# Patient Record
Sex: FEMALE | Race: OTHER | Employment: OTHER | URBAN - METROPOLITAN AREA
[De-identification: names, ages, dates, MRNs, and addresses within clinical notes are randomized per-mention and may not be internally consistent; named-entity substitution may affect disease eponyms.]

---

## 2017-05-26 ENCOUNTER — APPOINTMENT (OUTPATIENT)
Dept: GENERAL RADIOLOGY | Age: 82
DRG: 310 | End: 2017-05-26
Payer: MEDICARE

## 2017-05-26 ENCOUNTER — HOSPITAL ENCOUNTER (INPATIENT)
Age: 82
LOS: 1 days | Discharge: HOME | DRG: 310 | End: 2017-05-28
Attending: EMERGENCY MEDICINE | Admitting: INTERNAL MEDICINE
Payer: MEDICARE

## 2017-05-26 DIAGNOSIS — I47.1 PAROXYSMAL SUPRAVENTRICULAR TACHYCARDIA (HCC): Primary | ICD-10-CM

## 2017-05-26 LAB
ALBUMIN SERPL-MCNC: 3.5 G/DL (ref 3.9–4.9)
ALP BLD-CCNC: 90 U/L (ref 40–130)
ALT SERPL-CCNC: 18 U/L (ref 0–33)
ANION GAP SERPL CALCULATED.3IONS-SCNC: 15 MEQ/L (ref 7–13)
AST SERPL-CCNC: 25 U/L (ref 0–35)
BASOPHILS ABSOLUTE: 0 K/UL (ref 0–0.2)
BASOPHILS ABSOLUTE: 0.1 K/UL (ref 0–0.2)
BASOPHILS RELATIVE PERCENT: 0.4 %
BASOPHILS RELATIVE PERCENT: 0.5 %
BILIRUB SERPL-MCNC: 0.2 MG/DL (ref 0–1.2)
BILIRUBIN URINE: NEGATIVE
BLOOD, URINE: NEGATIVE
BUN BLDV-MCNC: 9 MG/DL (ref 8–23)
CALCIUM SERPL-MCNC: 8.7 MG/DL (ref 8.6–10.2)
CHLORIDE BLD-SCNC: 101 MEQ/L (ref 98–107)
CLARITY: CLEAR
CO2: 23 MEQ/L (ref 22–29)
COLOR: YELLOW
CREAT SERPL-MCNC: 0.66 MG/DL (ref 0.5–0.9)
EOSINOPHILS ABSOLUTE: 0 K/UL (ref 0–0.7)
EOSINOPHILS ABSOLUTE: 0.1 K/UL (ref 0–0.7)
EOSINOPHILS RELATIVE PERCENT: 0.3 %
EOSINOPHILS RELATIVE PERCENT: 0.8 %
GFR AFRICAN AMERICAN: >60
GFR NON-AFRICAN AMERICAN: >60
GLOBULIN: 2.4 G/DL (ref 2.3–3.5)
GLUCOSE BLD-MCNC: 192 MG/DL (ref 74–109)
GLUCOSE URINE: NEGATIVE MG/DL
HCT VFR BLD CALC: 30.5 % (ref 37–47)
HCT VFR BLD CALC: 41.7 % (ref 37–47)
HEMOGLOBIN: 13 G/DL (ref 12–16)
HEMOGLOBIN: 9.5 G/DL (ref 12–16)
INR BLD: 1
KETONES, URINE: NEGATIVE MG/DL
LEUKOCYTE ESTERASE, URINE: NEGATIVE
LYMPHOCYTES ABSOLUTE: 1.5 K/UL (ref 1–4.8)
LYMPHOCYTES ABSOLUTE: 1.8 K/UL (ref 1–4.8)
LYMPHOCYTES RELATIVE PERCENT: 13.1 %
LYMPHOCYTES RELATIVE PERCENT: 22.3 %
MCH RBC QN AUTO: 28 PG (ref 27–31.3)
MCH RBC QN AUTO: 28.3 PG (ref 27–31.3)
MCHC RBC AUTO-ENTMCNC: 31.2 % (ref 33–37)
MCHC RBC AUTO-ENTMCNC: 31.2 % (ref 33–37)
MCV RBC AUTO: 89.5 FL (ref 82–100)
MCV RBC AUTO: 90.6 FL (ref 82–100)
MONOCYTES ABSOLUTE: 0.3 K/UL (ref 0.2–0.8)
MONOCYTES ABSOLUTE: 0.5 K/UL (ref 0.2–0.8)
MONOCYTES RELATIVE PERCENT: 4 %
MONOCYTES RELATIVE PERCENT: 4.1 %
NEUTROPHILS ABSOLUTE: 6 K/UL (ref 1.4–6.5)
NEUTROPHILS ABSOLUTE: 9.6 K/UL (ref 1.4–6.5)
NEUTROPHILS RELATIVE PERCENT: 72.4 %
NEUTROPHILS RELATIVE PERCENT: 82.1 %
NITRITE, URINE: NEGATIVE
PDW BLD-RTO: 14.3 % (ref 11.5–14.5)
PDW BLD-RTO: 14.3 % (ref 11.5–14.5)
PH UA: 6.5 (ref 5–9)
PLATELET # BLD: 189 K/UL (ref 130–400)
PLATELET # BLD: 226 K/UL (ref 130–400)
POTASSIUM SERPL-SCNC: 4.5 MEQ/L (ref 3.5–5.1)
PROTEIN UA: ABNORMAL MG/DL
PROTHROMBIN TIME: 10.7 SEC (ref 8.1–13.7)
RBC # BLD: 3.36 M/UL (ref 4.2–5.4)
RBC # BLD: 4.66 M/UL (ref 4.2–5.4)
SODIUM BLD-SCNC: 139 MEQ/L (ref 132–144)
SPECIFIC GRAVITY UA: 1.01 (ref 1–1.03)
TOTAL PROTEIN: 5.9 G/DL (ref 6.4–8.1)
URINE REFLEX TO CULTURE: ABNORMAL
UROBILINOGEN, URINE: 0.2 E.U./DL
WBC # BLD: 11.7 K/UL (ref 4.8–10.8)
WBC # BLD: 8.3 K/UL (ref 4.8–10.8)

## 2017-05-26 PROCEDURE — G0378 HOSPITAL OBSERVATION PER HR: HCPCS

## 2017-05-26 PROCEDURE — 96374 THER/PROPH/DIAG INJ IV PUSH: CPT

## 2017-05-26 PROCEDURE — 2500000003 HC RX 250 WO HCPCS: Performed by: EMERGENCY MEDICINE

## 2017-05-26 PROCEDURE — 2580000003 HC RX 258: Performed by: EMERGENCY MEDICINE

## 2017-05-26 PROCEDURE — 85025 COMPLETE CBC W/AUTO DIFF WBC: CPT

## 2017-05-26 PROCEDURE — 71010 XR CHEST PORTABLE: CPT

## 2017-05-26 PROCEDURE — 85610 PROTHROMBIN TIME: CPT

## 2017-05-26 PROCEDURE — 81003 URINALYSIS AUTO W/O SCOPE: CPT

## 2017-05-26 PROCEDURE — 99285 EMERGENCY DEPT VISIT HI MDM: CPT

## 2017-05-26 PROCEDURE — 6360000002 HC RX W HCPCS: Performed by: EMERGENCY MEDICINE

## 2017-05-26 PROCEDURE — 93005 ELECTROCARDIOGRAM TRACING: CPT

## 2017-05-26 PROCEDURE — 80053 COMPREHEN METABOLIC PANEL: CPT

## 2017-05-26 PROCEDURE — 96372 THER/PROPH/DIAG INJ SC/IM: CPT

## 2017-05-26 PROCEDURE — 36415 COLL VENOUS BLD VENIPUNCTURE: CPT

## 2017-05-26 RX ORDER — ACETAMINOPHEN 325 MG/1
650 TABLET ORAL EVERY 4 HOURS PRN
Status: DISCONTINUED | OUTPATIENT
Start: 2017-05-26 | End: 2017-05-28 | Stop reason: HOSPADM

## 2017-05-26 RX ORDER — ACETAMINOPHEN 160 MG
TABLET,DISINTEGRATING ORAL DAILY
COMMUNITY
End: 2017-10-30

## 2017-05-26 RX ORDER — DILTIAZEM HYDROCHLORIDE 5 MG/ML
10 INJECTION INTRAVENOUS ONCE
Status: COMPLETED | OUTPATIENT
Start: 2017-05-26 | End: 2017-05-26

## 2017-05-26 RX ORDER — SOLIFENACIN SUCCINATE 5 MG/1
5 TABLET, FILM COATED ORAL DAILY
Status: ON HOLD | COMMUNITY
End: 2017-05-28 | Stop reason: SDUPTHER

## 2017-05-26 RX ORDER — SODIUM CHLORIDE 0.9 % (FLUSH) 0.9 %
10 SYRINGE (ML) INJECTION PRN
Status: DISCONTINUED | OUTPATIENT
Start: 2017-05-26 | End: 2017-05-28 | Stop reason: HOSPADM

## 2017-05-26 RX ORDER — ALENDRONATE SODIUM 70 MG/1
70 TABLET ORAL
Status: ON HOLD | COMMUNITY
End: 2017-05-28 | Stop reason: SDUPTHER

## 2017-05-26 RX ORDER — CYANOCOBALAMIN (VITAMIN B-12) 1000 MCG
1000 TABLET, EXTENDED RELEASE ORAL DAILY
COMMUNITY
End: 2017-10-30

## 2017-05-26 RX ORDER — MORPHINE SULFATE 4 MG/ML
4 INJECTION, SOLUTION INTRAMUSCULAR; INTRAVENOUS
Status: DISCONTINUED | OUTPATIENT
Start: 2017-05-26 | End: 2017-05-28 | Stop reason: HOSPADM

## 2017-05-26 RX ORDER — MORPHINE SULFATE 2 MG/ML
2 INJECTION, SOLUTION INTRAMUSCULAR; INTRAVENOUS
Status: DISCONTINUED | OUTPATIENT
Start: 2017-05-26 | End: 2017-05-28 | Stop reason: HOSPADM

## 2017-05-26 RX ORDER — OMEPRAZOLE 20 MG/1
20 CAPSULE, DELAYED RELEASE ORAL DAILY
Status: ON HOLD | COMMUNITY
End: 2017-05-28 | Stop reason: SDUPTHER

## 2017-05-26 RX ORDER — SODIUM CHLORIDE 0.9 % (FLUSH) 0.9 %
10 SYRINGE (ML) INJECTION EVERY 12 HOURS SCHEDULED
Status: DISCONTINUED | OUTPATIENT
Start: 2017-05-26 | End: 2017-05-28 | Stop reason: HOSPADM

## 2017-05-26 RX ORDER — RANITIDINE 150 MG/1
150 CAPSULE ORAL 2 TIMES DAILY
Status: ON HOLD | COMMUNITY
End: 2017-05-28 | Stop reason: SDUPTHER

## 2017-05-26 RX ADMIN — DILTIAZEM HYDROCHLORIDE 10 MG: 5 INJECTION INTRAVENOUS at 17:39

## 2017-05-26 RX ADMIN — DILTIAZEM HYDROCHLORIDE 10 MG: 5 INJECTION INTRAVENOUS at 17:23

## 2017-05-26 RX ADMIN — ENOXAPARIN SODIUM 40 MG: 100 INJECTION SUBCUTANEOUS at 19:24

## 2017-05-26 RX ADMIN — DILTIAZEM HYDROCHLORIDE 10 MG/HR: 5 INJECTION INTRAVENOUS at 17:44

## 2017-05-26 ASSESSMENT — ENCOUNTER SYMPTOMS
ANAL BLEEDING: 0
SHORTNESS OF BREATH: 0
NAUSEA: 0
FACIAL SWELLING: 0
VOICE CHANGE: 0
BACK PAIN: 0
TROUBLE SWALLOWING: 0
COLOR CHANGE: 0
ABDOMINAL DISTENTION: 0
PHOTOPHOBIA: 0
COUGH: 0
BLOOD IN STOOL: 0
EYE ITCHING: 0
DIARRHEA: 0
SINUS PRESSURE: 0
ABDOMINAL PAIN: 0
RHINORRHEA: 0
VOMITING: 0
EYE PAIN: 0
CHEST TIGHTNESS: 0
EYE REDNESS: 0
WHEEZING: 0
CHOKING: 0
SORE THROAT: 0
CONSTIPATION: 0
EYE DISCHARGE: 0
STRIDOR: 0

## 2017-05-26 ASSESSMENT — PAIN DESCRIPTION - FREQUENCY: FREQUENCY: CONTINUOUS

## 2017-05-26 ASSESSMENT — PAIN SCALES - GENERAL
PAINLEVEL_OUTOF10: 0
PAINLEVEL_OUTOF10: 8

## 2017-05-26 ASSESSMENT — PAIN DESCRIPTION - ONSET: ONSET: SUDDEN

## 2017-05-26 ASSESSMENT — PAIN DESCRIPTION - LOCATION: LOCATION: CHEST

## 2017-05-26 ASSESSMENT — PAIN DESCRIPTION - DESCRIPTORS: DESCRIPTORS: SHARP;TIGHTNESS;THROBBING

## 2017-05-26 ASSESSMENT — PAIN DESCRIPTION - PAIN TYPE: TYPE: ACUTE PAIN

## 2017-05-27 PROBLEM — I47.1 PAROXYSMAL SVT (SUPRAVENTRICULAR TACHYCARDIA) (HCC): Status: ACTIVE | Noted: 2017-05-27

## 2017-05-27 PROBLEM — E66.9 NON MORBID OBESITY: Status: ACTIVE | Noted: 2017-05-27

## 2017-05-27 PROBLEM — I47.10 PAROXYSMAL SVT (SUPRAVENTRICULAR TACHYCARDIA) (HCC): Status: ACTIVE | Noted: 2017-05-27

## 2017-05-27 PROBLEM — E11.9 TYPE 2 DIABETES MELLITUS (HCC): Status: ACTIVE | Noted: 2017-05-27

## 2017-05-27 PROBLEM — I10 ESSENTIAL HYPERTENSION: Status: ACTIVE | Noted: 2017-05-27

## 2017-05-27 LAB
GLUCOSE BLD-MCNC: 129 MG/DL (ref 60–115)
GLUCOSE BLD-MCNC: 140 MG/DL (ref 60–115)
GLUCOSE BLD-MCNC: 185 MG/DL (ref 60–115)
GLUCOSE BLD-MCNC: 189 MG/DL (ref 60–115)
LV EF: 60 %
LVEF MODALITY: NORMAL
MAGNESIUM: 2 MG/DL (ref 1.7–2.3)
PERFORMED ON: ABNORMAL
TROPONIN: <0.01 NG/ML (ref 0–0.01)

## 2017-05-27 PROCEDURE — 84484 ASSAY OF TROPONIN QUANT: CPT

## 2017-05-27 PROCEDURE — 2580000003 HC RX 258: Performed by: EMERGENCY MEDICINE

## 2017-05-27 PROCEDURE — 2700000000 HC OXYGEN THERAPY PER DAY

## 2017-05-27 PROCEDURE — 6360000002 HC RX W HCPCS: Performed by: EMERGENCY MEDICINE

## 2017-05-27 PROCEDURE — 36415 COLL VENOUS BLD VENIPUNCTURE: CPT

## 2017-05-27 PROCEDURE — 2500000003 HC RX 250 WO HCPCS: Performed by: EMERGENCY MEDICINE

## 2017-05-27 PROCEDURE — 93306 TTE W/DOPPLER COMPLETE: CPT

## 2017-05-27 PROCEDURE — 83735 ASSAY OF MAGNESIUM: CPT

## 2017-05-27 PROCEDURE — 1210000000 HC MED SURG R&B

## 2017-05-27 PROCEDURE — 99222 1ST HOSP IP/OBS MODERATE 55: CPT | Performed by: INTERNAL MEDICINE

## 2017-05-27 PROCEDURE — 6370000000 HC RX 637 (ALT 250 FOR IP): Performed by: PHYSICIAN ASSISTANT

## 2017-05-27 RX ORDER — PANTOPRAZOLE SODIUM 40 MG/1
40 TABLET, DELAYED RELEASE ORAL
Status: DISCONTINUED | OUTPATIENT
Start: 2017-05-28 | End: 2017-05-28 | Stop reason: HOSPADM

## 2017-05-27 RX ORDER — FAMOTIDINE 20 MG/1
20 TABLET, FILM COATED ORAL 2 TIMES DAILY
Status: DISCONTINUED | OUTPATIENT
Start: 2017-05-27 | End: 2017-05-28 | Stop reason: HOSPADM

## 2017-05-27 RX ORDER — CHOLECALCIFEROL (VITAMIN D3) 125 MCG
1000 CAPSULE ORAL DAILY
Status: DISCONTINUED | OUTPATIENT
Start: 2017-05-27 | End: 2017-05-28 | Stop reason: HOSPADM

## 2017-05-27 RX ORDER — OXYBUTYNIN CHLORIDE 5 MG/1
5 TABLET, EXTENDED RELEASE ORAL NIGHTLY
Status: DISCONTINUED | OUTPATIENT
Start: 2017-05-27 | End: 2017-05-28 | Stop reason: HOSPADM

## 2017-05-27 RX ORDER — NICOTINE POLACRILEX 4 MG
15 LOZENGE BUCCAL PRN
Status: DISCONTINUED | OUTPATIENT
Start: 2017-05-27 | End: 2017-05-28 | Stop reason: HOSPADM

## 2017-05-27 RX ORDER — ALENDRONATE SODIUM 70 MG/1
70 TABLET ORAL
Status: DISCONTINUED | OUTPATIENT
Start: 2017-05-27 | End: 2017-05-27 | Stop reason: CLARIF

## 2017-05-27 RX ORDER — DEXTROSE MONOHYDRATE 50 MG/ML
100 INJECTION, SOLUTION INTRAVENOUS PRN
Status: DISCONTINUED | OUTPATIENT
Start: 2017-05-27 | End: 2017-05-28 | Stop reason: HOSPADM

## 2017-05-27 RX ORDER — DEXTROSE MONOHYDRATE 25 G/50ML
12.5 INJECTION, SOLUTION INTRAVENOUS PRN
Status: DISCONTINUED | OUTPATIENT
Start: 2017-05-27 | End: 2017-05-28 | Stop reason: HOSPADM

## 2017-05-27 RX ADMIN — ENOXAPARIN SODIUM 40 MG: 100 INJECTION SUBCUTANEOUS at 08:26

## 2017-05-27 RX ADMIN — OXYBUTYNIN CHLORIDE 5 MG: 5 TABLET, FILM COATED, EXTENDED RELEASE ORAL at 21:36

## 2017-05-27 RX ADMIN — DILTIAZEM HYDROCHLORIDE 10 MG/HR: 5 INJECTION INTRAVENOUS at 04:50

## 2017-05-27 RX ADMIN — METFORMIN HYDROCHLORIDE 500 MG: 500 TABLET ORAL at 18:15

## 2017-05-27 RX ADMIN — Medication 10 ML: at 21:36

## 2017-05-27 RX ADMIN — DILTIAZEM HYDROCHLORIDE 30 MG: 30 TABLET, FILM COATED ORAL at 10:46

## 2017-05-27 RX ADMIN — DILTIAZEM HYDROCHLORIDE 30 MG: 30 TABLET, FILM COATED ORAL at 18:15

## 2017-05-27 RX ADMIN — METFORMIN HYDROCHLORIDE 500 MG: 500 TABLET ORAL at 09:48

## 2017-05-27 RX ADMIN — LINAGLIPTIN 5 MG: 5 TABLET, FILM COATED ORAL at 09:48

## 2017-05-27 RX ADMIN — Medication 10 ML: at 08:26

## 2017-05-27 RX ADMIN — DILTIAZEM HYDROCHLORIDE 30 MG: 30 TABLET, FILM COATED ORAL at 23:11

## 2017-05-27 RX ADMIN — INSULIN LISPRO 2 UNITS: 100 INJECTION, SOLUTION INTRAVENOUS; SUBCUTANEOUS at 13:40

## 2017-05-27 RX ADMIN — FAMOTIDINE 20 MG: 20 TABLET ORAL at 21:36

## 2017-05-27 RX ADMIN — FAMOTIDINE 20 MG: 20 TABLET ORAL at 09:48

## 2017-05-27 RX ADMIN — CYANOCOBALAMIN TAB 500 MCG 1000 MCG: 500 TAB at 09:48

## 2017-05-27 ASSESSMENT — ENCOUNTER SYMPTOMS
CHEST TIGHTNESS: 0
SHORTNESS OF BREATH: 1
ABDOMINAL PAIN: 0
VOMITING: 0
CONSTIPATION: 0
APNEA: 0
COUGH: 0
NAUSEA: 0
COLOR CHANGE: 0
WHEEZING: 0
DIARRHEA: 0

## 2017-05-28 VITALS
BODY MASS INDEX: 34.54 KG/M2 | SYSTOLIC BLOOD PRESSURE: 149 MMHG | DIASTOLIC BLOOD PRESSURE: 63 MMHG | RESPIRATION RATE: 18 BRPM | OXYGEN SATURATION: 94 % | HEIGHT: 60 IN | TEMPERATURE: 98.1 F | HEART RATE: 85 BPM | WEIGHT: 175.93 LBS

## 2017-05-28 LAB
GLUCOSE BLD-MCNC: 128 MG/DL (ref 60–115)
GLUCOSE BLD-MCNC: 129 MG/DL (ref 60–115)
PERFORMED ON: ABNORMAL
PERFORMED ON: ABNORMAL

## 2017-05-28 PROCEDURE — 99217 PR OBSERVATION CARE DISCHARGE MANAGEMENT: CPT | Performed by: PHYSICIAN ASSISTANT

## 2017-05-28 PROCEDURE — 2580000003 HC RX 258: Performed by: EMERGENCY MEDICINE

## 2017-05-28 PROCEDURE — 6360000002 HC RX W HCPCS: Performed by: EMERGENCY MEDICINE

## 2017-05-28 PROCEDURE — 6370000000 HC RX 637 (ALT 250 FOR IP): Performed by: PHYSICIAN ASSISTANT

## 2017-05-28 RX ORDER — DILTIAZEM HYDROCHLORIDE 180 MG/1
180 CAPSULE, COATED, EXTENDED RELEASE ORAL DAILY
Qty: 30 CAPSULE | Refills: 3 | Status: SHIPPED | OUTPATIENT
Start: 2017-05-28 | End: 2017-08-04 | Stop reason: SDUPTHER

## 2017-05-28 RX ORDER — DILTIAZEM HYDROCHLORIDE 180 MG/1
180 CAPSULE, COATED, EXTENDED RELEASE ORAL DAILY
Qty: 30 CAPSULE | Refills: 3 | Status: SHIPPED | OUTPATIENT
Start: 2017-05-28 | End: 2017-05-28 | Stop reason: SDUPTHER

## 2017-05-28 RX ORDER — OMEPRAZOLE 20 MG/1
20 CAPSULE, DELAYED RELEASE ORAL DAILY
Qty: 30 CAPSULE | Refills: 3 | Status: SHIPPED | OUTPATIENT
Start: 2017-05-28 | End: 2017-09-14 | Stop reason: SDUPTHER

## 2017-05-28 RX ORDER — ALENDRONATE SODIUM 70 MG/1
70 TABLET ORAL
Qty: 4 TABLET | Refills: 3 | Status: SHIPPED | OUTPATIENT
Start: 2017-05-28 | End: 2017-08-04 | Stop reason: SDUPTHER

## 2017-05-28 RX ORDER — SOLIFENACIN SUCCINATE 5 MG/1
5 TABLET, FILM COATED ORAL DAILY
Qty: 30 TABLET | Refills: 3 | Status: SHIPPED | OUTPATIENT
Start: 2017-05-28 | End: 2017-09-14

## 2017-05-28 RX ORDER — RANITIDINE 150 MG/1
150 CAPSULE ORAL 2 TIMES DAILY
Qty: 60 CAPSULE | Refills: 3 | Status: SHIPPED | OUTPATIENT
Start: 2017-05-28 | End: 2017-08-04 | Stop reason: SDUPTHER

## 2017-05-28 RX ADMIN — FAMOTIDINE 20 MG: 20 TABLET ORAL at 09:09

## 2017-05-28 RX ADMIN — DILTIAZEM HYDROCHLORIDE 30 MG: 30 TABLET, FILM COATED ORAL at 06:12

## 2017-05-28 RX ADMIN — DILTIAZEM HYDROCHLORIDE 30 MG: 30 TABLET, FILM COATED ORAL at 12:07

## 2017-05-28 RX ADMIN — PANTOPRAZOLE SODIUM 40 MG: 40 TABLET, DELAYED RELEASE ORAL at 09:09

## 2017-05-28 RX ADMIN — CYANOCOBALAMIN TAB 500 MCG 1000 MCG: 500 TAB at 09:09

## 2017-05-28 RX ADMIN — LINAGLIPTIN 5 MG: 5 TABLET, FILM COATED ORAL at 09:09

## 2017-05-28 RX ADMIN — Medication 10 ML: at 09:12

## 2017-05-28 RX ADMIN — METFORMIN HYDROCHLORIDE 500 MG: 500 TABLET ORAL at 09:09

## 2017-05-28 RX ADMIN — ENOXAPARIN SODIUM 40 MG: 100 INJECTION SUBCUTANEOUS at 09:10

## 2017-05-31 LAB
EKG ATRIAL RATE: 187 BPM
EKG ATRIAL RATE: 91 BPM
EKG P AXIS: 66 DEGREES
EKG P-R INTERVAL: 172 MS
EKG Q-T INTERVAL: 260 MS
EKG Q-T INTERVAL: 366 MS
EKG QRS DURATION: 68 MS
EKG QRS DURATION: 76 MS
EKG QTC CALCULATION (BAZETT): 447 MS
EKG QTC CALCULATION (BAZETT): 450 MS
EKG R AXIS: 10 DEGREES
EKG R AXIS: 47 DEGREES
EKG T AXIS: 27 DEGREES
EKG T AXIS: 92 DEGREES
EKG VENTRICULAR RATE: 178 BPM
EKG VENTRICULAR RATE: 91 BPM

## 2017-08-04 ENCOUNTER — OFFICE VISIT (OUTPATIENT)
Dept: FAMILY MEDICINE CLINIC | Age: 82
End: 2017-08-04

## 2017-08-04 VITALS
RESPIRATION RATE: 12 BRPM | OXYGEN SATURATION: 97 % | BODY MASS INDEX: 34.63 KG/M2 | SYSTOLIC BLOOD PRESSURE: 122 MMHG | TEMPERATURE: 96.9 F | WEIGHT: 165 LBS | HEIGHT: 58 IN | HEART RATE: 76 BPM | DIASTOLIC BLOOD PRESSURE: 80 MMHG

## 2017-08-04 DIAGNOSIS — I10 ESSENTIAL HYPERTENSION: ICD-10-CM

## 2017-08-04 DIAGNOSIS — M54.50 CHRONIC MIDLINE LOW BACK PAIN WITHOUT SCIATICA: ICD-10-CM

## 2017-08-04 DIAGNOSIS — M15.9 PRIMARY OSTEOARTHRITIS INVOLVING MULTIPLE JOINTS: ICD-10-CM

## 2017-08-04 DIAGNOSIS — E11.9 TYPE 2 DIABETES MELLITUS WITHOUT COMPLICATION, WITHOUT LONG-TERM CURRENT USE OF INSULIN (HCC): Primary | ICD-10-CM

## 2017-08-04 DIAGNOSIS — R26.9 ABNORMAL GAIT: ICD-10-CM

## 2017-08-04 DIAGNOSIS — G89.29 CHRONIC MIDLINE LOW BACK PAIN WITHOUT SCIATICA: ICD-10-CM

## 2017-08-04 DIAGNOSIS — K21.9 GASTROESOPHAGEAL REFLUX DISEASE WITHOUT ESOPHAGITIS: ICD-10-CM

## 2017-08-04 DIAGNOSIS — I47.1 PAROXYSMAL SUPRAVENTRICULAR TACHYCARDIA (HCC): ICD-10-CM

## 2017-08-04 DIAGNOSIS — Z91.81 RISK FOR FALLS: ICD-10-CM

## 2017-08-04 DIAGNOSIS — M85.80 OSTEOPENIA, UNSPECIFIED LOCATION: ICD-10-CM

## 2017-08-04 PROBLEM — M15.0 PRIMARY OSTEOARTHRITIS INVOLVING MULTIPLE JOINTS: Status: ACTIVE | Noted: 2017-08-04

## 2017-08-04 PROCEDURE — 99203 OFFICE O/P NEW LOW 30 MIN: CPT | Performed by: FAMILY MEDICINE

## 2017-08-04 RX ORDER — OMEPRAZOLE 20 MG/1
20 CAPSULE, DELAYED RELEASE ORAL DAILY
Qty: 30 CAPSULE | Refills: 3 | Status: CANCELLED | OUTPATIENT
Start: 2017-08-04

## 2017-08-04 RX ORDER — DILTIAZEM HYDROCHLORIDE 180 MG/1
180 CAPSULE, COATED, EXTENDED RELEASE ORAL DAILY
Qty: 30 CAPSULE | Refills: 3 | Status: ON HOLD | OUTPATIENT
Start: 2017-08-04 | End: 2017-08-15 | Stop reason: HOSPADM

## 2017-08-04 RX ORDER — SOLIFENACIN SUCCINATE 5 MG/1
5 TABLET, FILM COATED ORAL DAILY
Qty: 30 TABLET | Refills: 3 | Status: CANCELLED | OUTPATIENT
Start: 2017-08-04

## 2017-08-04 RX ORDER — IBUPROFEN 400 MG/1
400 TABLET ORAL EVERY 8 HOURS PRN
Qty: 120 TABLET | Refills: 3 | Status: SHIPPED | OUTPATIENT
Start: 2017-08-04 | End: 2018-02-05 | Stop reason: ALTCHOICE

## 2017-08-04 RX ORDER — RANITIDINE 150 MG/1
150 CAPSULE ORAL 2 TIMES DAILY
Qty: 60 CAPSULE | Refills: 3 | Status: SHIPPED | OUTPATIENT
Start: 2017-08-04 | End: 2018-02-23 | Stop reason: SDUPTHER

## 2017-08-04 RX ORDER — ALENDRONATE SODIUM 70 MG/1
70 TABLET ORAL
Qty: 4 TABLET | Refills: 3 | Status: SHIPPED | OUTPATIENT
Start: 2017-08-04 | End: 2018-02-05

## 2017-08-04 RX ORDER — IBUPROFEN 400 MG/1
400 TABLET ORAL EVERY 8 HOURS PRN
COMMUNITY
End: 2017-08-04 | Stop reason: SDUPTHER

## 2017-08-04 ASSESSMENT — PATIENT HEALTH QUESTIONNAIRE - PHQ9
SUM OF ALL RESPONSES TO PHQ9 QUESTIONS 1 & 2: 2
2. FEELING DOWN, DEPRESSED OR HOPELESS: 1
SUM OF ALL RESPONSES TO PHQ QUESTIONS 1-9: 2
1. LITTLE INTEREST OR PLEASURE IN DOING THINGS: 1

## 2017-08-04 ASSESSMENT — ENCOUNTER SYMPTOMS
BACK PAIN: 1
GASTROINTESTINAL NEGATIVE: 1
RESPIRATORY NEGATIVE: 1
ALLERGIC/IMMUNOLOGIC NEGATIVE: 1

## 2017-08-11 ENCOUNTER — HOSPITAL ENCOUNTER (INPATIENT)
Age: 82
LOS: 4 days | Discharge: HOME | DRG: 274 | End: 2017-08-15
Attending: EMERGENCY MEDICINE | Admitting: INTERNAL MEDICINE
Payer: MEDICARE

## 2017-08-11 DIAGNOSIS — I47.1 SVT (SUPRAVENTRICULAR TACHYCARDIA) (HCC): Primary | ICD-10-CM

## 2017-08-11 PROBLEM — E78.5 DYSLIPIDEMIA: Status: ACTIVE | Noted: 2017-08-11

## 2017-08-11 LAB
ALBUMIN SERPL-MCNC: 3.9 G/DL (ref 3.9–4.9)
ALP BLD-CCNC: 94 U/L (ref 40–130)
ALT SERPL-CCNC: 30 U/L (ref 0–33)
ANION GAP SERPL CALCULATED.3IONS-SCNC: 17 MEQ/L (ref 7–13)
AST SERPL-CCNC: 30 U/L (ref 0–35)
BASOPHILS ABSOLUTE: 0.1 K/UL (ref 0–0.2)
BASOPHILS RELATIVE PERCENT: 0.4 %
BILIRUB SERPL-MCNC: 0.2 MG/DL (ref 0–1.2)
BUN BLDV-MCNC: 9 MG/DL (ref 8–23)
CALCIUM SERPL-MCNC: 8.5 MG/DL (ref 8.6–10.2)
CHLORIDE BLD-SCNC: 101 MEQ/L (ref 98–107)
CO2: 24 MEQ/L (ref 22–29)
CREAT SERPL-MCNC: 0.66 MG/DL (ref 0.5–0.9)
EOSINOPHILS ABSOLUTE: 0 K/UL (ref 0–0.7)
EOSINOPHILS RELATIVE PERCENT: 0.2 %
GFR AFRICAN AMERICAN: >60
GFR NON-AFRICAN AMERICAN: >60
GLOBULIN: 2.5 G/DL (ref 2.3–3.5)
GLUCOSE BLD-MCNC: 191 MG/DL (ref 74–109)
HCT VFR BLD CALC: 42.7 % (ref 37–47)
HEMOGLOBIN: 13.6 G/DL (ref 12–16)
LYMPHOCYTES ABSOLUTE: 1.6 K/UL (ref 1–4.8)
LYMPHOCYTES RELATIVE PERCENT: 13.8 %
MCH RBC QN AUTO: 27.9 PG (ref 27–31.3)
MCHC RBC AUTO-ENTMCNC: 31.9 % (ref 33–37)
MCV RBC AUTO: 87.5 FL (ref 82–100)
MONOCYTES ABSOLUTE: 0.4 K/UL (ref 0.2–0.8)
MONOCYTES RELATIVE PERCENT: 3.4 %
NEUTROPHILS ABSOLUTE: 9.8 K/UL (ref 1.4–6.5)
NEUTROPHILS RELATIVE PERCENT: 82.2 %
PDW BLD-RTO: 14.9 % (ref 11.5–14.5)
PLATELET # BLD: 274 K/UL (ref 130–400)
POTASSIUM SERPL-SCNC: 4.8 MEQ/L (ref 3.5–5.1)
PRO-BNP: 610 PG/ML
RBC # BLD: 4.88 M/UL (ref 4.2–5.4)
SODIUM BLD-SCNC: 142 MEQ/L (ref 132–144)
TOTAL PROTEIN: 6.4 G/DL (ref 6.4–8.1)
TROPONIN: <0.01 NG/ML (ref 0–0.01)
TROPONIN: <0.01 NG/ML (ref 0–0.01)
WBC # BLD: 11.9 K/UL (ref 4.8–10.8)

## 2017-08-11 PROCEDURE — 85025 COMPLETE CBC W/AUTO DIFF WBC: CPT

## 2017-08-11 PROCEDURE — 99223 1ST HOSP IP/OBS HIGH 75: CPT | Performed by: INTERNAL MEDICINE

## 2017-08-11 PROCEDURE — 93005 ELECTROCARDIOGRAM TRACING: CPT

## 2017-08-11 PROCEDURE — 80053 COMPREHEN METABOLIC PANEL: CPT

## 2017-08-11 PROCEDURE — 2060000000 HC ICU INTERMEDIATE R&B

## 2017-08-11 PROCEDURE — 6360000002 HC RX W HCPCS: Performed by: PHYSICIAN ASSISTANT

## 2017-08-11 PROCEDURE — 2500000003 HC RX 250 WO HCPCS: Performed by: EMERGENCY MEDICINE

## 2017-08-11 PROCEDURE — 83880 ASSAY OF NATRIURETIC PEPTIDE: CPT

## 2017-08-11 PROCEDURE — 2580000003 HC RX 258

## 2017-08-11 PROCEDURE — 84484 ASSAY OF TROPONIN QUANT: CPT

## 2017-08-11 PROCEDURE — 93010 ELECTROCARDIOGRAM REPORT: CPT | Performed by: INTERNAL MEDICINE

## 2017-08-11 PROCEDURE — 96374 THER/PROPH/DIAG INJ IV PUSH: CPT

## 2017-08-11 PROCEDURE — 2580000003 HC RX 258: Performed by: EMERGENCY MEDICINE

## 2017-08-11 PROCEDURE — 36415 COLL VENOUS BLD VENIPUNCTURE: CPT

## 2017-08-11 PROCEDURE — 6370000000 HC RX 637 (ALT 250 FOR IP): Performed by: PHYSICIAN ASSISTANT

## 2017-08-11 PROCEDURE — 99285 EMERGENCY DEPT VISIT HI MDM: CPT

## 2017-08-11 RX ORDER — DILTIAZEM HYDROCHLORIDE 5 MG/ML
10 INJECTION INTRAVENOUS ONCE
Status: COMPLETED | OUTPATIENT
Start: 2017-08-11 | End: 2017-08-11

## 2017-08-11 RX ORDER — ATORVASTATIN CALCIUM 20 MG/1
20 TABLET, FILM COATED ORAL NIGHTLY
Status: DISCONTINUED | OUTPATIENT
Start: 2017-08-11 | End: 2017-08-13

## 2017-08-11 RX ORDER — NITROGLYCERIN 0.4 MG/1
0.4 TABLET SUBLINGUAL EVERY 5 MIN PRN
Status: DISCONTINUED | OUTPATIENT
Start: 2017-08-11 | End: 2017-08-15 | Stop reason: HOSPADM

## 2017-08-11 RX ORDER — ACETAMINOPHEN 325 MG/1
650 TABLET ORAL EVERY 4 HOURS PRN
Status: DISCONTINUED | OUTPATIENT
Start: 2017-08-11 | End: 2017-08-15 | Stop reason: HOSPADM

## 2017-08-11 RX ORDER — SODIUM CHLORIDE 9 MG/ML
INJECTION, SOLUTION INTRAVENOUS
Status: COMPLETED
Start: 2017-08-11 | End: 2017-08-11

## 2017-08-11 RX ORDER — PANTOPRAZOLE SODIUM 40 MG/1
40 TABLET, DELAYED RELEASE ORAL
Status: DISCONTINUED | OUTPATIENT
Start: 2017-08-12 | End: 2017-08-15 | Stop reason: HOSPADM

## 2017-08-11 RX ORDER — MORPHINE SULFATE 4 MG/ML
2 INJECTION, SOLUTION INTRAMUSCULAR; INTRAVENOUS
Status: DISCONTINUED | OUTPATIENT
Start: 2017-08-11 | End: 2017-08-15 | Stop reason: HOSPADM

## 2017-08-11 RX ORDER — ONDANSETRON 2 MG/ML
4 INJECTION INTRAMUSCULAR; INTRAVENOUS EVERY 6 HOURS PRN
Status: DISCONTINUED | OUTPATIENT
Start: 2017-08-11 | End: 2017-08-15 | Stop reason: HOSPADM

## 2017-08-11 RX ORDER — ALENDRONATE SODIUM 70 MG/1
70 TABLET ORAL
Status: DISCONTINUED | OUTPATIENT
Start: 2017-08-11 | End: 2017-08-11 | Stop reason: CLARIF

## 2017-08-11 RX ORDER — SODIUM CHLORIDE 0.9 % (FLUSH) 0.9 %
10 SYRINGE (ML) INJECTION PRN
Status: DISCONTINUED | OUTPATIENT
Start: 2017-08-11 | End: 2017-08-15 | Stop reason: HOSPADM

## 2017-08-11 RX ORDER — 0.9 % SODIUM CHLORIDE 0.9 %
500 INTRAVENOUS SOLUTION INTRAVENOUS ONCE
Status: COMPLETED | OUTPATIENT
Start: 2017-08-11 | End: 2017-08-11

## 2017-08-11 RX ORDER — OXYBUTYNIN CHLORIDE 5 MG/1
5 TABLET, EXTENDED RELEASE ORAL NIGHTLY
Status: DISCONTINUED | OUTPATIENT
Start: 2017-08-11 | End: 2017-08-15 | Stop reason: HOSPADM

## 2017-08-11 RX ORDER — SODIUM CHLORIDE 0.9 % (FLUSH) 0.9 %
10 SYRINGE (ML) INJECTION EVERY 12 HOURS SCHEDULED
Status: DISCONTINUED | OUTPATIENT
Start: 2017-08-11 | End: 2017-08-15 | Stop reason: HOSPADM

## 2017-08-11 RX ORDER — MORPHINE SULFATE 4 MG/ML
4 INJECTION, SOLUTION INTRAMUSCULAR; INTRAVENOUS
Status: DISCONTINUED | OUTPATIENT
Start: 2017-08-11 | End: 2017-08-15 | Stop reason: HOSPADM

## 2017-08-11 RX ORDER — CHOLECALCIFEROL (VITAMIN D3) 125 MCG
1000 CAPSULE ORAL DAILY
Status: DISCONTINUED | OUTPATIENT
Start: 2017-08-11 | End: 2017-08-15 | Stop reason: HOSPADM

## 2017-08-11 RX ORDER — FAMOTIDINE 20 MG/1
20 TABLET, FILM COATED ORAL 2 TIMES DAILY
Status: DISCONTINUED | OUTPATIENT
Start: 2017-08-11 | End: 2017-08-15 | Stop reason: HOSPADM

## 2017-08-11 RX ORDER — SODIUM CHLORIDE 9 MG/ML
INJECTION, SOLUTION INTRAVENOUS CONTINUOUS
Status: DISCONTINUED | OUTPATIENT
Start: 2017-08-11 | End: 2017-08-12

## 2017-08-11 RX ADMIN — FAMOTIDINE 20 MG: 20 TABLET ORAL at 20:54

## 2017-08-11 RX ADMIN — SODIUM CHLORIDE: 9 INJECTION, SOLUTION INTRAVENOUS at 20:05

## 2017-08-11 RX ADMIN — SODIUM CHLORIDE 500 ML: 9 INJECTION, SOLUTION INTRAVENOUS at 13:08

## 2017-08-11 RX ADMIN — ENOXAPARIN SODIUM 40 MG: 40 INJECTION SUBCUTANEOUS at 18:40

## 2017-08-11 RX ADMIN — DILTIAZEM HYDROCHLORIDE 10 MG: 5 INJECTION INTRAVENOUS at 13:37

## 2017-08-11 RX ADMIN — DILTIAZEM HYDROCHLORIDE 10 MG: 5 INJECTION INTRAVENOUS at 13:08

## 2017-08-11 RX ADMIN — DILTIAZEM HYDROCHLORIDE 5 MG/HR: 5 INJECTION INTRAVENOUS at 14:06

## 2017-08-11 RX ADMIN — ATORVASTATIN CALCIUM 20 MG: 20 TABLET, FILM COATED ORAL at 20:54

## 2017-08-11 RX ADMIN — OXYBUTYNIN CHLORIDE 5 MG: 5 TABLET, FILM COATED, EXTENDED RELEASE ORAL at 20:54

## 2017-08-11 RX ADMIN — SODIUM CHLORIDE: 9 INJECTION, SOLUTION INTRAVENOUS at 13:11

## 2017-08-11 ASSESSMENT — ENCOUNTER SYMPTOMS
DIARRHEA: 0
CHEST TIGHTNESS: 0
ABDOMINAL PAIN: 0
SHORTNESS OF BREATH: 0
BLOOD IN STOOL: 0
COLOR CHANGE: 0
SORE THROAT: 0
VOMITING: 0
APNEA: 0
WHEEZING: 0
NAUSEA: 0
EYE PAIN: 0
COUGH: 0
CONSTIPATION: 0

## 2017-08-11 ASSESSMENT — PAIN SCALES - GENERAL
PAINLEVEL_OUTOF10: 0
PAINLEVEL_OUTOF10: 0

## 2017-08-12 LAB
ANION GAP SERPL CALCULATED.3IONS-SCNC: 14 MEQ/L (ref 7–13)
BUN BLDV-MCNC: 9 MG/DL (ref 8–23)
CALCIUM SERPL-MCNC: 7.9 MG/DL (ref 8.6–10.2)
CHLORIDE BLD-SCNC: 102 MEQ/L (ref 98–107)
CHOLESTEROL, TOTAL: 229 MG/DL (ref 0–199)
CO2: 25 MEQ/L (ref 22–29)
CREAT SERPL-MCNC: 0.6 MG/DL (ref 0.5–0.9)
GFR AFRICAN AMERICAN: >60
GFR NON-AFRICAN AMERICAN: >60
GLUCOSE BLD-MCNC: 172 MG/DL (ref 74–109)
HCT VFR BLD CALC: 36.4 % (ref 37–47)
HDLC SERPL-MCNC: 48 MG/DL (ref 40–59)
HEMOGLOBIN: 11.7 G/DL (ref 12–16)
INR BLD: 1
LDL CHOLESTEROL CALCULATED: 147 MG/DL (ref 0–129)
MAGNESIUM: 1.7 MG/DL (ref 1.7–2.3)
MCH RBC QN AUTO: 28.2 PG (ref 27–31.3)
MCHC RBC AUTO-ENTMCNC: 32.2 % (ref 33–37)
MCV RBC AUTO: 87.6 FL (ref 82–100)
PDW BLD-RTO: 14.9 % (ref 11.5–14.5)
PLATELET # BLD: 220 K/UL (ref 130–400)
POTASSIUM SERPL-SCNC: 4.5 MEQ/L (ref 3.5–5.1)
PROTHROMBIN TIME: 10.7 SEC (ref 8.1–13.7)
RBC # BLD: 4.15 M/UL (ref 4.2–5.4)
SODIUM BLD-SCNC: 141 MEQ/L (ref 132–144)
TRIGL SERPL-MCNC: 168 MG/DL (ref 0–200)
TSH SERPL DL<=0.05 MIU/L-ACNC: 2.01 UIU/ML (ref 0.27–4.2)
WBC # BLD: 9.8 K/UL (ref 4.8–10.8)

## 2017-08-12 PROCEDURE — 6370000000 HC RX 637 (ALT 250 FOR IP): Performed by: PHYSICIAN ASSISTANT

## 2017-08-12 PROCEDURE — 80048 BASIC METABOLIC PNL TOTAL CA: CPT

## 2017-08-12 PROCEDURE — 2700000000 HC OXYGEN THERAPY PER DAY

## 2017-08-12 PROCEDURE — 2580000003 HC RX 258

## 2017-08-12 PROCEDURE — 6360000002 HC RX W HCPCS: Performed by: NURSE PRACTITIONER

## 2017-08-12 PROCEDURE — 2060000000 HC ICU INTERMEDIATE R&B

## 2017-08-12 PROCEDURE — 36415 COLL VENOUS BLD VENIPUNCTURE: CPT

## 2017-08-12 PROCEDURE — 6370000000 HC RX 637 (ALT 250 FOR IP): Performed by: NURSE PRACTITIONER

## 2017-08-12 PROCEDURE — 85610 PROTHROMBIN TIME: CPT

## 2017-08-12 PROCEDURE — 2580000003 HC RX 258: Performed by: EMERGENCY MEDICINE

## 2017-08-12 PROCEDURE — 84443 ASSAY THYROID STIM HORMONE: CPT

## 2017-08-12 PROCEDURE — 2580000003 HC RX 258: Performed by: PHYSICIAN ASSISTANT

## 2017-08-12 PROCEDURE — 80061 LIPID PANEL: CPT

## 2017-08-12 PROCEDURE — 83735 ASSAY OF MAGNESIUM: CPT

## 2017-08-12 PROCEDURE — 2500000003 HC RX 250 WO HCPCS: Performed by: EMERGENCY MEDICINE

## 2017-08-12 PROCEDURE — 93005 ELECTROCARDIOGRAM TRACING: CPT

## 2017-08-12 PROCEDURE — 85027 COMPLETE CBC AUTOMATED: CPT

## 2017-08-12 PROCEDURE — 6360000002 HC RX W HCPCS: Performed by: PHYSICIAN ASSISTANT

## 2017-08-12 RX ORDER — DILTIAZEM HYDROCHLORIDE 180 MG/1
180 CAPSULE, COATED, EXTENDED RELEASE ORAL DAILY
Status: DISCONTINUED | OUTPATIENT
Start: 2017-08-12 | End: 2017-08-12

## 2017-08-12 RX ORDER — MAGNESIUM SULFATE IN WATER 40 MG/ML
2 INJECTION, SOLUTION INTRAVENOUS ONCE
Status: COMPLETED | OUTPATIENT
Start: 2017-08-12 | End: 2017-08-12

## 2017-08-12 RX ORDER — DILTIAZEM HYDROCHLORIDE 180 MG/1
180 CAPSULE, COATED, EXTENDED RELEASE ORAL DAILY
Status: DISCONTINUED | OUTPATIENT
Start: 2017-08-13 | End: 2017-08-14

## 2017-08-12 RX ORDER — SODIUM CHLORIDE 9 MG/ML
INJECTION, SOLUTION INTRAVENOUS
Status: COMPLETED
Start: 2017-08-12 | End: 2017-08-12

## 2017-08-12 RX ADMIN — ENOXAPARIN SODIUM 40 MG: 40 INJECTION SUBCUTANEOUS at 08:31

## 2017-08-12 RX ADMIN — DILTIAZEM HYDROCHLORIDE 2.5 MG/HR: 5 INJECTION INTRAVENOUS at 12:04

## 2017-08-12 RX ADMIN — METFORMIN HYDROCHLORIDE 500 MG: 500 TABLET ORAL at 16:50

## 2017-08-12 RX ADMIN — LINAGLIPTIN 5 MG: 5 TABLET, FILM COATED ORAL at 08:31

## 2017-08-12 RX ADMIN — METFORMIN HYDROCHLORIDE 500 MG: 500 TABLET ORAL at 08:30

## 2017-08-12 RX ADMIN — SODIUM CHLORIDE: 9 INJECTION, SOLUTION INTRAVENOUS at 05:12

## 2017-08-12 RX ADMIN — VITAMIN D, TAB 1000IU (100/BT) 2000 UNITS: 25 TAB at 08:30

## 2017-08-12 RX ADMIN — ATORVASTATIN CALCIUM 20 MG: 20 TABLET, FILM COATED ORAL at 20:42

## 2017-08-12 RX ADMIN — OXYBUTYNIN CHLORIDE 5 MG: 5 TABLET, FILM COATED, EXTENDED RELEASE ORAL at 20:42

## 2017-08-12 RX ADMIN — FAMOTIDINE 20 MG: 20 TABLET ORAL at 20:42

## 2017-08-12 RX ADMIN — CYANOCOBALAMIN TAB 500 MCG 1000 MCG: 500 TAB at 08:30

## 2017-08-12 RX ADMIN — SODIUM CHLORIDE, PRESERVATIVE FREE 10 ML: 5 INJECTION INTRAVENOUS at 08:31

## 2017-08-12 RX ADMIN — FAMOTIDINE 20 MG: 20 TABLET ORAL at 08:31

## 2017-08-12 RX ADMIN — MAGNESIUM SULFATE IN WATER 2 G: 40 INJECTION, SOLUTION INTRAVENOUS at 13:20

## 2017-08-12 RX ADMIN — PANTOPRAZOLE SODIUM 40 MG: 40 TABLET, DELAYED RELEASE ORAL at 06:33

## 2017-08-12 RX ADMIN — DILTIAZEM HYDROCHLORIDE 180 MG: 180 CAPSULE, COATED, EXTENDED RELEASE ORAL at 13:19

## 2017-08-12 RX ADMIN — SODIUM CHLORIDE 1000 ML: 9 INJECTION, SOLUTION INTRAVENOUS at 06:32

## 2017-08-12 ASSESSMENT — PAIN SCALES - GENERAL
PAINLEVEL_OUTOF10: 0

## 2017-08-12 ASSESSMENT — ENCOUNTER SYMPTOMS
ABDOMINAL DISTENTION: 0
COUGH: 0
ABDOMINAL PAIN: 0
CHEST TIGHTNESS: 0
SHORTNESS OF BREATH: 0
SINUS PRESSURE: 0
NAUSEA: 0
BACK PAIN: 0
EYE DISCHARGE: 0
FACIAL SWELLING: 0
DIARRHEA: 0
SORE THROAT: 0
WHEEZING: 0
CONSTIPATION: 0
EYE REDNESS: 0

## 2017-08-13 PROCEDURE — 93005 ELECTROCARDIOGRAM TRACING: CPT

## 2017-08-13 PROCEDURE — 6370000000 HC RX 637 (ALT 250 FOR IP): Performed by: PHYSICIAN ASSISTANT

## 2017-08-13 PROCEDURE — 2580000003 HC RX 258: Performed by: PHYSICIAN ASSISTANT

## 2017-08-13 PROCEDURE — 99232 SBSQ HOSP IP/OBS MODERATE 35: CPT | Performed by: INTERNAL MEDICINE

## 2017-08-13 PROCEDURE — 6360000002 HC RX W HCPCS: Performed by: PHYSICIAN ASSISTANT

## 2017-08-13 PROCEDURE — 6360000002 HC RX W HCPCS: Performed by: NURSE PRACTITIONER

## 2017-08-13 PROCEDURE — 6370000000 HC RX 637 (ALT 250 FOR IP): Performed by: SPECIALIST

## 2017-08-13 PROCEDURE — 6370000000 HC RX 637 (ALT 250 FOR IP): Performed by: NURSE PRACTITIONER

## 2017-08-13 PROCEDURE — 2060000000 HC ICU INTERMEDIATE R&B

## 2017-08-13 RX ORDER — POTASSIUM CHLORIDE 7.45 MG/ML
10 INJECTION INTRAVENOUS PRN
Status: DISCONTINUED | OUTPATIENT
Start: 2017-08-13 | End: 2017-08-15 | Stop reason: HOSPADM

## 2017-08-13 RX ORDER — POTASSIUM CHLORIDE 20MEQ/15ML
40 LIQUID (ML) ORAL PRN
Status: DISCONTINUED | OUTPATIENT
Start: 2017-08-13 | End: 2017-08-15 | Stop reason: HOSPADM

## 2017-08-13 RX ORDER — FUROSEMIDE 10 MG/ML
40 INJECTION INTRAMUSCULAR; INTRAVENOUS ONCE
Status: COMPLETED | OUTPATIENT
Start: 2017-08-13 | End: 2017-08-13

## 2017-08-13 RX ORDER — ATORVASTATIN CALCIUM 40 MG/1
40 TABLET, FILM COATED ORAL NIGHTLY
Status: DISCONTINUED | OUTPATIENT
Start: 2017-08-13 | End: 2017-08-15 | Stop reason: HOSPADM

## 2017-08-13 RX ORDER — POTASSIUM CHLORIDE 20 MEQ/1
40 TABLET, EXTENDED RELEASE ORAL PRN
Status: DISCONTINUED | OUTPATIENT
Start: 2017-08-13 | End: 2017-08-15 | Stop reason: HOSPADM

## 2017-08-13 RX ADMIN — ENOXAPARIN SODIUM 40 MG: 40 INJECTION SUBCUTANEOUS at 09:44

## 2017-08-13 RX ADMIN — METFORMIN HYDROCHLORIDE 500 MG: 500 TABLET ORAL at 17:14

## 2017-08-13 RX ADMIN — SODIUM CHLORIDE, PRESERVATIVE FREE 10 ML: 5 INJECTION INTRAVENOUS at 09:44

## 2017-08-13 RX ADMIN — LINAGLIPTIN 5 MG: 5 TABLET, FILM COATED ORAL at 09:43

## 2017-08-13 RX ADMIN — VITAMIN D, TAB 1000IU (100/BT) 2000 UNITS: 25 TAB at 09:44

## 2017-08-13 RX ADMIN — FAMOTIDINE 20 MG: 20 TABLET ORAL at 21:21

## 2017-08-13 RX ADMIN — FAMOTIDINE 20 MG: 20 TABLET ORAL at 09:44

## 2017-08-13 RX ADMIN — ATORVASTATIN CALCIUM 40 MG: 40 TABLET, FILM COATED ORAL at 21:21

## 2017-08-13 RX ADMIN — METFORMIN HYDROCHLORIDE 500 MG: 500 TABLET ORAL at 09:43

## 2017-08-13 RX ADMIN — PANTOPRAZOLE SODIUM 40 MG: 40 TABLET, DELAYED RELEASE ORAL at 06:43

## 2017-08-13 RX ADMIN — CYANOCOBALAMIN TAB 500 MCG 1000 MCG: 500 TAB at 09:44

## 2017-08-13 RX ADMIN — SODIUM CHLORIDE, PRESERVATIVE FREE 10 ML: 5 INJECTION INTRAVENOUS at 21:22

## 2017-08-13 RX ADMIN — FUROSEMIDE 40 MG: 10 INJECTION, SOLUTION INTRAVENOUS at 09:44

## 2017-08-13 RX ADMIN — OXYBUTYNIN CHLORIDE 5 MG: 5 TABLET, FILM COATED, EXTENDED RELEASE ORAL at 21:21

## 2017-08-13 RX ADMIN — DILTIAZEM HYDROCHLORIDE 180 MG: 180 CAPSULE, COATED, EXTENDED RELEASE ORAL at 09:43

## 2017-08-13 ASSESSMENT — ENCOUNTER SYMPTOMS
EYE REDNESS: 0
SHORTNESS OF BREATH: 0
CONSTIPATION: 0
SORE THROAT: 0
BACK PAIN: 0
EYE DISCHARGE: 0
DIARRHEA: 0
ABDOMINAL DISTENTION: 0
SINUS PRESSURE: 0
ABDOMINAL PAIN: 0
CHEST TIGHTNESS: 0
NAUSEA: 0
WHEEZING: 0
COUGH: 0
FACIAL SWELLING: 0

## 2017-08-13 ASSESSMENT — PAIN SCALES - GENERAL: PAINLEVEL_OUTOF10: 0

## 2017-08-14 ENCOUNTER — APPOINTMENT (OUTPATIENT)
Dept: GENERAL RADIOLOGY | Age: 82
DRG: 274 | End: 2017-08-14
Payer: MEDICARE

## 2017-08-14 ENCOUNTER — APPOINTMENT (OUTPATIENT)
Dept: CARDIAC CATH/INVASIVE PROCEDURES | Age: 82
DRG: 274 | End: 2017-08-14
Payer: MEDICARE

## 2017-08-14 LAB
ANION GAP SERPL CALCULATED.3IONS-SCNC: 15 MEQ/L (ref 7–13)
BUN BLDV-MCNC: 8 MG/DL (ref 8–23)
CALCIUM SERPL-MCNC: 8.7 MG/DL (ref 8.6–10.2)
CHLORIDE BLD-SCNC: 99 MEQ/L (ref 98–107)
CO2: 27 MEQ/L (ref 22–29)
CREAT SERPL-MCNC: 0.57 MG/DL (ref 0.5–0.9)
GFR AFRICAN AMERICAN: >60
GFR NON-AFRICAN AMERICAN: >60
GLUCOSE BLD-MCNC: 155 MG/DL (ref 74–109)
HCT VFR BLD CALC: 41.8 % (ref 37–47)
HEMOGLOBIN: 13.5 G/DL (ref 12–16)
MAGNESIUM: 2.1 MG/DL (ref 1.7–2.3)
MCH RBC QN AUTO: 28.1 PG (ref 27–31.3)
MCHC RBC AUTO-ENTMCNC: 32.3 % (ref 33–37)
MCV RBC AUTO: 87 FL (ref 82–100)
PDW BLD-RTO: 14.6 % (ref 11.5–14.5)
PLATELET # BLD: 255 K/UL (ref 130–400)
POTASSIUM SERPL-SCNC: 4.1 MEQ/L (ref 3.5–5.1)
RBC # BLD: 4.81 M/UL (ref 4.2–5.4)
SODIUM BLD-SCNC: 141 MEQ/L (ref 132–144)
WBC # BLD: 10.6 K/UL (ref 4.8–10.8)

## 2017-08-14 PROCEDURE — G8987 SELF CARE CURRENT STATUS: HCPCS

## 2017-08-14 PROCEDURE — 2500000003 HC RX 250 WO HCPCS

## 2017-08-14 PROCEDURE — 93613 INTRACARDIAC EPHYS 3D MAPG: CPT | Performed by: SPECIALIST

## 2017-08-14 PROCEDURE — 85027 COMPLETE CBC AUTOMATED: CPT

## 2017-08-14 PROCEDURE — 6370000000 HC RX 637 (ALT 250 FOR IP): Performed by: PHYSICIAN ASSISTANT

## 2017-08-14 PROCEDURE — 2580000003 HC RX 258

## 2017-08-14 PROCEDURE — 99232 SBSQ HOSP IP/OBS MODERATE 35: CPT | Performed by: INTERNAL MEDICINE

## 2017-08-14 PROCEDURE — 71010 XR CHEST PORTABLE: CPT

## 2017-08-14 PROCEDURE — 02K83ZZ MAP CONDUCTION MECHANISM, PERCUTANEOUS APPROACH: ICD-10-PCS | Performed by: SPECIALIST

## 2017-08-14 PROCEDURE — 6360000002 HC RX W HCPCS

## 2017-08-14 PROCEDURE — G8978 MOBILITY CURRENT STATUS: HCPCS

## 2017-08-14 PROCEDURE — G8989 SELF CARE D/C STATUS: HCPCS

## 2017-08-14 PROCEDURE — 93620 COMP EP EVL R AT VEN PAC&REC: CPT | Performed by: SPECIALIST

## 2017-08-14 PROCEDURE — 97165 OT EVAL LOW COMPLEX 30 MIN: CPT

## 2017-08-14 PROCEDURE — C1894 INTRO/SHEATH, NON-LASER: HCPCS

## 2017-08-14 PROCEDURE — 97161 PT EVAL LOW COMPLEX 20 MIN: CPT

## 2017-08-14 PROCEDURE — 80048 BASIC METABOLIC PNL TOTAL CA: CPT

## 2017-08-14 PROCEDURE — 2580000003 HC RX 258: Performed by: PHYSICIAN ASSISTANT

## 2017-08-14 PROCEDURE — 93005 ELECTROCARDIOGRAM TRACING: CPT

## 2017-08-14 PROCEDURE — G8980 MOBILITY D/C STATUS: HCPCS

## 2017-08-14 PROCEDURE — 93010 ELECTROCARDIOGRAM REPORT: CPT | Performed by: INTERNAL MEDICINE

## 2017-08-14 PROCEDURE — 6370000000 HC RX 637 (ALT 250 FOR IP): Performed by: SPECIALIST

## 2017-08-14 PROCEDURE — G8979 MOBILITY GOAL STATUS: HCPCS

## 2017-08-14 PROCEDURE — G8988 SELF CARE GOAL STATUS: HCPCS

## 2017-08-14 PROCEDURE — 02583ZZ DESTRUCTION OF CONDUCTION MECHANISM, PERCUTANEOUS APPROACH: ICD-10-PCS | Performed by: SPECIALIST

## 2017-08-14 PROCEDURE — 6360000002 HC RX W HCPCS: Performed by: PHYSICIAN ASSISTANT

## 2017-08-14 PROCEDURE — C1730 CATH, EP, 19 OR FEW ELECT: HCPCS

## 2017-08-14 PROCEDURE — 2060000000 HC ICU INTERMEDIATE R&B

## 2017-08-14 PROCEDURE — 6370000000 HC RX 637 (ALT 250 FOR IP): Performed by: NURSE PRACTITIONER

## 2017-08-14 PROCEDURE — 2720000010 HC SURG SUPPLY STERILE

## 2017-08-14 PROCEDURE — 36415 COLL VENOUS BLD VENIPUNCTURE: CPT

## 2017-08-14 PROCEDURE — 83735 ASSAY OF MAGNESIUM: CPT

## 2017-08-14 RX ORDER — SODIUM CHLORIDE 0.9 % (FLUSH) 0.9 %
10 SYRINGE (ML) INJECTION EVERY 12 HOURS SCHEDULED
Status: DISCONTINUED | OUTPATIENT
Start: 2017-08-14 | End: 2017-08-15 | Stop reason: HOSPADM

## 2017-08-14 RX ORDER — SODIUM CHLORIDE 0.9 % (FLUSH) 0.9 %
10 SYRINGE (ML) INJECTION PRN
Status: DISCONTINUED | OUTPATIENT
Start: 2017-08-14 | End: 2017-08-15 | Stop reason: HOSPADM

## 2017-08-14 RX ADMIN — SODIUM CHLORIDE, PRESERVATIVE FREE 10 ML: 5 INJECTION INTRAVENOUS at 20:47

## 2017-08-14 RX ADMIN — METFORMIN HYDROCHLORIDE 500 MG: 500 TABLET ORAL at 07:51

## 2017-08-14 RX ADMIN — SODIUM CHLORIDE, PRESERVATIVE FREE 10 ML: 5 INJECTION INTRAVENOUS at 07:52

## 2017-08-14 RX ADMIN — VITAMIN D, TAB 1000IU (100/BT) 2000 UNITS: 25 TAB at 07:50

## 2017-08-14 RX ADMIN — ENOXAPARIN SODIUM 40 MG: 40 INJECTION SUBCUTANEOUS at 07:53

## 2017-08-14 RX ADMIN — OXYBUTYNIN CHLORIDE 5 MG: 5 TABLET, FILM COATED, EXTENDED RELEASE ORAL at 20:46

## 2017-08-14 RX ADMIN — CYANOCOBALAMIN TAB 500 MCG 1000 MCG: 500 TAB at 07:51

## 2017-08-14 RX ADMIN — FAMOTIDINE 20 MG: 20 TABLET ORAL at 20:46

## 2017-08-14 RX ADMIN — FAMOTIDINE 20 MG: 20 TABLET ORAL at 07:51

## 2017-08-14 RX ADMIN — ATORVASTATIN CALCIUM 40 MG: 40 TABLET, FILM COATED ORAL at 20:46

## 2017-08-14 RX ADMIN — PANTOPRAZOLE SODIUM 40 MG: 40 TABLET, DELAYED RELEASE ORAL at 06:19

## 2017-08-14 RX ADMIN — LINAGLIPTIN 5 MG: 5 TABLET, FILM COATED ORAL at 07:51

## 2017-08-14 ASSESSMENT — ENCOUNTER SYMPTOMS
COUGH: 0
NAUSEA: 0
EYE DISCHARGE: 0
CHEST TIGHTNESS: 0
SINUS PRESSURE: 0
FACIAL SWELLING: 0
ABDOMINAL DISTENTION: 0
EYE REDNESS: 0
DIARRHEA: 0
BACK PAIN: 0
ABDOMINAL PAIN: 0
CONSTIPATION: 0
SHORTNESS OF BREATH: 0
SORE THROAT: 0
WHEEZING: 0

## 2017-08-14 ASSESSMENT — PAIN SCALES - GENERAL
PAINLEVEL_OUTOF10: 0

## 2017-08-15 VITALS
BODY MASS INDEX: 41.71 KG/M2 | WEIGHT: 185.4 LBS | HEIGHT: 56 IN | HEART RATE: 101 BPM | OXYGEN SATURATION: 99 % | SYSTOLIC BLOOD PRESSURE: 138 MMHG | RESPIRATION RATE: 18 BRPM | DIASTOLIC BLOOD PRESSURE: 68 MMHG | TEMPERATURE: 98.1 F

## 2017-08-15 LAB
ANION GAP SERPL CALCULATED.3IONS-SCNC: 15 MEQ/L (ref 7–13)
BUN BLDV-MCNC: 8 MG/DL (ref 8–23)
CALCIUM SERPL-MCNC: 8.4 MG/DL (ref 8.6–10.2)
CHLORIDE BLD-SCNC: 101 MEQ/L (ref 98–107)
CO2: 27 MEQ/L (ref 22–29)
CREAT SERPL-MCNC: 0.52 MG/DL (ref 0.5–0.9)
GFR AFRICAN AMERICAN: >60
GFR NON-AFRICAN AMERICAN: >60
GLUCOSE BLD-MCNC: 162 MG/DL (ref 74–109)
HCT VFR BLD CALC: 39.4 % (ref 37–47)
HEMOGLOBIN: 12.5 G/DL (ref 12–16)
MAGNESIUM: 2.1 MG/DL (ref 1.7–2.3)
MCH RBC QN AUTO: 27.6 PG (ref 27–31.3)
MCHC RBC AUTO-ENTMCNC: 31.8 % (ref 33–37)
MCV RBC AUTO: 86.8 FL (ref 82–100)
PDW BLD-RTO: 14.5 % (ref 11.5–14.5)
PLATELET # BLD: 210 K/UL (ref 130–400)
POTASSIUM SERPL-SCNC: 4.3 MEQ/L (ref 3.5–5.1)
RBC # BLD: 4.54 M/UL (ref 4.2–5.4)
SODIUM BLD-SCNC: 143 MEQ/L (ref 132–144)
WBC # BLD: 11.2 K/UL (ref 4.8–10.8)

## 2017-08-15 PROCEDURE — 36415 COLL VENOUS BLD VENIPUNCTURE: CPT

## 2017-08-15 PROCEDURE — 99238 HOSP IP/OBS DSCHRG MGMT 30/<: CPT | Performed by: INTERNAL MEDICINE

## 2017-08-15 PROCEDURE — 2580000003 HC RX 258: Performed by: PHYSICIAN ASSISTANT

## 2017-08-15 PROCEDURE — 93005 ELECTROCARDIOGRAM TRACING: CPT

## 2017-08-15 PROCEDURE — 80048 BASIC METABOLIC PNL TOTAL CA: CPT

## 2017-08-15 PROCEDURE — 2700000000 HC OXYGEN THERAPY PER DAY

## 2017-08-15 PROCEDURE — 6370000000 HC RX 637 (ALT 250 FOR IP): Performed by: NURSE PRACTITIONER

## 2017-08-15 PROCEDURE — 6370000000 HC RX 637 (ALT 250 FOR IP): Performed by: PHYSICIAN ASSISTANT

## 2017-08-15 PROCEDURE — 93010 ELECTROCARDIOGRAM REPORT: CPT | Performed by: INTERNAL MEDICINE

## 2017-08-15 PROCEDURE — 85027 COMPLETE CBC AUTOMATED: CPT

## 2017-08-15 PROCEDURE — 2580000003 HC RX 258: Performed by: NURSE PRACTITIONER

## 2017-08-15 PROCEDURE — 83735 ASSAY OF MAGNESIUM: CPT

## 2017-08-15 RX ORDER — METOPROLOL SUCCINATE 50 MG/1
50 TABLET, EXTENDED RELEASE ORAL DAILY
Status: DISCONTINUED | OUTPATIENT
Start: 2017-08-15 | End: 2017-08-15 | Stop reason: HOSPADM

## 2017-08-15 RX ORDER — METOPROLOL SUCCINATE 50 MG/1
50 TABLET, EXTENDED RELEASE ORAL DAILY
Qty: 30 TABLET | Refills: 3 | Status: SHIPPED | OUTPATIENT
Start: 2017-08-15 | End: 2018-01-15 | Stop reason: SDUPTHER

## 2017-08-15 RX ORDER — ATORVASTATIN CALCIUM 40 MG/1
20 TABLET, FILM COATED ORAL NIGHTLY
Qty: 30 TABLET | Refills: 3 | Status: SHIPPED | OUTPATIENT
Start: 2017-08-15 | End: 2018-03-14 | Stop reason: SDUPTHER

## 2017-08-15 RX ADMIN — METOPROLOL SUCCINATE 50 MG: 50 TABLET, EXTENDED RELEASE ORAL at 08:28

## 2017-08-15 RX ADMIN — CYANOCOBALAMIN TAB 500 MCG 1000 MCG: 500 TAB at 08:27

## 2017-08-15 RX ADMIN — SODIUM CHLORIDE, PRESERVATIVE FREE 10 ML: 5 INJECTION INTRAVENOUS at 08:30

## 2017-08-15 RX ADMIN — FAMOTIDINE 20 MG: 20 TABLET ORAL at 08:30

## 2017-08-15 RX ADMIN — Medication 10 ML: at 10:04

## 2017-08-15 RX ADMIN — LINAGLIPTIN 5 MG: 5 TABLET, FILM COATED ORAL at 08:30

## 2017-08-15 RX ADMIN — PANTOPRAZOLE SODIUM 40 MG: 40 TABLET, DELAYED RELEASE ORAL at 07:01

## 2017-08-15 RX ADMIN — VITAMIN D, TAB 1000IU (100/BT) 2000 UNITS: 25 TAB at 08:30

## 2017-08-15 RX ADMIN — METFORMIN HYDROCHLORIDE 500 MG: 500 TABLET ORAL at 08:27

## 2017-08-15 ASSESSMENT — PAIN SCALES - GENERAL
PAINLEVEL_OUTOF10: 0

## 2017-08-16 ENCOUNTER — HOSPITAL ENCOUNTER (OUTPATIENT)
Dept: PHYSICAL THERAPY | Age: 82
Setting detail: THERAPIES SERIES
Discharge: HOME | End: 2017-08-16
Payer: MEDICARE

## 2017-08-16 LAB
EKG ATRIAL RATE: 178 BPM
EKG ATRIAL RATE: 88 BPM
EKG ATRIAL RATE: 92 BPM
EKG ATRIAL RATE: 93 BPM
EKG ATRIAL RATE: 95 BPM
EKG ATRIAL RATE: 99 BPM
EKG P AXIS: 64 DEGREES
EKG P AXIS: 65 DEGREES
EKG P AXIS: 70 DEGREES
EKG P AXIS: 72 DEGREES
EKG P AXIS: 78 DEGREES
EKG P-R INTERVAL: 150 MS
EKG P-R INTERVAL: 198 MS
EKG P-R INTERVAL: 204 MS
EKG P-R INTERVAL: 228 MS
EKG P-R INTERVAL: 252 MS
EKG Q-T INTERVAL: 250 MS
EKG Q-T INTERVAL: 348 MS
EKG Q-T INTERVAL: 366 MS
EKG Q-T INTERVAL: 376 MS
EKG Q-T INTERVAL: 382 MS
EKG Q-T INTERVAL: 394 MS
EKG QRS DURATION: 74 MS
EKG QRS DURATION: 76 MS
EKG QRS DURATION: 88 MS
EKG QTC CALCULATION (BAZETT): 435 MS
EKG QTC CALCULATION (BAZETT): 446 MS
EKG QTC CALCULATION (BAZETT): 459 MS
EKG QTC CALCULATION (BAZETT): 462 MS
EKG QTC CALCULATION (BAZETT): 464 MS
EKG QTC CALCULATION (BAZETT): 489 MS
EKG R AXIS: -2 DEGREES
EKG R AXIS: 1 DEGREES
EKG R AXIS: 1 DEGREES
EKG R AXIS: 3 DEGREES
EKG R AXIS: 4 DEGREES
EKG R AXIS: 7 DEGREES
EKG T AXIS: 148 DEGREES
EKG T AXIS: 21 DEGREES
EKG T AXIS: 27 DEGREES
EKG T AXIS: 39 DEGREES
EKG T AXIS: 47 DEGREES
EKG T AXIS: 56 DEGREES
EKG VENTRICULAR RATE: 182 BPM
EKG VENTRICULAR RATE: 88 BPM
EKG VENTRICULAR RATE: 92 BPM
EKG VENTRICULAR RATE: 93 BPM
EKG VENTRICULAR RATE: 95 BPM
EKG VENTRICULAR RATE: 99 BPM

## 2017-08-17 ENCOUNTER — APPOINTMENT (OUTPATIENT)
Dept: GENERAL RADIOLOGY | Age: 82
End: 2017-08-17
Payer: MEDICARE

## 2017-08-17 ENCOUNTER — TELEPHONE (OUTPATIENT)
Dept: FAMILY MEDICINE CLINIC | Age: 82
End: 2017-08-17

## 2017-08-17 ENCOUNTER — HOSPITAL ENCOUNTER (EMERGENCY)
Age: 82
Discharge: HOME | End: 2017-08-17
Payer: MEDICARE

## 2017-08-17 VITALS
DIASTOLIC BLOOD PRESSURE: 61 MMHG | WEIGHT: 180 LBS | OXYGEN SATURATION: 98 % | BODY MASS INDEX: 35.34 KG/M2 | RESPIRATION RATE: 18 BRPM | HEART RATE: 82 BPM | TEMPERATURE: 98.1 F | HEIGHT: 60 IN | SYSTOLIC BLOOD PRESSURE: 132 MMHG

## 2017-08-17 DIAGNOSIS — R53.1 GENERAL WEAKNESS: Primary | ICD-10-CM

## 2017-08-17 LAB
ALBUMIN SERPL-MCNC: 3.5 G/DL (ref 3.9–4.9)
ALP BLD-CCNC: 87 U/L (ref 40–130)
ALT SERPL-CCNC: 21 U/L (ref 0–33)
AMPHETAMINE SCREEN, URINE: NORMAL
ANION GAP SERPL CALCULATED.3IONS-SCNC: 11 MEQ/L (ref 7–13)
AST SERPL-CCNC: 23 U/L (ref 0–35)
BARBITURATE SCREEN URINE: NORMAL
BASOPHILS ABSOLUTE: 0 K/UL (ref 0–0.2)
BASOPHILS RELATIVE PERCENT: 0.4 %
BENZODIAZEPINE SCREEN, URINE: NORMAL
BILIRUB SERPL-MCNC: 0.3 MG/DL (ref 0–1.2)
BILIRUBIN URINE: NEGATIVE
BLOOD, URINE: NEGATIVE
BUN BLDV-MCNC: 11 MG/DL (ref 8–23)
CALCIUM SERPL-MCNC: 8.5 MG/DL (ref 8.6–10.2)
CANNABINOID SCREEN URINE: NORMAL
CHLORIDE BLD-SCNC: 101 MEQ/L (ref 98–107)
CLARITY: CLEAR
CO2: 27 MEQ/L (ref 22–29)
COCAINE METABOLITE SCREEN URINE: NORMAL
COLOR: YELLOW
CREAT SERPL-MCNC: 0.53 MG/DL (ref 0.5–0.9)
EOSINOPHILS ABSOLUTE: 0.1 K/UL (ref 0–0.7)
EOSINOPHILS RELATIVE PERCENT: 1.1 %
GFR AFRICAN AMERICAN: >60
GFR NON-AFRICAN AMERICAN: >60
GLOBULIN: 2.5 G/DL (ref 2.3–3.5)
GLUCOSE BLD-MCNC: 179 MG/DL (ref 74–109)
GLUCOSE URINE: NEGATIVE MG/DL
HCT VFR BLD CALC: 43.3 % (ref 37–47)
HEMOGLOBIN: 13.8 G/DL (ref 12–16)
INR BLD: 1
KETONES, URINE: NEGATIVE MG/DL
LACTIC ACID: 1.8 MMOL/L (ref 0.5–2.2)
LACTIC ACID: 3 MMOL/L (ref 0.5–2.2)
LEUKOCYTE ESTERASE, URINE: NEGATIVE
LYMPHOCYTES ABSOLUTE: 2.7 K/UL (ref 1–4.8)
LYMPHOCYTES RELATIVE PERCENT: 23.4 %
Lab: NORMAL
MAGNESIUM: 1.9 MG/DL (ref 1.7–2.3)
MCH RBC QN AUTO: 27.7 PG (ref 27–31.3)
MCHC RBC AUTO-ENTMCNC: 31.9 % (ref 33–37)
MCV RBC AUTO: 87 FL (ref 82–100)
MONOCYTES ABSOLUTE: 0.7 K/UL (ref 0.2–0.8)
MONOCYTES RELATIVE PERCENT: 6 %
NEUTROPHILS ABSOLUTE: 7.9 K/UL (ref 1.4–6.5)
NEUTROPHILS RELATIVE PERCENT: 69.1 %
NITRITE, URINE: NEGATIVE
OPIATE SCREEN URINE: NORMAL
PDW BLD-RTO: 14.9 % (ref 11.5–14.5)
PH UA: 6 (ref 5–9)
PHENCYCLIDINE SCREEN URINE: NORMAL
PLATELET # BLD: 277 K/UL (ref 130–400)
POTASSIUM SERPL-SCNC: 4.6 MEQ/L (ref 3.5–5.1)
PRO-BNP: 168 PG/ML
PROTEIN UA: NEGATIVE MG/DL
PROTHROMBIN TIME: 10.5 SEC (ref 8.1–13.7)
RBC # BLD: 4.98 M/UL (ref 4.2–5.4)
SODIUM BLD-SCNC: 139 MEQ/L (ref 132–144)
SPECIFIC GRAVITY UA: 1.01 (ref 1–1.03)
TOTAL CK: 38 U/L (ref 0–170)
TOTAL PROTEIN: 6 G/DL (ref 6.4–8.1)
TROPONIN: <0.01 NG/ML (ref 0–0.01)
TSH SERPL DL<=0.05 MIU/L-ACNC: 2.07 UIU/ML (ref 0.27–4.2)
UROBILINOGEN, URINE: 1 E.U./DL
WBC # BLD: 11.5 K/UL (ref 4.8–10.8)

## 2017-08-17 PROCEDURE — 83880 ASSAY OF NATRIURETIC PEPTIDE: CPT

## 2017-08-17 PROCEDURE — 85610 PROTHROMBIN TIME: CPT

## 2017-08-17 PROCEDURE — 80307 DRUG TEST PRSMV CHEM ANLYZR: CPT

## 2017-08-17 PROCEDURE — 36415 COLL VENOUS BLD VENIPUNCTURE: CPT

## 2017-08-17 PROCEDURE — 93005 ELECTROCARDIOGRAM TRACING: CPT

## 2017-08-17 PROCEDURE — 71010 XR CHEST PORTABLE: CPT

## 2017-08-17 PROCEDURE — 2580000003 HC RX 258: Performed by: NURSE PRACTITIONER

## 2017-08-17 PROCEDURE — 83735 ASSAY OF MAGNESIUM: CPT

## 2017-08-17 PROCEDURE — 82550 ASSAY OF CK (CPK): CPT

## 2017-08-17 PROCEDURE — 81003 URINALYSIS AUTO W/O SCOPE: CPT

## 2017-08-17 PROCEDURE — 84443 ASSAY THYROID STIM HORMONE: CPT

## 2017-08-17 PROCEDURE — 80053 COMPREHEN METABOLIC PANEL: CPT

## 2017-08-17 PROCEDURE — 99285 EMERGENCY DEPT VISIT HI MDM: CPT

## 2017-08-17 PROCEDURE — 83605 ASSAY OF LACTIC ACID: CPT

## 2017-08-17 PROCEDURE — 85025 COMPLETE CBC W/AUTO DIFF WBC: CPT

## 2017-08-17 PROCEDURE — 84484 ASSAY OF TROPONIN QUANT: CPT

## 2017-08-17 RX ORDER — 0.9 % SODIUM CHLORIDE 0.9 %
1000 INTRAVENOUS SOLUTION INTRAVENOUS ONCE
Status: COMPLETED | OUTPATIENT
Start: 2017-08-17 | End: 2017-08-17

## 2017-08-17 RX ADMIN — SODIUM CHLORIDE 1000 ML: 9 INJECTION, SOLUTION INTRAVENOUS at 15:38

## 2017-08-17 ASSESSMENT — ENCOUNTER SYMPTOMS
SHORTNESS OF BREATH: 0
ABDOMINAL PAIN: 0
DIARRHEA: 0
COUGH: 0
VOMITING: 0
NAUSEA: 0

## 2017-08-19 LAB
EKG ATRIAL RATE: 89 BPM
EKG P AXIS: 62 DEGREES
EKG P-R INTERVAL: 192 MS
EKG Q-T INTERVAL: 358 MS
EKG QRS DURATION: 76 MS
EKG QTC CALCULATION (BAZETT): 435 MS
EKG R AXIS: 4 DEGREES
EKG T AXIS: 42 DEGREES
EKG VENTRICULAR RATE: 89 BPM

## 2017-08-25 ENCOUNTER — OFFICE VISIT (OUTPATIENT)
Dept: FAMILY MEDICINE CLINIC | Age: 82
End: 2017-08-25

## 2017-08-25 VITALS
OXYGEN SATURATION: 96 % | TEMPERATURE: 96.8 F | RESPIRATION RATE: 12 BRPM | BODY MASS INDEX: 37.36 KG/M2 | HEIGHT: 58 IN | HEART RATE: 81 BPM | WEIGHT: 178 LBS | DIASTOLIC BLOOD PRESSURE: 64 MMHG | SYSTOLIC BLOOD PRESSURE: 122 MMHG

## 2017-08-25 DIAGNOSIS — Z91.81 RISK FOR FALLS: ICD-10-CM

## 2017-08-25 DIAGNOSIS — R26.9 ABNORMAL GAIT: ICD-10-CM

## 2017-08-25 DIAGNOSIS — M15.9 PRIMARY OSTEOARTHRITIS INVOLVING MULTIPLE JOINTS: Primary | ICD-10-CM

## 2017-08-25 PROCEDURE — 99214 OFFICE O/P EST MOD 30 MIN: CPT | Performed by: FAMILY MEDICINE

## 2017-08-25 ASSESSMENT — ENCOUNTER SYMPTOMS
RESPIRATORY NEGATIVE: 1
GASTROINTESTINAL NEGATIVE: 1

## 2017-08-30 ENCOUNTER — OFFICE VISIT (OUTPATIENT)
Dept: CARDIOLOGY | Age: 82
End: 2017-08-30

## 2017-08-30 VITALS
OXYGEN SATURATION: 96 % | WEIGHT: 177 LBS | BODY MASS INDEX: 37.16 KG/M2 | DIASTOLIC BLOOD PRESSURE: 72 MMHG | TEMPERATURE: 97.7 F | HEART RATE: 78 BPM | HEIGHT: 58 IN | SYSTOLIC BLOOD PRESSURE: 120 MMHG | RESPIRATION RATE: 16 BRPM

## 2017-08-30 DIAGNOSIS — I47.1 SVT (SUPRAVENTRICULAR TACHYCARDIA) (HCC): Primary | ICD-10-CM

## 2017-08-30 DIAGNOSIS — I10 ESSENTIAL HYPERTENSION: ICD-10-CM

## 2017-08-30 DIAGNOSIS — E78.5 DYSLIPIDEMIA: ICD-10-CM

## 2017-08-30 PROCEDURE — 99214 OFFICE O/P EST MOD 30 MIN: CPT | Performed by: INTERNAL MEDICINE

## 2017-08-30 ASSESSMENT — ENCOUNTER SYMPTOMS
NAUSEA: 0
GASTROINTESTINAL NEGATIVE: 1
WHEEZING: 0
STRIDOR: 0
SHORTNESS OF BREATH: 1
CHEST TIGHTNESS: 0
EYES NEGATIVE: 1
COUGH: 0
BLOOD IN STOOL: 0

## 2017-09-06 ENCOUNTER — TELEPHONE (OUTPATIENT)
Dept: FAMILY MEDICINE CLINIC | Age: 82
End: 2017-09-06

## 2017-09-06 DIAGNOSIS — I47.1 PAROXYSMAL SUPRAVENTRICULAR TACHYCARDIA (HCC): ICD-10-CM

## 2017-09-06 DIAGNOSIS — M15.9 PRIMARY OSTEOARTHRITIS INVOLVING MULTIPLE JOINTS: ICD-10-CM

## 2017-09-06 DIAGNOSIS — M85.80 OSTEOPENIA, UNSPECIFIED LOCATION: ICD-10-CM

## 2017-09-06 DIAGNOSIS — M54.50 CHRONIC MIDLINE LOW BACK PAIN WITHOUT SCIATICA: ICD-10-CM

## 2017-09-06 DIAGNOSIS — E11.9 TYPE 2 DIABETES MELLITUS WITHOUT COMPLICATION, WITHOUT LONG-TERM CURRENT USE OF INSULIN (HCC): ICD-10-CM

## 2017-09-06 DIAGNOSIS — G89.29 CHRONIC MIDLINE LOW BACK PAIN WITHOUT SCIATICA: ICD-10-CM

## 2017-09-06 DIAGNOSIS — I10 ESSENTIAL HYPERTENSION: ICD-10-CM

## 2017-09-06 DIAGNOSIS — K21.9 GASTROESOPHAGEAL REFLUX DISEASE WITHOUT ESOPHAGITIS: ICD-10-CM

## 2017-09-06 LAB
ALBUMIN SERPL-MCNC: 4 G/DL (ref 3.9–4.9)
ALP BLD-CCNC: 94 U/L (ref 40–130)
ALT SERPL-CCNC: 14 U/L (ref 0–33)
ANION GAP SERPL CALCULATED.3IONS-SCNC: 14 MEQ/L (ref 7–13)
AST SERPL-CCNC: 19 U/L (ref 0–35)
BASOPHILS ABSOLUTE: 0 K/UL (ref 0–0.2)
BASOPHILS RELATIVE PERCENT: 0.4 %
BILIRUB SERPL-MCNC: 0.3 MG/DL (ref 0–1.2)
BUN BLDV-MCNC: 11 MG/DL (ref 8–23)
CALCIUM SERPL-MCNC: 9.2 MG/DL (ref 8.6–10.2)
CHLORIDE BLD-SCNC: 98 MEQ/L (ref 98–107)
CHOLESTEROL, TOTAL: 178 MG/DL (ref 0–199)
CO2: 29 MEQ/L (ref 22–29)
CREAT SERPL-MCNC: 0.65 MG/DL (ref 0.5–0.9)
CREATININE URINE: 92.3 MG/DL
EOSINOPHILS ABSOLUTE: 0.1 K/UL (ref 0–0.7)
EOSINOPHILS RELATIVE PERCENT: 1.4 %
GFR AFRICAN AMERICAN: >60
GFR NON-AFRICAN AMERICAN: >60
GLOBULIN: 2.7 G/DL (ref 2.3–3.5)
GLUCOSE BLD-MCNC: 155 MG/DL (ref 74–109)
HBA1C MFR BLD: 8.2 % (ref 4.8–5.9)
HCT VFR BLD CALC: 43.7 % (ref 37–47)
HDLC SERPL-MCNC: 52 MG/DL (ref 40–59)
HEMOGLOBIN: 13.8 G/DL (ref 12–16)
LDL CHOLESTEROL CALCULATED: 94 MG/DL (ref 0–129)
LYMPHOCYTES ABSOLUTE: 2.4 K/UL (ref 1–4.8)
LYMPHOCYTES RELATIVE PERCENT: 25.9 %
MAGNESIUM: 2.2 MG/DL (ref 1.7–2.3)
MCH RBC QN AUTO: 27.8 PG (ref 27–31.3)
MCHC RBC AUTO-ENTMCNC: 31.7 % (ref 33–37)
MCV RBC AUTO: 87.7 FL (ref 82–100)
MICROALBUMIN UR-MCNC: <1.2 MG/DL
MICROALBUMIN/CREAT UR-RTO: NORMAL MG/G (ref 0–30)
MONOCYTES ABSOLUTE: 0.5 K/UL (ref 0.2–0.8)
MONOCYTES RELATIVE PERCENT: 5.1 %
NEUTROPHILS ABSOLUTE: 6.2 K/UL (ref 1.4–6.5)
NEUTROPHILS RELATIVE PERCENT: 67.2 %
PDW BLD-RTO: 14.2 % (ref 11.5–14.5)
PLATELET # BLD: 265 K/UL (ref 130–400)
POTASSIUM SERPL-SCNC: 4.6 MEQ/L (ref 3.5–5.1)
RBC # BLD: 4.97 M/UL (ref 4.2–5.4)
SODIUM BLD-SCNC: 141 MEQ/L (ref 132–144)
TOTAL PROTEIN: 6.7 G/DL (ref 6.4–8.1)
TRIGL SERPL-MCNC: 162 MG/DL (ref 0–200)
TSH REFLEX: 1.87 UIU/ML (ref 0.27–4.2)
WBC # BLD: 9.2 K/UL (ref 4.8–10.8)

## 2017-09-10 LAB
VITAMIN D2 AND D3, TOTAL: 28.4 NG/ML (ref 30–80)
VITAMIN D2, 25 HYDROXY: <1 NG/ML
VITAMIN D3,25 HYDROXY: 28.4 NG/ML

## 2017-09-14 ENCOUNTER — OFFICE VISIT (OUTPATIENT)
Dept: FAMILY MEDICINE CLINIC | Age: 82
End: 2017-09-14

## 2017-09-14 ENCOUNTER — TELEPHONE (OUTPATIENT)
Dept: FAMILY MEDICINE CLINIC | Age: 82
End: 2017-09-14

## 2017-09-14 VITALS
DIASTOLIC BLOOD PRESSURE: 72 MMHG | SYSTOLIC BLOOD PRESSURE: 110 MMHG | RESPIRATION RATE: 16 BRPM | HEART RATE: 84 BPM | TEMPERATURE: 96.8 F | HEIGHT: 58 IN | BODY MASS INDEX: 37.79 KG/M2 | WEIGHT: 180 LBS | OXYGEN SATURATION: 98 %

## 2017-09-14 DIAGNOSIS — E78.5 DYSLIPIDEMIA: ICD-10-CM

## 2017-09-14 DIAGNOSIS — Z23 NEED FOR INFLUENZA VACCINATION: ICD-10-CM

## 2017-09-14 DIAGNOSIS — Z23 NEED FOR PNEUMOCOCCAL VACCINATION: ICD-10-CM

## 2017-09-14 DIAGNOSIS — I10 ESSENTIAL HYPERTENSION: ICD-10-CM

## 2017-09-14 DIAGNOSIS — E11.9 TYPE 2 DIABETES MELLITUS WITHOUT COMPLICATION, WITHOUT LONG-TERM CURRENT USE OF INSULIN (HCC): Primary | ICD-10-CM

## 2017-09-14 DIAGNOSIS — K21.9 GASTROESOPHAGEAL REFLUX DISEASE WITHOUT ESOPHAGITIS: ICD-10-CM

## 2017-09-14 DIAGNOSIS — E55.9 VITAMIN D DEFICIENCY: ICD-10-CM

## 2017-09-14 PROCEDURE — 90662 IIV NO PRSV INCREASED AG IM: CPT | Performed by: FAMILY MEDICINE

## 2017-09-14 PROCEDURE — G0008 ADMIN INFLUENZA VIRUS VAC: HCPCS | Performed by: FAMILY MEDICINE

## 2017-09-14 PROCEDURE — 99214 OFFICE O/P EST MOD 30 MIN: CPT | Performed by: FAMILY MEDICINE

## 2017-09-14 PROCEDURE — 90670 PCV13 VACCINE IM: CPT | Performed by: FAMILY MEDICINE

## 2017-09-14 PROCEDURE — G0009 ADMIN PNEUMOCOCCAL VACCINE: HCPCS | Performed by: FAMILY MEDICINE

## 2017-09-14 RX ORDER — ERGOCALCIFEROL (VITAMIN D2) 1250 MCG
50000 CAPSULE ORAL WEEKLY
Qty: 12 CAPSULE | Refills: 0 | Status: SHIPPED | OUTPATIENT
Start: 2017-09-14 | End: 2018-02-05 | Stop reason: ALTCHOICE

## 2017-09-14 RX ORDER — OMEPRAZOLE 20 MG/1
20 CAPSULE, DELAYED RELEASE ORAL DAILY
Qty: 30 CAPSULE | Refills: 3 | Status: CANCELLED | OUTPATIENT
Start: 2017-09-14

## 2017-09-14 RX ORDER — ERGOCALCIFEROL (VITAMIN D2) 1250 MCG
50000 CAPSULE ORAL WEEKLY
Qty: 12 CAPSULE | Refills: 0 | Status: CANCELLED | OUTPATIENT
Start: 2017-09-14 | End: 2017-12-01

## 2017-09-14 RX ORDER — OMEPRAZOLE 20 MG/1
20 CAPSULE, DELAYED RELEASE ORAL DAILY
Qty: 30 CAPSULE | Refills: 5 | Status: SHIPPED | OUTPATIENT
Start: 2017-09-14 | End: 2018-02-27 | Stop reason: SDUPTHER

## 2017-09-14 ASSESSMENT — ENCOUNTER SYMPTOMS
BLURRED VISION: 0
GASTROINTESTINAL NEGATIVE: 1
RESPIRATORY NEGATIVE: 1
VISUAL CHANGE: 0
EYES NEGATIVE: 1
BACK PAIN: 1
ALLERGIC/IMMUNOLOGIC NEGATIVE: 1

## 2017-09-18 ENCOUNTER — TELEPHONE (OUTPATIENT)
Dept: FAMILY MEDICINE CLINIC | Age: 82
End: 2017-09-18

## 2017-09-18 ENCOUNTER — HOSPITAL ENCOUNTER (OUTPATIENT)
Dept: NUCLEAR MEDICINE | Age: 82
Discharge: HOME | End: 2017-09-18
Payer: MEDICARE

## 2017-09-18 DIAGNOSIS — I47.1 SVT (SUPRAVENTRICULAR TACHYCARDIA) (HCC): ICD-10-CM

## 2017-09-18 PROCEDURE — 2580000003 HC RX 258: Performed by: INTERNAL MEDICINE

## 2017-09-18 PROCEDURE — 6360000002 HC RX W HCPCS: Performed by: INTERNAL MEDICINE

## 2017-09-18 PROCEDURE — 93017 CV STRESS TEST TRACING ONLY: CPT

## 2017-09-18 PROCEDURE — 78452 HT MUSCLE IMAGE SPECT MULT: CPT

## 2017-09-18 PROCEDURE — A9502 TC99M TETROFOSMIN: HCPCS | Performed by: INTERNAL MEDICINE

## 2017-09-18 PROCEDURE — 3430000000 HC RX DIAGNOSTIC RADIOPHARMACEUTICAL: Performed by: INTERNAL MEDICINE

## 2017-09-18 RX ORDER — SODIUM CHLORIDE 0.9 % (FLUSH) 0.9 %
10 SYRINGE (ML) INJECTION PRN
Status: DISCONTINUED | OUTPATIENT
Start: 2017-09-18 | End: 2017-09-21 | Stop reason: HOSPADM

## 2017-09-18 RX ADMIN — Medication 20 ML: at 12:42

## 2017-09-18 RX ADMIN — TETROFOSMIN 10.9 MILLICURIE: 0.23 INJECTION, POWDER, LYOPHILIZED, FOR SOLUTION INTRAVENOUS at 10:40

## 2017-09-18 RX ADMIN — REGADENOSON 0.4 MG: 0.08 INJECTION, SOLUTION INTRAVENOUS at 12:42

## 2017-09-18 RX ADMIN — TETROFOSMIN 31.8 MILLICURIE: 0.23 INJECTION, POWDER, LYOPHILIZED, FOR SOLUTION INTRAVENOUS at 12:42

## 2017-09-18 RX ADMIN — Medication 10 ML: at 10:40

## 2017-10-30 ENCOUNTER — OFFICE VISIT (OUTPATIENT)
Dept: CARDIOLOGY | Age: 82
End: 2017-10-30

## 2017-10-30 VITALS
SYSTOLIC BLOOD PRESSURE: 112 MMHG | DIASTOLIC BLOOD PRESSURE: 62 MMHG | BODY MASS INDEX: 36.73 KG/M2 | OXYGEN SATURATION: 97 % | HEIGHT: 58 IN | WEIGHT: 175 LBS | RESPIRATION RATE: 16 BRPM | HEART RATE: 96 BPM | TEMPERATURE: 97.2 F

## 2017-10-30 DIAGNOSIS — I10 ESSENTIAL HYPERTENSION: ICD-10-CM

## 2017-10-30 DIAGNOSIS — E78.5 DYSLIPIDEMIA: ICD-10-CM

## 2017-10-30 DIAGNOSIS — I47.1 SVT (SUPRAVENTRICULAR TACHYCARDIA) (HCC): ICD-10-CM

## 2017-10-30 DIAGNOSIS — R06.09 DOE (DYSPNEA ON EXERTION): Primary | ICD-10-CM

## 2017-10-30 DIAGNOSIS — R06.09 DYSPNEA ON EXERTION: Primary | ICD-10-CM

## 2017-10-30 PROCEDURE — 99213 OFFICE O/P EST LOW 20 MIN: CPT | Performed by: INTERNAL MEDICINE

## 2017-10-30 ASSESSMENT — ENCOUNTER SYMPTOMS
WHEEZING: 0
STRIDOR: 0
GASTROINTESTINAL NEGATIVE: 1
BLOOD IN STOOL: 0
CHEST TIGHTNESS: 0
NAUSEA: 0
COUGH: 0
EYES NEGATIVE: 1
SHORTNESS OF BREATH: 1

## 2017-10-30 NOTE — PROGRESS NOTES
Subsequent Progress Note  Patient: Aric Flowers  YOB: 1934  MRN: [id number]    Chief Complaint:CADENA palpitations  Chief Complaint   Patient presents with    Follow-up     Review of Stress test   10/20- Spect negative  2017 echo 60%  Subjective: doing well. No cp at this time. Ambulates with walker. No falls. EKG:sr  Past Medical History:   Diagnosis Date    Arthritis     Chronic kidney disease     Diabetes mellitus ([address]     borderline    Gastroesophageal reflux disease without esophagitis 2017    History of blood transfusion     hemorrhage after an     Hyperlipidemia     Hypertension     Osteoporosis     Urinary frequency        Past Surgical History:   Procedure Laterality Date    CATARACT REMOVAL      COLONOSCOPY      EYE SURGERY      TUBAL LIGATION         No family history on file. Social History     Social History    Marital status:      Spouse name: N/A    Number of children: N/A    Years of education: N/A     Social History Main Topics    Smoking status: Former Smoker     Quit date: 1987    Smokeless tobacco: Never Used    Alcohol use No    Drug use: No    Sexual activity: Not on file     Other Topics Concern    Not on file     Social History Narrative    No narrative on file       Allergies   Allergen Reactions    Asa [Aspirin] Nausea And Vomiting       Current Outpatient Prescriptions   Medication Sig Dispense Refill    Handicap Placard MISC by Does not apply route Duration-5 years  Dx: Chronic back pain 1 each 0    omeprazole (PRILOSEC) 20 MG delayed release capsule Take 1 capsule by mouth daily 30 capsule 5    metFORMIN (GLUCOPHAGE) 500 MG tablet Take 2 tablets by mouth 2 times daily (with meals) 120 tablet 5    empagliflozin (JARDIANCE) 25 MG tablet Take 25 mg by mouth daily 30 tablet 5    Misc.  Devices (ROLLATOR) MISC 1 Device by Does not apply route daily 1 each 0    atorvastatin (LIPITOR) 40 MG tablet Take 0.5 tablets by mouth nightly 30 tablet 3    metoprolol succinate (TOPROL XL) 50 MG extended release tablet Take 1 tablet by mouth daily 30 tablet 3    ibuprofen (ADVIL;MOTRIN) 400 MG tablet Take 1 tablet by mouth every 8 hours as needed for Pain 120 tablet 3    alendronate (FOSAMAX) 70 MG tablet Take 1 tablet by mouth every 7 days 4 tablet 3    ranitidine (ZANTAC) 150 MG capsule Take 1 capsule by mouth 2 times daily 60 capsule 3    SITagliptin (JANUVIA) 100 MG tablet Take 1 tablet by mouth daily 30 tablet 3    ergocalciferol (DRISDOL) 11393 units capsule Take 1 capsule by mouth once a week for 12 doses 12 capsule 0     No current facility-administered medications for this visit. Review of Systems:   Review of Systems   Constitutional: Negative. Negative for diaphoresis and fatigue. HENT: Negative. Eyes: Negative. Respiratory: Positive for shortness of breath. Negative for cough, chest tightness, wheezing and stridor. Cardiovascular: Negative. Negative for chest pain, palpitations and leg swelling. Gastrointestinal: Negative. Negative for blood in stool and nausea. Genitourinary: Negative. Musculoskeletal: Negative. Skin: Negative. Neurological: Negative. Negative for dizziness, syncope, weakness and light-headedness. Hematological: Negative. Psychiatric/Behavioral: Negative. Physical Examination:    /62 (Site: Left Arm, Position: Sitting, Cuff Size: Large Adult)   Pulse 96   Temp 97.2 °F (36.2 °C) (Temporal)   Resp 16   Ht 4' 10\" (1.473 m)   Wt 175 lb (79.4 kg)   SpO2 97%   BMI 36.58 kg/m²    Physical Exam   Constitutional: She appears healthy. No distress. HENT:   Normal cephalic and Atraumatic   Eyes: Pupils are equal, round, and reactive to light. Neck: Normal range of motion and thyroid normal. Neck supple. No JVD present. No neck adenopathy. No thyromegaly present.    Cardiovascular: Normal rate, regular rhythm, normal heart sounds, intact distal pulses

## 2018-01-15 RX ORDER — METOPROLOL SUCCINATE 50 MG/1
50 TABLET, EXTENDED RELEASE ORAL DAILY
Qty: 30 TABLET | Refills: 5 | Status: SHIPPED | OUTPATIENT
Start: 2018-01-15 | End: 2018-02-27 | Stop reason: SDUPTHER

## 2018-01-31 ENCOUNTER — TELEPHONE (OUTPATIENT)
Dept: FAMILY MEDICINE CLINIC | Age: 83
End: 2018-01-31

## 2018-02-01 RX ORDER — METFORMIN HYDROCHLORIDE 1000 MG/1
1000 TABLET, FILM COATED, EXTENDED RELEASE ORAL
Qty: 90 TABLET | Refills: 3 | Status: SHIPPED | OUTPATIENT
Start: 2018-02-01 | End: 2018-03-14 | Stop reason: SDUPTHER

## 2018-02-05 ENCOUNTER — OFFICE VISIT (OUTPATIENT)
Dept: CARDIOLOGY CLINIC | Age: 83
End: 2018-02-05
Payer: COMMERCIAL

## 2018-02-05 VITALS
OXYGEN SATURATION: 94 % | WEIGHT: 178 LBS | HEART RATE: 88 BPM | DIASTOLIC BLOOD PRESSURE: 76 MMHG | RESPIRATION RATE: 16 BRPM | SYSTOLIC BLOOD PRESSURE: 129 MMHG | HEIGHT: 58 IN | BODY MASS INDEX: 37.36 KG/M2 | TEMPERATURE: 97.9 F

## 2018-02-05 DIAGNOSIS — E78.5 DYSLIPIDEMIA: ICD-10-CM

## 2018-02-05 DIAGNOSIS — R42 DIZZY: Primary | ICD-10-CM

## 2018-02-05 DIAGNOSIS — Z98.890 HISTORY OF CARDIAC RADIOFREQUENCY ABLATION (RFA): ICD-10-CM

## 2018-02-05 DIAGNOSIS — I10 ESSENTIAL HYPERTENSION: ICD-10-CM

## 2018-02-05 DIAGNOSIS — I47.1 SVT (SUPRAVENTRICULAR TACHYCARDIA) (HCC): ICD-10-CM

## 2018-02-05 PROCEDURE — 1036F TOBACCO NON-USER: CPT | Performed by: INTERNAL MEDICINE

## 2018-02-05 PROCEDURE — 4040F PNEUMOC VAC/ADMIN/RCVD: CPT | Performed by: INTERNAL MEDICINE

## 2018-02-05 PROCEDURE — 1123F ACP DISCUSS/DSCN MKR DOCD: CPT | Performed by: INTERNAL MEDICINE

## 2018-02-05 PROCEDURE — G8484 FLU IMMUNIZE NO ADMIN: HCPCS | Performed by: INTERNAL MEDICINE

## 2018-02-05 PROCEDURE — G8400 PT W/DXA NO RESULTS DOC: HCPCS | Performed by: INTERNAL MEDICINE

## 2018-02-05 PROCEDURE — G8428 CUR MEDS NOT DOCUMENT: HCPCS | Performed by: INTERNAL MEDICINE

## 2018-02-05 PROCEDURE — 99214 OFFICE O/P EST MOD 30 MIN: CPT | Performed by: INTERNAL MEDICINE

## 2018-02-05 PROCEDURE — G8417 CALC BMI ABV UP PARAM F/U: HCPCS | Performed by: INTERNAL MEDICINE

## 2018-02-05 PROCEDURE — 1090F PRES/ABSN URINE INCON ASSESS: CPT | Performed by: INTERNAL MEDICINE

## 2018-02-05 RX ORDER — CLOPIDOGREL BISULFATE 75 MG/1
75 TABLET ORAL DAILY
Qty: 30 TABLET | Refills: 5 | Status: SHIPPED | OUTPATIENT
Start: 2018-02-05

## 2018-02-05 ASSESSMENT — ENCOUNTER SYMPTOMS
NAUSEA: 0
CHEST TIGHTNESS: 0
GASTROINTESTINAL NEGATIVE: 1
BLOOD IN STOOL: 0
WHEEZING: 0
SHORTNESS OF BREATH: 0
EYES NEGATIVE: 1
STRIDOR: 0
RESPIRATORY NEGATIVE: 1
COUGH: 0

## 2018-02-09 ENCOUNTER — HOSPITAL ENCOUNTER (OUTPATIENT)
Dept: ULTRASOUND IMAGING | Age: 83
Discharge: HOME | End: 2018-02-11
Payer: COMMERCIAL

## 2018-02-09 DIAGNOSIS — R42 DIZZY: ICD-10-CM

## 2018-02-09 PROCEDURE — 93880 EXTRACRANIAL BILAT STUDY: CPT

## 2018-02-23 DIAGNOSIS — K21.9 GASTROESOPHAGEAL REFLUX DISEASE WITHOUT ESOPHAGITIS: ICD-10-CM

## 2018-02-26 RX ORDER — RANITIDINE 150 MG/1
TABLET ORAL
Qty: 60 TABLET | Refills: 1 | Status: SHIPPED | OUTPATIENT
Start: 2018-02-26 | End: 2018-02-27 | Stop reason: SDUPTHER

## 2018-02-27 DIAGNOSIS — K21.9 GASTROESOPHAGEAL REFLUX DISEASE WITHOUT ESOPHAGITIS: ICD-10-CM

## 2018-02-27 RX ORDER — METOPROLOL SUCCINATE 50 MG/1
50 TABLET, EXTENDED RELEASE ORAL DAILY
Qty: 30 TABLET | Refills: 5 | Status: SHIPPED | OUTPATIENT
Start: 2018-02-27 | End: 2018-03-14 | Stop reason: SDUPTHER

## 2018-02-27 RX ORDER — OMEPRAZOLE 20 MG/1
20 CAPSULE, DELAYED RELEASE ORAL DAILY
Qty: 30 CAPSULE | Refills: 5 | Status: SHIPPED | OUTPATIENT
Start: 2018-02-27 | End: 2018-03-14 | Stop reason: SDUPTHER

## 2018-02-27 RX ORDER — RANITIDINE 150 MG/1
150 TABLET ORAL 2 TIMES DAILY
Qty: 60 TABLET | Refills: 1 | Status: SHIPPED | OUTPATIENT
Start: 2018-02-27

## 2018-03-07 DIAGNOSIS — I10 ESSENTIAL HYPERTENSION: ICD-10-CM

## 2018-03-07 DIAGNOSIS — E11.9 TYPE 2 DIABETES MELLITUS WITHOUT COMPLICATION, WITHOUT LONG-TERM CURRENT USE OF INSULIN (HCC): ICD-10-CM

## 2018-03-07 DIAGNOSIS — E78.5 DYSLIPIDEMIA: ICD-10-CM

## 2018-03-07 DIAGNOSIS — E55.9 VITAMIN D DEFICIENCY: ICD-10-CM

## 2018-03-07 LAB
ALBUMIN SERPL-MCNC: 4 G/DL (ref 3.9–4.9)
ALP BLD-CCNC: 89 U/L (ref 40–130)
ALT SERPL-CCNC: 17 U/L (ref 0–33)
ANION GAP SERPL CALCULATED.3IONS-SCNC: 14 MEQ/L (ref 7–13)
AST SERPL-CCNC: 17 U/L (ref 0–35)
BASOPHILS ABSOLUTE: 0 K/UL (ref 0–0.2)
BASOPHILS RELATIVE PERCENT: 0.4 %
BILIRUB SERPL-MCNC: 0.2 MG/DL (ref 0–1.2)
BUN BLDV-MCNC: 11 MG/DL (ref 8–23)
CALCIUM SERPL-MCNC: 9.2 MG/DL (ref 8.6–10.2)
CHLORIDE BLD-SCNC: 96 MEQ/L (ref 98–107)
CHOLESTEROL, TOTAL: 288 MG/DL (ref 0–199)
CO2: 31 MEQ/L (ref 22–29)
CREAT SERPL-MCNC: 0.69 MG/DL (ref 0.5–0.9)
CREATININE URINE: 127.2 MG/DL
EOSINOPHILS ABSOLUTE: 0.1 K/UL (ref 0–0.7)
EOSINOPHILS RELATIVE PERCENT: 1.5 %
GFR AFRICAN AMERICAN: >60
GFR NON-AFRICAN AMERICAN: >60
GLOBULIN: 2.5 G/DL (ref 2.3–3.5)
GLUCOSE BLD-MCNC: 161 MG/DL (ref 74–109)
HBA1C MFR BLD: 8 % (ref 4.8–5.9)
HCT VFR BLD CALC: 43.4 % (ref 37–47)
HDLC SERPL-MCNC: 53 MG/DL (ref 40–59)
HEMOGLOBIN: 13.9 G/DL (ref 12–16)
LDL CHOLESTEROL CALCULATED: 171 MG/DL (ref 0–129)
LYMPHOCYTES ABSOLUTE: 2.7 K/UL (ref 1–4.8)
LYMPHOCYTES RELATIVE PERCENT: 30.3 %
MCH RBC QN AUTO: 28.9 PG (ref 27–31.3)
MCHC RBC AUTO-ENTMCNC: 32 % (ref 33–37)
MCV RBC AUTO: 90.3 FL (ref 82–100)
MICROALBUMIN UR-MCNC: <1.2 MG/DL
MICROALBUMIN/CREAT UR-RTO: NORMAL MG/G (ref 0–30)
MONOCYTES ABSOLUTE: 0.5 K/UL (ref 0.2–0.8)
MONOCYTES RELATIVE PERCENT: 5.2 %
NEUTROPHILS ABSOLUTE: 5.6 K/UL (ref 1.4–6.5)
NEUTROPHILS RELATIVE PERCENT: 62.6 %
PDW BLD-RTO: 14.4 % (ref 11.5–14.5)
PLATELET # BLD: 263 K/UL (ref 130–400)
POTASSIUM SERPL-SCNC: 4.6 MEQ/L (ref 3.5–5.1)
RBC # BLD: 4.81 M/UL (ref 4.2–5.4)
SODIUM BLD-SCNC: 141 MEQ/L (ref 132–144)
TOTAL PROTEIN: 6.5 G/DL (ref 6.4–8.1)
TRIGL SERPL-MCNC: 318 MG/DL (ref 0–200)
TSH REFLEX: 3.72 UIU/ML (ref 0.27–4.2)
WBC # BLD: 8.9 K/UL (ref 4.8–10.8)

## 2018-03-10 LAB
VITAMIN D2 AND D3, TOTAL: 32.9 NG/ML (ref 30–80)
VITAMIN D2, 25 HYDROXY: <1 NG/ML
VITAMIN D3,25 HYDROXY: 32.9 NG/ML

## 2018-03-14 ENCOUNTER — OFFICE VISIT (OUTPATIENT)
Dept: FAMILY MEDICINE CLINIC | Age: 83
End: 2018-03-14
Payer: COMMERCIAL

## 2018-03-14 VITALS
WEIGHT: 176 LBS | HEIGHT: 59 IN | DIASTOLIC BLOOD PRESSURE: 60 MMHG | RESPIRATION RATE: 14 BRPM | TEMPERATURE: 98.4 F | BODY MASS INDEX: 35.48 KG/M2 | SYSTOLIC BLOOD PRESSURE: 126 MMHG | HEART RATE: 96 BPM

## 2018-03-14 DIAGNOSIS — E11.9 TYPE 2 DIABETES MELLITUS WITHOUT COMPLICATION, WITHOUT LONG-TERM CURRENT USE OF INSULIN (HCC): Primary | ICD-10-CM

## 2018-03-14 DIAGNOSIS — E78.5 DYSLIPIDEMIA: ICD-10-CM

## 2018-03-14 DIAGNOSIS — Z23 NEED FOR PROPHYLACTIC VACCINATION AND INOCULATION AGAINST VARICELLA: ICD-10-CM

## 2018-03-14 DIAGNOSIS — K21.9 GASTROESOPHAGEAL REFLUX DISEASE WITHOUT ESOPHAGITIS: ICD-10-CM

## 2018-03-14 DIAGNOSIS — I10 ESSENTIAL HYPERTENSION: ICD-10-CM

## 2018-03-14 PROCEDURE — G8417 CALC BMI ABV UP PARAM F/U: HCPCS | Performed by: FAMILY MEDICINE

## 2018-03-14 PROCEDURE — G8482 FLU IMMUNIZE ORDER/ADMIN: HCPCS | Performed by: FAMILY MEDICINE

## 2018-03-14 PROCEDURE — G8400 PT W/DXA NO RESULTS DOC: HCPCS | Performed by: FAMILY MEDICINE

## 2018-03-14 PROCEDURE — 99214 OFFICE O/P EST MOD 30 MIN: CPT | Performed by: FAMILY MEDICINE

## 2018-03-14 PROCEDURE — G8427 DOCREV CUR MEDS BY ELIG CLIN: HCPCS | Performed by: FAMILY MEDICINE

## 2018-03-14 PROCEDURE — 1090F PRES/ABSN URINE INCON ASSESS: CPT | Performed by: FAMILY MEDICINE

## 2018-03-14 PROCEDURE — 1036F TOBACCO NON-USER: CPT | Performed by: FAMILY MEDICINE

## 2018-03-14 PROCEDURE — 4040F PNEUMOC VAC/ADMIN/RCVD: CPT | Performed by: FAMILY MEDICINE

## 2018-03-14 PROCEDURE — 1123F ACP DISCUSS/DSCN MKR DOCD: CPT | Performed by: FAMILY MEDICINE

## 2018-03-14 RX ORDER — ATORVASTATIN CALCIUM 20 MG/1
20 TABLET, FILM COATED ORAL NIGHTLY
Qty: 90 TABLET | Refills: 3 | Status: SHIPPED | OUTPATIENT
Start: 2018-03-14 | End: 2018-09-27 | Stop reason: SDUPTHER

## 2018-03-14 RX ORDER — METFORMIN HYDROCHLORIDE 1000 MG/1
1000 TABLET, FILM COATED, EXTENDED RELEASE ORAL 2 TIMES DAILY WITH MEALS
Qty: 180 TABLET | Refills: 3 | Status: SHIPPED | OUTPATIENT
Start: 2018-03-14 | End: 2018-06-19 | Stop reason: SDUPTHER

## 2018-03-14 RX ORDER — RANITIDINE 150 MG/1
150 TABLET ORAL 2 TIMES DAILY
Qty: 60 TABLET | Refills: 1 | Status: CANCELLED | OUTPATIENT
Start: 2018-03-14

## 2018-03-14 RX ORDER — OMEPRAZOLE 20 MG/1
20 CAPSULE, DELAYED RELEASE ORAL DAILY
Qty: 90 CAPSULE | Refills: 3 | Status: SHIPPED | OUTPATIENT
Start: 2018-03-14

## 2018-03-14 RX ORDER — METOPROLOL SUCCINATE 50 MG/1
50 TABLET, EXTENDED RELEASE ORAL DAILY
Qty: 90 TABLET | Refills: 3 | Status: SHIPPED | OUTPATIENT
Start: 2018-03-14 | End: 2019-03-17 | Stop reason: SDUPTHER

## 2018-03-14 ASSESSMENT — ENCOUNTER SYMPTOMS
RESPIRATORY NEGATIVE: 1
EYES NEGATIVE: 1
ALLERGIC/IMMUNOLOGIC NEGATIVE: 1
GASTROINTESTINAL NEGATIVE: 1

## 2018-03-14 NOTE — PROGRESS NOTES
She exhibits no distension. There is no splenomegaly or hepatomegaly. There is no tenderness. There is no rigidity, no rebound, no guarding and no CVA tenderness. No hernia. Musculoskeletal:        Right knee: Normal.        Left knee: Normal.        Cervical back: Normal.        Thoracic back: Normal.        Lumbar back: Normal.   Diffuse arthritic deformities   Lymphadenopathy:     She has no cervical adenopathy. Neurological: She is alert. She has normal strength. No cranial nerve deficit or sensory deficit. Coordination and gait normal.   Skin: Skin is warm, dry and intact. No rash noted. No erythema. Psychiatric: She has a normal mood and affect. Her speech is normal. Judgment and thought content normal. Cognition and memory are normal.            Assessment & Plan   1. Type 2 diabetes mellitus without complication, without long-term current use of insulin (HCC)  Comprehensive Metabolic Panel    Lipid Panel    Hemoglobin A1C    Microalbumin / Creatinine Urine Ratio    TSH ULTRASENSITIVE, DIRECTED    metFORMIN, MOD, (GLUMETZA) 1000 MG extended release tablet    atorvastatin (LIPITOR) 20 MG tablet    SITagliptin (JANUVIA) 100 MG tablet   2. Essential hypertension  CBC Auto Differential    Comprehensive Metabolic Panel    Lipid Panel    Microalbumin / Creatinine Urine Ratio    TSH ULTRASENSITIVE, DIRECTED    metoprolol succinate (TOPROL XL) 50 MG extended release tablet   3. Dyslipidemia  Comprehensive Metabolic Panel    Lipid Panel    TSH ULTRASENSITIVE, DIRECTED    atorvastatin (LIPITOR) 20 MG tablet   4. Gastroesophageal reflux disease without esophagitis  Comprehensive Metabolic Panel    omeprazole (PRILOSEC) 20 MG delayed release capsule   5.  Need for prophylactic vaccination and inoculation against varicella  zoster recombinant adjuvanted vaccine (SHINGRIX) 50 MCG SUSR injection     Orders Placed This Encounter   Procedures    CBC Auto Differential     Standing Status:   Future     Standing Expiration

## 2018-05-09 ENCOUNTER — OFFICE VISIT (OUTPATIENT)
Dept: CARDIOLOGY CLINIC | Age: 83
End: 2018-05-09
Payer: COMMERCIAL

## 2018-05-09 VITALS
HEART RATE: 102 BPM | OXYGEN SATURATION: 96 % | HEIGHT: 59 IN | WEIGHT: 182 LBS | SYSTOLIC BLOOD PRESSURE: 128 MMHG | RESPIRATION RATE: 16 BRPM | BODY MASS INDEX: 36.69 KG/M2 | DIASTOLIC BLOOD PRESSURE: 68 MMHG

## 2018-05-09 DIAGNOSIS — I10 ESSENTIAL HYPERTENSION: ICD-10-CM

## 2018-05-09 DIAGNOSIS — I47.1 SVT (SUPRAVENTRICULAR TACHYCARDIA) (HCC): ICD-10-CM

## 2018-05-09 DIAGNOSIS — Z98.890 HISTORY OF CARDIAC RADIOFREQUENCY ABLATION (RFA): ICD-10-CM

## 2018-05-09 DIAGNOSIS — E78.5 DYSLIPIDEMIA: ICD-10-CM

## 2018-05-09 DIAGNOSIS — R42 DIZZY: ICD-10-CM

## 2018-05-09 PROCEDURE — G8428 CUR MEDS NOT DOCUMENT: HCPCS | Performed by: INTERNAL MEDICINE

## 2018-05-09 PROCEDURE — 1036F TOBACCO NON-USER: CPT | Performed by: INTERNAL MEDICINE

## 2018-05-09 PROCEDURE — 99214 OFFICE O/P EST MOD 30 MIN: CPT | Performed by: INTERNAL MEDICINE

## 2018-05-09 PROCEDURE — G8417 CALC BMI ABV UP PARAM F/U: HCPCS | Performed by: INTERNAL MEDICINE

## 2018-05-09 PROCEDURE — 1123F ACP DISCUSS/DSCN MKR DOCD: CPT | Performed by: INTERNAL MEDICINE

## 2018-05-09 PROCEDURE — G8400 PT W/DXA NO RESULTS DOC: HCPCS | Performed by: INTERNAL MEDICINE

## 2018-05-09 PROCEDURE — 1090F PRES/ABSN URINE INCON ASSESS: CPT | Performed by: INTERNAL MEDICINE

## 2018-05-09 PROCEDURE — 4040F PNEUMOC VAC/ADMIN/RCVD: CPT | Performed by: INTERNAL MEDICINE

## 2018-05-09 ASSESSMENT — ENCOUNTER SYMPTOMS
WHEEZING: 0
GASTROINTESTINAL NEGATIVE: 1
RESPIRATORY NEGATIVE: 1
STRIDOR: 0
SHORTNESS OF BREATH: 0
NAUSEA: 0
CHEST TIGHTNESS: 0
COUGH: 0
EYES NEGATIVE: 1
BLOOD IN STOOL: 0

## 2018-05-15 ENCOUNTER — TELEPHONE (OUTPATIENT)
Dept: FAMILY MEDICINE CLINIC | Age: 83
End: 2018-05-15

## 2018-06-04 ENCOUNTER — TELEPHONE (OUTPATIENT)
Dept: FAMILY MEDICINE CLINIC | Age: 83
End: 2018-06-04

## 2018-06-19 DIAGNOSIS — E11.9 TYPE 2 DIABETES MELLITUS WITHOUT COMPLICATION, WITHOUT LONG-TERM CURRENT USE OF INSULIN (HCC): ICD-10-CM

## 2018-06-19 RX ORDER — METFORMIN HYDROCHLORIDE 1000 MG/1
1000 TABLET, FILM COATED, EXTENDED RELEASE ORAL 2 TIMES DAILY WITH MEALS
Qty: 180 TABLET | Refills: 3 | Status: SHIPPED | OUTPATIENT
Start: 2018-06-19

## 2018-06-26 ENCOUNTER — TELEPHONE (OUTPATIENT)
Dept: FAMILY MEDICINE CLINIC | Age: 83
End: 2018-06-26

## 2018-07-03 ENCOUNTER — TELEPHONE (OUTPATIENT)
Dept: FAMILY MEDICINE CLINIC | Age: 83
End: 2018-07-03

## 2018-07-03 NOTE — TELEPHONE ENCOUNTER
Patient states she was unable to travel to [place] 07- sites breathing issues. For reimbursement purposes she is requesting a letter stating she medically was unable to travel.

## 2018-09-24 DIAGNOSIS — K21.9 GASTROESOPHAGEAL REFLUX DISEASE WITHOUT ESOPHAGITIS: ICD-10-CM

## 2018-09-24 DIAGNOSIS — E11.9 TYPE 2 DIABETES MELLITUS WITHOUT COMPLICATION, WITHOUT LONG-TERM CURRENT USE OF INSULIN (HCC): ICD-10-CM

## 2018-09-24 DIAGNOSIS — E78.5 DYSLIPIDEMIA: ICD-10-CM

## 2018-09-24 DIAGNOSIS — I10 ESSENTIAL HYPERTENSION: ICD-10-CM

## 2018-09-24 LAB
ALBUMIN SERPL-MCNC: 3.7 G/DL (ref 3.9–4.9)
ALP BLD-CCNC: 94 U/L (ref 40–130)
ALT SERPL-CCNC: 24 U/L (ref 0–33)
ANION GAP SERPL CALCULATED.3IONS-SCNC: 15 MEQ/L (ref 7–13)
AST SERPL-CCNC: 25 U/L (ref 0–35)
BASOPHILS ABSOLUTE: 0 K/UL (ref 0–0.2)
BASOPHILS RELATIVE PERCENT: 0.3 %
BILIRUB SERPL-MCNC: 0.3 MG/DL (ref 0–1.2)
BUN BLDV-MCNC: 8 MG/DL (ref 8–23)
CALCIUM SERPL-MCNC: 8.8 MG/DL (ref 8.6–10.2)
CHLORIDE BLD-SCNC: 98 MEQ/L (ref 98–107)
CHOLESTEROL, TOTAL: 240 MG/DL (ref 0–199)
CO2: 29 MEQ/L (ref 22–29)
CREAT SERPL-MCNC: 0.66 MG/DL (ref 0.5–0.9)
CREATININE URINE: 86.4 MG/DL
EOSINOPHILS ABSOLUTE: 0.1 K/UL (ref 0–0.7)
EOSINOPHILS RELATIVE PERCENT: 1.4 %
GFR AFRICAN AMERICAN: >60
GFR NON-AFRICAN AMERICAN: >60
GLOBULIN: 3 G/DL (ref 2.3–3.5)
GLUCOSE BLD-MCNC: 181 MG/DL (ref 74–109)
HBA1C MFR BLD: 8.8 % (ref 4.8–5.9)
HCT VFR BLD CALC: 42 % (ref 37–47)
HDLC SERPL-MCNC: 49 MG/DL (ref 40–59)
HEMOGLOBIN: 13.6 G/DL (ref 12–16)
LDL CHOLESTEROL CALCULATED: 148 MG/DL (ref 0–129)
LYMPHOCYTES ABSOLUTE: 2.7 K/UL (ref 1–4.8)
LYMPHOCYTES RELATIVE PERCENT: 29 %
MCH RBC QN AUTO: 28.3 PG (ref 27–31.3)
MCHC RBC AUTO-ENTMCNC: 32.4 % (ref 33–37)
MCV RBC AUTO: 87.5 FL (ref 82–100)
MICROALBUMIN UR-MCNC: <1.2 MG/DL
MICROALBUMIN/CREAT UR-RTO: NORMAL MG/G (ref 0–30)
MONOCYTES ABSOLUTE: 0.5 K/UL (ref 0.2–0.8)
MONOCYTES RELATIVE PERCENT: 5.1 %
NEUTROPHILS ABSOLUTE: 5.9 K/UL (ref 1.4–6.5)
NEUTROPHILS RELATIVE PERCENT: 64.2 %
PDW BLD-RTO: 14.8 % (ref 11.5–14.5)
PLATELET # BLD: 275 K/UL (ref 130–400)
POTASSIUM SERPL-SCNC: 4.3 MEQ/L (ref 3.5–5.1)
RBC # BLD: 4.8 M/UL (ref 4.2–5.4)
SODIUM BLD-SCNC: 142 MEQ/L (ref 132–144)
TOTAL PROTEIN: 6.7 G/DL (ref 6.4–8.1)
TRIGL SERPL-MCNC: 216 MG/DL (ref 0–200)
TSH REFLEX: 2.87 UIU/ML (ref 0.27–4.2)
WBC # BLD: 9.3 K/UL (ref 4.8–10.8)

## 2018-09-27 ENCOUNTER — OFFICE VISIT (OUTPATIENT)
Dept: FAMILY MEDICINE CLINIC | Age: 83
End: 2018-09-27
Payer: COMMERCIAL

## 2018-09-27 VITALS
WEIGHT: 182 LBS | SYSTOLIC BLOOD PRESSURE: 130 MMHG | BODY MASS INDEX: 36.69 KG/M2 | HEART RATE: 84 BPM | OXYGEN SATURATION: 98 % | DIASTOLIC BLOOD PRESSURE: 60 MMHG | HEIGHT: 59 IN | TEMPERATURE: 96.7 F | RESPIRATION RATE: 16 BRPM

## 2018-09-27 DIAGNOSIS — E66.01 CLASS 2 SEVERE OBESITY DUE TO EXCESS CALORIES WITH SERIOUS COMORBIDITY AND BODY MASS INDEX (BMI) OF 36.0 TO 36.9 IN ADULT (HCC): ICD-10-CM

## 2018-09-27 DIAGNOSIS — Z78.0 POSTMENOPAUSAL: ICD-10-CM

## 2018-09-27 DIAGNOSIS — E78.5 DYSLIPIDEMIA: ICD-10-CM

## 2018-09-27 DIAGNOSIS — Z23 NEED FOR PNEUMOCOCCAL VACCINATION: ICD-10-CM

## 2018-09-27 DIAGNOSIS — E11.9 TYPE 2 DIABETES MELLITUS WITHOUT COMPLICATION, WITHOUT LONG-TERM CURRENT USE OF INSULIN (HCC): Primary | ICD-10-CM

## 2018-09-27 DIAGNOSIS — I10 ESSENTIAL HYPERTENSION: ICD-10-CM

## 2018-09-27 DIAGNOSIS — Z23 NEED FOR INFLUENZA VACCINATION: ICD-10-CM

## 2018-09-27 PROBLEM — E66.812 CLASS 2 SEVERE OBESITY DUE TO EXCESS CALORIES WITH SERIOUS COMORBIDITY AND BODY MASS INDEX (BMI) OF 36.0 TO 36.9 IN ADULT: Status: ACTIVE | Noted: 2017-05-27

## 2018-09-27 PROCEDURE — G8417 CALC BMI ABV UP PARAM F/U: HCPCS | Performed by: FAMILY MEDICINE

## 2018-09-27 PROCEDURE — 1090F PRES/ABSN URINE INCON ASSESS: CPT | Performed by: FAMILY MEDICINE

## 2018-09-27 PROCEDURE — G8427 DOCREV CUR MEDS BY ELIG CLIN: HCPCS | Performed by: FAMILY MEDICINE

## 2018-09-27 PROCEDURE — 90471 IMMUNIZATION ADMIN: CPT | Performed by: FAMILY MEDICINE

## 2018-09-27 PROCEDURE — 1123F ACP DISCUSS/DSCN MKR DOCD: CPT | Performed by: FAMILY MEDICINE

## 2018-09-27 PROCEDURE — 90732 PPSV23 VACC 2 YRS+ SUBQ/IM: CPT | Performed by: FAMILY MEDICINE

## 2018-09-27 PROCEDURE — 90662 IIV NO PRSV INCREASED AG IM: CPT | Performed by: FAMILY MEDICINE

## 2018-09-27 PROCEDURE — 4040F PNEUMOC VAC/ADMIN/RCVD: CPT | Performed by: FAMILY MEDICINE

## 2018-09-27 PROCEDURE — 1036F TOBACCO NON-USER: CPT | Performed by: FAMILY MEDICINE

## 2018-09-27 PROCEDURE — 1101F PT FALLS ASSESS-DOCD LE1/YR: CPT | Performed by: FAMILY MEDICINE

## 2018-09-27 PROCEDURE — G8400 PT W/DXA NO RESULTS DOC: HCPCS | Performed by: FAMILY MEDICINE

## 2018-09-27 PROCEDURE — 90472 IMMUNIZATION ADMIN EACH ADD: CPT | Performed by: FAMILY MEDICINE

## 2018-09-27 PROCEDURE — 99214 OFFICE O/P EST MOD 30 MIN: CPT | Performed by: FAMILY MEDICINE

## 2018-09-27 RX ORDER — LISINOPRIL 2.5 MG/1
2.5 TABLET ORAL DAILY
Qty: 30 TABLET | Refills: 11 | Status: SHIPPED | OUTPATIENT
Start: 2018-09-27

## 2018-09-27 RX ORDER — ATORVASTATIN CALCIUM 40 MG/1
40 TABLET, FILM COATED ORAL DAILY
Qty: 30 TABLET | Refills: 11 | Status: SHIPPED | OUTPATIENT
Start: 2018-09-27

## 2018-09-27 ASSESSMENT — PATIENT HEALTH QUESTIONNAIRE - PHQ9
1. LITTLE INTEREST OR PLEASURE IN DOING THINGS: 0
DEPRESSION UNABLE TO ASSESS: FUNCTIONAL CAPACITY MOTIVATION LIMITS ACCURACY
SUM OF ALL RESPONSES TO PHQ9 QUESTIONS 1 & 2: 0
2. FEELING DOWN, DEPRESSED OR HOPELESS: 0
SUM OF ALL RESPONSES TO PHQ QUESTIONS 1-9: 0
SUM OF ALL RESPONSES TO PHQ QUESTIONS 1-9: 0

## 2018-09-27 ASSESSMENT — ENCOUNTER SYMPTOMS
ALLERGIC/IMMUNOLOGIC NEGATIVE: 1
GASTROINTESTINAL NEGATIVE: 1
RESPIRATORY NEGATIVE: 1
EYES NEGATIVE: 1

## 2018-09-27 NOTE — PROGRESS NOTES
Subjective  Araseli Rubio, 80 y.o. female presents today with:  Chief Complaint   Patient presents with    6 Month Follow-Up    Diabetes    Hypertension    Gastroesophageal Reflux    Arthritis    Results     labs        Diabetes   She presents for her follow-up diabetic visit. She has type 2 diabetes mellitus. Her disease course has been stable. There are no hypoglycemic associated symptoms. Associated symptoms include foot paresthesias. There are no hypoglycemic complications. Symptoms are stable. Diabetic complications include peripheral neuropathy. Risk factors for coronary artery disease include diabetes mellitus, dyslipidemia, hypertension, obesity and post-menopausal. Current diabetic treatment includes oral agent (dual therapy). She is compliant with treatment all of the time. Her weight is fluctuating minimally. She is following a generally healthy diet. When asked about meal planning, she reported none. She has not had a previous visit with a dietitian. She participates in exercise intermittently. There is no change in her home blood glucose trend. An ACE inhibitor/angiotensin II receptor blocker is not being taken. She does not see a podiatrist.Eye exam is current. Hypertension   This is a chronic problem. The current episode started more than 1 year ago. The problem is unchanged. The problem is controlled. There are no associated agents to hypertension. Past treatments include beta blockers. The current treatment provides significant improvement. There are no compliance problems. Hypertensive end-organ damage includes kidney disease and CAD/MI. Identifiable causes of hypertension include chronic renal disease. There is no history of a hypertension causing med or a thyroid problem. Gastroesophageal Reflux         Review of Systems   Constitutional: Negative. HENT: Negative. Eyes: Negative. Respiratory: Negative. Cardiovascular: Negative. Gastrointestinal: Negative.     Endocrine: Objective    Vitals:    09/27/18 1350   BP: 130/60   Pulse: 84   Resp: 16   Temp: 96.7 °F (35.9 °C)   TempSrc: Tympanic   SpO2: 98%   Weight: 182 lb (82.6 kg)   Height: 4' 11\" (1.499 m)     Physical Exam   Constitutional: Vital signs are normal. She appears well-developed and well-nourished. No distress. HENT:   Head: Normocephalic and atraumatic. Right Ear: Tympanic membrane, external ear and ear canal normal. Tympanic membrane is not erythematous and not retracted. No middle ear effusion. Left Ear: Tympanic membrane, external ear and ear canal normal. Tympanic membrane is not erythematous and not retracted. No middle ear effusion. Nose: Nose normal. No mucosal edema, rhinorrhea or septal deviation. Right sinus exhibits no maxillary sinus tenderness and no frontal sinus tenderness. Left sinus exhibits no maxillary sinus tenderness and no frontal sinus tenderness. Mouth/Throat: Uvula is midline, oropharynx is clear and moist and mucous membranes are normal.   Eyes: Pupils are equal, round, and reactive to light. Conjunctivae, EOM and lids are normal.   Neck: Trachea normal and normal range of motion. Neck supple. No JVD present. Carotid bruit is not present. No tracheal deviation present. No thyroid mass and no thyromegaly present. Cardiovascular: Normal rate, regular rhythm, S1 normal, S2 normal, normal heart sounds, intact distal pulses and normal pulses. Pulmonary/Chest: Effort normal and breath sounds normal. No respiratory distress. She has no decreased breath sounds. She has no wheezes. She has no rhonchi. She has no rales. She exhibits no tenderness and no deformity. Abdominal: Soft. Normal appearance and bowel sounds are normal. She exhibits no distension. There is no splenomegaly or hepatomegaly. There is no tenderness. There is no rigidity, no rebound, no guarding and no CVA tenderness. No hernia.    Musculoskeletal:        Right knee: Normal.        Left knee: Normal. Density Axial Skeleton     Standing Status:   Future     Standing Expiration Date:   9/27/2019    INFLUENZA, HIGH DOSE, 65 YRS +, IM, PF, PREFILL SYR, 0.5ML (FLUZONE HD)    Pneumococcal polysaccharide vaccine 23-valent greater than or equal to 1yo subcutaneous/IM    CBC Auto Differential     Standing Status:   Future     Standing Expiration Date:   9/27/2019    Comprehensive Metabolic Panel     Standing Status:   Future     Standing Expiration Date:   9/27/2019    Hemoglobin A1C     Standing Status:   Future     Standing Expiration Date:   9/27/2019    Lipid Panel     Standing Status:   Future     Standing Expiration Date:   9/27/2019     Order Specific Question:   Is Patient Fasting?/# of Hours     Answer:   8    Magnesium     Standing Status:   Future     Standing Expiration Date:   9/27/2019    Microalbumin / Creatinine Urine Ratio     Standing Status:   Future     Standing Expiration Date:   9/27/2019    TSH without Reflex     Standing Status:   Future     Standing Expiration Date:   9/27/2019     Orders Placed This Encounter   Medications    lisinopril (PRINIVIL;ZESTRIL) 2.5 MG tablet     Sig: Take 1 tablet by mouth daily     Dispense:  30 tablet     Refill:  11    atorvastatin (LIPITOR) 40 MG tablet     Sig: Take 1 tablet by mouth daily     Dispense:  30 tablet     Refill:  11    Empagliflozin-Linagliptin 25-5 MG TABS     Sig: Take 1 tablet by mouth daily     Dispense:  30 tablet     Refill:  11     Medications Discontinued During This Encounter   Medication Reason    SITagliptin (JANUVIA) 100 MG tablet LIST CLEANUP    atorvastatin (LIPITOR) 20 MG tablet REORDER       Counseling given: Yes      Return in about 6 months (around 3/27/2019).     Fidencio Laughter, DO

## 2018-11-08 ENCOUNTER — HOSPITAL ENCOUNTER (OUTPATIENT)
Dept: WOMENS IMAGING | Age: 83
Discharge: HOME | End: 2018-11-10
Payer: MEDICARE

## 2018-11-08 DIAGNOSIS — Z78.0 POSTMENOPAUSAL: ICD-10-CM

## 2018-11-08 PROCEDURE — 77080 DXA BONE DENSITY AXIAL: CPT

## 2019-02-22 ENCOUNTER — TELEPHONE (OUTPATIENT)
Dept: FAMILY MEDICINE CLINIC | Age: 84
End: 2019-02-22

## 2019-02-25 ENCOUNTER — TELEPHONE (OUTPATIENT)
Dept: FAMILY MEDICINE CLINIC | Age: 84
End: 2019-02-25

## 2019-03-17 DIAGNOSIS — E11.9 TYPE 2 DIABETES MELLITUS WITHOUT COMPLICATION, WITHOUT LONG-TERM CURRENT USE OF INSULIN (HCC): ICD-10-CM

## 2019-03-17 DIAGNOSIS — I10 ESSENTIAL HYPERTENSION: ICD-10-CM

## 2019-03-18 RX ORDER — SITAGLIPTIN 100 MG/1
TABLET, FILM COATED ORAL
Qty: 90 TABLET | Refills: 0 | Status: SHIPPED | OUTPATIENT
Start: 2019-03-18

## 2019-03-18 RX ORDER — METOPROLOL SUCCINATE 50 MG/1
50 TABLET, EXTENDED RELEASE ORAL DAILY
Qty: 90 TABLET | Refills: 0 | Status: SHIPPED | OUTPATIENT
Start: 2019-03-18

## 2019-04-29 ENCOUNTER — TELEPHONE (OUTPATIENT)
Dept: FAMILY MEDICINE CLINIC | Age: 84
End: 2019-04-29

## 2023-01-23 PROBLEM — N39.46 URGE AND STRESS INCONTINENCE: Status: ACTIVE | Noted: 2023-01-23

## 2023-01-23 PROBLEM — N32.81 OVERACTIVE BLADDER: Status: ACTIVE | Noted: 2023-01-23

## 2023-01-23 PROBLEM — E78.2 MIXED HYPERLIPIDEMIA: Status: ACTIVE | Noted: 2023-01-23

## 2023-01-23 PROBLEM — M19.90 ARTHRITIS: Status: ACTIVE | Noted: 2023-01-23

## 2023-01-23 PROBLEM — G56.03 BILATERAL CARPAL TUNNEL SYNDROME: Status: ACTIVE | Noted: 2023-01-23

## 2023-01-23 PROBLEM — K21.00 GASTROESOPHAGEAL REFLUX DISEASE WITH ESOPHAGITIS: Status: ACTIVE | Noted: 2023-01-23

## 2023-01-23 PROBLEM — E66.811 CLASS 1 OBESITY DUE TO EXCESS CALORIES WITHOUT SERIOUS COMORBIDITY WITH BODY MASS INDEX (BMI) OF 33.0 TO 33.9 IN ADULT: Status: ACTIVE | Noted: 2023-01-23

## 2023-01-23 PROBLEM — I10 ESSENTIAL HYPERTENSION: Status: ACTIVE | Noted: 2023-01-23

## 2023-01-23 PROBLEM — E11.42 DIABETIC POLYNEUROPATHY ASSOCIATED WITH TYPE 2 DIABETES MELLITUS (MULTI): Status: ACTIVE | Noted: 2023-01-23

## 2023-01-23 PROBLEM — M81.0 OSTEOPOROSIS: Status: ACTIVE | Noted: 2023-01-23

## 2023-01-23 PROBLEM — E66.09 CLASS 1 OBESITY DUE TO EXCESS CALORIES WITHOUT SERIOUS COMORBIDITY WITH BODY MASS INDEX (BMI) OF 33.0 TO 33.9 IN ADULT: Status: ACTIVE | Noted: 2023-01-23

## 2023-01-23 RX ORDER — METFORMIN HYDROCHLORIDE 1000 MG/1
1000 TABLET ORAL 2 TIMES DAILY
COMMUNITY
End: 2023-09-18 | Stop reason: SDUPTHER

## 2023-01-23 RX ORDER — METOPROLOL SUCCINATE 50 MG/1
50 TABLET, EXTENDED RELEASE ORAL DAILY
COMMUNITY
End: 2023-09-18 | Stop reason: SDUPTHER

## 2023-01-23 RX ORDER — ATORVASTATIN CALCIUM 40 MG/1
40 TABLET, FILM COATED ORAL DAILY
COMMUNITY
End: 2023-09-18 | Stop reason: SDUPTHER

## 2023-01-23 RX ORDER — MIRABEGRON 50 MG/1
50 TABLET, EXTENDED RELEASE ORAL DAILY
COMMUNITY
End: 2023-04-21 | Stop reason: ALTCHOICE

## 2023-01-23 RX ORDER — CHOLECALCIFEROL (VITAMIN D3) 50 MCG
50 TABLET ORAL DAILY
COMMUNITY

## 2023-01-23 RX ORDER — LISINOPRIL 2.5 MG/1
2.5 TABLET ORAL DAILY
COMMUNITY
End: 2023-09-18 | Stop reason: SDUPTHER

## 2023-01-23 RX ORDER — OMEPRAZOLE 20 MG/1
20 CAPSULE, DELAYED RELEASE ORAL
COMMUNITY
End: 2023-04-11

## 2023-03-07 ENCOUNTER — OFFICE VISIT (OUTPATIENT)
Dept: PRIMARY CARE | Facility: CLINIC | Age: 88
End: 2023-03-07
Payer: MEDICARE

## 2023-03-07 VITALS
WEIGHT: 131 LBS | TEMPERATURE: 97.5 F | SYSTOLIC BLOOD PRESSURE: 107 MMHG | BODY MASS INDEX: 30.32 KG/M2 | RESPIRATION RATE: 16 BRPM | HEIGHT: 55 IN | OXYGEN SATURATION: 93 % | DIASTOLIC BLOOD PRESSURE: 51 MMHG | HEART RATE: 82 BPM

## 2023-03-07 DIAGNOSIS — E11.42 TYPE 2 DIABETES MELLITUS WITH DIABETIC POLYNEUROPATHY, WITHOUT LONG-TERM CURRENT USE OF INSULIN (MULTI): Primary | ICD-10-CM

## 2023-03-07 DIAGNOSIS — E78.2 MIXED HYPERLIPIDEMIA: ICD-10-CM

## 2023-03-07 DIAGNOSIS — I10 ESSENTIAL HYPERTENSION: ICD-10-CM

## 2023-03-07 DIAGNOSIS — E66.09 CLASS 1 OBESITY DUE TO EXCESS CALORIES WITH SERIOUS COMORBIDITY AND BODY MASS INDEX (BMI) OF 31.0 TO 31.9 IN ADULT: ICD-10-CM

## 2023-03-07 PROCEDURE — 3078F DIAST BP <80 MM HG: CPT | Performed by: FAMILY MEDICINE

## 2023-03-07 PROCEDURE — 99214 OFFICE O/P EST MOD 30 MIN: CPT | Performed by: FAMILY MEDICINE

## 2023-03-07 PROCEDURE — 3074F SYST BP LT 130 MM HG: CPT | Performed by: FAMILY MEDICINE

## 2023-03-07 PROCEDURE — 1036F TOBACCO NON-USER: CPT | Performed by: FAMILY MEDICINE

## 2023-03-07 PROCEDURE — 1159F MED LIST DOCD IN RCRD: CPT | Performed by: FAMILY MEDICINE

## 2023-03-07 RX ORDER — PIOGLITAZONEHYDROCHLORIDE 15 MG/1
15 TABLET ORAL DAILY
Qty: 30 TABLET | Refills: 11 | Status: SHIPPED | OUTPATIENT
Start: 2023-03-07 | End: 2023-05-09 | Stop reason: SDUPTHER

## 2023-03-07 ASSESSMENT — ENCOUNTER SYMPTOMS
POLYDIPSIA: 0
ACTIVITY CHANGE: 0
BLURRED VISION: 0
DIFFICULTY URINATING: 0
FACIAL ASYMMETRY: 0
LIGHT-HEADEDNESS: 0
EYE PAIN: 0
POLYPHAGIA: 0
WHEEZING: 0
VOMITING: 0
FREQUENCY: 1
CHILLS: 0
SHORTNESS OF BREATH: 0
BACK PAIN: 0
NECK STIFFNESS: 0
ADENOPATHY: 0
RHINORRHEA: 0
WEAKNESS: 0
CHOKING: 0
JOINT SWELLING: 0
EYE DISCHARGE: 0
ABDOMINAL PAIN: 0
SPEECH DIFFICULTY: 0
SWEATS: 0
ORTHOPNEA: 0
DYSURIA: 0
SEIZURES: 0
DIZZINESS: 0
BRUISES/BLEEDS EASILY: 0
ARTHRALGIAS: 0
SLEEP DISTURBANCE: 0
FEVER: 0
FOCAL SENSORY LOSS: 0
WEIGHT LOSS: 0
WOUND: 0
FOCAL WEAKNESS: 0
BLOOD IN STOOL: 0
UNEXPECTED WEIGHT CHANGE: 0
TREMORS: 0
HYPERTENSION: 1
NECK PAIN: 0
DIARRHEA: 0
FATIGUE: 0
CONSTIPATION: 0
EYE ITCHING: 0
PHOTOPHOBIA: 0
PALPITATIONS: 0
HEADACHES: 0
HEMATURIA: 0
NAUSEA: 0
APPETITE CHANGE: 0
NUMBNESS: 0
NERVOUS/ANXIOUS: 0
VISUAL CHANGE: 0
LEG PAIN: 0
PND: 0
FLANK PAIN: 0
MYALGIAS: 0
HUNGER: 0
TROUBLE SWALLOWING: 0
VOICE CHANGE: 0
EYE REDNESS: 0
DIAPHORESIS: 0
SINUS PRESSURE: 0
COUGH: 0

## 2023-03-07 NOTE — PROGRESS NOTES
Subjective   Patient ID: Kelly Ramesh is a 88 y.o. female who presents for 6 month check up (And follow up on labs ), Knee Pain (Left), and Leg Cramps (Bilt calves and feet ).    Hypertension  This is a chronic problem. The current episode started more than 1 year ago. The problem is unchanged. The problem is controlled. Pertinent negatives include no anxiety, blurred vision, chest pain, headaches, malaise/fatigue, neck pain, orthopnea, palpitations, peripheral edema, PND, shortness of breath or sweats. There are no associated agents to hypertension. Risk factors for coronary artery disease include diabetes mellitus, dyslipidemia, obesity and post-menopausal state. Past treatments include beta blockers and ACE inhibitors. The current treatment provides significant improvement. There are no compliance problems.  There is no history of angina, kidney disease, CAD/MI, CVA, heart failure, left ventricular hypertrophy, PVD or retinopathy. There is no history of chronic renal disease, coarctation of the aorta, hyperaldosteronism, hypercortisolism, hyperparathyroidism, a hypertension causing med, pheochromocytoma, renovascular disease, sleep apnea or a thyroid problem.   Hyperlipidemia  This is a chronic problem. The current episode started more than 1 year ago. The problem is controlled. Recent lipid tests were reviewed and are normal. Exacerbating diseases include diabetes and obesity. She has no history of chronic renal disease, hypothyroidism, liver disease or nephrotic syndrome. Factors aggravating her hyperlipidemia include beta blockers. Pertinent negatives include no chest pain, focal sensory loss, focal weakness, leg pain, myalgias or shortness of breath. Current antihyperlipidemic treatment includes statins. The current treatment provides significant improvement of lipids. There are no compliance problems.  Risk factors for coronary artery disease include diabetes mellitus, dyslipidemia, hypertension,  post-menopausal and obesity.   Diabetes  She presents for her follow-up diabetic visit. She has type 2 diabetes mellitus. No MedicAlert identification noted. Her disease course has been worsening. Pertinent negatives for hypoglycemia include no dizziness, headaches, hunger, mood changes, nervousness/anxiousness, pallor, seizures, sleepiness, speech difficulty, sweats or tremors. Associated symptoms include foot paresthesias and polyuria. Pertinent negatives for diabetes include no blurred vision, no chest pain, no fatigue, no foot ulcerations, no polydipsia, no polyphagia, no visual change, no weakness and no weight loss. There are no hypoglycemic complications. Symptoms are worsening. Diabetic complications include peripheral neuropathy. Pertinent negatives for diabetic complications include no autonomic neuropathy, CVA, heart disease, nephropathy, PVD or retinopathy. Risk factors for coronary artery disease include diabetes mellitus, dyslipidemia, hypertension, obesity and post-menopausal. Current diabetic treatment includes oral agent (triple therapy). She is compliant with treatment all of the time. She has not had a previous visit with a dietitian. She participates in exercise intermittently. An ACE inhibitor/angiotensin II receptor blocker is being taken. She does not see a podiatrist.Eye exam is current.        Review of Systems   Constitutional:  Negative for activity change, appetite change, chills, diaphoresis, fatigue, fever, malaise/fatigue, unexpected weight change and weight loss.   HENT:  Negative for congestion, hearing loss, nosebleeds, postnasal drip, rhinorrhea, sinus pressure, trouble swallowing and voice change.    Eyes:  Negative for blurred vision, photophobia, pain, discharge, redness, itching and visual disturbance.   Respiratory:  Negative for cough, choking, shortness of breath and wheezing.    Cardiovascular:  Negative for chest pain, palpitations, orthopnea, leg swelling and PND.  "  Gastrointestinal:  Negative for abdominal pain, blood in stool, constipation, diarrhea, nausea and vomiting.   Endocrine: Positive for polyuria. Negative for cold intolerance, heat intolerance, polydipsia and polyphagia.   Genitourinary:  Positive for frequency and urgency. Negative for difficulty urinating, dysuria, flank pain and hematuria.   Musculoskeletal:  Negative for arthralgias, back pain, joint swelling, myalgias, neck pain and neck stiffness.   Skin:  Negative for pallor, rash and wound.   Allergic/Immunologic: Negative for immunocompromised state.   Neurological:  Negative for dizziness, tremors, focal weakness, seizures, syncope, facial asymmetry, speech difficulty, weakness, light-headedness, numbness and headaches.   Hematological:  Negative for adenopathy. Does not bruise/bleed easily.   Psychiatric/Behavioral:  Negative for sleep disturbance and suicidal ideas. The patient is not nervous/anxious.        Objective   /51 (BP Location: Right arm, Patient Position: Sitting, BP Cuff Size: Large adult)   Pulse 82   Temp 36.4 °C (97.5 °F) (Temporal)   Resp 16   Ht 1.372 m (4' 6\")   Wt 59.4 kg (131 lb)   SpO2 93%   BMI 31.59 kg/m²     Physical Exam  Constitutional:       General: She is not in acute distress.     Appearance: She is not ill-appearing or diaphoretic.   HENT:      Head: Normocephalic and atraumatic.      Right Ear: External ear normal.      Left Ear: External ear normal.      Nose: Nose normal. No rhinorrhea.   Eyes:      General: Lids are normal. No scleral icterus.        Right eye: No discharge.         Left eye: No discharge.      Conjunctiva/sclera: Conjunctivae normal.   Cardiovascular:      Rate and Rhythm: Normal rate and regular rhythm.      Pulses: Normal pulses.      Heart sounds: No murmur heard.  Pulmonary:      Effort: Pulmonary effort is normal. No respiratory distress.      Breath sounds: No decreased breath sounds, wheezing, rhonchi or rales.   Abdominal:      " General: Bowel sounds are normal. There is no distension.      Palpations: Abdomen is soft. There is no mass.      Tenderness: There is no abdominal tenderness. There is no guarding or rebound.   Musculoskeletal:         General: No swelling, tenderness or deformity.      Cervical back: No rigidity or tenderness.      Right lower leg: No edema.      Left lower leg: No edema.   Lymphadenopathy:      Cervical: No cervical adenopathy.      Upper Body:      Right upper body: No supraclavicular adenopathy.      Left upper body: No supraclavicular adenopathy.   Skin:     General: Skin is warm and dry.      Coloration: Skin is not jaundiced or pale.      Findings: No erythema, lesion or rash.   Neurological:      General: No focal deficit present.      Mental Status: She is alert and oriented to person, place, and time.      Sensory: No sensory deficit.      Motor: No weakness or tremor.      Coordination: Coordination normal.      Gait: Gait normal.   Psychiatric:         Mood and Affect: Mood normal. Affect is not inappropriate.         Behavior: Behavior normal.         Assessment/Plan   Diagnoses and all orders for this visit:  Type 2 diabetes mellitus with diabetic polyneuropathy, without long-term current use of insulin (CMS/[facility])  -     pioglitazone (Actos) 15 mg tablet; Take 1 tablet (15 mg) by mouth once daily.  -     Follow Up In [facility] - Pharmacy; Future  -     Albumin , Urine Random; Future  -     Comprehensive Metabolic Panel; Future  -     Hemoglobin A1C; Future  -     Lipid Panel; Future  -     Magnesium; Future  -     TSH with reflex to Free T4 if abnormal; Future  -     CBC and Auto Differential; Future  -     Follow Up In [facility] - PCP; Future  Essential hypertension  -     Albumin , Urine Random; Future  -     Comprehensive Metabolic Panel; Future  -     Hemoglobin A1C; Future  -     Lipid Panel; Future  -     Magnesium; Future  -     TSH with reflex to Free T4 if abnormal;  Future  -     CBC and Auto Differential; Future  -     Follow Up In [facility] - PCP; Future  Mixed hyperlipidemia  -     Albumin , Urine Random; Future  -     Comprehensive Metabolic Panel; Future  -     Hemoglobin A1C; Future  -     Lipid Panel; Future  -     Magnesium; Future  -     TSH with reflex to Free T4 if abnormal; Future  -     CBC and Auto Differential; Future  -     Follow Up In [facility] - PCP; Future  Class 1 obesity due to excess calories with serious comorbidity and body mass index (BMI) of 31.0 to 31.9 in adult  -     Albumin , Urine Random; Future  -     Comprehensive Metabolic Panel; Future  -     Hemoglobin A1C; Future  -     Lipid Panel; Future  -     Magnesium; Future  -     TSH with reflex to Free T4 if abnormal; Future  -     CBC and Auto Differential; Future  -     Follow Up In [facility] - PCP; Future

## 2023-03-21 ENCOUNTER — TELEMEDICINE (OUTPATIENT)
Dept: PHARMACY | Facility: HOSPITAL | Age: 88
End: 2023-03-21
Payer: MEDICARE

## 2023-03-21 DIAGNOSIS — E11.42 TYPE 2 DIABETES MELLITUS WITH DIABETIC POLYNEUROPATHY, WITHOUT LONG-TERM CURRENT USE OF INSULIN (MULTI): ICD-10-CM

## 2023-03-21 ASSESSMENT — ENCOUNTER SYMPTOMS: DIABETIC ASSOCIATED SYMPTOMS: 0

## 2023-03-21 NOTE — PROGRESS NOTES
Subjective   Patient ID: Kelly Ramesh is a 88 y.o. female who presents for Diabetes.    Referring Provider: Jj Bonner,      Diabetes  She presents for her initial diabetic visit. She has type 2 diabetes mellitus. Her disease course has been stable. There are no hypoglycemic associated symptoms. There are no diabetic associated symptoms. There are no hypoglycemic complications. Symptoms are stable. Current diabetic treatment includes oral agent (triple therapy). She is compliant with treatment all of the time. Weight trend: lost weight is now at 131 lbs. An ACE inhibitor/angiotensin II receptor blocker is being taken.       Review of Systems   All other systems reviewed and are negative.      Objective     Assessment/Plan   Problem List Items Addressed This Visit          Nervous    Type 2 diabetes mellitus with diabetic polyneuropathy, without long-term current use of insulin (CMS/[facility])     # Secondary prevention  - Statin? [ Yes ]  - ACE-I/ARB? [ Yes ]  - Aspirin? [ No ]  ______________________________________________________________________________  DIABETES ASSESSMENT    Diet:  Stated she does not really have a diet.  Morning: oatmeal and cup of coffee  Lunch: sandwich and toast   Dinner: rice and not too much meat   Drinks: a soda-seltzer, water     Monitoring  Patient only checks blood sugars when she believes she needs to. Patient is aware on what do if she experiences hypoglycemia.     Has the patient experienced any low sugars since last contact? [ No ]   Was the patient symptomatic? [ No ] [ sweaty, shaky, anxious, excessive hunger, confusion, lightheaded, nauseous, blurred vision ]   Has the patient experienced any high sugars since last contact? [ No ]   Was the patient symptomatic? [ No ] [ excessive thirst, frequent urination, fatigue, nausea, shortness of breath, trouble concentrating ]     ______________________________________________________________________________  PATIENT  EDUCATION/GOALS  Current A1c: [ 8.3%] [ 2022]     Goals  Fasting B - 130 mg/dL  Postprandial BG: less than 180 mg/dL  A1c: less than 7%   Exercise when she can even if it it for 15-30 minutes 2-3x/week    ______________________________________________________________________________  Assessment/Plan:  1.  Continue current medication regimen   2.  Lifestyle modification : mainly exercise   3. Check blood glucose and record readings at least 2x/day      Clinical Pharmacist Follow-Up Date: 1 month  Type of encounter: Nilson BeasleyD, BRENDEN  Pharmacy Resident, [facility]    Verbal consent to manage patient's drug therapy was obtained from [the patient or an individual authorized to act on behalf of a patient]. They were informed they may decline to participate or withdraw from participation in pharmacy services at any time.

## 2023-04-07 DIAGNOSIS — K21.00 GASTRO-ESOPHAGEAL REFLUX DISEASE WITH ESOPHAGITIS: ICD-10-CM

## 2023-04-11 RX ORDER — OMEPRAZOLE 20 MG/1
CAPSULE, DELAYED RELEASE ORAL
Qty: 30 CAPSULE | Refills: 11 | Status: SHIPPED | OUTPATIENT
Start: 2023-04-11 | End: 2023-04-21 | Stop reason: ALTCHOICE

## 2023-04-21 ENCOUNTER — TELEMEDICINE (OUTPATIENT)
Dept: PHARMACY | Facility: HOSPITAL | Age: 88
End: 2023-04-21
Payer: MEDICARE

## 2023-04-21 DIAGNOSIS — E11.42 TYPE 2 DIABETES MELLITUS WITH DIABETIC POLYNEUROPATHY, WITHOUT LONG-TERM CURRENT USE OF INSULIN (MULTI): ICD-10-CM

## 2023-04-21 NOTE — PROGRESS NOTES
Pharmacist Clinic: Diabetes Management  Kelly Ramesh is a 88 y.o. female was referred to Clinical Pharmacy Team for diabetes management.     Referring Provider: Jj Bonner DO     HISTORY OF PRESENT ILLNESS  Spoke with patient today regarding diabetes management - patient denies any concerns at this time. Patient is not testing blood sugar regularly, however denies any side effects, or hyper/hypoglycemia symptoms.    LAB REVIEW   Glucose (mg/dL)   Date Value   09/06/2022 145 (H)   03/28/2022 125 (H)   10/28/2020 154 (H)     Hemoglobin A1C (%)   Date Value   09/06/2022 8.3 (A)   03/28/2022 8.1 (A)   10/28/2020 8.0     Bicarbonate (mmol/L)   Date Value   09/06/2022 29   03/28/2022 30   10/28/2020 29     Urea Nitrogen (mg/dL)   Date Value   09/06/2022 8   03/28/2022 17   10/28/2020 13     Creatinine (mg/dL)   Date Value   09/06/2022 0.67   03/28/2022 0.64   10/28/2020 0.74     Lab Results   Component Value Date    HGBA1C 8.3 (A) 09/06/2022    HGBA1C 8.1 (A) 03/28/2022    HGBA1C 8.0 10/28/2020     Lab Results   Component Value Date    CREATININE 0.67 09/06/2022     Lab Results   Component Value Date    CHOL 141 09/06/2022    CHOL 182 03/28/2022    CHOL 145 10/28/2020     Lab Results   Component Value Date    HDL 52.0 09/06/2022    HDL 59.0 03/28/2022    HDL 49.0 10/28/2020     No results found for: LDLCALC  Lab Results   Component Value Date    TRIG 154 (H) 09/06/2022    TRIG 184 (H) 03/28/2022    TRIG 153 (H) 10/28/2020     No components found for: CHOLHDL  No results found for: LDLCALC    DIABETES ASSESSMENT    CURRENT PHARMACOTHERAPY  - Jardiance 25 mg daily  - pioglitazone 15 mg daily  - Rybelsus 14 mg daily  - metformin 1000 mg BID    SECONDARY PREVENTION  - Statin? Yes - atorvastatin 40 mg daily  - ACE-I/ARB? Yes - lisinopril 2.5 mg daily  - Aspirin? No    SMBG MEASUREMENTS  Patient is not testing blood sugar at this time.    Patient does not report symptoms of hypoglycemia.   Patient does not report  symptoms of hyperglycemia.    RECOMMENDATIONS/PLAN  1. Patients diabetes is stable with most recent A1c of 8.3% (goal < 8.5 %).   Continue:   - Jardiance 25 mg daily  - pioglitazone 15 mg daily  - Rybelsus 14 mg daily  - metformin 1000 mg BID    2. Education:   - At this time, patient is at A1c goal, and most diabetic medications are maximized. Given patient is overdue for labs, [facility] will not make any medication changes at this time. Will re-evaluate in 6 weeks, and advised patient to complete labwork.    Clinical Pharmacist follow up: 5/30/2023 @ 11 am    Thank you,  Chanel Cueva, PharmD    Verbal consent to manage patient's drug therapy was obtained from patient. They were informed they may decline to participate or withdraw from participation in pharmacy services at any time.

## 2023-05-09 DIAGNOSIS — E11.42 TYPE 2 DIABETES MELLITUS WITH DIABETIC POLYNEUROPATHY, WITHOUT LONG-TERM CURRENT USE OF INSULIN (MULTI): ICD-10-CM

## 2023-05-09 RX ORDER — PIOGLITAZONEHYDROCHLORIDE 15 MG/1
15 TABLET ORAL DAILY
Qty: 90 TABLET | Refills: 3 | Status: SHIPPED | OUTPATIENT
Start: 2023-05-09 | End: 2023-11-13 | Stop reason: SDUPTHER

## 2023-05-30 ENCOUNTER — TELEPHONE (OUTPATIENT)
Dept: PRIMARY CARE | Facility: CLINIC | Age: 88
End: 2023-05-30

## 2023-05-30 NOTE — TELEPHONE ENCOUNTER
Pharmacy Follow-up  5/30/23     Unable to reach patient and left voicemail. Will try again soon.      Please reach out to pharmacy team for any questions or concerns.   _______________________________________________________________________     Xiomara Pacheco PharmD, BRENDEN  Pharmacy Resident, [facility]

## 2023-06-20 ENCOUNTER — TELEPHONE (OUTPATIENT)
Dept: PRIMARY CARE | Facility: CLINIC | Age: 88
End: 2023-06-20

## 2023-06-20 NOTE — TELEPHONE ENCOUNTER
Pharmacy Follow-up  06/20/23     Unable to reach patient and left voicemail. Will try again soon.      Please reach out to pharmacy team for any questions or concerns.   _______________________________________________________________________     Xiomara Pacheco PharmD, BRENDEN  Pharmacy Resident, [facility]

## 2023-09-18 DIAGNOSIS — E78.2 MIXED HYPERLIPIDEMIA: ICD-10-CM

## 2023-09-18 DIAGNOSIS — I10 ESSENTIAL HYPERTENSION: ICD-10-CM

## 2023-09-18 DIAGNOSIS — E11.42 TYPE 2 DIABETES MELLITUS WITH DIABETIC POLYNEUROPATHY, WITHOUT LONG-TERM CURRENT USE OF INSULIN (MULTI): ICD-10-CM

## 2023-09-18 RX ORDER — METOPROLOL SUCCINATE 50 MG/1
50 TABLET, EXTENDED RELEASE ORAL DAILY
Qty: 90 TABLET | Refills: 0 | Status: SHIPPED | OUTPATIENT
Start: 2023-09-18 | End: 2024-05-11

## 2023-09-18 RX ORDER — ATORVASTATIN CALCIUM 40 MG/1
40 TABLET, FILM COATED ORAL DAILY
Qty: 90 TABLET | Refills: 0 | Status: SHIPPED | OUTPATIENT
Start: 2023-09-18 | End: 2024-05-11

## 2023-09-18 RX ORDER — METFORMIN HYDROCHLORIDE 1000 MG/1
1000 TABLET ORAL 2 TIMES DAILY
Qty: 180 TABLET | Refills: 0 | Status: SHIPPED | OUTPATIENT
Start: 2023-09-18 | End: 2024-05-11

## 2023-09-18 RX ORDER — LISINOPRIL 2.5 MG/1
2.5 TABLET ORAL DAILY
Qty: 90 TABLET | Refills: 0 | Status: SHIPPED | OUTPATIENT
Start: 2023-09-18 | End: 2024-05-11

## 2023-11-13 DIAGNOSIS — E11.42 TYPE 2 DIABETES MELLITUS WITH DIABETIC POLYNEUROPATHY, WITHOUT LONG-TERM CURRENT USE OF INSULIN (MULTI): ICD-10-CM

## 2023-11-13 RX ORDER — PIOGLITAZONEHYDROCHLORIDE 15 MG/1
15 TABLET ORAL DAILY
Qty: 30 TABLET | Refills: 0 | Status: SHIPPED | OUTPATIENT
Start: 2023-11-13 | End: 2023-12-11

## 2023-12-20 ENCOUNTER — OFFICE VISIT (OUTPATIENT)
Dept: PRIMARY CARE | Facility: CLINIC | Age: 88
End: 2023-12-20
Payer: MEDICARE

## 2023-12-20 VITALS
SYSTOLIC BLOOD PRESSURE: 129 MMHG | HEIGHT: 55 IN | BODY MASS INDEX: 38.56 KG/M2 | HEART RATE: 81 BPM | OXYGEN SATURATION: 95 % | DIASTOLIC BLOOD PRESSURE: 73 MMHG | WEIGHT: 166.6 LBS | TEMPERATURE: 97.1 F | RESPIRATION RATE: 16 BRPM

## 2023-12-20 DIAGNOSIS — E11.42 TYPE 2 DIABETES MELLITUS WITH DIABETIC POLYNEUROPATHY, WITHOUT LONG-TERM CURRENT USE OF INSULIN (MULTI): ICD-10-CM

## 2023-12-20 DIAGNOSIS — M79.2 NEURALGIA INVOLVING SCALP: ICD-10-CM

## 2023-12-20 DIAGNOSIS — E66.01 CLASS 3 SEVERE OBESITY DUE TO EXCESS CALORIES WITH SERIOUS COMORBIDITY AND BODY MASS INDEX (BMI) OF 40.0 TO 44.9 IN ADULT (MULTI): ICD-10-CM

## 2023-12-20 DIAGNOSIS — I10 ESSENTIAL HYPERTENSION: ICD-10-CM

## 2023-12-20 DIAGNOSIS — N39.46 URGE AND STRESS INCONTINENCE: Primary | ICD-10-CM

## 2023-12-20 DIAGNOSIS — Z23 ENCOUNTER FOR IMMUNIZATION: ICD-10-CM

## 2023-12-20 DIAGNOSIS — H04.123 DRY EYE SYNDROME OF BOTH EYES: ICD-10-CM

## 2023-12-20 DIAGNOSIS — E78.2 MIXED HYPERLIPIDEMIA: ICD-10-CM

## 2023-12-20 PROBLEM — E66.813 CLASS 3 SEVERE OBESITY DUE TO EXCESS CALORIES WITH SERIOUS COMORBIDITY AND BODY MASS INDEX (BMI) OF 40.0 TO 44.9 IN ADULT: Status: ACTIVE | Noted: 2023-01-23

## 2023-12-20 PROBLEM — G56.00 CARPAL TUNNEL SYNDROME: Status: RESOLVED | Noted: 2023-12-20 | Resolved: 2023-12-20

## 2023-12-20 LAB
POC APPEARANCE, URINE: CLEAR
POC BILIRUBIN, URINE: NEGATIVE
POC BLOOD, URINE: NEGATIVE
POC COLOR, URINE: YELLOW
POC GLUCOSE, URINE: ABNORMAL MG/DL
POC HEMOGLOBIN A1C: 8 % (ref 4.2–6.5)
POC KETONES, URINE: NEGATIVE MG/DL
POC LEUKOCYTES, URINE: NEGATIVE
POC NITRITE,URINE: NEGATIVE
POC PH, URINE: 5.5 PH
POC PROTEIN, URINE: NEGATIVE MG/DL
POC SPECIFIC GRAVITY, URINE: 1.01
POC UROBILINOGEN, URINE: 0.2 EU/DL

## 2023-12-20 PROCEDURE — 1159F MED LIST DOCD IN RCRD: CPT | Performed by: FAMILY MEDICINE

## 2023-12-20 PROCEDURE — 83036 HEMOGLOBIN GLYCOSYLATED A1C: CPT | Performed by: FAMILY MEDICINE

## 2023-12-20 PROCEDURE — 81003 URINALYSIS AUTO W/O SCOPE: CPT | Performed by: FAMILY MEDICINE

## 2023-12-20 PROCEDURE — 1160F RVW MEDS BY RX/DR IN RCRD: CPT | Performed by: FAMILY MEDICINE

## 2023-12-20 PROCEDURE — 3074F SYST BP LT 130 MM HG: CPT | Performed by: FAMILY MEDICINE

## 2023-12-20 PROCEDURE — 99213 OFFICE O/P EST LOW 20 MIN: CPT | Performed by: FAMILY MEDICINE

## 2023-12-20 PROCEDURE — 1126F AMNT PAIN NOTED NONE PRSNT: CPT | Performed by: FAMILY MEDICINE

## 2023-12-20 PROCEDURE — 1036F TOBACCO NON-USER: CPT | Performed by: FAMILY MEDICINE

## 2023-12-20 PROCEDURE — 3078F DIAST BP <80 MM HG: CPT | Performed by: FAMILY MEDICINE

## 2023-12-20 RX ORDER — MIRABEGRON 50 MG/1
50 TABLET, EXTENDED RELEASE ORAL DAILY
Qty: 30 TABLET | Refills: 11 | Status: SHIPPED | OUTPATIENT
Start: 2023-12-20 | End: 2024-12-19

## 2023-12-20 RX ORDER — OMEPRAZOLE 20 MG/1
20 CAPSULE, DELAYED RELEASE ORAL
COMMUNITY
Start: 2023-11-29 | End: 2024-03-08

## 2023-12-20 NOTE — PROGRESS NOTES
"Subjective   Patient ID: Kelly Ramesh is a 89 y.o. female who presents for Pain (And \"weird\" sensation on left side of head. Patient states she touches her head and feels the sensation for a while and its constant. ), Eye Problem (Patient states eyes get watery all the time especially when the light hits them. ), and bladder issues (Patient states she's going to the bathroom all the time and it's been happening for some time. Patient states she wakes up at least 4 times throughout the night to go to the bathroom. ).  Chronic issues with urinary urgency, frequency, stress incontinence.  Desires urologic eval.  Has recent i.e. 1 week history of burning sensation left side of scalp worse with light touch.  Denies any nausea, vomiting, diarrhea, fever, chills, shortness of breath or rashes.  No visual changes.  Does have chronic dry eye but that is unchanged.    Urinary Frequency   This is a chronic problem. The current episode started more than 1 year ago. The problem occurs every urination. The problem has been waxing and waning. The patient is experiencing no pain. There has been no fever. She is Not sexually active. There is No history of pyelonephritis. Associated symptoms include frequency and urgency. Pertinent negatives include no chills, flank pain, hematuria, nausea or vomiting. She has tried nothing for the symptoms. The treatment provided no relief.       Review of Systems   Constitutional:  Negative for activity change, appetite change, chills, diaphoresis, fatigue, fever and unexpected weight change.   HENT:  Negative for congestion, ear pain, hearing loss, nosebleeds, postnasal drip, rhinorrhea, sinus pressure, sneezing, sore throat, tinnitus, trouble swallowing and voice change.    Eyes:  Negative for photophobia, pain, discharge, redness, itching and visual disturbance.   Respiratory:  Negative for cough, choking, chest tightness, shortness of breath and wheezing.    Cardiovascular:  Negative for chest " "pain, palpitations and leg swelling.   Gastrointestinal:  Negative for abdominal distention, abdominal pain, blood in stool, constipation, diarrhea, nausea and vomiting.   Endocrine: Negative for cold intolerance, heat intolerance, polydipsia and polyuria.   Genitourinary:  Positive for frequency and urgency. Negative for dysuria, flank pain and hematuria.   Musculoskeletal:  Negative for arthralgias, back pain, joint swelling, myalgias, neck pain and neck stiffness.   Skin:  Negative for rash and wound.   Allergic/Immunologic: Negative for immunocompromised state.   Neurological:  Positive for headaches (Hypesthesia left temporal area). Negative for dizziness, tremors, seizures, syncope, facial asymmetry, speech difficulty, weakness, light-headedness and numbness.   Hematological:  Negative for adenopathy. Does not bruise/bleed easily.   Psychiatric/Behavioral:  Negative for agitation, behavioral problems, confusion, dysphoric mood, hallucinations, self-injury, sleep disturbance and suicidal ideas. The patient is not nervous/anxious.        Objective   /73 (BP Location: Right arm, Patient Position: Sitting)   Pulse 81   Temp 36.2 °C (97.1 °F)   Resp 16   Ht 1.372 m (4' 6\")   Wt 75.6 kg (166 lb 9.6 oz)   SpO2 95%   BMI 40.17 kg/m²   Physical Exam  Constitutional:       General: She is not in acute distress.     Appearance: She is not ill-appearing or diaphoretic.   HENT:      Head: Normocephalic and atraumatic.        Comments: Area of hypersensitivity-no rashes noted     Right Ear: External ear normal.      Left Ear: External ear normal.      Nose: Nose normal. No rhinorrhea.   Eyes:      General: Lids are normal. No scleral icterus.        Right eye: No discharge.         Left eye: No discharge.      Conjunctiva/sclera: Conjunctivae normal.   Cardiovascular:      Rate and Rhythm: Normal rate and regular rhythm.      Pulses: Normal pulses.      Heart sounds: No murmur heard.  Pulmonary:      Effort: " Pulmonary effort is normal. No respiratory distress.      Breath sounds: No decreased breath sounds, wheezing, rhonchi or rales.   Abdominal:      General: Bowel sounds are normal. There is no distension.      Palpations: Abdomen is soft. There is no mass.      Tenderness: There is no abdominal tenderness. There is no guarding or rebound.   Musculoskeletal:         General: No swelling, tenderness or deformity.      Cervical back: No rigidity or tenderness.      Right lower leg: No edema.      Left lower leg: No edema.   Lymphadenopathy:      Cervical: No cervical adenopathy.      Upper Body:      Right upper body: No supraclavicular adenopathy.      Left upper body: No supraclavicular adenopathy.   Skin:     General: Skin is warm and dry.      Coloration: Skin is not jaundiced or pale.      Findings: No erythema, lesion or rash.   Neurological:      General: No focal deficit present.      Mental Status: She is alert and oriented to person, place, and time.      Sensory: No sensory deficit.      Motor: No weakness or tremor.      Coordination: Coordination normal.      Gait: Gait normal.   Psychiatric:         Mood and Affect: Mood normal. Affect is not inappropriate.         Behavior: Behavior normal.         Assessment/Plan   Problem List Items Addressed This Visit       Class 3 severe obesity due to excess calories with serious comorbidity and body mass index (BMI) of 40.0 to 44.9 in adult (CMS/HCC)    Essential hypertension    Relevant Orders    Albumin , Urine Random    CBC and Auto Differential    Comprehensive Metabolic Panel    Lipid Panel    Magnesium    TSH with reflex to Free T4 if abnormal    Follow Up In [facility] - PCP - Established    Mixed hyperlipidemia    Relevant Orders    Comprehensive Metabolic Panel    Lipid Panel    TSH with reflex to Free T4 if abnormal    Follow Up In [facility] - PCP - Established    Urge and stress incontinence - Primary    Relevant Medications     mirabegron (Myrbetriq) 50 mg tablet extended release 24 hr 24 hr tablet    Other Relevant Orders    Referral to Urology    Follow Up In [facility] - PCP - Established    Type 2 diabetes mellitus with diabetic polyneuropathy, without long-term current use of insulin (CMS/[facility])    Relevant Orders    POCT UA Automated manually resulted (Completed)    POCT glycosylated hemoglobin (Hb A1C) manually resulted (Completed)    Albumin , Urine Random    CBC and Auto Differential    Comprehensive Metabolic Panel    Hemoglobin A1C    Lipid Panel    Magnesium    TSH with reflex to Free T4 if abnormal    Follow Up In [facility] - PCP - Established    Referral to Ophthalmology     Other Visit Diagnoses       Dry eye syndrome of both eyes        Relevant Medications    white petrolatum-mineral oil 94-3 % ophthalmic ointment    Encounter for immunization        Neuralgia involving scalp        Relevant Medications    lidocaine HCL 4 % ointment

## 2023-12-20 NOTE — LETTER
December 21, 2023     Kelly Ramesh  [address] [address]      Dear Ms. Ramesh:    Below are the results from your recent visit:  No lab findings that require immediate attention. We will review at next visit. If you have concerns that you desire to address sooner than next regular visit, please schedule appointment.   Resulted Orders   POCT UA Automated manually resulted   Result Value Ref Range    POC Color, Urine Yellow Straw, Yellow, Light-Yellow    POC Appearance, Urine Clear Clear    POC Glucose, Urine 500 (3+) (A) NEGATIVE mg/dl    POC Bilirubin, Urine NEGATIVE NEGATIVE    POC Ketones, Urine NEGATIVE NEGATIVE mg/dl    POC Specific Gravity, Urine 1.015 1.005 - 1.035    POC Blood, Urine NEGATIVE NEGATIVE    POC PH, Urine 5.5 No Reference Range Established PH    POC Protein, Urine NEGATIVE NEGATIVE, 30 (1+) mg/dl    POC Urobilinogen, Urine 0.2 0.2, 1.0 EU/DL    Poc Nitrite, Urine NEGATIVE NEGATIVE    POC Leukocytes, Urine NEGATIVE NEGATIVE   POCT glycosylated hemoglobin (Hb A1C) manually resulted   Result Value Ref Range    POC HEMOGLOBIN A1c 8.0 (A) 4.2 - 6.5 %      Comment:      8.0% +

## 2023-12-21 ASSESSMENT — ENCOUNTER SYMPTOMS
CONFUSION: 0
POLYDIPSIA: 0
PHOTOPHOBIA: 0
SINUS PRESSURE: 0
FACIAL ASYMMETRY: 0
SHORTNESS OF BREATH: 0
RHINORRHEA: 0
BACK PAIN: 0
EYE ITCHING: 0
ACTIVITY CHANGE: 0
CONSTIPATION: 0
NECK PAIN: 0
EYE REDNESS: 0
NERVOUS/ANXIOUS: 0
TREMORS: 0
JOINT SWELLING: 0
UNEXPECTED WEIGHT CHANGE: 0
HEMATURIA: 0
CHOKING: 0
SPEECH DIFFICULTY: 0
VOMITING: 0
ADENOPATHY: 0
DYSPHORIC MOOD: 0
EYE DISCHARGE: 0
WEAKNESS: 0
EYE PAIN: 0
COUGH: 0
VOICE CHANGE: 0
FEVER: 0
AGITATION: 0
CHEST TIGHTNESS: 0
ABDOMINAL DISTENTION: 0
TROUBLE SWALLOWING: 0
BLOOD IN STOOL: 0
HEADACHES: 1
NUMBNESS: 0
MYALGIAS: 0
SORE THROAT: 0
SEIZURES: 0
DYSURIA: 0
WOUND: 0
NECK STIFFNESS: 0
LIGHT-HEADEDNESS: 0
DIZZINESS: 0
PALPITATIONS: 0
DIAPHORESIS: 0
WHEEZING: 0
APPETITE CHANGE: 0
BRUISES/BLEEDS EASILY: 0
DIARRHEA: 0
FLANK PAIN: 0
FREQUENCY: 1
NAUSEA: 0
FATIGUE: 0
ARTHRALGIAS: 0
ABDOMINAL PAIN: 0
SLEEP DISTURBANCE: 0
HALLUCINATIONS: 0
CHILLS: 0

## 2024-01-01 ENCOUNTER — OFFICE VISIT (OUTPATIENT)
Dept: GERIATRIC MEDICINE | Age: 88
End: 2024-01-01
Payer: MEDICARE

## 2024-01-01 DIAGNOSIS — C34.82 MALIGNANT NEOPLASM OF OVERLAPPING SITES OF LEFT LUNG (HCC): ICD-10-CM

## 2024-01-01 DIAGNOSIS — Z51.5 HOSPICE CARE PATIENT: Primary | ICD-10-CM

## 2024-01-01 DIAGNOSIS — R62.7 FTT (FAILURE TO THRIVE) IN ADULT: ICD-10-CM

## 2024-01-01 PROCEDURE — 99308 SBSQ NF CARE LOW MDM 20: CPT | Performed by: PHYSICIAN ASSISTANT

## 2024-01-01 PROCEDURE — G9692 HOSP RECD BY PT DUR MSMT PER: HCPCS | Performed by: PHYSICIAN ASSISTANT

## 2024-01-07 DIAGNOSIS — E11.42 TYPE 2 DIABETES MELLITUS WITH DIABETIC POLYNEUROPATHY, WITHOUT LONG-TERM CURRENT USE OF INSULIN (MULTI): ICD-10-CM

## 2024-01-08 RX ORDER — PIOGLITAZONEHYDROCHLORIDE 15 MG/1
15 TABLET ORAL DAILY
Qty: 30 TABLET | Refills: 0 | Status: SHIPPED | OUTPATIENT
Start: 2024-01-08 | End: 2024-02-05

## 2024-02-03 DIAGNOSIS — E11.42 TYPE 2 DIABETES MELLITUS WITH DIABETIC POLYNEUROPATHY, WITHOUT LONG-TERM CURRENT USE OF INSULIN (MULTI): ICD-10-CM

## 2024-02-05 RX ORDER — PIOGLITAZONEHYDROCHLORIDE 15 MG/1
15 TABLET ORAL DAILY
Qty: 30 TABLET | Refills: 0 | Status: SHIPPED | OUTPATIENT
Start: 2024-02-05 | End: 2024-03-08

## 2024-03-08 DIAGNOSIS — K21.00 GASTRO-ESOPHAGEAL REFLUX DISEASE WITH ESOPHAGITIS, WITHOUT BLEEDING: ICD-10-CM

## 2024-03-08 DIAGNOSIS — E11.42 TYPE 2 DIABETES MELLITUS WITH DIABETIC POLYNEUROPATHY, WITHOUT LONG-TERM CURRENT USE OF INSULIN (MULTI): ICD-10-CM

## 2024-03-08 RX ORDER — PIOGLITAZONEHYDROCHLORIDE 15 MG/1
15 TABLET ORAL DAILY
Qty: 30 TABLET | Refills: 0 | Status: SHIPPED | OUTPATIENT
Start: 2024-03-08 | End: 2024-04-09

## 2024-03-08 RX ORDER — OMEPRAZOLE 20 MG/1
20 CAPSULE, DELAYED RELEASE ORAL
Qty: 30 CAPSULE | Refills: 0 | Status: SHIPPED | OUTPATIENT
Start: 2024-03-08 | End: 2024-04-09

## 2024-03-22 ENCOUNTER — TELEPHONE (OUTPATIENT)
Dept: PRIMARY CARE | Facility: CLINIC | Age: 89
End: 2024-03-22
Payer: MEDICARE

## 2024-04-06 DIAGNOSIS — E11.42 TYPE 2 DIABETES MELLITUS WITH DIABETIC POLYNEUROPATHY, WITHOUT LONG-TERM CURRENT USE OF INSULIN (MULTI): ICD-10-CM

## 2024-04-06 DIAGNOSIS — K21.00 GASTRO-ESOPHAGEAL REFLUX DISEASE WITH ESOPHAGITIS, WITHOUT BLEEDING: ICD-10-CM

## 2024-04-09 RX ORDER — PIOGLITAZONEHYDROCHLORIDE 15 MG/1
15 TABLET ORAL DAILY
Qty: 30 TABLET | Refills: 0 | Status: SHIPPED | OUTPATIENT
Start: 2024-04-09 | End: 2024-05-11

## 2024-04-09 RX ORDER — OMEPRAZOLE 20 MG/1
20 CAPSULE, DELAYED RELEASE ORAL
Qty: 30 CAPSULE | Refills: 0 | Status: SHIPPED | OUTPATIENT
Start: 2024-04-09 | End: 2024-05-11

## 2024-05-10 ENCOUNTER — APPOINTMENT (OUTPATIENT)
Dept: GENERAL RADIOLOGY | Age: 89
DRG: 180 | End: 2024-05-10
Payer: MEDICARE

## 2024-05-10 ENCOUNTER — HOSPITAL ENCOUNTER (INPATIENT)
Age: 89
LOS: 6 days | Discharge: NURSING, REHAB OR LONG-TERM CARE | DRG: 180 | End: 2024-05-17
Attending: INTERNAL MEDICINE | Admitting: INTERNAL MEDICINE
Payer: MEDICARE

## 2024-05-10 DIAGNOSIS — E78.2 MIXED HYPERLIPIDEMIA: ICD-10-CM

## 2024-05-10 DIAGNOSIS — E11.42 TYPE 2 DIABETES MELLITUS WITH DIABETIC POLYNEUROPATHY, WITHOUT LONG-TERM CURRENT USE OF INSULIN (MULTI): ICD-10-CM

## 2024-05-10 DIAGNOSIS — I48.91 ATRIAL FIBRILLATION, UNSPECIFIED TYPE (HCC): ICD-10-CM

## 2024-05-10 DIAGNOSIS — J96.01 ACUTE RESPIRATORY FAILURE WITH HYPOXIA (HCC): Primary | ICD-10-CM

## 2024-05-10 DIAGNOSIS — R91.8 LUNG MASS: ICD-10-CM

## 2024-05-10 DIAGNOSIS — I10 ESSENTIAL HYPERTENSION: ICD-10-CM

## 2024-05-10 DIAGNOSIS — K21.00 GASTRO-ESOPHAGEAL REFLUX DISEASE WITH ESOPHAGITIS, WITHOUT BLEEDING: ICD-10-CM

## 2024-05-10 LAB
ALBUMIN SERPL-MCNC: 3.5 G/DL (ref 3.5–4.6)
ALP SERPL-CCNC: 104 U/L (ref 40–130)
ALT SERPL-CCNC: 9 U/L (ref 0–33)
ANION GAP SERPL CALCULATED.3IONS-SCNC: 15 MEQ/L (ref 9–15)
AST SERPL-CCNC: 10 U/L (ref 0–35)
BASOPHILS # BLD: 0 K/UL (ref 0–0.2)
BASOPHILS NFR BLD: 0.2 %
BILIRUB SERPL-MCNC: <0.2 MG/DL (ref 0.2–0.7)
BNP BLD-MCNC: 535 PG/ML
BUN SERPL-MCNC: 13 MG/DL (ref 8–23)
CALCIUM SERPL-MCNC: 9 MG/DL (ref 8.5–9.9)
CHLORIDE SERPL-SCNC: 98 MEQ/L (ref 95–107)
CO2 SERPL-SCNC: 24 MEQ/L (ref 20–31)
CREAT SERPL-MCNC: 0.61 MG/DL (ref 0.5–0.9)
D DIMER PPP FEU-MCNC: 4.34 MG/L FEU (ref 0–0.5)
EOSINOPHIL # BLD: 0.3 K/UL (ref 0–0.7)
EOSINOPHIL NFR BLD: 2.4 %
ERYTHROCYTE [DISTWIDTH] IN BLOOD BY AUTOMATED COUNT: 14.4 % (ref 11.5–14.5)
GLOBULIN SER CALC-MCNC: 3.4 G/DL (ref 2.3–3.5)
GLUCOSE SERPL-MCNC: 278 MG/DL (ref 70–99)
HCT VFR BLD AUTO: 43.3 % (ref 37–47)
HGB BLD-MCNC: 13.1 G/DL (ref 12–16)
INR PPP: 1
LACTATE BLDV-SCNC: 3 MMOL/L (ref 0.5–2.2)
LYMPHOCYTES # BLD: 1.6 K/UL (ref 1–4.8)
LYMPHOCYTES NFR BLD: 12.8 %
MAGNESIUM SERPL-MCNC: 2.1 MG/DL (ref 1.7–2.4)
MCH RBC QN AUTO: 27.2 PG (ref 27–31.3)
MCHC RBC AUTO-ENTMCNC: 30.3 % (ref 33–37)
MCV RBC AUTO: 89.8 FL (ref 79.4–94.8)
MONOCYTES # BLD: 0.6 K/UL (ref 0.2–0.8)
MONOCYTES NFR BLD: 5.1 %
NEUTROPHILS # BLD: 9.7 K/UL (ref 1.4–6.5)
NEUTS SEG NFR BLD: 79 %
PLATELET # BLD AUTO: 369 K/UL (ref 130–400)
POTASSIUM SERPL-SCNC: 4.6 MEQ/L (ref 3.4–4.9)
PROT SERPL-MCNC: 6.9 G/DL (ref 6.3–8)
PROTHROMBIN TIME: 13.2 SEC (ref 12.3–14.9)
RBC # BLD AUTO: 4.82 M/UL (ref 4.2–5.4)
SODIUM SERPL-SCNC: 137 MEQ/L (ref 135–144)
TROPONIN, HIGH SENSITIVITY: 10 NG/L (ref 0–19)
WBC # BLD AUTO: 12.3 K/UL (ref 4.8–10.8)

## 2024-05-10 PROCEDURE — 36415 COLL VENOUS BLD VENIPUNCTURE: CPT

## 2024-05-10 PROCEDURE — 85379 FIBRIN DEGRADATION QUANT: CPT

## 2024-05-10 PROCEDURE — 96374 THER/PROPH/DIAG INJ IV PUSH: CPT

## 2024-05-10 PROCEDURE — 83880 ASSAY OF NATRIURETIC PEPTIDE: CPT

## 2024-05-10 PROCEDURE — 84484 ASSAY OF TROPONIN QUANT: CPT

## 2024-05-10 PROCEDURE — 81003 URINALYSIS AUTO W/O SCOPE: CPT

## 2024-05-10 PROCEDURE — 6370000000 HC RX 637 (ALT 250 FOR IP): Performed by: PHYSICIAN ASSISTANT

## 2024-05-10 PROCEDURE — 83735 ASSAY OF MAGNESIUM: CPT

## 2024-05-10 PROCEDURE — 71045 X-RAY EXAM CHEST 1 VIEW: CPT

## 2024-05-10 PROCEDURE — 6360000002 HC RX W HCPCS: Performed by: PHYSICIAN ASSISTANT

## 2024-05-10 PROCEDURE — 83605 ASSAY OF LACTIC ACID: CPT

## 2024-05-10 PROCEDURE — 0202U NFCT DS 22 TRGT SARS-COV-2: CPT

## 2024-05-10 PROCEDURE — 94640 AIRWAY INHALATION TREATMENT: CPT

## 2024-05-10 PROCEDURE — 84145 PROCALCITONIN (PCT): CPT

## 2024-05-10 PROCEDURE — 85610 PROTHROMBIN TIME: CPT

## 2024-05-10 PROCEDURE — 85025 COMPLETE CBC W/AUTO DIFF WBC: CPT

## 2024-05-10 PROCEDURE — 94761 N-INVAS EAR/PLS OXIMETRY MLT: CPT

## 2024-05-10 PROCEDURE — 99285 EMERGENCY DEPT VISIT HI MDM: CPT

## 2024-05-10 PROCEDURE — 80053 COMPREHEN METABOLIC PANEL: CPT

## 2024-05-10 RX ORDER — METHYLPREDNISOLONE SODIUM SUCCINATE 125 MG/2ML
125 INJECTION, POWDER, LYOPHILIZED, FOR SOLUTION INTRAMUSCULAR; INTRAVENOUS ONCE
Status: COMPLETED | OUTPATIENT
Start: 2024-05-10 | End: 2024-05-10

## 2024-05-10 RX ORDER — ALBUTEROL SULFATE 2.5 MG/3ML
2.5 SOLUTION RESPIRATORY (INHALATION)
Status: DISCONTINUED | OUTPATIENT
Start: 2024-05-10 | End: 2024-05-11

## 2024-05-10 RX ORDER — IPRATROPIUM BROMIDE AND ALBUTEROL SULFATE 2.5; .5 MG/3ML; MG/3ML
1 SOLUTION RESPIRATORY (INHALATION) ONCE
Status: COMPLETED | OUTPATIENT
Start: 2024-05-10 | End: 2024-05-10

## 2024-05-10 RX ADMIN — METHYLPREDNISOLONE SODIUM SUCCINATE 125 MG: 125 INJECTION, POWDER, FOR SOLUTION INTRAMUSCULAR; INTRAVENOUS at 23:02

## 2024-05-10 RX ADMIN — ALBUTEROL SULFATE 2.5 MG: 2.5 SOLUTION RESPIRATORY (INHALATION) at 21:44

## 2024-05-10 RX ADMIN — IPRATROPIUM BROMIDE AND ALBUTEROL SULFATE 1 DOSE: 2.5; .5 SOLUTION RESPIRATORY (INHALATION) at 21:44

## 2024-05-11 ENCOUNTER — APPOINTMENT (OUTPATIENT)
Dept: ULTRASOUND IMAGING | Age: 89
DRG: 180 | End: 2024-05-11
Payer: MEDICARE

## 2024-05-11 ENCOUNTER — APPOINTMENT (OUTPATIENT)
Dept: CT IMAGING | Age: 89
DRG: 180 | End: 2024-05-11
Payer: MEDICARE

## 2024-05-11 PROBLEM — J96.01 ACUTE RESPIRATORY FAILURE WITH HYPOXIA (HCC): Status: ACTIVE | Noted: 2024-05-11

## 2024-05-11 PROBLEM — J96.21 ACUTE ON CHRONIC RESPIRATORY FAILURE WITH HYPOXIA (HCC): Status: RESOLVED | Noted: 2024-05-11 | Resolved: 2024-05-11

## 2024-05-11 PROBLEM — J96.21 ACUTE ON CHRONIC RESPIRATORY FAILURE WITH HYPOXIA (HCC): Status: ACTIVE | Noted: 2024-05-11

## 2024-05-11 LAB
ALBUMIN SERPL-MCNC: 3.6 G/DL (ref 3.5–4.6)
ALP SERPL-CCNC: 100 U/L (ref 40–130)
ALT SERPL-CCNC: 8 U/L (ref 0–33)
ANION GAP SERPL CALCULATED.3IONS-SCNC: 12 MEQ/L (ref 9–15)
AST SERPL-CCNC: 9 U/L (ref 0–35)
B PARAP IS1001 DNA NPH QL NAA+NON-PROBE: NOT DETECTED
B PERT.PT PRMT NPH QL NAA+NON-PROBE: NOT DETECTED
BASOPHILS # BLD: 0 K/UL (ref 0–0.2)
BASOPHILS NFR BLD: 0.2 %
BILIRUB SERPL-MCNC: <0.2 MG/DL (ref 0.2–0.7)
BILIRUB UR QL STRIP: NEGATIVE
BUN SERPL-MCNC: 14 MG/DL (ref 8–23)
C PNEUM DNA NPH QL NAA+NON-PROBE: NOT DETECTED
CALCIUM SERPL-MCNC: 9 MG/DL (ref 8.5–9.9)
CHLORIDE SERPL-SCNC: 100 MEQ/L (ref 95–107)
CLARITY UR: CLEAR
CO2 SERPL-SCNC: 26 MEQ/L (ref 20–31)
COLOR UR: YELLOW
CREAT SERPL-MCNC: 0.57 MG/DL (ref 0.5–0.9)
EOSINOPHIL # BLD: 0 K/UL (ref 0–0.7)
EOSINOPHIL NFR BLD: 0.2 %
ERYTHROCYTE [DISTWIDTH] IN BLOOD BY AUTOMATED COUNT: 14.3 % (ref 11.5–14.5)
FLUAV RNA NPH QL NAA+NON-PROBE: NOT DETECTED
FLUBV RNA NPH QL NAA+NON-PROBE: NOT DETECTED
GLOBULIN SER CALC-MCNC: 3.1 G/DL (ref 2.3–3.5)
GLUCOSE BLD-MCNC: 158 MG/DL (ref 70–99)
GLUCOSE BLD-MCNC: 181 MG/DL (ref 70–99)
GLUCOSE BLD-MCNC: 349 MG/DL (ref 70–99)
GLUCOSE SERPL-MCNC: 297 MG/DL (ref 70–99)
GLUCOSE UR STRIP-MCNC: >=1000 MG/DL
HADV DNA NPH QL NAA+NON-PROBE: NOT DETECTED
HCOV 229E RNA NPH QL NAA+NON-PROBE: NOT DETECTED
HCOV HKU1 RNA NPH QL NAA+NON-PROBE: NOT DETECTED
HCOV NL63 RNA NPH QL NAA+NON-PROBE: NOT DETECTED
HCOV OC43 RNA NPH QL NAA+NON-PROBE: NOT DETECTED
HCT VFR BLD AUTO: 40.3 % (ref 37–47)
HGB BLD-MCNC: 12.5 G/DL (ref 12–16)
HGB UR QL STRIP: NEGATIVE
HMPV RNA NPH QL NAA+NON-PROBE: NOT DETECTED
HPIV1 RNA NPH QL NAA+NON-PROBE: NOT DETECTED
HPIV2 RNA NPH QL NAA+NON-PROBE: NOT DETECTED
HPIV3 RNA NPH QL NAA+NON-PROBE: NOT DETECTED
HPIV4 RNA NPH QL NAA+NON-PROBE: NOT DETECTED
KETONES UR STRIP-MCNC: NEGATIVE MG/DL
LEUKOCYTE ESTERASE UR QL STRIP: NEGATIVE
LYMPHOCYTES # BLD: 0.8 K/UL (ref 1–4.8)
LYMPHOCYTES NFR BLD: 7.6 %
M PNEUMO DNA NPH QL NAA+NON-PROBE: NOT DETECTED
MAGNESIUM SERPL-MCNC: 2.3 MG/DL (ref 1.7–2.4)
MCH RBC QN AUTO: 26.5 PG (ref 27–31.3)
MCHC RBC AUTO-ENTMCNC: 31 % (ref 33–37)
MCV RBC AUTO: 85.6 FL (ref 79.4–94.8)
MONOCYTES # BLD: 0.1 K/UL (ref 0.2–0.8)
MONOCYTES NFR BLD: 0.7 %
NEUTROPHILS # BLD: 9.1 K/UL (ref 1.4–6.5)
NEUTS SEG NFR BLD: 90.9 %
NITRITE UR QL STRIP: NEGATIVE
PERFORMED ON: ABNORMAL
PH UR STRIP: 8.5 [PH] (ref 5–9)
PLATELET # BLD AUTO: 345 K/UL (ref 130–400)
POTASSIUM SERPL-SCNC: 4.8 MEQ/L (ref 3.4–4.9)
PROCALCITONIN SERPL IA-MCNC: 0.07 NG/ML (ref 0–0.15)
PROT SERPL-MCNC: 6.7 G/DL (ref 6.3–8)
PROT UR STRIP-MCNC: NEGATIVE MG/DL
RBC # BLD AUTO: 4.71 M/UL (ref 4.2–5.4)
RSV RNA NPH QL NAA+NON-PROBE: NOT DETECTED
RV+EV RNA NPH QL NAA+NON-PROBE: NOT DETECTED
SARS-COV-2 RNA NPH QL NAA+NON-PROBE: NOT DETECTED
SODIUM SERPL-SCNC: 138 MEQ/L (ref 135–144)
SP GR UR STRIP: 1.04 (ref 1–1.03)
TROPONIN, HIGH SENSITIVITY: 9 NG/L (ref 0–19)
URINE REFLEX TO CULTURE: ABNORMAL
UROBILINOGEN UR STRIP-ACNC: 0.2 E.U./DL
WBC # BLD AUTO: 10 K/UL (ref 4.8–10.8)

## 2024-05-11 PROCEDURE — 2580000003 HC RX 258: Performed by: NURSE PRACTITIONER

## 2024-05-11 PROCEDURE — 36415 COLL VENOUS BLD VENIPUNCTURE: CPT

## 2024-05-11 PROCEDURE — 71275 CT ANGIOGRAPHY CHEST: CPT

## 2024-05-11 PROCEDURE — 83735 ASSAY OF MAGNESIUM: CPT

## 2024-05-11 PROCEDURE — 6370000000 HC RX 637 (ALT 250 FOR IP): Performed by: INTERNAL MEDICINE

## 2024-05-11 PROCEDURE — 6360000002 HC RX W HCPCS: Performed by: NURSE PRACTITIONER

## 2024-05-11 PROCEDURE — 2060000000 HC ICU INTERMEDIATE R&B

## 2024-05-11 PROCEDURE — 85025 COMPLETE CBC W/AUTO DIFF WBC: CPT

## 2024-05-11 PROCEDURE — 80053 COMPREHEN METABOLIC PANEL: CPT

## 2024-05-11 PROCEDURE — 6370000000 HC RX 637 (ALT 250 FOR IP): Performed by: NURSE PRACTITIONER

## 2024-05-11 PROCEDURE — 99223 1ST HOSP IP/OBS HIGH 75: CPT | Performed by: INTERNAL MEDICINE

## 2024-05-11 PROCEDURE — 6360000004 HC RX CONTRAST MEDICATION: Performed by: PHYSICIAN ASSISTANT

## 2024-05-11 PROCEDURE — 93970 EXTREMITY STUDY: CPT

## 2024-05-11 RX ORDER — EMPAGLIFLOZIN 25 MG/1
25 TABLET, FILM COATED ORAL DAILY
COMMUNITY

## 2024-05-11 RX ORDER — MAGNESIUM SULFATE IN WATER 40 MG/ML
2000 INJECTION, SOLUTION INTRAVENOUS PRN
Status: DISCONTINUED | OUTPATIENT
Start: 2024-05-11 | End: 2024-05-18 | Stop reason: HOSPADM

## 2024-05-11 RX ORDER — LISINOPRIL 2.5 MG/1
2.5 TABLET ORAL DAILY
Qty: 90 TABLET | Refills: 0 | Status: SHIPPED | OUTPATIENT
Start: 2024-05-11

## 2024-05-11 RX ORDER — GLUCAGON 1 MG/ML
1 KIT INJECTION PRN
Status: DISCONTINUED | OUTPATIENT
Start: 2024-05-11 | End: 2024-05-18 | Stop reason: HOSPADM

## 2024-05-11 RX ORDER — SODIUM CHLORIDE 0.9 % (FLUSH) 0.9 %
5-40 SYRINGE (ML) INJECTION PRN
Status: DISCONTINUED | OUTPATIENT
Start: 2024-05-11 | End: 2024-05-18 | Stop reason: HOSPADM

## 2024-05-11 RX ORDER — POTASSIUM CHLORIDE 20 MEQ/1
40 TABLET, EXTENDED RELEASE ORAL PRN
Status: DISCONTINUED | OUTPATIENT
Start: 2024-05-11 | End: 2024-05-18 | Stop reason: HOSPADM

## 2024-05-11 RX ORDER — METOPROLOL SUCCINATE 50 MG/1
50 TABLET, EXTENDED RELEASE ORAL DAILY
Qty: 90 TABLET | Refills: 0 | Status: SHIPPED | OUTPATIENT
Start: 2024-05-11

## 2024-05-11 RX ORDER — ACETAMINOPHEN 650 MG/1
650 SUPPOSITORY RECTAL EVERY 6 HOURS PRN
Status: DISCONTINUED | OUTPATIENT
Start: 2024-05-11 | End: 2024-05-18 | Stop reason: HOSPADM

## 2024-05-11 RX ORDER — MIRABEGRON 50 MG/1
50 TABLET, EXTENDED RELEASE ORAL DAILY
COMMUNITY

## 2024-05-11 RX ORDER — INSULIN GLARGINE 100 [IU]/ML
10 INJECTION, SOLUTION SUBCUTANEOUS 2 TIMES DAILY
Status: DISCONTINUED | OUTPATIENT
Start: 2024-05-11 | End: 2024-05-18 | Stop reason: HOSPADM

## 2024-05-11 RX ORDER — SODIUM CHLORIDE 9 MG/ML
INJECTION, SOLUTION INTRAVENOUS PRN
Status: DISCONTINUED | OUTPATIENT
Start: 2024-05-11 | End: 2024-05-18 | Stop reason: HOSPADM

## 2024-05-11 RX ORDER — INSULIN LISPRO 100 [IU]/ML
0-4 INJECTION, SOLUTION INTRAVENOUS; SUBCUTANEOUS NIGHTLY
Status: DISCONTINUED | OUTPATIENT
Start: 2024-05-11 | End: 2024-05-18 | Stop reason: HOSPADM

## 2024-05-11 RX ORDER — SODIUM CHLORIDE 0.9 % (FLUSH) 0.9 %
5-40 SYRINGE (ML) INJECTION EVERY 12 HOURS SCHEDULED
Status: DISCONTINUED | OUTPATIENT
Start: 2024-05-11 | End: 2024-05-18 | Stop reason: HOSPADM

## 2024-05-11 RX ORDER — POLYETHYLENE GLYCOL 3350 17 G/17G
17 POWDER, FOR SOLUTION ORAL DAILY PRN
Status: DISCONTINUED | OUTPATIENT
Start: 2024-05-11 | End: 2024-05-18 | Stop reason: HOSPADM

## 2024-05-11 RX ORDER — PIOGLITAZONEHYDROCHLORIDE 15 MG/1
15 TABLET ORAL DAILY
COMMUNITY
Start: 2024-05-01

## 2024-05-11 RX ORDER — METFORMIN HYDROCHLORIDE 1000 MG/1
1000 TABLET ORAL 2 TIMES DAILY
Qty: 180 TABLET | Refills: 0 | Status: SHIPPED | OUTPATIENT
Start: 2024-05-11

## 2024-05-11 RX ORDER — INSULIN LISPRO 100 [IU]/ML
0-8 INJECTION, SOLUTION INTRAVENOUS; SUBCUTANEOUS
Status: DISCONTINUED | OUTPATIENT
Start: 2024-05-11 | End: 2024-05-18 | Stop reason: HOSPADM

## 2024-05-11 RX ORDER — DEXTROSE MONOHYDRATE 100 MG/ML
INJECTION, SOLUTION INTRAVENOUS CONTINUOUS PRN
Status: DISCONTINUED | OUTPATIENT
Start: 2024-05-11 | End: 2024-05-18 | Stop reason: HOSPADM

## 2024-05-11 RX ORDER — EMPAGLIFLOZIN 25 MG/1
25 TABLET, FILM COATED ORAL DAILY
Qty: 90 TABLET | Refills: 0 | Status: SHIPPED | OUTPATIENT
Start: 2024-05-11

## 2024-05-11 RX ORDER — PIOGLITAZONEHYDROCHLORIDE 15 MG/1
15 TABLET ORAL DAILY
Qty: 30 TABLET | Refills: 0 | Status: SHIPPED | OUTPATIENT
Start: 2024-05-11 | End: 2024-05-14 | Stop reason: SDUPTHER

## 2024-05-11 RX ORDER — ENOXAPARIN SODIUM 100 MG/ML
40 INJECTION SUBCUTANEOUS DAILY
Status: DISCONTINUED | OUTPATIENT
Start: 2024-05-11 | End: 2024-05-16

## 2024-05-11 RX ORDER — ATORVASTATIN CALCIUM 40 MG/1
40 TABLET, FILM COATED ORAL DAILY
Qty: 90 TABLET | Refills: 0 | Status: SHIPPED | OUTPATIENT
Start: 2024-05-11

## 2024-05-11 RX ORDER — ACETAMINOPHEN 325 MG/1
650 TABLET ORAL EVERY 6 HOURS PRN
Status: DISCONTINUED | OUTPATIENT
Start: 2024-05-11 | End: 2024-05-18 | Stop reason: HOSPADM

## 2024-05-11 RX ORDER — POTASSIUM CHLORIDE 7.45 MG/ML
10 INJECTION INTRAVENOUS PRN
Status: DISCONTINUED | OUTPATIENT
Start: 2024-05-11 | End: 2024-05-18 | Stop reason: HOSPADM

## 2024-05-11 RX ORDER — ONDANSETRON 2 MG/ML
4 INJECTION INTRAMUSCULAR; INTRAVENOUS EVERY 6 HOURS PRN
Status: DISCONTINUED | OUTPATIENT
Start: 2024-05-11 | End: 2024-05-18 | Stop reason: HOSPADM

## 2024-05-11 RX ORDER — ONDANSETRON 4 MG/1
4 TABLET, ORALLY DISINTEGRATING ORAL EVERY 8 HOURS PRN
Status: DISCONTINUED | OUTPATIENT
Start: 2024-05-11 | End: 2024-05-18 | Stop reason: HOSPADM

## 2024-05-11 RX ORDER — OMEPRAZOLE 20 MG/1
20 CAPSULE, DELAYED RELEASE ORAL
Qty: 30 CAPSULE | Refills: 0 | Status: SHIPPED | OUTPATIENT
Start: 2024-05-11

## 2024-05-11 RX ADMIN — INSULIN LISPRO 8 UNITS: 100 INJECTION, SOLUTION INTRAVENOUS; SUBCUTANEOUS at 11:48

## 2024-05-11 RX ADMIN — SODIUM CHLORIDE, PRESERVATIVE FREE 10 ML: 5 INJECTION INTRAVENOUS at 21:17

## 2024-05-11 RX ADMIN — SODIUM CHLORIDE, PRESERVATIVE FREE 10 ML: 5 INJECTION INTRAVENOUS at 08:17

## 2024-05-11 RX ADMIN — ENOXAPARIN SODIUM 40 MG: 100 INJECTION SUBCUTANEOUS at 08:16

## 2024-05-11 RX ADMIN — INSULIN LISPRO 4 UNITS: 100 INJECTION, SOLUTION INTRAVENOUS; SUBCUTANEOUS at 08:16

## 2024-05-11 RX ADMIN — INSULIN GLARGINE 10 UNITS: 100 INJECTION, SOLUTION SUBCUTANEOUS at 21:51

## 2024-05-11 RX ADMIN — INSULIN GLARGINE 10 UNITS: 100 INJECTION, SOLUTION SUBCUTANEOUS at 13:46

## 2024-05-11 RX ADMIN — IOPAMIDOL 75 ML: 755 INJECTION, SOLUTION INTRAVENOUS at 00:41

## 2024-05-11 ASSESSMENT — LIFESTYLE VARIABLES
HOW MANY STANDARD DRINKS CONTAINING ALCOHOL DO YOU HAVE ON A TYPICAL DAY: PATIENT DOES NOT DRINK
HOW OFTEN DO YOU HAVE A DRINK CONTAINING ALCOHOL: NEVER

## 2024-05-11 NOTE — H&P
DEPARTMENT OF HOSPITAL MEDICINE    HISTORY AND PHYSICAL EXAM    PATIENT NAME:  Dayami Christensen    MRN:  [id number]  SERVICE DATE:  2024   SERVICE TIME:  2:11 AM    Primary Care Physician: Chan Decker DO         SUBJECTIVE  CHIEF COMPLAINT:  Shortness of Breath      HPI: Patient being admitted for respiratory failure with hypoxia.  Patient is an alert and oriented x 3, 89-year-old female.  Patient states for about a week she has been experiencing shortness of breath.  Patient states that whenever she walks short distances she becomes very short of breath and has to sit down in order to get relief.  She denies any orthopnea, chest pain, or bilateral lower extremity edema.  Patient also denies any fever but states at night that she feels hot and cold intermittently.  She states she has rhinorrhea and congestion but this is chronic and she has also had a nonproductive cough for the past week.  Patient also denies any abdominal pain, nausea, or vomiting.    Patient's past medical history includes DM 2, neuropathy, HTN, PSVT, and GERD.  Echo in 2017 showed preserved EF with mild diastolic dysfunction.    PAST MEDICAL HISTORY:    Past Medical History:   Diagnosis Date    Arthritis     Chronic kidney disease     Diabetes mellitus (HCC)     borderline    Gastroesophageal reflux disease without esophagitis 2017    History of blood transfusion     hemorrhage after an     Hyperlipidemia     Hypertension     Osteoporosis     Urinary frequency      PAST SURGICAL HISTORY:    Past Surgical History:   Procedure Laterality Date    CATARACT REMOVAL      COLONOSCOPY      EYE SURGERY      TUBAL LIGATION       FAMILY HISTORY:  No family history on file.  SOCIAL HISTORY:    Social History     Socioeconomic History    Marital status:      Spouse name: Not on file    Number of children: Not on file    Years of education: Not on file    Highest education level: Not on file   Occupational History    Not on file

## 2024-05-11 NOTE — ED PROVIDER NOTES
[facility] ED  EMERGENCY DEPARTMENT ENCOUNTER      Pt Name: Dayami Christensen  MRN: [id number]  Birthdate 11/5/1934  Date of evaluation: 5/10/2024  Provider: José Hodge PA-C  9:38 PM EDT      CHIEF COMPLAINT       Chief Complaint   Patient presents with    Shortness of Breath     SOB for the last few days.          HISTORY OF PRESENT ILLNESS   (Location/Symptom, Timing/Onset, Context/Setting, Quality, Duration, Modifying Factors, Severity)  Note limiting factors.   Dayami Christensen is a 89 y.o. female who presents to the emergency department for shortness of breath x 1 week. Patient states she has been feeling \"hot and cold\" but doesn't think it is a fever. Patient states she has not noticed abnormal swelling of lower extremities. Patient denies chest pain, sinus congestion, nausea, vomiting, diarrhea.     HPI    Nursing Notes were reviewed.    REVIEW OF SYSTEMS    (2-9 systems for level 4, 10 or more for level 5)     Review of Systems   Constitutional:  Negative for chills, diaphoresis and fever.   HENT:  Positive for rhinorrhea. Negative for congestion and trouble swallowing.    Respiratory:  Positive for cough, shortness of breath and wheezing. Negative for apnea.    Cardiovascular:  Negative for chest pain.   Gastrointestinal:  Negative for abdominal pain, diarrhea, nausea and vomiting.   Endocrine: Negative.    Genitourinary:  Negative for hematuria.   Musculoskeletal:  Negative for neck stiffness.   Skin:  Negative for color change.   Allergic/Immunologic: Negative.    Neurological:  Negative for dizziness and numbness.   Hematological: Negative.    Psychiatric/Behavioral: Negative.     All other systems reviewed and are negative.      Except as noted above the remainder of the review of systems was reviewed and negative.       PAST MEDICAL HISTORY     Past Medical History:   Diagnosis Date    Arthritis     Chronic kidney disease     Diabetes mellitus (HCC)     borderline    Gastroesophageal reflux disease  for the following components:    Glucose 278 (*)     All other components within normal limits   LACTIC ACID - Abnormal; Notable for the following components:    Lactic Acid 3.0 (*)     All other components within normal limits    Narrative:     CALL  [facility] tel. [phone],  Chemistry results called to and read back by cristina yu, 05/10/2024 22:26,  by OUMOU MUELLER-DIMER, QUANTITATIVE - Abnormal; Notable for the following components:    D-Dimer, Quant 4.34 (*)     All other components within normal limits    Narrative:     CALL  [facility] tel. [phone],  DIMER results called to and read back by DR. YU, 05/10/2024 23:31, by  MAKAYLA   URINALYSIS WITH REFLEX TO CULTURE - Abnormal; Notable for the following components:    Glucose, Ur >=1000 (*)     All other components within normal limits   RESPIRATORY PANEL, MOLECULAR, WITH COVID-19   PROCALCITONIN   MAGNESIUM   PROTIME-INR   TROPONIN   TROPONIN   BRAIN NATRIURETIC PEPTIDE       All other labs were within normal range or not returned as of this dictation.    EMERGENCY DEPARTMENT COURSE and DIFFERENTIAL DIAGNOSIS/MDM:   Vitals:    Vitals:    05/10/24 2133   BP: (!) 157/74   Pulse: (!) 103   Resp: 26   Temp: 98.7 °F (37.1 °C)   TempSrc: Oral   SpO2: 94%   Weight: 81.6 kg (180 lb)                Medical Decision Making  Amount and/or Complexity of Data Reviewed  Labs: ordered.  Radiology: ordered.    Risk  Prescription drug management.  Decision regarding hospitalization.          REASSESSMENT     Patient presented to the emergency department with progressive worsening of shortness of breath and exertional dyspnea for the past few days per report.  Patient has no cough or URI type symptoms.  Patient was noted to become hypoxic when ambulating from wheelchair to bed with an oxygen saturation of 88%.  Oxygenation does improve when laying.  Patient had a significantly elevated D-dimer so CTA of the chest was obtained that shows no PE however there is a masslike

## 2024-05-11 NOTE — ONCOLOGY
Hematology/Oncology Consult  Encounter Date: 2024 6:27 AM    Ms. Dayami Christensen is a 89 y.o. female  : 1934  MRN: [id number]  Acct Number: [id number]  Requesting Provider: Liana Young MD    Reason for request: lung cancer      CONSULTANT: Pritesh Farah DO    HPI: Patient presented with SOB.  CT showed a left lung mass.    Patient Active Problem List   Diagnosis    Essential hypertension    SVT (supraventricular tachycardia) (HCC)    Type 2 diabetes mellitus without complication, without long-term current use of insulin (HCC)    Class 2 severe obesity due to excess calories with serious comorbidity and body mass index (BMI) of 36.0 to 36.9 in adult (HCC)    Gastroesophageal reflux disease without esophagitis    Primary osteoarthritis involving multiple joints    Abnormal gait    Risk for falls    Chronic midline low back pain without sciatica    Osteopenia    Dyslipidemia    Dizzy    History of cardiac radiofrequency ablation (RFA)    Acute respiratory failure with hypoxia (HCC)     Past Medical History:   Diagnosis Date    Arthritis     Chronic kidney disease     Diabetes mellitus (HCC)     borderline    Gastroesophageal reflux disease without esophagitis 2017    History of blood transfusion     hemorrhage after an     Hyperlipidemia     Hypertension     Osteoporosis     Urinary frequency      @PSH@  History reviewed. No pertinent family history.  Social History     Socioeconomic History    Marital status:      Spouse name: Not on file    Number of children: Not on file    Years of education: Not on file    Highest education level: Not on file   Occupational History    Not on file   Tobacco Use    Smoking status: Former     Current packs/day: 0.00     Types: Cigarettes     Quit date: 1987     Years since quittin.7    Smokeless tobacco: Never   Substance and Sexual Activity    Alcohol use: No    Drug use: No    Sexual activity: Not on file   Other Topics Concern    Not on     Total Protein 6.9 6.3 - 8.0 g/dL    Albumin 3.5 3.5 - 4.6 g/dL    Total Bilirubin <0.2 0.2 - 0.7 mg/dL    Alkaline Phosphatase 104 40 - 130 U/L    ALT 9 0 - 33 U/L    AST 10 0 - 35 U/L    Globulin 3.4 2.3 - 3.5 g/dL   Procalcitonin    Collection Time: 05/10/24  9:45 PM   Result Value Ref Range    Procalcitonin 0.07 0.00 - 0.15 ng/mL   Lactic Acid    Collection Time: 05/10/24  9:45 PM   Result Value Ref Range    Lactic Acid 3.0 (HH) 0.5 - 2.2 mmol/L   Magnesium    Collection Time: 05/10/24  9:45 PM   Result Value Ref Range    Magnesium 2.1 1.7 - 2.4 mg/dL   Protime-INR    Collection Time: 05/10/24  9:45 PM   Result Value Ref Range    Protime 13.2 12.3 - 14.9 sec    INR 1.0    Troponin Now and Q 1 Hour x 2    Collection Time: 05/10/24  9:45 PM   Result Value Ref Range    Troponin, High Sensitivity 10 0 - 19 ng/L   D-Dimer, Quantitative    Collection Time: 05/10/24  9:45 PM   Result Value Ref Range    D-Dimer, Quant 4.34 (HH) 0.00 - 0.50 mg/L FEU   Brain Natriuretic Peptide    Collection Time: 05/10/24  9:45 PM   Result Value Ref Range    Pro- pg/mL   Troponin Now and Q 1 Hour x 2    Collection Time: 05/10/24 10:45 PM   Result Value Ref Range    Troponin, High Sensitivity 9 0 - 19 ng/L   Urinalysis with Reflex to Culture    Collection Time: 05/10/24 11:15 PM    Specimen: Urine, clean catch   Result Value Ref Range    Color, UA Yellow Straw/Yellow    Clarity, UA Clear Clear    Glucose, Ur >=1000 (A) Negative mg/dL    Bilirubin, Urine Negative Negative    Ketones, Urine Negative Negative mg/dL    Specific Gravity, UA 1.042 1.005 - 1.030    Blood, Urine Negative Negative    pH, Urine 8.5 5.0 - 9.0    Protein, UA Negative Negative mg/dL    Urobilinogen, Urine 0.2 <2.0 E.U./dL    Nitrite, Urine Negative Negative    Leukocyte Esterase, Urine Negative Negative    Urine Reflex to Culture Not Indicated    Respiratory Panel, Molecular, with COVID-19 (Restricted: peds pts or suitable admitted adults)    Collection Time:  COMPARISON: 08/17/2017 HISTORY: ORDERING SYSTEM PROVIDED HISTORY: sob TECHNOLOGIST PROVIDED HISTORY: Reason for exam:->sob What reading provider will be dictating this exam?->CRC FINDINGS: The lungs are without acute focal process.  There is no effusion or pneumothorax.  Left mid lung opacity possibly representing overlapping soft tissue.  The cardiomediastinal silhouette is without acute process. The osseous structures are without acute process.     No acute process.       ASSESSMENT AND PLAN:  88 yo independently living female admitted with recent SOB and cough.  CT chest showed a left lung lesion with N2 adenopathy.  Probable at least stage 3 lung cancer.  She is a remote smoker.    I discussed my concerns with the patient today.  Her performance is excellent.  I recommend further work-up with biopsy and outpatient PET.  She would likely be a candidate for radiation and then immune therapy to improve survival.    Electronically signed by Pritesh Farah DO on 5/11/2024 at 6:27 AM

## 2024-05-11 NOTE — CONSULTS
Pulmonary and Critical Care Medicine  Consult Note  Encounter Date: 2024 11:35 AM    Ms. Dayami Christensen is a 89 y.o. female  : 1934  Requesting Provider: Deon Bain MD    Reason for request: Lung mass            HISTORY OF PRESENT ILLNESS:    Patient is 89 y.o. presents with shortness of breath and hypoxia, she denies chest pain, no coughing, no hemoptysis, no weight loss, no lower extremity edema.  She has reported desaturation to 88% with activity while on oxygen in ED.          Past Medical History:        Diagnosis Date    Arthritis     Chronic kidney disease     Diabetes mellitus (HCC)     borderline    Gastroesophageal reflux disease without esophagitis 2017    History of blood transfusion     hemorrhage after an     Hyperlipidemia     Hypertension     Osteoporosis     Urinary frequency        Past Surgical History:        Procedure Laterality Date    CATARACT REMOVAL      COLONOSCOPY      EYE SURGERY      TUBAL LIGATION         Social History:     reports that she quit smoking about 36 years ago. She has never used smokeless tobacco. She reports that she does not drink alcohol and does not use drugs.    Family History:   History reviewed. No pertinent family history.    Allergies:  Asa [aspirin]        MEDICATIONS during current hospitalization:    Continuous Infusions:   sodium chloride      dextrose         Scheduled Meds:   sodium chloride flush  5-40 mL IntraVENous 2 times per day    enoxaparin  40 mg SubCUTAneous Daily    insulin lispro  0-8 Units SubCUTAneous TID WC    insulin lispro  0-4 Units SubCUTAneous Nightly       PRN Meds:sodium chloride flush, sodium chloride, potassium chloride **OR** potassium alternative oral replacement **OR** potassium chloride, magnesium sulfate, ondansetron **OR** ondansetron, polyethylene glycol, acetaminophen **OR** acetaminophen, glucose, dextrose bolus **OR** dextrose bolus, glucagon (rDNA), dextrose        REVIEW OF SYSTEMS:  ROS: 10  organs review of system is done including general, psychological, ENT, hematological, endocrine, respiratory, cardiovascular, gastrointestinal, musculoskeletal, neurological,  allergy and Immunology is done and is otherwise negative.    PHYSICAL EXAM:    Vitals:  BP (!) 135/57   Pulse 87   Temp 97.5 °F (36.4 °C)   Resp 22   Ht 1.499 m (4' 11\")   Wt 74.5 kg (164 lb 3.9 oz)   SpO2 95%   BMI 33.17 kg/m²     General: alert, cooperative, no distress  Head: normocephalic, atraumatic  Eyes:No gross abnormalities.  ENT:  MMM no lesions  Neck:  supple and no masses  Chest : Diminished air movement, no wheezing, no rales, nontender, tympanic  Heart:: Heart sounds are normal.  Regular rate and rhythm without murmur, gallop or rub.  ABD:  symmetric, soft, non-tender  Musculoskeletal : no cyanosis, no clubbing, and no edema  Neuro:  Grossly normal  Skin: No rashes or nodules noted.  Lymph node:  no cervical nodes  Urology: No Hamilton   Psychiatric: appropriate        Data Review  Recent Labs     05/10/24  2145 05/11/24  0548   WBC 12.3* 10.0   HGB 13.1 12.5   HCT 43.3 40.3    345      Recent Labs     05/10/24  2145 05/11/24  0548    138   K 4.6 4.8   CL 98 100   CO2 24 26   BUN 13 14   CREATININE 0.61 0.57   GLUCOSE 278* 297*       MV Settings:          ABGs: No results for input(s): \"PHART\", \"YTJ9RHL\", \"PO2ART\", \"XGS3OQT\", \"BEART\", \"O4RBIFCV\", \"BKS1RTV\" in the last 72 hours.  O2 Device: (S) None (Room air)  O2 Flow Rate (L/min): 2 L/min  Lab Results   Component Value Date/Time    LACTA 3.0 05/10/2024 09:45 PM    LACTA 1.8 08/17/2017 05:59 PM    LACTA 3.0 08/17/2017 03:15 PM       Radiology  I personally reviewed imaging studies films and CT chest shows congestion, inferior lingula mass, mediastinal and hilar lymphadenopathy, and left lower lobe airway narrowing         Assessment, plan:   Patient is at risk due to    Inferior lingula mass with mediastinal hilar lymphadenopathy, and possible endobronchial

## 2024-05-11 NOTE — PLAN OF CARE
Problem: Discharge Planning  Goal: Discharge to home or other facility with appropriate resources  Outcome: Progressing     Problem: Safety - Adult  Goal: Free from fall injury  5/11/2024 0923 by Riya Santamaria, RN  Outcome: Progressing  5/11/2024 0335 by Yulissa Bonilla RN  Outcome: Progressing     Problem: ABCDS Injury Assessment  Goal: Absence of physical injury  5/11/2024 0923 by Riya Santamaria, RN  Outcome: Progressing  5/11/2024 0335 by Yulissa Bonilla, RN  Outcome: Progressing

## 2024-05-11 NOTE — ED NOTES
PT ambulatory to bathroom, pt destat on o2 to 88% while ambulating. Provider notified  
Report called to leonela rico. All questions answered  
done

## 2024-05-11 NOTE — PROGRESS NOTES
Hospitalist Progress Note      PCP: Chan Decker DO    Date of Admission: 5/10/2024    Chief Complaint:  no acute events, afebrile, stable HD    Medications:  Reviewed    Infusion Medications    sodium chloride      dextrose       Scheduled Medications    sodium chloride flush  5-40 mL IntraVENous 2 times per day    enoxaparin  40 mg SubCUTAneous Daily    insulin lispro  0-8 Units SubCUTAneous TID WC    insulin lispro  0-4 Units SubCUTAneous Nightly    insulin glargine  10 Units SubCUTAneous BID     PRN Meds: sodium chloride flush, sodium chloride, potassium chloride **OR** potassium alternative oral replacement **OR** potassium chloride, magnesium sulfate, ondansetron **OR** ondansetron, polyethylene glycol, acetaminophen **OR** acetaminophen, glucose, dextrose bolus **OR** dextrose bolus, glucagon (rDNA), dextrose      Intake/Output Summary (Last 24 hours) at 5/11/2024 1328  Last data filed at 5/11/2024 1136  Gross per 24 hour   Intake 400 ml   Output 600 ml   Net -200 ml       Exam:    BP (!) 135/57   Pulse 87   Temp 97.5 °F (36.4 °C)   Resp 22   Ht 1.499 m (4' 11\")   Wt 74.5 kg (164 lb 3.9 oz)   SpO2 95%   BMI 33.17 kg/m²     General appearance: appears stated age and cooperative.  Respiratory:  diminished with faint wheezing bilaterally.  Cardiovascular: Regular rate and rhythm, S1/S2.  Abdomen: Soft, active bowel sounds.  Musculoskeletal: No edema bilaterally.      Labs:   Recent Labs     05/10/24  2145 05/11/24  0548   WBC 12.3* 10.0   HGB 13.1 12.5   HCT 43.3 40.3    345     Recent Labs     05/10/24  2145 05/11/24  0548    138   K 4.6 4.8   CL 98 100   CO2 24 26   BUN 13 14   CREATININE 0.61 0.57   CALCIUM 9.0 9.0     Recent Labs     05/10/24  2145 05/11/24  0548   AST 10 9   ALT 9 8   BILITOT <0.2 <0.2   ALKPHOS 104 100     Recent Labs     05/10/24  2145   INR 1.0     No results for input(s): \"CKTOTAL\", \"TROPONINI\" in the last 72 hours.    Urinalysis:      Lab Results   Component  Value Date/Time    NITRU Negative 05/10/2024 11:15 PM    BLOODU Negative 05/10/2024 11:15 PM    GLUCOSEU >=1000 05/10/2024 11:15 PM       Radiology:  Vascular duplex lower extremity venous bilateral   Final Result   No evidence of DVT in either lower extremity.         CTA CHEST W WO CONTRAST   Final Result   1. No evidence of pulmonary embolism.   2. 4.6 cm lingular masslike consolidation with left perihilar and mediastinal   lymphadenopathy. Findings are concerning for neoplasm.         XR CHEST PORTABLE   Final Result   No acute process.                 Assessment/Plan:    89 y/o female with history of PSVT s/p RFA, HTN, DM2, obesity who presented with:    Exertional dyspnea and hypoxia  - CTA of the chest was negative for PE, it showed a lingular mass concerning for malignancy  - plan for bronchoscopy with biopsy next week   - followed by pulmonology and oncology    DM2 with hyperglycemia  - on ISS  - add Lantus  - monitor glucose levels closely    HTN  - resume home meds when med recc completed         Diet: ADULT DIET; Regular; 4 carb choices (60 gm/meal)    Code Status: Full Code              Electronically signed by BARBIE HONEYCUTT MD on 5/11/2024 at 1:28 PM

## 2024-05-11 NOTE — PROGRESS NOTES
Called patients [facility] phone number [phone] they DO NOT have plavix on file for one of her medications. Updated Home medication list

## 2024-05-12 ENCOUNTER — APPOINTMENT (OUTPATIENT)
Dept: GENERAL RADIOLOGY | Age: 89
DRG: 180 | End: 2024-05-12
Payer: MEDICARE

## 2024-05-12 PROBLEM — R91.8 LUNG MASS: Status: ACTIVE | Noted: 2024-05-10

## 2024-05-12 LAB
ALBUMIN SERPL-MCNC: 3.5 G/DL (ref 3.5–4.6)
ALP SERPL-CCNC: 93 U/L (ref 40–130)
ALT SERPL-CCNC: 8 U/L (ref 0–33)
ANION GAP SERPL CALCULATED.3IONS-SCNC: 11 MEQ/L (ref 9–15)
AST SERPL-CCNC: 9 U/L (ref 0–35)
BASOPHILS # BLD: 0 K/UL (ref 0–0.2)
BASOPHILS NFR BLD: 0.2 %
BILIRUB SERPL-MCNC: 0.3 MG/DL (ref 0.2–0.7)
BUN SERPL-MCNC: 23 MG/DL (ref 8–23)
CALCIUM SERPL-MCNC: 8.8 MG/DL (ref 8.5–9.9)
CHLORIDE SERPL-SCNC: 100 MEQ/L (ref 95–107)
CO2 SERPL-SCNC: 27 MEQ/L (ref 20–31)
CREAT SERPL-MCNC: 0.56 MG/DL (ref 0.5–0.9)
CRP SERPL HS-MCNC: 25.5 MG/L (ref 0–5)
EOSINOPHIL # BLD: 0.2 K/UL (ref 0–0.7)
EOSINOPHIL NFR BLD: 1.1 %
ERYTHROCYTE [DISTWIDTH] IN BLOOD BY AUTOMATED COUNT: 14.5 % (ref 11.5–14.5)
GLOBULIN SER CALC-MCNC: 3 G/DL (ref 2.3–3.5)
GLUCOSE BLD-MCNC: 146 MG/DL (ref 70–99)
GLUCOSE BLD-MCNC: 224 MG/DL (ref 70–99)
GLUCOSE BLD-MCNC: 260 MG/DL (ref 70–99)
GLUCOSE BLD-MCNC: 313 MG/DL (ref 70–99)
GLUCOSE SERPL-MCNC: 137 MG/DL (ref 70–99)
HCT VFR BLD AUTO: 38.9 % (ref 37–47)
HGB BLD-MCNC: 12.2 G/DL (ref 12–16)
LYMPHOCYTES # BLD: 2.2 K/UL (ref 1–4.8)
LYMPHOCYTES NFR BLD: 13.5 %
MAGNESIUM SERPL-MCNC: 2.4 MG/DL (ref 1.7–2.4)
MCH RBC QN AUTO: 26.6 PG (ref 27–31.3)
MCHC RBC AUTO-ENTMCNC: 31.4 % (ref 33–37)
MCV RBC AUTO: 84.7 FL (ref 79.4–94.8)
MONOCYTES # BLD: 0.8 K/UL (ref 0.2–0.8)
MONOCYTES NFR BLD: 5.2 %
NEUTROPHILS # BLD: 12.8 K/UL (ref 1.4–6.5)
NEUTS SEG NFR BLD: 79.3 %
PERFORMED ON: ABNORMAL
PLATELET # BLD AUTO: 371 K/UL (ref 130–400)
POTASSIUM SERPL-SCNC: 3.9 MEQ/L (ref 3.4–4.9)
PROT SERPL-MCNC: 6.5 G/DL (ref 6.3–8)
RBC # BLD AUTO: 4.59 M/UL (ref 4.2–5.4)
SODIUM SERPL-SCNC: 138 MEQ/L (ref 135–144)
WBC # BLD AUTO: 16.1 K/UL (ref 4.8–10.8)

## 2024-05-12 PROCEDURE — 86140 C-REACTIVE PROTEIN: CPT

## 2024-05-12 PROCEDURE — 6370000000 HC RX 637 (ALT 250 FOR IP): Performed by: REGISTERED NURSE

## 2024-05-12 PROCEDURE — 6370000000 HC RX 637 (ALT 250 FOR IP): Performed by: INTERNAL MEDICINE

## 2024-05-12 PROCEDURE — 80053 COMPREHEN METABOLIC PANEL: CPT

## 2024-05-12 PROCEDURE — 2500000003 HC RX 250 WO HCPCS: Performed by: INTERNAL MEDICINE

## 2024-05-12 PROCEDURE — 6360000002 HC RX W HCPCS: Performed by: NURSE PRACTITIONER

## 2024-05-12 PROCEDURE — 99233 SBSQ HOSP IP/OBS HIGH 50: CPT | Performed by: INTERNAL MEDICINE

## 2024-05-12 PROCEDURE — 2060000000 HC ICU INTERMEDIATE R&B

## 2024-05-12 PROCEDURE — 94761 N-INVAS EAR/PLS OXIMETRY MLT: CPT

## 2024-05-12 PROCEDURE — 36415 COLL VENOUS BLD VENIPUNCTURE: CPT

## 2024-05-12 PROCEDURE — 6360000002 HC RX W HCPCS: Performed by: INTERNAL MEDICINE

## 2024-05-12 PROCEDURE — 71045 X-RAY EXAM CHEST 1 VIEW: CPT

## 2024-05-12 PROCEDURE — 94640 AIRWAY INHALATION TREATMENT: CPT

## 2024-05-12 PROCEDURE — 83735 ASSAY OF MAGNESIUM: CPT

## 2024-05-12 PROCEDURE — 2580000003 HC RX 258: Performed by: NURSE PRACTITIONER

## 2024-05-12 PROCEDURE — 93005 ELECTROCARDIOGRAM TRACING: CPT | Performed by: INTERNAL MEDICINE

## 2024-05-12 PROCEDURE — 6370000000 HC RX 637 (ALT 250 FOR IP): Performed by: NURSE PRACTITIONER

## 2024-05-12 PROCEDURE — 85025 COMPLETE CBC W/AUTO DIFF WBC: CPT

## 2024-05-12 RX ORDER — METOPROLOL TARTRATE 1 MG/ML
5 INJECTION, SOLUTION INTRAVENOUS EVERY 6 HOURS PRN
Status: DISCONTINUED | OUTPATIENT
Start: 2024-05-12 | End: 2024-05-18 | Stop reason: HOSPADM

## 2024-05-12 RX ORDER — ATORVASTATIN CALCIUM 40 MG/1
40 TABLET, FILM COATED ORAL DAILY
Status: DISCONTINUED | OUTPATIENT
Start: 2024-05-12 | End: 2024-05-18 | Stop reason: HOSPADM

## 2024-05-12 RX ORDER — IPRATROPIUM BROMIDE AND ALBUTEROL SULFATE 2.5; .5 MG/3ML; MG/3ML
1 SOLUTION RESPIRATORY (INHALATION)
Status: DISCONTINUED | OUTPATIENT
Start: 2024-05-12 | End: 2024-05-12

## 2024-05-12 RX ORDER — HYDRALAZINE HYDROCHLORIDE 20 MG/ML
5 INJECTION INTRAMUSCULAR; INTRAVENOUS ONCE
Status: DISCONTINUED | OUTPATIENT
Start: 2024-05-12 | End: 2024-05-12

## 2024-05-12 RX ORDER — PANTOPRAZOLE SODIUM 40 MG/1
40 TABLET, DELAYED RELEASE ORAL
Status: DISCONTINUED | OUTPATIENT
Start: 2024-05-12 | End: 2024-05-18 | Stop reason: HOSPADM

## 2024-05-12 RX ORDER — LISINOPRIL 5 MG/1
2.5 TABLET ORAL DAILY
Status: DISCONTINUED | OUTPATIENT
Start: 2024-05-12 | End: 2024-05-18 | Stop reason: HOSPADM

## 2024-05-12 RX ORDER — IPRATROPIUM BROMIDE AND ALBUTEROL SULFATE 2.5; .5 MG/3ML; MG/3ML
1 SOLUTION RESPIRATORY (INHALATION)
Status: DISCONTINUED | OUTPATIENT
Start: 2024-05-12 | End: 2024-05-17

## 2024-05-12 RX ORDER — TROSPIUM CHLORIDE 20 MG/1
20 TABLET, FILM COATED ORAL
Status: DISCONTINUED | OUTPATIENT
Start: 2024-05-12 | End: 2024-05-18 | Stop reason: HOSPADM

## 2024-05-12 RX ORDER — METOPROLOL SUCCINATE 50 MG/1
50 TABLET, EXTENDED RELEASE ORAL DAILY
Status: DISCONTINUED | OUTPATIENT
Start: 2024-05-12 | End: 2024-05-14

## 2024-05-12 RX ADMIN — INSULIN GLARGINE 10 UNITS: 100 INJECTION, SOLUTION SUBCUTANEOUS at 08:39

## 2024-05-12 RX ADMIN — INSULIN LISPRO 4 UNITS: 100 INJECTION, SOLUTION INTRAVENOUS; SUBCUTANEOUS at 16:35

## 2024-05-12 RX ADMIN — TROSPIUM CHLORIDE 20 MG: 20 TABLET, FILM COATED ORAL at 08:42

## 2024-05-12 RX ADMIN — INSULIN LISPRO 4 UNITS: 100 INJECTION, SOLUTION INTRAVENOUS; SUBCUTANEOUS at 22:05

## 2024-05-12 RX ADMIN — SODIUM CHLORIDE, PRESERVATIVE FREE 10 ML: 5 INJECTION INTRAVENOUS at 20:45

## 2024-05-12 RX ADMIN — METHYLPREDNISOLONE SODIUM SUCCINATE 40 MG: 40 INJECTION INTRAMUSCULAR; INTRAVENOUS at 12:38

## 2024-05-12 RX ADMIN — TROSPIUM CHLORIDE 20 MG: 20 TABLET, FILM COATED ORAL at 16:35

## 2024-05-12 RX ADMIN — SODIUM CHLORIDE, PRESERVATIVE FREE 10 ML: 5 INJECTION INTRAVENOUS at 08:42

## 2024-05-12 RX ADMIN — INSULIN GLARGINE 10 UNITS: 100 INJECTION, SOLUTION SUBCUTANEOUS at 20:46

## 2024-05-12 RX ADMIN — INSULIN LISPRO 2 UNITS: 100 INJECTION, SOLUTION INTRAVENOUS; SUBCUTANEOUS at 11:54

## 2024-05-12 RX ADMIN — METOPROLOL TARTRATE 5 MG: 5 INJECTION INTRAVENOUS at 12:38

## 2024-05-12 RX ADMIN — LISINOPRIL 2.5 MG: 5 TABLET ORAL at 08:38

## 2024-05-12 RX ADMIN — ENOXAPARIN SODIUM 40 MG: 100 INJECTION SUBCUTANEOUS at 08:39

## 2024-05-12 RX ADMIN — IPRATROPIUM BROMIDE AND ALBUTEROL SULFATE 1 DOSE: 2.5; .5 SOLUTION RESPIRATORY (INHALATION) at 12:40

## 2024-05-12 RX ADMIN — IPRATROPIUM BROMIDE AND ALBUTEROL SULFATE 1 DOSE: 2.5; .5 SOLUTION RESPIRATORY (INHALATION) at 21:15

## 2024-05-12 RX ADMIN — METOPROLOL SUCCINATE 50 MG: 50 TABLET, EXTENDED RELEASE ORAL at 08:38

## 2024-05-12 RX ADMIN — PANTOPRAZOLE SODIUM 40 MG: 40 TABLET, DELAYED RELEASE ORAL at 08:42

## 2024-05-12 RX ADMIN — ATORVASTATIN CALCIUM 40 MG: 40 TABLET, FILM COATED ORAL at 08:38

## 2024-05-12 NOTE — PROGRESS NOTES
[facility]: Notes  Non-Formulary with TI: Empagliflozin 25 mg  Pharmacist should discontinue if used for indication other than HF or CKD    Jardiance discontinued. Can be re-ordered upon discharge.    Willie Mccarthy R.Ph.  5/12/2024  2:22 PM

## 2024-05-12 NOTE — PROGRESS NOTES
INPATIENT PROGRESS NOTES    PATIENT NAME: Dayami Christensen  MRN: [id number]  SERVICE DATE:  May 12, 2024   SERVICE TIME:  10:53 AM      PRIMARY SERVICE: Pulmonary Disease    CHIEF COMPLAINTS: Lung mass    INTERVAL HPI: Patient seen and examined at bedside, Interval Notes, orders reviewed. Nursing notes noted    Patient report no complaint, no chest pain, no coughing, no hemoptysis, she is on room air saturation 95%.    New information updated in the note today, rest of the examination did not change compared to yesterday.    Review of system:     GI Abdominal pain No  Skin Rash No    Social History     Tobacco Use    Smoking status: Former     Current packs/day: 0.00     Types: Cigarettes     Quit date: 1987     Years since quittin.7    Smokeless tobacco: Never   Substance Use Topics    Alcohol use: No     History reviewed. No pertinent family history.      OBJECTIVE    Body mass index is 33.17 kg/m².    PHYSICAL EXAM:  Vitals:  BP (!) 153/84   Pulse (!) 124   Temp 97.3 °F (36.3 °C) (Axillary)   Resp 16   Ht 1.499 m (4' 11\")   Wt 74.5 kg (164 lb 3.9 oz)   SpO2 95%   BMI 33.17 kg/m²     General: alert, cooperative, no distress  Head: normocephalic, atraumatic  Eyes:No gross abnormalities.  ENT:  MMM no lesions  Neck:  supple and no masses  Chest : clear to auscultation bilaterally- no wheezes, rales or rhonchi, normal air movement, no respiratory distress  Heart:: Heart sounds are normal.  Regular rate and rhythm without murmur, gallop or rub.  ABD:  symmetric, soft, non-tender  Musculoskeletal : no cyanosis, no clubbing, and no edema  Neuro:  Grossly normal  Skin: No rashes or nodules noted.  Lymph node:  no cervical nodes  Urology: No Hamilton   Psychiatric: appropriate    DATA:   Recent Labs     24  0548 24  0532   WBC 10.0 16.1*   HGB 12.5 12.2   HCT 40.3 38.9   MCV 85.6 84.7    371     Recent Labs     24  0548 24  0532    138   K 4.8 3.9    100   CO2 26 27   BUN  alternatives discussed with patient. Patient  agrees to proceed with procedure            Electronically signed by Melva Agrawal MD,  FCCP   on 5/12/2024 at 10:53 AM

## 2024-05-12 NOTE — PROGRESS NOTES
05/12/24 1200   RT Protocol   History Pulmonary Disease 1   Respiratory pattern 0   Breath sounds 4   Cough 0   Indications for Bronchodilator Therapy Decreased or absent breath sounds   Bronchodilator Assessment Score 5

## 2024-05-12 NOTE — CARE COORDINATION
Case Management Assessment  Initial Evaluation    Date/Time of Evaluation: 5/12/2024 12:27 PM  Assessment Completed by: Leanna Michael RN    If patient is discharged prior to next notation, then this note serves as note for discharge by case management.    Patient Name: Dayami Christensen                   YOB: 1934  Diagnosis: Acute respiratory failure with hypoxia (HCC) [J96.01]  Acute on chronic respiratory failure with hypoxia (HCC) [J96.21]                   Date / Time: 5/10/2024  9:32 PM    Patient Admission Status: Inpatient   Readmission Risk (Low < 19, Mod (19-27), High > 27): Readmission Risk Score: 11    Current PCP: Chan Decker, DO  PCP verified by CM? Yes    Chart Reviewed: Yes      History Provided by: Patient  Patient Orientation: Alert and Oriented    Patient Cognition: Alert    Hospitalization in the last 30 days (Readmission):  No    If yes, Readmission Assessment in CM Navigator will be completed.    Advance Directives:      Code Status: Full Code   Patient's Primary Decision Maker is: Named in Scanned ACP Document      Discharge Planning:    Patient lives with: Alone (WITH DTR) Type of Home: House  Primary Care Giver: Self  Patient Support Systems include: Children   Current Financial resources: Medicare  Current community resources: None  Current services prior to admission: None            Current DME:  [facility],             Type of Home Care services:  None    ADLS  Prior functional level: Independent in ADLs/IADLs  Current functional level: Mobility, Assistance with the following:    PT AM-PAC:   /24  OT AM-PAC:   /24    Family can provide assistance at DC: Yes (DTR)  Would you like Case Management to discuss the discharge plan with any other family members/significant others, and if so, who? Yes (DTR)  Plans to Return to Present Housing: Yes  Other Identified Issues/Barriers to RETURNING to current housing:  MEDICAL COMPLICATIONS, CONSULTS, O2 NC   Potential Assistance

## 2024-05-12 NOTE — PLAN OF CARE
Problem: Discharge Planning  Goal: Discharge to home or other facility with appropriate resources  Outcome: Progressing  Flowsheets (Taken 5/11/2024 2107)  Discharge to home or other facility with appropriate resources:   Identify barriers to discharge with patient and caregiver   Arrange for needed discharge resources and transportation as appropriate   Identify discharge learning needs (meds, wound care, etc)   Arrange for interpreters to assist at discharge as needed   Refer to discharge planning if patient needs post-hospital services based on physician order or complex needs related to functional status, cognitive ability or social support system     Problem: Safety - Adult  Goal: Free from fall injury  Outcome: Progressing     Problem: ABCDS Injury Assessment  Goal: Absence of physical injury  Outcome: Progressing     Problem: Chronic Conditions and Co-morbidities  Goal: Patient's chronic conditions and co-morbidity symptoms are monitored and maintained or improved  Outcome: Progressing  Flowsheets (Taken 5/11/2024 2107)  Care Plan - Patient's Chronic Conditions and Co-Morbidity Symptoms are Monitored and Maintained or Improved:   Monitor and assess patient's chronic conditions and comorbid symptoms for stability, deterioration, or improvement   Collaborate with multidisciplinary team to address chronic and comorbid conditions and prevent exacerbation or deterioration   Update acute care plan with appropriate goals if chronic or comorbid symptoms are exacerbated and prevent overall improvement and discharge

## 2024-05-12 NOTE — PROGRESS NOTES
Hospitalist Progress Note      PCP: Chan Decker DO    Date of Admission: 5/10/2024    Chief Complaint:  patient went in AFIB today with HR in the 120s on telemetry, is afebrile and normotensive, feels short of breath, SPO2 is 96% on RA    Medications:  Reviewed    Infusion Medications    sodium chloride      dextrose       Scheduled Medications    atorvastatin  40 mg Oral Daily    lisinopril  2.5 mg Oral Daily    metoprolol succinate  50 mg Oral Daily    trospium  20 mg Oral BID AC    pantoprazole  40 mg Oral QAM AC    sodium chloride flush  5-40 mL IntraVENous 2 times per day    [Held by provider] enoxaparin  40 mg SubCUTAneous Daily    insulin lispro  0-8 Units SubCUTAneous TID WC    insulin lispro  0-4 Units SubCUTAneous Nightly    insulin glargine  10 Units SubCUTAneous BID     PRN Meds: sodium chloride flush, sodium chloride, potassium chloride **OR** potassium alternative oral replacement **OR** potassium chloride, magnesium sulfate, ondansetron **OR** ondansetron, polyethylene glycol, acetaminophen **OR** acetaminophen, glucose, dextrose bolus **OR** dextrose bolus, glucagon (rDNA), dextrose      Intake/Output Summary (Last 24 hours) at 5/12/2024 1209  Last data filed at 5/12/2024 0231  Gross per 24 hour   Intake 180 ml   Output 2000 ml   Net -1820 ml         Exam:    /66   Pulse (!) 117   Temp 98.1 °F (36.7 °C) (Oral)   Resp 16   Ht 1.499 m (4' 11\")   Wt 74.5 kg (164 lb 3.9 oz)   SpO2 96%   BMI 33.17 kg/m²     General appearance: appears stated age and cooperative.  Respiratory:  very diminished with faint wheezing bilaterally.  Cardiovascular: irregular, tachycardic.  Abdomen: Soft, active bowel sounds.  Musculoskeletal: No edema bilaterally.      Labs:   Recent Labs     05/10/24  2145 05/11/24  0548 05/12/24  0532   WBC 12.3* 10.0 16.1*   HGB 13.1 12.5 12.2   HCT 43.3 40.3 38.9    345 371       Recent Labs     05/10/24  2145 05/11/24  0548 05/12/24  0532    138 138   K 4.6  4.8 3.9   CL 98 100 100   CO2 24 26 27   BUN 13 14 23   CREATININE 0.61 0.57 0.56   CALCIUM 9.0 9.0 8.8       Recent Labs     05/10/24  2145 05/11/24  0548 05/12/24  0532   AST 10 9 9   ALT 9 8 8   BILITOT <0.2 <0.2 0.3   ALKPHOS 104 100 93       Recent Labs     05/10/24  2145   INR 1.0       No results for input(s): \"CKTOTAL\", \"TROPONINI\" in the last 72 hours.    Urinalysis:      Lab Results   Component Value Date/Time    NITRU Negative 05/10/2024 11:15 PM    BLOODU Negative 05/10/2024 11:15 PM    GLUCOSEU >=1000 05/10/2024 11:15 PM       Radiology:  Vascular duplex lower extremity venous bilateral   Final Result   No evidence of DVT in either lower extremity.         CTA CHEST W WO CONTRAST   Final Result   1. No evidence of pulmonary embolism.   2. 4.6 cm lingular masslike consolidation with left perihilar and mediastinal   lymphadenopathy. Findings are concerning for neoplasm.         XR CHEST PORTABLE   Final Result   No acute process.                 Assessment/Plan:    89 y/o female with history of PSVT s/p RFA, HTN, DM2, obesity who presented with:    Exertional dyspnea and hypoxia  - CTA of the chest was negative for PE, it showed a lingular mass concerning for malignancy  - plan for bronchoscopy with biopsy tomorrow  - feels very short of breath at rest, is very diminished with faint wheezing on lung exam,   - CXR was negative  - started Duonebs and Solumedrol   - followed by pulmonology and oncology    New onset AFIB with RVR  - resumed home dose of Toprol  - IV Lopressor as needed  - consulted cardiology  - monitor on telemetry    DM2 with hyperglycemia  - on ISS, Lantus     HTN  - resumed home meds       Diet: ADULT DIET; Regular; 4 carb choices (60 gm/meal)  Diet NPO Exceptions are: Sips of Water with Meds    Code Status: Full Code      Disposition - home with [facility] when medically ready        Electronically signed by BARBIE HONEYCUTT MD on 5/12/2024 at 12:09 PM

## 2024-05-12 NOTE — ACP (ADVANCE CARE PLANNING)
Advance Care Planning   Healthcare Decision Maker:    Primary Decision Maker: Fanny Ivy - Kapil - [phone]    SECONDARY DECISION MAKER: NABEEL SCHMIDT [phone]. EMAIL ADDRESS SOHAIL [email or web address].

## 2024-05-12 NOTE — RT PROTOCOL NOTE
RT Inhaler-Nebulizer Bronchodilator Protocol Note    There is a bronchodilator order in the chart from a provider indicating to follow the RT Bronchodilator Protocol and there is an “Initiate RT Inhaler-Nebulizer Bronchodilator Protocol” order as well (see protocol at bottom of note).    CXR Findings:  XR CHEST PORTABLE    Result Date: 5/10/2024  No acute process.       The findings from the last RT Protocol Assessment were as follows:   History Pulmonary Disease:    Respiratory Pattern:    Breath Sounds:    Cough:    Indication for Bronchodilator Therapy:    Bronchodilator Assessment Score:      Aerosolized bronchodilator medication orders have been revised according to the RT Inhaler-Nebulizer Bronchodilator Protocol below.    Respiratory Therapist to perform RT Therapy Protocol Assessment initially then follow the protocol.  Repeat RT Therapy Protocol Assessment PRN for score 0-3 or on second treatment, BID, and PRN for scores above 3.    No Indications - adjust the frequency to every 6 hours PRN wheezing or bronchospasm, if no treatments needed after 48 hours then discontinue using Per Protocol order mode.     If indication present, adjust the RT bronchodilator orders based on the Bronchodilator Assessment Score as indicated below.  Use Inhaler orders unless patient has one or more of the following: on home nebulizer, not able to hold breath for 10 seconds, is not alert and oriented, cannot activate and use MDI correctly, or respiratory rate 25 breaths per minute or more, then use the equivalent nebulizer order(s) with same Frequency and PRN reasons based on the score.  If a patient is on this medication at home then do not decrease Frequency below that used at home.    0-3 - enter or revise RT bronchodilator order(s) to equivalent RT Bronchodilator order with Frequency of every 4 hours PRN for wheezing or increased work of breathing using Per Protocol order mode.        4-6 - enter or revise RT Bronchodilator

## 2024-05-12 NOTE — CARE COORDINATION
SPOKE W.SON NABEEL SCHMIDT WHO WANTS TO BE ADDED AS SECONDARY . SON AND DTR WOULD LIKE HHC TO FOLLOW ON DISCHARGE. THEY ARE WORKING ON OBTAINING LIFE ALERT AND HOME CAMERA SYSTEM. NEAREST FAMILY LIVES 15 MINUTES AWAY. SON LIVES OUT OF STATE. CM TO FOLLOW FOR HHC.

## 2024-05-13 ENCOUNTER — ANESTHESIA EVENT (OUTPATIENT)
Dept: OPERATING ROOM | Age: 89
End: 2024-05-13
Payer: MEDICARE

## 2024-05-13 ENCOUNTER — ANESTHESIA (OUTPATIENT)
Dept: OPERATING ROOM | Age: 89
End: 2024-05-13
Payer: MEDICARE

## 2024-05-13 LAB
ALBUMIN SERPL-MCNC: 3.4 G/DL (ref 3.5–4.6)
ALP SERPL-CCNC: 83 U/L (ref 40–130)
ALT SERPL-CCNC: 11 U/L (ref 0–33)
ANION GAP SERPL CALCULATED.3IONS-SCNC: 13 MEQ/L (ref 9–15)
AST SERPL-CCNC: 11 U/L (ref 0–35)
BASOPHILS # BLD: 0 K/UL (ref 0–0.2)
BASOPHILS NFR BLD: 0.2 %
BILIRUB SERPL-MCNC: 0.3 MG/DL (ref 0.2–0.7)
BUN SERPL-MCNC: 21 MG/DL (ref 8–23)
CALCIUM SERPL-MCNC: 8.6 MG/DL (ref 8.5–9.9)
CHLORIDE SERPL-SCNC: 101 MEQ/L (ref 95–107)
CO2 SERPL-SCNC: 25 MEQ/L (ref 20–31)
CREAT SERPL-MCNC: 0.54 MG/DL (ref 0.5–0.9)
EOSINOPHIL # BLD: 0.1 K/UL (ref 0–0.7)
EOSINOPHIL NFR BLD: 0.6 %
ERYTHROCYTE [DISTWIDTH] IN BLOOD BY AUTOMATED COUNT: 14.6 % (ref 11.5–14.5)
GLOBULIN SER CALC-MCNC: 2.7 G/DL (ref 2.3–3.5)
GLUCOSE BLD-MCNC: 132 MG/DL (ref 70–99)
GLUCOSE BLD-MCNC: 149 MG/DL (ref 70–99)
GLUCOSE BLD-MCNC: 167 MG/DL (ref 70–99)
GLUCOSE BLD-MCNC: 210 MG/DL (ref 70–99)
GLUCOSE BLD-MCNC: 255 MG/DL (ref 70–99)
GLUCOSE SERPL-MCNC: 139 MG/DL (ref 70–99)
HCT VFR BLD AUTO: 39.9 % (ref 37–47)
HGB BLD-MCNC: 12.4 G/DL (ref 12–16)
LYMPHOCYTES # BLD: 1.9 K/UL (ref 1–4.8)
LYMPHOCYTES NFR BLD: 14.5 %
MAGNESIUM SERPL-MCNC: 2.4 MG/DL (ref 1.7–2.4)
MCH RBC QN AUTO: 26.4 PG (ref 27–31.3)
MCHC RBC AUTO-ENTMCNC: 31.1 % (ref 33–37)
MCV RBC AUTO: 85.1 FL (ref 79.4–94.8)
MONOCYTES # BLD: 0.7 K/UL (ref 0.2–0.8)
MONOCYTES NFR BLD: 5.1 %
NEUTROPHILS # BLD: 10 K/UL (ref 1.4–6.5)
NEUTS SEG NFR BLD: 79 %
PERFORMED ON: ABNORMAL
PLATELET # BLD AUTO: 396 K/UL (ref 130–400)
POTASSIUM SERPL-SCNC: 4.3 MEQ/L (ref 3.4–4.9)
PROT SERPL-MCNC: 6.1 G/DL (ref 6.3–8)
RBC # BLD AUTO: 4.69 M/UL (ref 4.2–5.4)
SODIUM SERPL-SCNC: 139 MEQ/L (ref 135–144)
WBC # BLD AUTO: 12.7 K/UL (ref 4.8–10.8)

## 2024-05-13 PROCEDURE — 94760 N-INVAS EAR/PLS OXIMETRY 1: CPT

## 2024-05-13 PROCEDURE — 94761 N-INVAS EAR/PLS OXIMETRY MLT: CPT

## 2024-05-13 PROCEDURE — 2500000003 HC RX 250 WO HCPCS: Performed by: INTERNAL MEDICINE

## 2024-05-13 PROCEDURE — 6370000000 HC RX 637 (ALT 250 FOR IP): Performed by: NURSE PRACTITIONER

## 2024-05-13 PROCEDURE — 2580000003 HC RX 258: Performed by: INTERNAL MEDICINE

## 2024-05-13 PROCEDURE — 6360000002 HC RX W HCPCS: Performed by: INTERNAL MEDICINE

## 2024-05-13 PROCEDURE — 80053 COMPREHEN METABOLIC PANEL: CPT

## 2024-05-13 PROCEDURE — 2060000000 HC ICU INTERMEDIATE R&B

## 2024-05-13 PROCEDURE — 6370000000 HC RX 637 (ALT 250 FOR IP): Performed by: INTERNAL MEDICINE

## 2024-05-13 PROCEDURE — 36415 COLL VENOUS BLD VENIPUNCTURE: CPT

## 2024-05-13 PROCEDURE — 99223 1ST HOSP IP/OBS HIGH 75: CPT | Performed by: INTERNAL MEDICINE

## 2024-05-13 PROCEDURE — 2700000000 HC OXYGEN THERAPY PER DAY

## 2024-05-13 PROCEDURE — 99232 SBSQ HOSP IP/OBS MODERATE 35: CPT | Performed by: INTERNAL MEDICINE

## 2024-05-13 PROCEDURE — 83735 ASSAY OF MAGNESIUM: CPT

## 2024-05-13 PROCEDURE — 6370000000 HC RX 637 (ALT 250 FOR IP): Performed by: REGISTERED NURSE

## 2024-05-13 PROCEDURE — 94640 AIRWAY INHALATION TREATMENT: CPT

## 2024-05-13 PROCEDURE — 85025 COMPLETE CBC W/AUTO DIFF WBC: CPT

## 2024-05-13 RX ORDER — SODIUM CHLORIDE 0.9 % (FLUSH) 0.9 %
5-40 SYRINGE (ML) INJECTION EVERY 12 HOURS SCHEDULED
Status: DISCONTINUED | OUTPATIENT
Start: 2024-05-13 | End: 2024-05-15 | Stop reason: HOSPADM

## 2024-05-13 RX ORDER — SODIUM CHLORIDE 9 MG/ML
INJECTION, SOLUTION INTRAVENOUS PRN
Status: DISCONTINUED | OUTPATIENT
Start: 2024-05-13 | End: 2024-05-15 | Stop reason: HOSPADM

## 2024-05-13 RX ORDER — SODIUM CHLORIDE 0.9 % (FLUSH) 0.9 %
5-40 SYRINGE (ML) INJECTION PRN
Status: DISCONTINUED | OUTPATIENT
Start: 2024-05-13 | End: 2024-05-15 | Stop reason: HOSPADM

## 2024-05-13 RX ADMIN — INSULIN GLARGINE 10 UNITS: 100 INJECTION, SOLUTION SUBCUTANEOUS at 09:30

## 2024-05-13 RX ADMIN — Medication 10 ML: at 21:36

## 2024-05-13 RX ADMIN — LISINOPRIL 2.5 MG: 5 TABLET ORAL at 09:30

## 2024-05-13 RX ADMIN — METOPROLOL SUCCINATE 50 MG: 50 TABLET, EXTENDED RELEASE ORAL at 07:31

## 2024-05-13 RX ADMIN — TROSPIUM CHLORIDE 20 MG: 20 TABLET, FILM COATED ORAL at 06:32

## 2024-05-13 RX ADMIN — ATORVASTATIN CALCIUM 40 MG: 40 TABLET, FILM COATED ORAL at 21:36

## 2024-05-13 RX ADMIN — INSULIN GLARGINE 10 UNITS: 100 INJECTION, SOLUTION SUBCUTANEOUS at 21:35

## 2024-05-13 RX ADMIN — TROSPIUM CHLORIDE 20 MG: 20 TABLET, FILM COATED ORAL at 18:26

## 2024-05-13 RX ADMIN — PANTOPRAZOLE SODIUM 40 MG: 40 TABLET, DELAYED RELEASE ORAL at 06:32

## 2024-05-13 RX ADMIN — METHYLPREDNISOLONE SODIUM SUCCINATE 40 MG: 40 INJECTION INTRAMUSCULAR; INTRAVENOUS at 09:30

## 2024-05-13 RX ADMIN — IPRATROPIUM BROMIDE AND ALBUTEROL SULFATE 1 DOSE: 2.5; .5 SOLUTION RESPIRATORY (INHALATION) at 07:18

## 2024-05-13 RX ADMIN — IPRATROPIUM BROMIDE AND ALBUTEROL SULFATE 1 DOSE: 2.5; .5 SOLUTION RESPIRATORY (INHALATION) at 19:52

## 2024-05-13 RX ADMIN — INSULIN LISPRO 2 UNITS: 100 INJECTION, SOLUTION INTRAVENOUS; SUBCUTANEOUS at 13:13

## 2024-05-13 NOTE — PROGRESS NOTES
Pulmonary Progress Note    2024 3:56 PM    Subjective:   Admit Date: 5/10/2024  PCP: Chan Dceker DO    Chief Complaint   Patient presents with    Shortness of Breath     SOB for the last few days.      Interval History:  Continues to be on 2 L oxygen.     14 points review of systems has been obtained and negative except to was mentioned in HPI.     Medications:   Scheduled Meds:   sodium chloride flush  5-40 mL IntraVENous 2 times per day    atorvastatin  40 mg Oral Daily    lisinopril  2.5 mg Oral Daily    metoprolol succinate  50 mg Oral Daily    trospium  20 mg Oral BID AC    pantoprazole  40 mg Oral QAM AC    methylPREDNISolone  40 mg IntraVENous Daily    ipratropium 0.5 mg-albuterol 2.5 mg  1 Dose Inhalation BID RT    sodium chloride flush  5-40 mL IntraVENous 2 times per day    [Held by provider] enoxaparin  40 mg SubCUTAneous Daily    insulin lispro  0-8 Units SubCUTAneous TID WC    insulin lispro  0-4 Units SubCUTAneous Nightly    insulin glargine  10 Units SubCUTAneous BID     Continuous Infusions:   sodium chloride      sodium chloride      dextrose           Objective:   Vitals:   Temp (24hrs), Av.7 °F (36.5 °C), Min:97.3 °F (36.3 °C), Max:98.2 °F (36.8 °C)    BP (!) 142/61   Pulse 86   Temp 97.8 °F (36.6 °C) (Oral)   Resp 18   Ht 1.499 m (4' 11\")   Wt 74.5 kg (164 lb 3.9 oz)   SpO2 98%   BMI 33.17 kg/m²   I/O:24HR INTAKE/OUTPUT:    Intake/Output Summary (Last 24 hours) at 2024 1556  Last data filed at 2024 1158  Gross per 24 hour   Intake --   Output 3150 ml   Net -3150 ml      0701 -  0700  In: 500 [P.O.:500]  Out: 2950 [Urine:2950]  CVP:          Physical Exam:    General appearance - alert, ill appearing   Mental status - alert, oriented to person, place, and time  Eyes - pupils equal and reactive, extraocular eye movements intact  Nose - normal and patent, no erythema, discharge or polyps  Neck - supple, no significant adenopathy  Chest - clear to

## 2024-05-13 NOTE — PROGRESS NOTES
05/12/24 2100   RT Protocol   History Pulmonary Disease 1   Respiratory pattern 0   Breath sounds 2   Cough 0   Indications for Bronchodilator Therapy Decreased or absent breath sounds   Bronchodilator Assessment Score 3

## 2024-05-13 NOTE — PROGRESS NOTES
Plan to do bronchoscopy and biopsy today, anesthesia not available after 330, I was unable to do it before 3 failed due to office hours, procedure was canceled, discussed with Dr. Levy .  Called patient daughter, and left a voicemail in this regard      Melva Agrawal MD

## 2024-05-13 NOTE — CONSULTS
[place], [place]    CARDIOLOGY CONSULTATION REPORT    DATE OF CONSULTATION  5/13/2024    CONSULTANT  Maria Isabel Knott DO     REQUESTING PHYSICIAN  Domingo Villalta MD     PRIMARY CARDIOLOGIST  Dr. Rangel last seen in 2018    REASON FOR CONSULTATION  Chief Complaint   Patient presents with    Shortness of Breath     SOB for the last few days.        HISTORY OF PRESENT ILLNESS  Dayami Christensen is a 89 y.o. female with remote history of paroxysmal SVT, SVT ablation procedure, CKD, hypertension, hyperlipidemia and remote history of tobacco abuse who presents to the hospital with shortness of breath and hypoxia.  She did saturating to 88% while in ER.  She was found to have lingular mass on CTA of the chest concerning for malignancy.  We are consulted for atrial fibrillation which is newly recognized.  She developed rapid atrial fibrillation yesterday with heart rates running 120s to 130s.  She reports feeling mild palpitations and shortness of breath during the episode.  She was transferred to cardiac telemetry.  She was started on IV Lopressor as needed and spontaneously converted to sinus rhythm this morning.  Currently, she reports feeling better but still has issues with shortness of breath.  Her son is at bedside.  She denies any prior history of atrial fibrillation.  No history of TIA or CVA.  No bleeding issues.    Allergies  Allergies   Allergen Reactions    Asa [Aspirin] Nausea And Vomiting       Medications   sodium chloride flush, 5-40 mL, 2 times per day  atorvastatin, 40 mg, Daily  lisinopril, 2.5 mg, Daily  metoprolol succinate, 50 mg, Daily  trospium, 20 mg, BID AC  pantoprazole, 40 mg, QAM AC  methylPREDNISolone, 40 mg, Daily  ipratropium 0.5 mg-albuterol 2.5 mg, 1 Dose, BID RT  sodium chloride flush, 5-40 mL, 2 times per day  [Held by provider] enoxaparin, 40 mg, Daily  insulin lispro, 0-8 Units, TID WC  insulin lispro, 0-4 Units, Nightly  insulin glargine, 10 Units, BID        Past Medical History:  pain.    Neurologic: Cranial nerves grossly intact.  No focal motor and/or sensory deficits.  Skin: No rashes or lesions. No cyanosis.       Recent Labs     05/10/24  2145 05/11/24  0548 05/12/24  0532 05/13/24  0530   HGB 13.1 12.5 12.2 12.4   WBC 12.3* 10.0 16.1* 12.7*    345 371 396    138 138 139   K 4.6 4.8 3.9 4.3   CO2 24 26 27 25   BUN 13 14 23 21   CREATININE 0.61 0.57 0.56 0.54   MG 2.1 2.3 2.4 2.4   INR 1.0  --   --   --    PROBNP 535  --   --   --      High-sensitivity troponin negative x 2 sets    Lab Results   Component Value Date/Time    PROBNP 535 05/10/2024 09:45 PM       EKG: My independent review reveals sinus tachycardia.  Heart rate 103 bpm.  No significant ST-T wave changes    Telemetry: My independent review reveals atrial fibrillation running 110s to 130s spontaneously converting to sinus rhythm at 2106 last night.  Currently sinus rhythm heart rate 80s          Assessment/Impression:   Newly recognized paroxysmal atrial fibrillation with RVR, currently sinus rhythm  [id number] score of 5 indicating high risk for stroke.  Remote history of PSVT with prior ablation.  Hypertension  Diabetes mellitus type 2  Hyperlipidemia  CKD  Remote history of tobacco abuse  Lingular mass on CT chest, concern for malignancy.  Workup in progress with plan for bronchoscopy possibly tomorrow.      Recommendations:   Continue telemetry monitoring  Echocardiogram for structural heart evaluation  Increase Toprol-XL to 75 mg daily for improved rate/rhythm control.  Discussed risks and benefits of long-term anticoagulation in detail with patient and son at bedside.  Risk discussed include potentially serious and/or life-threatening bleeding with the use of any potential blood thinner.  She understands that her risk for stroke is higher than her risk for bleeding at this time.  They would like to think about it further.  If they agree to anticoagulation I would favor Eliquis which can be started after

## 2024-05-13 NOTE — PROGRESS NOTES
Hospitalist Progress Note      PCP: Chan Decker DO    Date of Admission: 5/10/2024    Chief Complaint:  Patient resting in bed, no cp, sob.  Was to go for bronch however canceled due to scheduling issue.    Medications:  Reviewed    Infusion Medications    sodium chloride      sodium chloride      dextrose       Scheduled Medications    sodium chloride flush  5-40 mL IntraVENous 2 times per day    atorvastatin  40 mg Oral Daily    lisinopril  2.5 mg Oral Daily    metoprolol succinate  50 mg Oral Daily    trospium  20 mg Oral BID AC    pantoprazole  40 mg Oral QAM AC    methylPREDNISolone  40 mg IntraVENous Daily    ipratropium 0.5 mg-albuterol 2.5 mg  1 Dose Inhalation BID RT    sodium chloride flush  5-40 mL IntraVENous 2 times per day    [Held by provider] enoxaparin  40 mg SubCUTAneous Daily    insulin lispro  0-8 Units SubCUTAneous TID WC    insulin lispro  0-4 Units SubCUTAneous Nightly    insulin glargine  10 Units SubCUTAneous BID     PRN Meds: sodium chloride flush, sodium chloride, metoprolol, sodium chloride flush, sodium chloride, potassium chloride **OR** potassium alternative oral replacement **OR** potassium chloride, magnesium sulfate, ondansetron **OR** ondansetron, polyethylene glycol, acetaminophen **OR** acetaminophen, glucose, dextrose bolus **OR** dextrose bolus, glucagon (rDNA), dextrose      Intake/Output Summary (Last 24 hours) at 5/13/2024 1145  Last data filed at 5/13/2024 0629  Gross per 24 hour   Intake --   Output 2300 ml   Net -2300 ml       Exam:    BP (!) 142/61   Pulse 86   Temp 97.8 °F (36.6 °C) (Oral)   Resp 18   Ht 1.499 m (4' 11\")   Wt 74.5 kg (164 lb 3.9 oz)   SpO2 98%   BMI 33.17 kg/m²     General appearance: appears stated age and cooperative.  Respiratory:  very diminished with faint wheezing bilaterally.  Cardiovascular: irregular, tachycardic.  Abdomen: Soft, active bowel sounds.  Musculoskeletal: No edema bilaterally.      Labs:   Recent Labs      05/11/24  0548 05/12/24  0532 05/13/24  0530   WBC 10.0 16.1* 12.7*   HGB 12.5 12.2 12.4   HCT 40.3 38.9 39.9    371 396     Recent Labs     05/11/24  0548 05/12/24  0532 05/13/24  0530    138 139   K 4.8 3.9 4.3    100 101   CO2 26 27 25   BUN 14 23 21   CREATININE 0.57 0.56 0.54   CALCIUM 9.0 8.8 8.6     Recent Labs     05/11/24  0548 05/12/24  0532 05/13/24  0530   AST 9 9 11   ALT 8 8 11   BILITOT <0.2 0.3 0.3   ALKPHOS 100 93 83     Recent Labs     05/10/24  2145   INR 1.0     No results for input(s): \"CKTOTAL\", \"TROPONINI\" in the last 72 hours.    Urinalysis:      Lab Results   Component Value Date/Time    NITRU Negative 05/10/2024 11:15 PM    BLOODU Negative 05/10/2024 11:15 PM    GLUCOSEU >=1000 05/10/2024 11:15 PM       Radiology:  XR CHEST PORTABLE   Final Result   1. No acute cardiopulmonary disease.   2. Rounded soft tissue density in the lingular segment similar to CT of   05/11/2024.         Vascular duplex lower extremity venous bilateral   Final Result   No evidence of DVT in either lower extremity.         CTA CHEST W WO CONTRAST   Final Result   1. No evidence of pulmonary embolism.   2. 4.6 cm lingular masslike consolidation with left perihilar and mediastinal   lymphadenopathy. Findings are concerning for neoplasm.         XR CHEST PORTABLE   Final Result   No acute process.                 Assessment/Plan:    88 y/o female with history of PSVT s/p RFA, HTN, DM2, obesity who presented with:    Exertional dyspnea and hypoxia  - CTA of the chest was negative for PE, it showed a lingular mass concerning for malignancy  - plan for bronchoscopy with biopsy tomorrow  - feels very short of breath at rest, is very diminished with faint wheezing on lung exam,   - CXR was negative  - started Duonebs and Solumedrol   - followed by pulmonology and oncology  5/13 - await reschedule of bronch    New onset AFIB with RVR  - resumed home dose of Toprol  - IV Lopressor as needed  - consulted

## 2024-05-13 NOTE — PLAN OF CARE
Problem: Discharge Planning  Goal: Discharge to home or other facility with appropriate resources  Outcome: Progressing  Flowsheets (Taken 5/12/2024 2045)  Discharge to home or other facility with appropriate resources:   Identify barriers to discharge with patient and caregiver   Arrange for needed discharge resources and transportation as appropriate   Identify discharge learning needs (meds, wound care, etc)   Refer to discharge planning if patient needs post-hospital services based on physician order or complex needs related to functional status, cognitive ability or social support system     Problem: Safety - Adult  Goal: Free from fall injury  Outcome: Progressing     Problem: ABCDS Injury Assessment  Goal: Absence of physical injury  Outcome: Progressing     Problem: Chronic Conditions and Co-morbidities  Goal: Patient's chronic conditions and co-morbidity symptoms are monitored and maintained or improved  Outcome: Progressing  Flowsheets (Taken 5/12/2024 2045)  Care Plan - Patient's Chronic Conditions and Co-Morbidity Symptoms are Monitored and Maintained or Improved:   Monitor and assess patient's chronic conditions and comorbid symptoms for stability, deterioration, or improvement   Collaborate with multidisciplinary team to address chronic and comorbid conditions and prevent exacerbation or deterioration   Update acute care plan with appropriate goals if chronic or comorbid symptoms are exacerbated and prevent overall improvement and discharge

## 2024-05-14 ENCOUNTER — APPOINTMENT (OUTPATIENT)
Age: 89
DRG: 180 | End: 2024-05-14
Payer: MEDICARE

## 2024-05-14 DIAGNOSIS — E11.42 TYPE 2 DIABETES MELLITUS WITH DIABETIC POLYNEUROPATHY, WITHOUT LONG-TERM CURRENT USE OF INSULIN (MULTI): ICD-10-CM

## 2024-05-14 LAB
ALBUMIN SERPL-MCNC: 3.3 G/DL (ref 3.5–4.6)
ALP SERPL-CCNC: 80 U/L (ref 40–130)
ALT SERPL-CCNC: 15 U/L (ref 0–33)
ANION GAP SERPL CALCULATED.3IONS-SCNC: 12 MEQ/L (ref 9–15)
AST SERPL-CCNC: 12 U/L (ref 0–35)
BASOPHILS # BLD: 0 K/UL (ref 0–0.2)
BASOPHILS NFR BLD: 0.1 %
BILIRUB SERPL-MCNC: 0.3 MG/DL (ref 0.2–0.7)
BUN SERPL-MCNC: 18 MG/DL (ref 8–23)
CALCIUM SERPL-MCNC: 8.7 MG/DL (ref 8.5–9.9)
CHLORIDE SERPL-SCNC: 100 MEQ/L (ref 95–107)
CO2 SERPL-SCNC: 26 MEQ/L (ref 20–31)
CREAT SERPL-MCNC: 0.58 MG/DL (ref 0.5–0.9)
ECHO AO ROOT DIAM: 2.4 CM
ECHO AO ROOT INDEX: 1.42 CM/M2
ECHO AR MAX VEL PISA: 3.7 M/S
ECHO AV AREA PEAK VELOCITY: 1.2 CM2
ECHO AV AREA PEAK VELOCITY: 1.3 CM2
ECHO AV AREA PEAK VELOCITY: 1.4 CM2
ECHO AV AREA PEAK VELOCITY: 1.4 CM2
ECHO AV AREA VTI: 1.3 CM2
ECHO AV AREA/BSA VTI: 0.8 CM2/M2
ECHO AV CUSP MM: 1.4 CM
ECHO AV MEAN GRADIENT: 5 MMHG
ECHO AV MEAN VELOCITY: 1 M/S
ECHO AV PEAK GRADIENT: 10 MMHG
ECHO AV PEAK GRADIENT: 9 MMHG
ECHO AV PEAK VELOCITY: 1.5 M/S
ECHO AV PEAK VELOCITY: 1.6 M/S
ECHO AV REGURGITANT PHT: 344.8 MILLISECOND
ECHO AV VTI: 33.2 CM
ECHO BSA: 1.75 M2
ECHO EST RA PRESSURE: 3 MMHG
ECHO LA DIAMETER INDEX: 1.72 CM/M2
ECHO LA DIAMETER: 2.9 CM
ECHO LA TO AORTIC ROOT RATIO: 1.21
ECHO LA VOL A-L A2C: 41 ML (ref 22–52)
ECHO LA VOL A-L A4C: 41 ML (ref 22–52)
ECHO LA VOL MOD A2C: 38 ML (ref 22–52)
ECHO LA VOL MOD A4C: 36 ML (ref 22–52)
ECHO LA VOLUME AREA LENGTH: 45 ML
ECHO LA VOLUME INDEX A-L A2C: 24 ML/M2 (ref 16–34)
ECHO LA VOLUME INDEX A-L A4C: 24 ML/M2 (ref 16–34)
ECHO LA VOLUME INDEX AREA LENGTH: 27 ML/M2 (ref 16–34)
ECHO LA VOLUME INDEX MOD A2C: 22 ML/M2 (ref 16–34)
ECHO LA VOLUME INDEX MOD A4C: 21 ML/M2 (ref 16–34)
ECHO LV E' LATERAL VELOCITY: 7 CM/S
ECHO LV E' SEPTAL VELOCITY: 7 CM/S
ECHO LV EDV A2C: 56 ML
ECHO LV EDV A4C: 43 ML
ECHO LV EDV BP: 49 ML (ref 56–104)
ECHO LV EDV INDEX A4C: 25 ML/M2
ECHO LV EDV INDEX BP: 29 ML/M2
ECHO LV EDV NDEX A2C: 33 ML/M2
ECHO LV EJECTION FRACTION A2C: 69 %
ECHO LV EJECTION FRACTION A4C: 81 %
ECHO LV EJECTION FRACTION BIPLANE: 76 % (ref 55–100)
ECHO LV ESV A2C: 17 ML
ECHO LV ESV A4C: 8 ML
ECHO LV ESV BP: 12 ML (ref 19–49)
ECHO LV ESV INDEX A2C: 10 ML/M2
ECHO LV ESV INDEX A4C: 5 ML/M2
ECHO LV ESV INDEX BP: 7 ML/M2
ECHO LV FRACTIONAL SHORTENING: 29 % (ref 28–44)
ECHO LV INTERNAL DIMENSION DIASTOLE INDEX: 2.25 CM/M2
ECHO LV INTERNAL DIMENSION DIASTOLIC: 3.8 CM (ref 3.9–5.3)
ECHO LV INTERNAL DIMENSION SYSTOLIC INDEX: 1.6 CM/M2
ECHO LV INTERNAL DIMENSION SYSTOLIC: 2.7 CM
ECHO LV IVSD: 1 CM (ref 0.6–0.9)
ECHO LV IVSS: 1.4 CM
ECHO LV MASS 2D: 117.3 G (ref 67–162)
ECHO LV MASS INDEX 2D: 69.4 G/M2 (ref 43–95)
ECHO LV POSTERIOR WALL DIASTOLIC: 1 CM (ref 0.6–0.9)
ECHO LV POSTERIOR WALL SYSTOLIC: 1.2 CM
ECHO LV RELATIVE WALL THICKNESS RATIO: 0.53
ECHO LVOT AREA: 2.3 CM2
ECHO LVOT AV VTI INDEX: 0.58
ECHO LVOT DIAM: 1.7 CM
ECHO LVOT MEAN GRADIENT: 1 MMHG
ECHO LVOT PEAK GRADIENT: 3 MMHG
ECHO LVOT PEAK GRADIENT: 3 MMHG
ECHO LVOT PEAK VELOCITY: 0.8 M/S
ECHO LVOT PEAK VELOCITY: 0.9 M/S
ECHO LVOT STROKE VOLUME INDEX: 25.6 ML/M2
ECHO LVOT SV: 43.3 ML
ECHO LVOT VTI: 19.1 CM
ECHO MV AREA VTI: 1.5 CM2
ECHO MV LVOT VTI INDEX: 1.51
ECHO MV MAX VELOCITY: 1.5 M/S
ECHO MV MEAN GRADIENT: 5 MMHG
ECHO MV MEAN VELOCITY: 1 M/S
ECHO MV PEAK GRADIENT: 9 MMHG
ECHO MV REGURGITANT PEAK GRADIENT: 74 MMHG
ECHO MV REGURGITANT PEAK VELOCITY: 4.3 M/S
ECHO MV VTI: 28.9 CM
ECHO PV MAX VELOCITY: 0.7 M/S
ECHO PV PEAK GRADIENT: 2 MMHG
ECHO RIGHT VENTRICULAR SYSTOLIC PRESSURE (RVSP): 27 MMHG
ECHO RV TAPSE: 1.9 CM (ref 1.7–?)
ECHO TV REGURGITANT MAX VELOCITY: 2.45 M/S
ECHO TV REGURGITANT PEAK GRADIENT: 24 MMHG
EKG ATRIAL RATE: 103 BPM
EKG ATRIAL RATE: 120 BPM
EKG P AXIS: 64 DEGREES
EKG P-R INTERVAL: 196 MS
EKG Q-T INTERVAL: 338 MS
EKG Q-T INTERVAL: 340 MS
EKG QRS DURATION: 82 MS
EKG QRS DURATION: 82 MS
EKG QTC CALCULATION (BAZETT): 442 MS
EKG QTC CALCULATION (BAZETT): 468 MS
EKG R AXIS: -13 DEGREES
EKG R AXIS: 7 DEGREES
EKG T AXIS: 128 DEGREES
EKG T AXIS: 65 DEGREES
EKG VENTRICULAR RATE: 103 BPM
EKG VENTRICULAR RATE: 114 BPM
EOSINOPHIL # BLD: 0.2 K/UL (ref 0–0.7)
EOSINOPHIL NFR BLD: 1.8 %
ERYTHROCYTE [DISTWIDTH] IN BLOOD BY AUTOMATED COUNT: 14.5 % (ref 11.5–14.5)
GLOBULIN SER CALC-MCNC: 2.7 G/DL (ref 2.3–3.5)
GLUCOSE BLD-MCNC: 141 MG/DL (ref 70–99)
GLUCOSE BLD-MCNC: 180 MG/DL (ref 70–99)
GLUCOSE BLD-MCNC: 263 MG/DL (ref 70–99)
GLUCOSE BLD-MCNC: 295 MG/DL (ref 70–99)
GLUCOSE SERPL-MCNC: 154 MG/DL (ref 70–99)
HCT VFR BLD AUTO: 39 % (ref 37–47)
HGB BLD-MCNC: 12.2 G/DL (ref 12–16)
LYMPHOCYTES # BLD: 2.6 K/UL (ref 1–4.8)
LYMPHOCYTES NFR BLD: 19.9 %
MAGNESIUM SERPL-MCNC: 2.4 MG/DL (ref 1.7–2.4)
MCH RBC QN AUTO: 26.6 PG (ref 27–31.3)
MCHC RBC AUTO-ENTMCNC: 31.3 % (ref 33–37)
MCV RBC AUTO: 85 FL (ref 79.4–94.8)
MONOCYTES # BLD: 0.8 K/UL (ref 0.2–0.8)
MONOCYTES NFR BLD: 5.8 %
NEUTROPHILS # BLD: 9.2 K/UL (ref 1.4–6.5)
NEUTS SEG NFR BLD: 71.6 %
PERFORMED ON: ABNORMAL
PLATELET # BLD AUTO: 345 K/UL (ref 130–400)
POTASSIUM SERPL-SCNC: 4.2 MEQ/L (ref 3.4–4.9)
PROT SERPL-MCNC: 6 G/DL (ref 6.3–8)
RBC # BLD AUTO: 4.59 M/UL (ref 4.2–5.4)
SODIUM SERPL-SCNC: 138 MEQ/L (ref 135–144)
WBC # BLD AUTO: 12.9 K/UL (ref 4.8–10.8)

## 2024-05-14 PROCEDURE — 93306 TTE W/DOPPLER COMPLETE: CPT

## 2024-05-14 PROCEDURE — 2700000000 HC OXYGEN THERAPY PER DAY

## 2024-05-14 PROCEDURE — 36415 COLL VENOUS BLD VENIPUNCTURE: CPT

## 2024-05-14 PROCEDURE — 99231 SBSQ HOSP IP/OBS SF/LOW 25: CPT | Performed by: PHYSICIAN ASSISTANT

## 2024-05-14 PROCEDURE — 6370000000 HC RX 637 (ALT 250 FOR IP): Performed by: INTERNAL MEDICINE

## 2024-05-14 PROCEDURE — 6360000002 HC RX W HCPCS: Performed by: INTERNAL MEDICINE

## 2024-05-14 PROCEDURE — 83735 ASSAY OF MAGNESIUM: CPT

## 2024-05-14 PROCEDURE — 80053 COMPREHEN METABOLIC PANEL: CPT

## 2024-05-14 PROCEDURE — 94760 N-INVAS EAR/PLS OXIMETRY 1: CPT

## 2024-05-14 PROCEDURE — 94640 AIRWAY INHALATION TREATMENT: CPT

## 2024-05-14 PROCEDURE — 2580000003 HC RX 258: Performed by: INTERNAL MEDICINE

## 2024-05-14 PROCEDURE — 93010 ELECTROCARDIOGRAM REPORT: CPT | Performed by: INTERNAL MEDICINE

## 2024-05-14 PROCEDURE — 93306 TTE W/DOPPLER COMPLETE: CPT | Performed by: INTERNAL MEDICINE

## 2024-05-14 PROCEDURE — 85025 COMPLETE CBC W/AUTO DIFF WBC: CPT

## 2024-05-14 PROCEDURE — 6370000000 HC RX 637 (ALT 250 FOR IP): Performed by: REGISTERED NURSE

## 2024-05-14 PROCEDURE — 99232 SBSQ HOSP IP/OBS MODERATE 35: CPT | Performed by: INTERNAL MEDICINE

## 2024-05-14 PROCEDURE — 94761 N-INVAS EAR/PLS OXIMETRY MLT: CPT

## 2024-05-14 PROCEDURE — 2060000000 HC ICU INTERMEDIATE R&B

## 2024-05-14 PROCEDURE — 6370000000 HC RX 637 (ALT 250 FOR IP): Performed by: NURSE PRACTITIONER

## 2024-05-14 PROCEDURE — 2500000003 HC RX 250 WO HCPCS: Performed by: INTERNAL MEDICINE

## 2024-05-14 RX ORDER — METOPROLOL SUCCINATE 25 MG/1
25 TABLET, EXTENDED RELEASE ORAL ONCE
Status: COMPLETED | OUTPATIENT
Start: 2024-05-14 | End: 2024-05-14

## 2024-05-14 RX ORDER — PIOGLITAZONEHYDROCHLORIDE 15 MG/1
15 TABLET ORAL DAILY
Qty: 90 TABLET | Refills: 0 | Status: SHIPPED | OUTPATIENT
Start: 2024-05-14

## 2024-05-14 RX ADMIN — INSULIN GLARGINE 10 UNITS: 100 INJECTION, SOLUTION SUBCUTANEOUS at 20:38

## 2024-05-14 RX ADMIN — INSULIN LISPRO 4 UNITS: 100 INJECTION, SOLUTION INTRAVENOUS; SUBCUTANEOUS at 17:39

## 2024-05-14 RX ADMIN — INSULIN GLARGINE 10 UNITS: 100 INJECTION, SOLUTION SUBCUTANEOUS at 11:02

## 2024-05-14 RX ADMIN — IPRATROPIUM BROMIDE AND ALBUTEROL SULFATE 1 DOSE: 2.5; .5 SOLUTION RESPIRATORY (INHALATION) at 19:43

## 2024-05-14 RX ADMIN — Medication 5 ML: at 20:38

## 2024-05-14 RX ADMIN — TROSPIUM CHLORIDE 20 MG: 20 TABLET, FILM COATED ORAL at 06:06

## 2024-05-14 RX ADMIN — ATORVASTATIN CALCIUM 40 MG: 40 TABLET, FILM COATED ORAL at 11:01

## 2024-05-14 RX ADMIN — TROSPIUM CHLORIDE 20 MG: 20 TABLET, FILM COATED ORAL at 17:39

## 2024-05-14 RX ADMIN — METHYLPREDNISOLONE SODIUM SUCCINATE 40 MG: 40 INJECTION INTRAMUSCULAR; INTRAVENOUS at 11:02

## 2024-05-14 RX ADMIN — IPRATROPIUM BROMIDE AND ALBUTEROL SULFATE 1 DOSE: 2.5; .5 SOLUTION RESPIRATORY (INHALATION) at 08:04

## 2024-05-14 RX ADMIN — PANTOPRAZOLE SODIUM 40 MG: 40 TABLET, DELAYED RELEASE ORAL at 06:06

## 2024-05-14 RX ADMIN — METOPROLOL SUCCINATE 25 MG: 25 TABLET, EXTENDED RELEASE ORAL at 11:01

## 2024-05-14 RX ADMIN — LISINOPRIL 2.5 MG: 5 TABLET ORAL at 11:01

## 2024-05-14 ASSESSMENT — ENCOUNTER SYMPTOMS
SHORTNESS OF BREATH: 1
VOMITING: 0
COLOR CHANGE: 0
CHEST TIGHTNESS: 0
NAUSEA: 0

## 2024-05-14 ASSESSMENT — PULMONARY FUNCTION TESTS: PEFR_L/MIN: 16

## 2024-05-14 NOTE — CARE COORDINATION
AWAITING PULM PLAN, ?BRONCH.  PER CARDIOLOGY START ELIQUIS ONCE ABLE, CARD PLACED ON BLUE CHART.  PT REMAINS ON O2 2L, MAY NEED HOME O2 EVAL PRIOR TO DC.  PLAN HOME WITH [facility] ONCE CLEARED BY REY WAGNER [facility] UPDATED.

## 2024-05-14 NOTE — PROGRESS NOTES
Progress Note  Patient: Dayami Christensen  Unit/Bed: W192/W192-01  YOB: 1934  MRN: [id number]  Acct: [id number]   Admitting Diagnosis: Acute respiratory failure with hypoxia (HCC) [J96.01]  Acute on chronic respiratory failure with hypoxia (HCC) [J96.21]  Date:  5/10/2024  Hospital Day: 3    Chief Complaint:  SOB    Subjective      5/14/24: Resting quietly in bed in no acute distress.  States that after eating lunch this afternoon she began feeling dizzy which she describes as a room spinning sensation.  Also complaining of generalized weakness.  Has persistent shortness of breath.  Denies chest pain.    Hemodynamically stable with most recent blood pressure 135/60.  On telemetry, sinus rhythm with heart rate in the 90s.  Toprol-XL increased to 75 mg p.o. daily beginning this morning 5/14/2024 however after talking with nursing staff, patient only received the additional 25 mg dose of Toprol this morning as 50 mg dose was apparently canceled from the system.  Will order Toprol-XL 50 mg p.o. x 1 now (for total dose 75mg today) if blood pressure stable on repeat check by nursing.  Pulmonology following.  Initial plan for bronchoscopy with biopsy yesterday however this was canceled due to scheduling issues.      5/13/24: Dayami Christensen is a 89 y.o. female with remote history of paroxysmal SVT, SVT ablation procedure, CKD, hypertension, hyperlipidemia and remote history of tobacco abuse who presents to the hospital with shortness of breath and hypoxia.  She did saturating to 88% while in ER.  She was found to have lingular mass on CTA of the chest concerning for malignancy.  We are consulted for atrial fibrillation which is newly recognized.  She developed rapid atrial fibrillation yesterday with heart rates running 120s to 130s.  She reports feeling mild palpitations and shortness of breath during the episode.  She was transferred to cardiac telemetry.  She was started on IV Lopressor as needed and

## 2024-05-14 NOTE — PROGRESS NOTES
Hospitalist Progress Note      PCP: Chan Decker DO    Date of Admission: 5/10/2024    Chief Complaint:  Patient resting in bed, no cp, sob.  Awaiting bronch plan.    Medications:  Reviewed    Infusion Medications    sodium chloride      sodium chloride      dextrose       Scheduled Medications    [START ON 5/15/2024] metoprolol succinate  75 mg Oral Daily    sodium chloride flush  5-40 mL IntraVENous 2 times per day    atorvastatin  40 mg Oral Daily    lisinopril  2.5 mg Oral Daily    trospium  20 mg Oral BID AC    pantoprazole  40 mg Oral QAM AC    methylPREDNISolone  40 mg IntraVENous Daily    ipratropium 0.5 mg-albuterol 2.5 mg  1 Dose Inhalation BID RT    sodium chloride flush  5-40 mL IntraVENous 2 times per day    [Held by provider] enoxaparin  40 mg SubCUTAneous Daily    insulin lispro  0-8 Units SubCUTAneous TID WC    insulin lispro  0-4 Units SubCUTAneous Nightly    insulin glargine  10 Units SubCUTAneous BID     PRN Meds: sodium chloride flush, sodium chloride, metoprolol, sodium chloride flush, sodium chloride, potassium chloride **OR** potassium alternative oral replacement **OR** potassium chloride, magnesium sulfate, ondansetron **OR** ondansetron, polyethylene glycol, acetaminophen **OR** acetaminophen, glucose, dextrose bolus **OR** dextrose bolus, glucagon (rDNA), dextrose      Intake/Output Summary (Last 24 hours) at 5/14/2024 1715  Last data filed at 5/14/2024 1054  Gross per 24 hour   Intake --   Output 2650 ml   Net -2650 ml       Exam:    /76   Pulse 87   Temp 98.1 °F (36.7 °C) (Axillary)   Resp 16   Ht 1.49 m (4' 10.66\")   Wt 74.4 kg (164 lb)   SpO2 100%   BMI 33.51 kg/m²     General appearance: appears stated age and cooperative.  Respiratory:  very diminished with faint wheezing bilaterally.  Cardiovascular: irregular, tachycardic.  Abdomen: Soft, active bowel sounds.  Musculoskeletal: No edema bilaterally.      Labs:   Recent Labs     05/12/24  0532 05/13/24  0530  05/14/24  0548   WBC 16.1* 12.7* 12.9*   HGB 12.2 12.4 12.2   HCT 38.9 39.9 39.0    396 345     Recent Labs     05/12/24  0532 05/13/24  0530 05/14/24  0548    139 138   K 3.9 4.3 4.2    101 100   CO2 27 25 26   BUN 23 21 18   CREATININE 0.56 0.54 0.58   CALCIUM 8.8 8.6 8.7     Recent Labs     05/12/24  0532 05/13/24  0530 05/14/24  0548   AST 9 11 12   ALT 8 11 15   BILITOT 0.3 0.3 0.3   ALKPHOS 93 83 80     No results for input(s): \"INR\" in the last 72 hours.    No results for input(s): \"CKTOTAL\", \"TROPONINI\" in the last 72 hours.    Urinalysis:      Lab Results   Component Value Date/Time    NITRU Negative 05/10/2024 11:15 PM    BLOODU Negative 05/10/2024 11:15 PM    GLUCOSEU >=1000 05/10/2024 11:15 PM       Radiology:  XR CHEST PORTABLE   Final Result   1. No acute cardiopulmonary disease.   2. Rounded soft tissue density in the lingular segment similar to CT of   05/11/2024.         Vascular duplex lower extremity venous bilateral   Final Result   No evidence of DVT in either lower extremity.         CTA CHEST W WO CONTRAST   Final Result   1. No evidence of pulmonary embolism.   2. 4.6 cm lingular masslike consolidation with left perihilar and mediastinal   lymphadenopathy. Findings are concerning for neoplasm.         XR CHEST PORTABLE   Final Result   No acute process.                 Assessment/Plan:    89 y/o female with history of PSVT s/p RFA, HTN, DM2, obesity who presented with:    Exertional dyspnea and hypoxia  - CTA of the chest was negative for PE, it showed a lingular mass concerning for malignancy  - plan for bronchoscopy with biopsy tomorrow  - feels very short of breath at rest, is very diminished with faint wheezing on lung exam,   - CXR was negative  - started Duonebs and Solumedrol   - followed by pulmonology and oncology  5/13 - await reschedule of bronch  5/14 - d/w Dr. Cuenca, plan for bronch next 1-2 days pending schedule.      New onset AFIB with RVR  - resumed home

## 2024-05-14 NOTE — PROGRESS NOTES
Pulmonary Progress Note    2024 3:47 PM    Subjective:   Admit Date: 5/10/2024  PCP: Chan Decker DO    Chief Complaint   Patient presents with    Shortness of Breath     SOB for the last few days.      Interval History:   No major issues overnight.  Continues to be on 2 L of oxygen.  Continues to be on steroids and bronchodilators.    14 points review of systems has been obtained and negative except to was mentioned in HPI.     Medications:   Scheduled Meds:   [START ON 5/15/2024] metoprolol succinate  75 mg Oral Daily    sodium chloride flush  5-40 mL IntraVENous 2 times per day    atorvastatin  40 mg Oral Daily    lisinopril  2.5 mg Oral Daily    trospium  20 mg Oral BID AC    pantoprazole  40 mg Oral QAM AC    methylPREDNISolone  40 mg IntraVENous Daily    ipratropium 0.5 mg-albuterol 2.5 mg  1 Dose Inhalation BID RT    sodium chloride flush  5-40 mL IntraVENous 2 times per day    [Held by provider] enoxaparin  40 mg SubCUTAneous Daily    insulin lispro  0-8 Units SubCUTAneous TID WC    insulin lispro  0-4 Units SubCUTAneous Nightly    insulin glargine  10 Units SubCUTAneous BID     Continuous Infusions:   sodium chloride      sodium chloride      dextrose           Objective:   Vitals:   Temp (24hrs), Av.9 °F (36.6 °C), Min:97.7 °F (36.5 °C), Max:98.1 °F (36.7 °C)    /76   Pulse 87   Temp 98.1 °F (36.7 °C) (Axillary)   Resp 16   Ht 1.49 m (4' 10.66\")   Wt 74.4 kg (164 lb)   SpO2 100%   BMI 33.51 kg/m²   I/O:24HR INTAKE/OUTPUT:    Intake/Output Summary (Last 24 hours) at 2024 1547  Last data filed at 2024 1054  Gross per 24 hour   Intake --   Output 2650 ml   Net -2650 ml        0701 -  0700  In: -   Out: 2650 [Urine:2650]  CVP:          Physical Exam:    General appearance - alert, ill appearing   Mental status - alert, oriented to person, place, and time  Eyes - pupils equal and reactive, extraocular eye movements intact  Nose - normal and patent, no erythema,  INTERVAL HPI/OVERNIGHT EVENTS: Pain controlled, POC wnl, fs at 23:50 was 326 gave pt ISS, pt c/o of hiccups around 12am, NGT flushed, 1x pepcid, 500cc bolus of NS given for low bp, tachy to 112, bp cont to be low, another 500cc bolus of NS given, stat labs, lactate 5.1, hgb 6.7 (10.3), K + 6.0 calcium gluconate given, 1 L bolus of NS given, ekg wnl, cxr for NGT placement, peripheral IV (18g) in L arm obtained for 2 units of PRBC (2L of ns given in total)    STATUS POST:  Abdominal perineal resection 9/24, Lap converted to open, EDER, NGT 10/14    POST OPERATIVE DAY #: 1    SUBJECTIVE: Pt seen and examined at bedside this AM by surgery team. Still continues to be distended and tender this AM. Denies f/n/v/cp/sob.    MEDICATIONS  (STANDING):  influenza   Vaccine 0.5 milliLiter(s) IntraMuscular once  Parenteral Nutrition - Adult 1 Each (71 mL/Hr) TPN Continuous <Continuous>    MEDICATIONS  (PRN):  ketorolac 0.5% Ophthalmic Solution 1 Drop(s) Left EYE every 6 hours PRN eye pain irritation      Vital Signs Last 24 Hrs  T(C): 36.8 (15 Oct 2019 06:37), Max: 37.2 (15 Oct 2019 03:13)  T(F): 98.2 (15 Oct 2019 06:37), Max: 99 (15 Oct 2019 03:13)  HR: 114 (15 Oct 2019 06:37) (94 - 114)  BP: 81/50 (15 Oct 2019 06:37) (72/50 - 127/75)  BP(mean): 84 (14 Oct 2019 17:23) (81 - 94)  RR: 19 (15 Oct 2019 06:37) (13 - 20)  SpO2: 98% (15 Oct 2019 06:37) (95% - 100%)    PHYSICAL EXAM:      Constitutional: A&Ox3, NAD     Respiratory: non labored breathing, no respiratory distress    Cardiovascular: NSR, RRR    Gastrointestinal: Abdomen soft, distended, diffusely ttp. JAKUB x 1 to suction w sanguineous/serosanguineous OP w/ clots present. Colostomy w/ pink stoma, no gas or stool present in bag.    Extremities: wwp, no calf tenderness or edema, SCDs in place     I&O's Detail    14 Oct 2019 07:01  -  15 Oct 2019 07:00  --------------------------------------------------------  IN:    IV PiggyBack: 150 mL    lactated ringers.: 350 mL    Packed Red Blood Cells: 350 mL    Sodium Chloride 0.9% IV Bolus: 2000 mL    TPN (Total Parenteral Nutrition): 497 mL  Total IN: 3347 mL    OUT:    Bulb: 195 mL    Colostomy: 50 mL    Estimated Blood Loss: 30 mL    Indwelling Catheter - Urethral: 850 mL    Voided: 140 mL  Total OUT: 1265 mL    Total NET: 2082 mL      LABS:                        6.7    19.83 )-----------( 445      ( 15 Oct 2019 01:39 )             20.0     10-15    132<L>  |  96  |  25<H>  ----------------------------<  382<H>  6.0<H>   |  21<L>  |  1.18    Ca    8.8      15 Oct 2019 01:39  Phos  4.3     10-15  Mg     1.8     10-15      RADIOLOGY & ADDITIONAL STUDIES: discharge or polyps  Neck - supple, no significant adenopathy  Chest - clear to auscultation, no wheezes, rales or rhonchi   Heart - normal rate, regular rhythm, normal S1, S2   Abdomen - soft, nontender, nondistended   Rectal - deferred, not clinically indicated  Neurological - alert, oriented, normal speech, no focal findings   Musculoskeletal - no joint tenderness, deformity or swelling  Extremities - peripheral pulses normal, no pedal edema   Skin - normal coloration and turgor, no rashes               BMP:    Recent Labs     05/12/24  0532 05/13/24  0530 05/14/24  0548    139 138   K 3.9 4.3 4.2    101 100   CO2 27 25 26   BUN 23 21 18   CREATININE 0.56 0.54 0.58   GLUCOSE 137* 139* 154*   MG 2.4 2.4 2.4      .  MG:3,PHOS:3)@  Ionized Calcium: No results found for: \"IONCA\"  CBC:   Recent Labs     05/13/24  0530 05/14/24  0548   WBC 12.7* 12.9*   HGB 12.4 12.2    345        ABG: No results for input(s): \"PH\", \"PCO2\", \"PO2\" in the last 72 hours.        Assessment and Plan:       Impression:    - Lung mass, lingula  - Mediasdtinal adenopathy   - Acute hypoxic RF.   - Pulmonary edema.       Recommendations:    -Discussed bronchoscopy with patient and family at the bedside.  Can proceed tomorrow.  N.p.o. past midnight.  Hold low molecular weight heparin  - Wean off oxygen.  Goal saturation above 90%.  - Strict intake and output measurement.  Watch urine output closely.  -  continue steroids   - DVT/GI prophylaxis          Electronically signed by Benedict Cuenca MD on 5/14/2024 at 3:47 PM

## 2024-05-14 NOTE — PLAN OF CARE
Problem: Discharge Planning  Goal: Discharge to home or other facility with appropriate resources  Outcome: Progressing  Flowsheets (Taken 5/13/2024 0937 by Mily Daugherty, RN)  Discharge to home or other facility with appropriate resources:   Identify barriers to discharge with patient and caregiver   Arrange for needed discharge resources and transportation as appropriate   Identify discharge learning needs (meds, wound care, etc)   Refer to discharge planning if patient needs post-hospital services based on physician order or complex needs related to functional status, cognitive ability or social support system     Problem: Safety - Adult  Goal: Free from fall injury  Outcome: Progressing     Problem: ABCDS Injury Assessment  Goal: Absence of physical injury  Outcome: Progressing     Problem: Chronic Conditions and Co-morbidities  Goal: Patient's chronic conditions and co-morbidity symptoms are monitored and maintained or improved  Outcome: Progressing  Flowsheets (Taken 5/13/2024 0937 by Mily Daugherty, RN)  Care Plan - Patient's Chronic Conditions and Co-Morbidity Symptoms are Monitored and Maintained or Improved:   Monitor and assess patient's chronic conditions and comorbid symptoms for stability, deterioration, or improvement   Collaborate with multidisciplinary team to address chronic and comorbid conditions and prevent exacerbation or deterioration   Update acute care plan with appropriate goals if chronic or comorbid symptoms are exacerbated and prevent overall improvement and discharge

## 2024-05-15 ENCOUNTER — APPOINTMENT (OUTPATIENT)
Dept: GENERAL RADIOLOGY | Age: 89
DRG: 180 | End: 2024-05-15
Payer: MEDICARE

## 2024-05-15 PROBLEM — I48.91 NEW ONSET ATRIAL FIBRILLATION (HCC): Status: ACTIVE | Noted: 2017-05-27

## 2024-05-15 LAB
ALBUMIN SERPL-MCNC: 3.5 G/DL (ref 3.5–4.6)
ALP SERPL-CCNC: 88 U/L (ref 40–130)
ALT SERPL-CCNC: 16 U/L (ref 0–33)
ANION GAP SERPL CALCULATED.3IONS-SCNC: 13 MEQ/L (ref 9–15)
AST SERPL-CCNC: 11 U/L (ref 0–35)
BASOPHILS # BLD: 0 K/UL (ref 0–0.2)
BASOPHILS NFR BLD: 0.1 %
BILIRUB SERPL-MCNC: 0.3 MG/DL (ref 0.2–0.7)
BUN SERPL-MCNC: 18 MG/DL (ref 8–23)
CALCIUM SERPL-MCNC: 8.9 MG/DL (ref 8.5–9.9)
CHLORIDE SERPL-SCNC: 99 MEQ/L (ref 95–107)
CHP ED QC CHECK: NORMAL
CO2 SERPL-SCNC: 26 MEQ/L (ref 20–31)
CREAT SERPL-MCNC: 0.54 MG/DL (ref 0.5–0.9)
EOSINOPHIL # BLD: 0.3 K/UL (ref 0–0.7)
EOSINOPHIL NFR BLD: 1.9 %
ERYTHROCYTE [DISTWIDTH] IN BLOOD BY AUTOMATED COUNT: 14.5 % (ref 11.5–14.5)
GLOBULIN SER CALC-MCNC: 3 G/DL (ref 2.3–3.5)
GLUCOSE BLD-MCNC: 141 MG/DL (ref 70–99)
GLUCOSE BLD-MCNC: 256 MG/DL
GLUCOSE BLD-MCNC: 256 MG/DL (ref 70–99)
GLUCOSE BLD-MCNC: 278 MG/DL (ref 70–99)
GLUCOSE BLD-MCNC: 321 MG/DL (ref 70–99)
GLUCOSE BLD-MCNC: 333 MG/DL (ref 70–99)
GLUCOSE SERPL-MCNC: 146 MG/DL (ref 70–99)
HCT VFR BLD AUTO: 41.3 % (ref 37–47)
HGB BLD-MCNC: 13 G/DL (ref 12–16)
LYMPHOCYTES # BLD: 2.3 K/UL (ref 1–4.8)
LYMPHOCYTES NFR BLD: 16.7 %
MAGNESIUM SERPL-MCNC: 2.3 MG/DL (ref 1.7–2.4)
MCH RBC QN AUTO: 26.6 PG (ref 27–31.3)
MCHC RBC AUTO-ENTMCNC: 31.5 % (ref 33–37)
MCV RBC AUTO: 84.6 FL (ref 79.4–94.8)
MONOCYTES # BLD: 0.7 K/UL (ref 0.2–0.8)
MONOCYTES NFR BLD: 5.3 %
NEUTROPHILS # BLD: 10.6 K/UL (ref 1.4–6.5)
NEUTS SEG NFR BLD: 75.1 %
PERFORMED ON: ABNORMAL
PLATELET # BLD AUTO: ABNORMAL K/UL (ref 130–400)
PLATELET BLD QL SMEAR: ABNORMAL
POTASSIUM SERPL-SCNC: 4.3 MEQ/L (ref 3.4–4.9)
PROT SERPL-MCNC: 6.5 G/DL (ref 6.3–8)
RBC # BLD AUTO: 4.88 M/UL (ref 4.2–5.4)
SLIDE REVIEW: ABNORMAL
SODIUM SERPL-SCNC: 138 MEQ/L (ref 135–144)
WBC # BLD AUTO: 14 K/UL (ref 4.8–10.8)

## 2024-05-15 PROCEDURE — 2500000003 HC RX 250 WO HCPCS: Performed by: INTERNAL MEDICINE

## 2024-05-15 PROCEDURE — 6360000002 HC RX W HCPCS: Performed by: INTERNAL MEDICINE

## 2024-05-15 PROCEDURE — 88112 CYTOPATH CELL ENHANCE TECH: CPT

## 2024-05-15 PROCEDURE — 7100000001 HC PACU RECOVERY - ADDTL 15 MIN: Performed by: INTERNAL MEDICINE

## 2024-05-15 PROCEDURE — 2709999900 HC NON-CHARGEABLE SUPPLY: Performed by: INTERNAL MEDICINE

## 2024-05-15 PROCEDURE — 87186 SC STD MICRODIL/AGAR DIL: CPT

## 2024-05-15 PROCEDURE — 94640 AIRWAY INHALATION TREATMENT: CPT

## 2024-05-15 PROCEDURE — 31624 DX BRONCHOSCOPE/LAVAGE: CPT | Performed by: INTERNAL MEDICINE

## 2024-05-15 PROCEDURE — 0B9G8ZX DRAINAGE OF LEFT UPPER LUNG LOBE, VIA NATURAL OR ARTIFICIAL OPENING ENDOSCOPIC, DIAGNOSTIC: ICD-10-PCS | Performed by: INTERNAL MEDICINE

## 2024-05-15 PROCEDURE — 99233 SBSQ HOSP IP/OBS HIGH 50: CPT | Performed by: INTERNAL MEDICINE

## 2024-05-15 PROCEDURE — 31653 BRONCH EBUS SAMPLNG 3/> NODE: CPT | Performed by: INTERNAL MEDICINE

## 2024-05-15 PROCEDURE — 6370000000 HC RX 637 (ALT 250 FOR IP): Performed by: NURSE PRACTITIONER

## 2024-05-15 PROCEDURE — 7100000000 HC PACU RECOVERY - FIRST 15 MIN: Performed by: INTERNAL MEDICINE

## 2024-05-15 PROCEDURE — 94761 N-INVAS EAR/PLS OXIMETRY MLT: CPT

## 2024-05-15 PROCEDURE — 31627 NAVIGATIONAL BRONCHOSCOPY: CPT | Performed by: INTERNAL MEDICINE

## 2024-05-15 PROCEDURE — 88173 CYTOPATH EVAL FNA REPORT: CPT

## 2024-05-15 PROCEDURE — 88341 IMHCHEM/IMCYTCHM EA ADD ANTB: CPT

## 2024-05-15 PROCEDURE — 0BDG8ZX EXTRACTION OF LEFT UPPER LUNG LOBE, VIA NATURAL OR ARTIFICIAL OPENING ENDOSCOPIC, DIAGNOSTIC: ICD-10-PCS | Performed by: INTERNAL MEDICINE

## 2024-05-15 PROCEDURE — 6360000002 HC RX W HCPCS: Performed by: NURSE ANESTHETIST, CERTIFIED REGISTERED

## 2024-05-15 PROCEDURE — 88342 IMHCHEM/IMCYTCHM 1ST ANTB: CPT

## 2024-05-15 PROCEDURE — 2700000000 HC OXYGEN THERAPY PER DAY

## 2024-05-15 PROCEDURE — 3700000001 HC ADD 15 MINUTES (ANESTHESIA): Performed by: INTERNAL MEDICINE

## 2024-05-15 PROCEDURE — 3700000000 HC ANESTHESIA ATTENDED CARE: Performed by: INTERNAL MEDICINE

## 2024-05-15 PROCEDURE — 3603165200 HC BRNCHSC EBUS GUIDED SAMPL 1/2 NODE STATION/STRUX: Performed by: INTERNAL MEDICINE

## 2024-05-15 PROCEDURE — 88305 TISSUE EXAM BY PATHOLOGIST: CPT

## 2024-05-15 PROCEDURE — 87077 CULTURE AEROBIC IDENTIFY: CPT

## 2024-05-15 PROCEDURE — 2580000003 HC RX 258: Performed by: INTERNAL MEDICINE

## 2024-05-15 PROCEDURE — 80053 COMPREHEN METABOLIC PANEL: CPT

## 2024-05-15 PROCEDURE — 2500000003 HC RX 250 WO HCPCS: Performed by: NURSE ANESTHETIST, CERTIFIED REGISTERED

## 2024-05-15 PROCEDURE — 07B74ZX EXCISION OF THORAX LYMPHATIC, PERCUTANEOUS ENDOSCOPIC APPROACH, DIAGNOSTIC: ICD-10-PCS | Performed by: INTERNAL MEDICINE

## 2024-05-15 PROCEDURE — 89051 BODY FLUID CELL COUNT: CPT

## 2024-05-15 PROCEDURE — 97162 PT EVAL MOD COMPLEX 30 MIN: CPT

## 2024-05-15 PROCEDURE — 71045 X-RAY EXAM CHEST 1 VIEW: CPT

## 2024-05-15 PROCEDURE — 87102 FUNGUS ISOLATION CULTURE: CPT

## 2024-05-15 PROCEDURE — 6370000000 HC RX 637 (ALT 250 FOR IP): Performed by: INTERNAL MEDICINE

## 2024-05-15 PROCEDURE — C1725 CATH, TRANSLUMIN NON-LASER: HCPCS | Performed by: INTERNAL MEDICINE

## 2024-05-15 PROCEDURE — A4217 STERILE WATER/SALINE, 500 ML: HCPCS | Performed by: INTERNAL MEDICINE

## 2024-05-15 PROCEDURE — 2060000000 HC ICU INTERMEDIATE R&B

## 2024-05-15 PROCEDURE — 36415 COLL VENOUS BLD VENIPUNCTURE: CPT

## 2024-05-15 PROCEDURE — 87116 MYCOBACTERIA CULTURE: CPT

## 2024-05-15 PROCEDURE — 2580000003 HC RX 258: Performed by: NURSE PRACTITIONER

## 2024-05-15 PROCEDURE — 85025 COMPLETE CBC W/AUTO DIFF WBC: CPT

## 2024-05-15 PROCEDURE — 83735 ASSAY OF MAGNESIUM: CPT

## 2024-05-15 PROCEDURE — 6370000000 HC RX 637 (ALT 250 FOR IP): Performed by: REGISTERED NURSE

## 2024-05-15 PROCEDURE — 87070 CULTURE OTHR SPECIMN AEROBIC: CPT

## 2024-05-15 PROCEDURE — 31629 BRONCHOSCOPY/NEEDLE BX EACH: CPT | Performed by: INTERNAL MEDICINE

## 2024-05-15 RX ORDER — METOPROLOL SUCCINATE 25 MG/1
75 TABLET, EXTENDED RELEASE ORAL DAILY
Qty: 30 TABLET | Refills: 3 | Status: SHIPPED | OUTPATIENT
Start: 2024-05-16 | End: 2024-05-22 | Stop reason: DRUGHIGH

## 2024-05-15 RX ORDER — DEXAMETHASONE SODIUM PHOSPHATE 10 MG/ML
INJECTION INTRAMUSCULAR; INTRAVENOUS PRN
Status: DISCONTINUED | OUTPATIENT
Start: 2024-05-15 | End: 2024-05-15 | Stop reason: SDUPTHER

## 2024-05-15 RX ORDER — PREDNISONE 10 MG/1
TABLET ORAL
Qty: 18 TABLET | Refills: 0 | Status: SHIPPED | OUTPATIENT
Start: 2024-05-15 | End: 2024-05-24

## 2024-05-15 RX ORDER — ONDANSETRON 2 MG/ML
INJECTION INTRAMUSCULAR; INTRAVENOUS PRN
Status: DISCONTINUED | OUTPATIENT
Start: 2024-05-15 | End: 2024-05-15 | Stop reason: SDUPTHER

## 2024-05-15 RX ORDER — METOPROLOL SUCCINATE 100 MG/1
100 TABLET, EXTENDED RELEASE ORAL DAILY
Qty: 30 TABLET | Refills: 3 | Status: SHIPPED | OUTPATIENT
Start: 2024-05-16

## 2024-05-15 RX ORDER — MEPERIDINE HYDROCHLORIDE 25 MG/ML
12.5 INJECTION INTRAMUSCULAR; INTRAVENOUS; SUBCUTANEOUS
Status: DISCONTINUED | OUTPATIENT
Start: 2024-05-15 | End: 2024-05-15 | Stop reason: HOSPADM

## 2024-05-15 RX ORDER — FENTANYL CITRATE 50 UG/ML
INJECTION, SOLUTION INTRAMUSCULAR; INTRAVENOUS PRN
Status: DISCONTINUED | OUTPATIENT
Start: 2024-05-15 | End: 2024-05-15 | Stop reason: SDUPTHER

## 2024-05-15 RX ORDER — DIPHENHYDRAMINE HYDROCHLORIDE 50 MG/ML
12.5 INJECTION INTRAMUSCULAR; INTRAVENOUS
Status: DISCONTINUED | OUTPATIENT
Start: 2024-05-15 | End: 2024-05-15 | Stop reason: HOSPADM

## 2024-05-15 RX ORDER — METOPROLOL SUCCINATE 100 MG/1
100 TABLET, EXTENDED RELEASE ORAL DAILY
Status: DISCONTINUED | OUTPATIENT
Start: 2024-05-16 | End: 2024-05-18 | Stop reason: HOSPADM

## 2024-05-15 RX ORDER — SUCCINYLCHOLINE/SOD CL,ISO/PF 100 MG/5ML
SYRINGE (ML) INTRAVENOUS PRN
Status: DISCONTINUED | OUTPATIENT
Start: 2024-05-15 | End: 2024-05-15 | Stop reason: SDUPTHER

## 2024-05-15 RX ORDER — ROCURONIUM BROMIDE 10 MG/ML
INJECTION, SOLUTION INTRAVENOUS PRN
Status: DISCONTINUED | OUTPATIENT
Start: 2024-05-15 | End: 2024-05-15 | Stop reason: SDUPTHER

## 2024-05-15 RX ORDER — LIDOCAINE HYDROCHLORIDE 20 MG/ML
INJECTION, SOLUTION EPIDURAL; INFILTRATION; INTRACAUDAL; PERINEURAL PRN
Status: DISCONTINUED | OUTPATIENT
Start: 2024-05-15 | End: 2024-05-15 | Stop reason: HOSPADM

## 2024-05-15 RX ORDER — METOCLOPRAMIDE HYDROCHLORIDE 5 MG/ML
10 INJECTION INTRAMUSCULAR; INTRAVENOUS
Status: DISCONTINUED | OUTPATIENT
Start: 2024-05-15 | End: 2024-05-15 | Stop reason: HOSPADM

## 2024-05-15 RX ORDER — ONDANSETRON 2 MG/ML
4 INJECTION INTRAMUSCULAR; INTRAVENOUS
Status: DISCONTINUED | OUTPATIENT
Start: 2024-05-15 | End: 2024-05-15 | Stop reason: HOSPADM

## 2024-05-15 RX ORDER — NALOXONE HYDROCHLORIDE 0.4 MG/ML
INJECTION, SOLUTION INTRAMUSCULAR; INTRAVENOUS; SUBCUTANEOUS PRN
Status: DISCONTINUED | OUTPATIENT
Start: 2024-05-15 | End: 2024-05-15 | Stop reason: HOSPADM

## 2024-05-15 RX ORDER — SODIUM CHLORIDE 9 MG/ML
INJECTION, SOLUTION INTRAVENOUS PRN
Status: DISCONTINUED | OUTPATIENT
Start: 2024-05-15 | End: 2024-05-15 | Stop reason: HOSPADM

## 2024-05-15 RX ORDER — OXYCODONE HYDROCHLORIDE 5 MG/1
5 TABLET ORAL
Status: DISCONTINUED | OUTPATIENT
Start: 2024-05-15 | End: 2024-05-15 | Stop reason: HOSPADM

## 2024-05-15 RX ORDER — SODIUM CHLORIDE 0.9 % (FLUSH) 0.9 %
5-40 SYRINGE (ML) INJECTION PRN
Status: DISCONTINUED | OUTPATIENT
Start: 2024-05-15 | End: 2024-05-15 | Stop reason: HOSPADM

## 2024-05-15 RX ORDER — FENTANYL CITRATE 0.05 MG/ML
50 INJECTION, SOLUTION INTRAMUSCULAR; INTRAVENOUS EVERY 10 MIN PRN
Status: DISCONTINUED | OUTPATIENT
Start: 2024-05-15 | End: 2024-05-15 | Stop reason: HOSPADM

## 2024-05-15 RX ORDER — SODIUM CHLORIDE 0.9 % (FLUSH) 0.9 %
5-40 SYRINGE (ML) INJECTION EVERY 12 HOURS SCHEDULED
Status: DISCONTINUED | OUTPATIENT
Start: 2024-05-15 | End: 2024-05-15 | Stop reason: HOSPADM

## 2024-05-15 RX ORDER — PROPOFOL 10 MG/ML
INJECTION, EMULSION INTRAVENOUS PRN
Status: DISCONTINUED | OUTPATIENT
Start: 2024-05-15 | End: 2024-05-15 | Stop reason: SDUPTHER

## 2024-05-15 RX ORDER — MAGNESIUM HYDROXIDE 1200 MG/15ML
LIQUID ORAL CONTINUOUS PRN
Status: DISCONTINUED | OUTPATIENT
Start: 2024-05-15 | End: 2024-05-15 | Stop reason: HOSPADM

## 2024-05-15 RX ADMIN — DILTIAZEM HYDROCHLORIDE 30 MG: 30 TABLET, FILM COATED ORAL at 17:08

## 2024-05-15 RX ADMIN — METHYLPREDNISOLONE SODIUM SUCCINATE 40 MG: 40 INJECTION INTRAMUSCULAR; INTRAVENOUS at 08:13

## 2024-05-15 RX ADMIN — Medication 10 ML: at 08:15

## 2024-05-15 RX ADMIN — ROCURONIUM BROMIDE 50 MG: 10 INJECTION INTRAVENOUS at 11:32

## 2024-05-15 RX ADMIN — ATORVASTATIN CALCIUM 40 MG: 40 TABLET, FILM COATED ORAL at 08:14

## 2024-05-15 RX ADMIN — SUGAMMADEX 200 MG: 100 INJECTION, SOLUTION INTRAVENOUS at 12:23

## 2024-05-15 RX ADMIN — TROSPIUM CHLORIDE 20 MG: 20 TABLET, FILM COATED ORAL at 16:16

## 2024-05-15 RX ADMIN — SODIUM CHLORIDE, PRESERVATIVE FREE 10 ML: 5 INJECTION INTRAVENOUS at 21:37

## 2024-05-15 RX ADMIN — DEXAMETHASONE SODIUM PHOSPHATE 10 MG: 10 INJECTION INTRAMUSCULAR; INTRAVENOUS at 11:38

## 2024-05-15 RX ADMIN — ONDANSETRON 4 MG: 2 INJECTION INTRAMUSCULAR; INTRAVENOUS at 11:38

## 2024-05-15 RX ADMIN — PROPOFOL 80 MG: 10 INJECTION, EMULSION INTRAVENOUS at 11:28

## 2024-05-15 RX ADMIN — METOPROLOL SUCCINATE 75 MG: 50 TABLET, EXTENDED RELEASE ORAL at 08:14

## 2024-05-15 RX ADMIN — INSULIN LISPRO 4 UNITS: 100 INJECTION, SOLUTION INTRAVENOUS; SUBCUTANEOUS at 16:16

## 2024-05-15 RX ADMIN — SODIUM CHLORIDE, PRESERVATIVE FREE 10 ML: 5 INJECTION INTRAVENOUS at 08:15

## 2024-05-15 RX ADMIN — LISINOPRIL 2.5 MG: 5 TABLET ORAL at 08:14

## 2024-05-15 RX ADMIN — TROSPIUM CHLORIDE 20 MG: 20 TABLET, FILM COATED ORAL at 06:20

## 2024-05-15 RX ADMIN — PANTOPRAZOLE SODIUM 40 MG: 40 TABLET, DELAYED RELEASE ORAL at 06:21

## 2024-05-15 RX ADMIN — INSULIN GLARGINE 10 UNITS: 100 INJECTION, SOLUTION SUBCUTANEOUS at 21:37

## 2024-05-15 RX ADMIN — FENTANYL CITRATE 50 MCG: 50 INJECTION, SOLUTION INTRAMUSCULAR; INTRAVENOUS at 11:18

## 2024-05-15 RX ADMIN — Medication 80 MG: at 11:28

## 2024-05-15 RX ADMIN — IPRATROPIUM BROMIDE AND ALBUTEROL SULFATE 1 DOSE: 2.5; .5 SOLUTION RESPIRATORY (INHALATION) at 07:21

## 2024-05-15 RX ADMIN — FENTANYL CITRATE 50 MCG: 50 INJECTION, SOLUTION INTRAMUSCULAR; INTRAVENOUS at 12:05

## 2024-05-15 RX ADMIN — IPRATROPIUM BROMIDE AND ALBUTEROL SULFATE 1 DOSE: 2.5; .5 SOLUTION RESPIRATORY (INHALATION) at 20:06

## 2024-05-15 RX ADMIN — INSULIN LISPRO 4 UNITS: 100 INJECTION, SOLUTION INTRAVENOUS; SUBCUTANEOUS at 21:37

## 2024-05-15 ASSESSMENT — PAIN SCALES - GENERAL
PAINLEVEL_OUTOF10: 0

## 2024-05-15 ASSESSMENT — ENCOUNTER SYMPTOMS: SHORTNESS OF BREATH: 1

## 2024-05-15 NOTE — CARE COORDINATION
FAMILY REQUESTED CM. 2 SONS PRESENT IN ROOM. WANTING TO DISCUSS HOME GOING NEEDS. EXPLAINED HHC AND UPDATED THAT THIS HAS BEEN ARRANGED. SON NABEEL STATES THAT HE NEEDS HHA SERVICES ARRANGED. EXPLAINED TO SONS HHA/PASSPORT SERVICES AND PROCEDURE IN ARRANGEMENT OF THAT. SON STATES THAT THE PATIENT HAS MEDICAID. UPON CHART REVIEW PT HAS MANAGED MEDICARE. SONS STATE THAT FAMILY WILL BE STAYING WITH PATIENT UNTIL SERVICES FOR HHA CAN BE ESTABLISHED. SON REQUESTS LSW. LSW UPDATED.

## 2024-05-15 NOTE — FLOWSHEET NOTE
1306 - Taking ice chips to cool down sore throat. Barky hoarse cough. Explained that it will go away in 1 or 2 days. Due to intubation. RH

## 2024-05-15 NOTE — ANESTHESIA POSTPROCEDURE EVALUATION
Department of Anesthesiology  Postprocedure Note    Patient: Dayami Christensen  MRN: [id number]  YOB: 1934  Date of evaluation: 5/15/2024    Procedure Summary       Date: 05/15/24 Room / Location: [address]    Anesthesia Start: 1117 Anesthesia Stop: 1238    Procedure: NAVIGATIONAL  BRONCHOSCOPY WITH ENDOBRONCHIAL ULTRASOUND  TBNA TRANSBRONCHIAL BIOPIES (Bronchus) Diagnosis:       Lung mass      (Lung mass [R91.8])    Surgeons: Benedict Cuenca MD Responsible Provider: Janie Casey MD    Anesthesia Type: general ASA Status: 3            Anesthesia Type: No value filed.    Angela Phase I: Angela Score: 10    Angela Phase II:      Anesthesia Post Evaluation    Patient location during evaluation: bedside  Patient participation: complete - patient participated  Level of consciousness: awake and awake and alert  Airway patency: patent  Nausea & Vomiting: no nausea and no vomiting  Cardiovascular status: blood pressure returned to baseline and hemodynamically stable  Respiratory status: acceptable  Hydration status: euvolemic  Pain management: adequate        No notable events documented.

## 2024-05-15 NOTE — BRIEF OP NOTE
Brief Postoperative Note      Patient: Dayami Christensen  YOB: 1934  MRN: [id number]    Date of Procedure: 5/15/2024    Pre-Op Diagnosis Codes:     * Lung mass [R91.8]    Post-Op Diagnosis: Same       Procedure(s):  NAVIGATIONAL  BRONCHOSCOPY WITH ENDOBRONCHIAL ULTRASOUND  TBNA TRANSBRONCHIAL BIOPIES    -Transbronchial needle aspiration of left upper lobe mass.  -Endobronchial ultrasound with sampling of station 7, 10 L and 11 L.    Surgeon(s):  Benedict Cuenca MD    Assistant:  * No surgical staff found *    Anesthesia: General    Estimated Blood Loss (mL): less than 50     Complications: None    Specimens:   ID Type Source Tests Collected by Time Destination   1 : BRONCHOALVEOLAR LAVAGE (BAL) LEFT UPPER LOBE Body Fluid Lung CELL COUNT WITH DIFFERENTIAL, BODY FLUID, CULTURE, FUNGUS, GRAM STAIN, CULTURE WITH SMEAR, ACID FAST BACILLIUS, CULTURE, RESPIRATORY Benedict Cuenca MD 5/15/2024 1142    A : FNA LEFT UPPER LOBE Tissue Lung CYTOLOGY, NON-GYN Benedict Cuenca MD 5/15/2024 1153    B : 50/50 LEFT UPPER LOBE Body Fluid Lung CYTOLOGY, SETH-GYN Benedict Cuenca MD 5/15/2024 1142    C : LYMPH NODE LEVEL 7 Tissue Lymph Node CYTOLOGY, SETH-GYN Benedict Cuenca MD 5/15/2024 1142    D : LYMPH NODE 10L Tissue Lymph Node CYTOLOGY, JERALDGYN Benedict Cuenca MD 5/15/2024 1213    E : LYMPH NODE 11L Tissue Lymph Node CYTOLOGY, SETH-GYN Benedict Cuenca MD 5/15/2024 1218        Implants:  * No implants in log *      Drains:   External Urinary Catheter (Active)   Site Assessment Clean,dry & intact 05/14/24 2000   Placement Repositioned 05/12/24 2059   Securement Method Securing device (Describe) 05/12/24 2059   Catheter Care Catheter/Wick replaced 05/14/24 0215   Perineal Care Yes 05/14/24 0215   Suction 40 mmgHg continuous 05/12/24 2059   Urine Color Yellow 05/12/24 2059   Urine Appearance Clear 05/12/24 2059   Output (mL) 1000 mL 05/14/24 2336       Findings:  Infection Present At Time Of Surgery (PATOS) (choose all levels that have infection

## 2024-05-15 NOTE — CARE COORDINATION
CALL TO CHERRY AT [facility] AND UPDATED. POSSIBLE DC TODAY OR TOMORROW PENDING MEDICAL CLEARANCE. PT FOR [facility] TODAY. WILL NEED HOME 02 EVAL PRIOR TO DC.

## 2024-05-15 NOTE — CARE COORDINATION
Per CM family is requesting to meet with .   LSW met with family at bedside. Pt not in room at this time currently in OR.   Pt's two sons at bedside. One from [place] and One from [place] currently taking turns being here with pt.     Son from [place] is requesting information about HPOA, PASSPORT [facility], and changing PCP and Possible SNF at DC.   Both son would like PT/OT to evaluate pt for possible SNF at DC.  LSW provided all information (HPOA, PCP list and PASSPORT information) and answered all questions as accurate as possible.     Notify CM for PT/OT order.     LSW will follow.     Electronically signed by CHANNING Caban on 5/15/2024 at 11:17 AM

## 2024-05-15 NOTE — PLAN OF CARE
Problem: Discharge Planning  Goal: Discharge to home or other facility with appropriate resources  Outcome: Progressing  Flowsheets (Taken 5/14/2024 1100 by Mily Daugherty, RN)  Discharge to home or other facility with appropriate resources:   Identify barriers to discharge with patient and caregiver   Arrange for needed discharge resources and transportation as appropriate   Identify discharge learning needs (meds, wound care, etc)   Refer to discharge planning if patient needs post-hospital services based on physician order or complex needs related to functional status, cognitive ability or social support system     Problem: Safety - Adult  Goal: Free from fall injury  Outcome: Progressing     Problem: ABCDS Injury Assessment  Goal: Absence of physical injury  Outcome: Progressing     Problem: Chronic Conditions and Co-morbidities  Goal: Patient's chronic conditions and co-morbidity symptoms are monitored and maintained or improved  Outcome: Progressing  Flowsheets (Taken 5/14/2024 1100 by Mily Daugherty, RN)  Care Plan - Patient's Chronic Conditions and Co-Morbidity Symptoms are Monitored and Maintained or Improved:   Monitor and assess patient's chronic conditions and comorbid symptoms for stability, deterioration, or improvement   Collaborate with multidisciplinary team to address chronic and comorbid conditions and prevent exacerbation or deterioration   Update acute care plan with appropriate goals if chronic or comorbid symptoms are exacerbated and prevent overall improvement and discharge

## 2024-05-15 NOTE — PROGRESS NOTES
Physical Therapy Med Surg Initial Assessment  [place] [facility]  Room: [place] [address]       NAME: Dayami Christensen  : 1934 (89 y.o.)  MRN: [id number]  CODE STATUS: Full Code    Date of Service: 5/15/2024    Patient Diagnosis(es): Acute respiratory failure with hypoxia (HCC) [J96.01]  Acute on chronic respiratory failure with hypoxia (HCC) [J96.21]   Chief Complaint   Patient presents with    Shortness of Breath     SOB for the last few days.      Patient Active Problem List    Diagnosis Date Noted    Acute respiratory failure with hypoxia (HCC) 2024    Lung mass 05/10/2024    Dizzy 2018    History of cardiac radiofrequency ablation (RFA) 2018    Dyslipidemia 2017    Gastroesophageal reflux disease without esophagitis 2017    Primary osteoarthritis involving multiple joints 2017    Abnormal gait 2017    Risk for falls 2017    Chronic midline low back pain without sciatica 2017    Osteopenia 2017    Essential hypertension 2017    SVT (supraventricular tachycardia) ([facility]) 2017    Type 2 diabetes mellitus without complication, without long-term current use of insulin ([facility]) 2017    Class 2 severe obesity due to excess calories with serious comorbidity and body mass index (BMI) of 36.0 to 36.9 in adult ([facility]) 2017        Past Medical History:   Diagnosis Date    Arthritis     Chronic kidney disease     Diabetes mellitus (HCC)     borderline    Gastroesophageal reflux disease without esophagitis 2017    History of blood transfusion     hemorrhage after an     Hyperlipidemia     Hypertension     Osteoporosis     Urinary frequency      Past Surgical History:   Procedure Laterality Date    CATARACT REMOVAL      COLONOSCOPY      EYE SURGERY      TUBAL LIGATION         Chart Reviewed: Yes  Family / Caregiver Present: No    Restrictions:  Restrictions/Precautions: Fall Risk (Pulm TB r/o on chart - nursing reports  perform sit to stand with UE support or lifting assistance)  Stand to Sit: Moderate Assistance    Ambulation  Surface: Level tile  Device: No Device;Hand-Held Assist  Assistance: Moderate assistance  Quality of Gait: LOB lat required recovery assistance and bilat UE support(pt declined ww and utilized rails of [facility]), knee stability fair  Distance: 4-5  steps between bed and bsc  Comments: unsafe to test gait further due to continued dizziness in standing  - pt returned to bed from [facility]    Stairs/Curb  Stairs?: No              Activity Tolerance  Activity Tolerance: Patient limited by endurance  Activity Tolerance Comments: Pt presented with dizziness with each transition. She required rest for this to dissipate. Activity limited to accomodate this    Patient Education  Education Given To: Patient;Family  Education Provided: Role of Therapy;Plan of Care  Education Method: Verbal  Education Outcome: Verbalized understanding       ASSESSMENT:   Body Structures, Functions, Activity Limitations Requiring Skilled Therapeutic Intervention: Decreased functional mobility ;Decreased endurance;Decreased balance;Decreased safe awareness;Decreased strength;Decreased posture  Decision Making: Medium Complexity  History: high  Exam: med  Clinical Presentation: med    Barriers to Learning: none         DISCHARGE RECOMMENDATIONS:  Discharge Recommendations: Continue to assess pending progress    Assessment: Pt demonstrates deficits as listed. She is requiring assistance with all mobility at this time. pt is in need of continued PT prior to safe return to home  Requires PT Follow-Up: Yes       PLAN OF CARE:  Physical Therapy Plan  General Plan: 1 time a day 3-6 times a week  Current Treatment Recommendations: Strengthening, Functional mobility training, Balance training, Transfer training, Gait training, Stair training, Equipment evaluation, education, & procurement, Home exercise program, Safety education & training, Patient/Caregiver

## 2024-05-15 NOTE — PROGRESS NOTES
Cardiology Follow up Note    Subjective:  Status post bronchoscopy. Son at bedside. Patient denies any chest pain, SOB, or palpitaitons.     Was sinus earlier but came back to room and now in Afib, -130s but currently 90s.       Review of Systems:   As noted in the HPI*    Objective:  /60   Pulse (!) 120   Temp 97.8 °F (36.6 °C) (Oral)   Resp 16   Ht 1.49 m (4' 10.66\")   Wt 70.3 kg (154 lb 15.7 oz)   SpO2 93%   BMI 31.67 kg/m²   Vitals stable.   Constitutional: alert, cooperative, in no distress  Eyes: Conjunctiva clear; no scleral icturus. PERRLA   Respiratory: clear to auscultation bilaterally  Musculoskeletal: No chest wall tenderness. No clubbing or cyanosis.   Cardiovascular: irregularly irregular. No murmur, click, rub or gallop. No carotid bruit. No JVD. Pulses 2+ and symmetric.   GI: soft, non-tender, non-distended. Bowel sounds normal. No masses,  no organomegaly  MSK: extremities normal, atraumatic, no clubbing. No chest wall pain.    Neurologic: Cranial nerves grossly intact.  No focal motor and/or sensory deficits.  Skin: No rashes or lesions. No cyanosis.       Intake/Output Summary (Last 24 hours) at 5/15/2024 1611  Last data filed at 5/15/2024 1358  Gross per 24 hour   Intake --   Output 2300 ml   Net -2300 ml       Medications:  metoprolol succinate, 75 mg, Daily  atorvastatin, 40 mg, Daily  lisinopril, 2.5 mg, Daily  trospium, 20 mg, BID AC  pantoprazole, 40 mg, QAM AC  methylPREDNISolone, 40 mg, Daily  ipratropium 0.5 mg-albuterol 2.5 mg, 1 Dose, BID RT  sodium chloride flush, 5-40 mL, 2 times per day  [Held by provider] enoxaparin, 40 mg, Daily  insulin lispro, 0-8 Units, TID WC  insulin lispro, 0-4 Units, Nightly  insulin glargine, 10 Units, BID      sodium chloride  dextrose      Recent Labs     05/13/24  0530 05/14/24  0548 05/15/24  0602   HGB 12.4 12.2 13.0   WBC 12.7* 12.9* 14.0*    345 CLUMPS    138 138   K 4.3 4.2 4.3   CO2 25 26 26   BUN 21 18 18   MG 2.4  2.4 2.3     Cr 0.54    HS troponin negative x 2 sets.     EKG: My independent review reveals sinus tachycardia.  Heart rate 103 bpm.  No significant ST-T wave changes       Telemetry: My independent review reveals SR earlier but now back in Afib HR 90s    2D Echo:     Echo (TTE) complete 05/14/2024    Interpretation Summary    Left Ventricle: Normal left ventricular systolic function with a visually estimated EF of 60 - 65%. Left ventricle size is normal. Mildly increased wall thickness. Normal wall motion.    Right Ventricle: Normal systolic function.    Aortic Valve: Moderate regurgitation.    Mitral Valve: Mild regurgitation.    Tricuspid Valve: Normal RVSP. The estimated RVSP is 27 mmHg.    Left Atrium: Left atrium is mildly dilated.    Pericardium: No pericardial effusion.    Image quality is adequate. Procedure performed with the patient in a supine position.      Assessment:  Newly recognized paroxysmal atrial fibrillation with RVR, currently rate controlled.   [id number] score of 5 indicating high risk for stroke.  Remote history of PSVT with prior ablation.  Hypertension  Diabetes mellitus type 2  Hyperlipidemia  CKD  Remote history of tobacco abuse  Lingular mass on CT chest, concern for malignancy.  Status post bronchoscopy.       Recommendations:  Needs better rate/rhythm control.  Add Cardizem 30 mg PO Q6 hours  Increase metoprolol succinate to 100 mg daily.   Reviewed risks/benefits of anticoagulation. Patient and family agreeable. Start Eliquis 5 mg BID starting tomorrow if no hemoptysis per pulmonary.   Discussed with pulmonary medicine.     Thank you for allowing me to participate in your patient's cardiac care. Should you have questions, please do not hesitate to contact me.     Maria Isabel Knott DO, FACC, [facility]  [facility]

## 2024-05-15 NOTE — ANESTHESIA PRE PROCEDURE
no problems          Problem List:    Patient Active Problem List   Diagnosis Code   • Essential hypertension I10   • SVT (supraventricular tachycardia) ([facility]) I47.10   • Type 2 diabetes mellitus without complication, without long-term current use of insulin ([facility]) E11.9   • Class 2 severe obesity due to excess calories with serious comorbidity and body mass index (BMI) of 36.0 to 36.9 in adult ([facility]) E66.01, Z68.36   • Gastroesophageal reflux disease without esophagitis K21.9   • Primary osteoarthritis involving multiple joints M15.9   • Abnormal gait R26.9   • Risk for falls Z91.81   • Chronic midline low back pain without sciatica M54.50, G89.29   • Osteopenia M85.80   • Dyslipidemia E78.5   • Dizzy R42   • History of cardiac radiofrequency ablation (RFA) Z98.890   • Acute respiratory failure with hypoxia ([facility]) J96.01   • Lung mass R91.8       Past Medical History:        Diagnosis Date   • Arthritis    • Chronic kidney disease    • Diabetes mellitus ([facility])     borderline   • Gastroesophageal reflux disease without esophagitis 2017   • History of blood transfusion     hemorrhage after an    • Hyperlipidemia    • Hypertension    • Osteoporosis    • Urinary frequency        Past Surgical History:        Procedure Laterality Date   • CATARACT REMOVAL     • COLONOSCOPY     • EYE SURGERY     • TUBAL LIGATION         Social History:    Social History     Tobacco Use   • Smoking status: Former     Current packs/day: 0.00     Types: Cigarettes     Quit date: 1987     Years since quittin.8   • Smokeless tobacco: Never   Substance Use Topics   • Alcohol use: No                                Counseling given: Not Answered      Vital Signs (Current):   Vitals:    05/15/24 0049 05/15/24 0701 05/15/24 0749 05/15/24 0925   BP: (!) 150/66  (!) 129/59 133/65   Pulse: 81  86 84   Resp: 18  16 14   Temp: 98.1 °F (36.7 °C)  97.5 °F (36.4 °C) 97.8 °F (36.6 °C)   TempSrc: Oral  Oral Temporal   SpO2: 96%  94% 94%

## 2024-05-16 LAB
ALBUMIN SERPL-MCNC: 3.1 G/DL (ref 3.5–4.6)
ALP SERPL-CCNC: 76 U/L (ref 40–130)
ALT SERPL-CCNC: 14 U/L (ref 0–33)
ANION GAP SERPL CALCULATED.3IONS-SCNC: 12 MEQ/L (ref 9–15)
AST SERPL-CCNC: 9 U/L (ref 0–35)
BASOPHILS # BLD: 0 K/UL (ref 0–0.2)
BASOPHILS NFR BLD: 0.2 %
BILIRUB SERPL-MCNC: 0.4 MG/DL (ref 0.2–0.7)
BUN SERPL-MCNC: 21 MG/DL (ref 8–23)
CALCIUM SERPL-MCNC: 8.8 MG/DL (ref 8.5–9.9)
CELL COUNT FLUID TYPE: NORMAL
CHLORIDE SERPL-SCNC: 101 MEQ/L (ref 95–107)
CO2 SERPL-SCNC: 26 MEQ/L (ref 20–31)
CREAT SERPL-MCNC: 0.63 MG/DL (ref 0.5–0.9)
EOSINOPHIL # BLD: 0 K/UL (ref 0–0.7)
EOSINOPHIL NFR BLD: 0.2 %
ERYTHROCYTE [DISTWIDTH] IN BLOOD BY AUTOMATED COUNT: 14.5 % (ref 11.5–14.5)
GLOBULIN SER CALC-MCNC: 2.7 G/DL (ref 2.3–3.5)
GLUCOSE BLD-MCNC: 167 MG/DL (ref 70–99)
GLUCOSE BLD-MCNC: 235 MG/DL (ref 70–99)
GLUCOSE BLD-MCNC: 251 MG/DL (ref 70–99)
GLUCOSE BLD-MCNC: 314 MG/DL (ref 70–99)
GLUCOSE SERPL-MCNC: 181 MG/DL (ref 70–99)
HCT VFR BLD AUTO: 40.8 % (ref 37–47)
HGB BLD-MCNC: 12.6 G/DL (ref 12–16)
LYMPHOCYTES # BLD: 1.7 K/UL (ref 1–4.8)
LYMPHOCYTES NFR BLD: 9.2 %
MAGNESIUM SERPL-MCNC: 2.2 MG/DL (ref 1.7–2.4)
MCH RBC QN AUTO: 26.3 PG (ref 27–31.3)
MCHC RBC AUTO-ENTMCNC: 30.9 % (ref 33–37)
MCV RBC AUTO: 85 FL (ref 79.4–94.8)
MONOCYTES # BLD: 0.9 K/UL (ref 0.2–0.8)
MONOCYTES NFR BLD: 5.1 %
NEUTROPHILS # BLD: 15.3 K/UL (ref 1.4–6.5)
NEUTS SEG NFR BLD: 84.3 %
PERFORMED ON: ABNORMAL
PLATELET # BLD AUTO: 403 K/UL (ref 130–400)
POTASSIUM SERPL-SCNC: 4.7 MEQ/L (ref 3.4–4.9)
PROT SERPL-MCNC: 5.8 G/DL (ref 6.3–8)
RBC # BLD AUTO: 4.8 M/UL (ref 4.2–5.4)
SODIUM SERPL-SCNC: 139 MEQ/L (ref 135–144)
WBC # BLD AUTO: 18.1 K/UL (ref 4.8–10.8)

## 2024-05-16 PROCEDURE — 2500000003 HC RX 250 WO HCPCS: Performed by: INTERNAL MEDICINE

## 2024-05-16 PROCEDURE — 6370000000 HC RX 637 (ALT 250 FOR IP): Performed by: REGISTERED NURSE

## 2024-05-16 PROCEDURE — 97166 OT EVAL MOD COMPLEX 45 MIN: CPT

## 2024-05-16 PROCEDURE — 2700000000 HC OXYGEN THERAPY PER DAY

## 2024-05-16 PROCEDURE — 6370000000 HC RX 637 (ALT 250 FOR IP): Performed by: NURSE PRACTITIONER

## 2024-05-16 PROCEDURE — 6360000002 HC RX W HCPCS: Performed by: INTERNAL MEDICINE

## 2024-05-16 PROCEDURE — 99233 SBSQ HOSP IP/OBS HIGH 50: CPT | Performed by: INTERNAL MEDICINE

## 2024-05-16 PROCEDURE — 6370000000 HC RX 637 (ALT 250 FOR IP): Performed by: INTERNAL MEDICINE

## 2024-05-16 PROCEDURE — 80053 COMPREHEN METABOLIC PANEL: CPT

## 2024-05-16 PROCEDURE — 97535 SELF CARE MNGMENT TRAINING: CPT

## 2024-05-16 PROCEDURE — 2580000003 HC RX 258: Performed by: NURSE PRACTITIONER

## 2024-05-16 PROCEDURE — 97116 GAIT TRAINING THERAPY: CPT

## 2024-05-16 PROCEDURE — 94761 N-INVAS EAR/PLS OXIMETRY MLT: CPT

## 2024-05-16 PROCEDURE — 36415 COLL VENOUS BLD VENIPUNCTURE: CPT

## 2024-05-16 PROCEDURE — 85025 COMPLETE CBC W/AUTO DIFF WBC: CPT

## 2024-05-16 PROCEDURE — 94640 AIRWAY INHALATION TREATMENT: CPT

## 2024-05-16 PROCEDURE — 2060000000 HC ICU INTERMEDIATE R&B

## 2024-05-16 PROCEDURE — 83735 ASSAY OF MAGNESIUM: CPT

## 2024-05-16 RX ORDER — DILTIAZEM HYDROCHLORIDE 120 MG/1
120 CAPSULE, COATED, EXTENDED RELEASE ORAL DAILY
Status: DISCONTINUED | OUTPATIENT
Start: 2024-05-16 | End: 2024-05-18 | Stop reason: HOSPADM

## 2024-05-16 RX ORDER — DILTIAZEM HYDROCHLORIDE 120 MG/1
120 CAPSULE, COATED, EXTENDED RELEASE ORAL DAILY
Qty: 30 CAPSULE | Refills: 3 | Status: SHIPPED | OUTPATIENT
Start: 2024-05-16

## 2024-05-16 RX ORDER — IPRATROPIUM BROMIDE AND ALBUTEROL SULFATE 2.5; .5 MG/3ML; MG/3ML
1 SOLUTION RESPIRATORY (INHALATION) EVERY 4 HOURS PRN
Status: DISCONTINUED | OUTPATIENT
Start: 2024-05-16 | End: 2024-05-18 | Stop reason: HOSPADM

## 2024-05-16 RX ADMIN — TROSPIUM CHLORIDE 20 MG: 20 TABLET, FILM COATED ORAL at 06:22

## 2024-05-16 RX ADMIN — IPRATROPIUM BROMIDE AND ALBUTEROL SULFATE 1 DOSE: 2.5; .5 SOLUTION RESPIRATORY (INHALATION) at 07:03

## 2024-05-16 RX ADMIN — IPRATROPIUM BROMIDE AND ALBUTEROL SULFATE 1 DOSE: 2.5; .5 SOLUTION RESPIRATORY (INHALATION) at 19:43

## 2024-05-16 RX ADMIN — PANTOPRAZOLE SODIUM 40 MG: 40 TABLET, DELAYED RELEASE ORAL at 06:22

## 2024-05-16 RX ADMIN — DILTIAZEM HYDROCHLORIDE 30 MG: 30 TABLET, FILM COATED ORAL at 06:22

## 2024-05-16 RX ADMIN — METOPROLOL SUCCINATE 100 MG: 100 TABLET, EXTENDED RELEASE ORAL at 08:33

## 2024-05-16 RX ADMIN — SODIUM CHLORIDE, PRESERVATIVE FREE 10 ML: 5 INJECTION INTRAVENOUS at 21:45

## 2024-05-16 RX ADMIN — INSULIN GLARGINE 10 UNITS: 100 INJECTION, SOLUTION SUBCUTANEOUS at 08:32

## 2024-05-16 RX ADMIN — INSULIN GLARGINE 10 UNITS: 100 INJECTION, SOLUTION SUBCUTANEOUS at 21:44

## 2024-05-16 RX ADMIN — LISINOPRIL 2.5 MG: 5 TABLET ORAL at 08:32

## 2024-05-16 RX ADMIN — METHYLPREDNISOLONE SODIUM SUCCINATE 40 MG: 40 INJECTION INTRAMUSCULAR; INTRAVENOUS at 08:32

## 2024-05-16 RX ADMIN — DILTIAZEM HYDROCHLORIDE 120 MG: 120 CAPSULE, EXTENDED RELEASE ORAL at 12:55

## 2024-05-16 RX ADMIN — INSULIN LISPRO 6 UNITS: 100 INJECTION, SOLUTION INTRAVENOUS; SUBCUTANEOUS at 16:59

## 2024-05-16 RX ADMIN — INSULIN LISPRO 4 UNITS: 100 INJECTION, SOLUTION INTRAVENOUS; SUBCUTANEOUS at 12:55

## 2024-05-16 RX ADMIN — TROSPIUM CHLORIDE 20 MG: 20 TABLET, FILM COATED ORAL at 16:59

## 2024-05-16 RX ADMIN — ATORVASTATIN CALCIUM 40 MG: 40 TABLET, FILM COATED ORAL at 08:32

## 2024-05-16 RX ADMIN — SODIUM CHLORIDE, PRESERVATIVE FREE 10 ML: 5 INJECTION INTRAVENOUS at 08:32

## 2024-05-16 RX ADMIN — APIXABAN 5 MG: 5 TABLET, FILM COATED ORAL at 21:44

## 2024-05-16 RX ADMIN — APIXABAN 5 MG: 5 TABLET, FILM COATED ORAL at 08:32

## 2024-05-16 RX ADMIN — DILTIAZEM HYDROCHLORIDE 30 MG: 30 TABLET, FILM COATED ORAL at 00:40

## 2024-05-16 NOTE — PROGRESS NOTES
05/16/24 0900   RT Protocol   History Pulmonary Disease 0   Respiratory pattern 0   Breath sounds 6   Cough 0   Bronchodilator Assessment Score 6

## 2024-05-16 NOTE — PROGRESS NOTES
[facility] OCCUPATIONAL THERAPY EVALUATION - ACUTE     NAME: Dayami Christensen  : 1934 (89 y.o.)  MRN: [id number]  CODE STATUS: Full Code  Room: 92/92[facility]    Date of Service: 2024    Patient Diagnosis(es): Acute respiratory failure with hypoxia (HCC) [J96.01]  Acute on chronic respiratory failure with hypoxia (HCC) [J96.21]   Patient Active Problem List    Diagnosis Date Noted    Acute respiratory failure with hypoxia (HCC) 2024    Lung mass 05/10/2024    Dizzy 2018    History of cardiac radiofrequency ablation (RFA) 2018    Dyslipidemia 2017    Gastroesophageal reflux disease without esophagitis 2017    Primary osteoarthritis involving multiple joints 2017    Abnormal gait 2017    Risk for falls 2017    Chronic midline low back pain without sciatica 2017    Osteopenia 2017    Essential hypertension 2017    New onset atrial fibrillation (HCC) 2017    Type 2 diabetes mellitus without complication, without long-term current use of insulin (HCC) 2017    Class 2 severe obesity due to excess calories with serious comorbidity and body mass index (BMI) of 36.0 to 36.9 in adult (HCC) 2017        Past Medical History:   Diagnosis Date    Arthritis     Chronic kidney disease     Diabetes mellitus (HCC)     borderline    Gastroesophageal reflux disease without esophagitis 2017    History of blood transfusion     hemorrhage after an     Hyperlipidemia     Hypertension     Osteoporosis     Urinary frequency      Past Surgical History:   Procedure Laterality Date    BRONCHOSCOPY N/A 5/15/2024    NAVIGATIONAL  BRONCHOSCOPY WITH ENDOBRONCHIAL ULTRASOUND  TBNA TRANSBRONCHIAL BIOPIES performed by Benedict Cuenca MD at [facility]    CATARACT REMOVAL      COLONOSCOPY      EYE SURGERY      TUBAL LIGATION          Restrictions  Restrictions/Precautions: Fall Risk (Pulm TB r/o on chart - nursing reports she has followed up with doctor  (degrees)  Right Hand AROM: WFL    UE STRENGTH:  Strength: Within functional limits    UE COORDINATION:  Coordination: Within functional limits    UE TONE:  Tone: Normal    UE SENSATION:  Sensation: Intact    Hand Dominance:  Hand Dominance  Hand Dominance: Right    ADL Status:  ADL  Feeding: Unable to assess (Comment)  Grooming: Minimal assistance  UE Bathing: Minimal assistance  LE Bathing: Moderate assistance  UE Dressing: Minimal assistance  LE Dressing: Maximum assistance  Toileting: Unable to assess (Comment)  Additional Comments: ADL tasks simulated as above stated. Pt unable to complete distal aspect of LB ADLs on this date, requiring assistance.  Toilet Transfers  Toilet Transfer: Unable to assess  Toilet Transfers Comments: anticipate mod A  Tub Transfers  Tub Transfers: Not tested    Functional Mobility:    Transfers  Stand Step Transfers: Minimal assistance (1-2 lateral steps toward head of bed)  Sit to stand: Minimal assistance  Stand to sit: Minimal assistance  Transfer Comments: HHA - pt declines walker however may benefit from education/use    Patient  takes approx 2 lateral steps to HOB  with Handheld assist at Min A level.     Bed Mobility  Bed mobility  Rolling to Left: Stand by assistance  Supine to Sit: Minimal assistance  Sit to Supine: Moderate assistance  Bed Mobility Comments: HOB elevated with no rails utilized -  lifting assistance required    Seated and Standing Balance:  Balance  Sitting:  (SUP d/t reports of dizziness upon initial sitting)  Standing:  (min A for steadying assist)    Functional Endurance:  Activity Tolerance  Activity Tolerance: Patient limited by fatigue;Treatment limited secondary to medical complications (free text)    D/C Recommendations:  OT D/C RECOMMENDATIONS  REQUIRES OT FOLLOW-UP: Yes    Equipment Recommendations:  OT Equipment Recommendations  Equipment Needed:  (TBD)  Other: continue to assess    OT Education:   Patient Education  Education Given To:

## 2024-05-16 NOTE — PROGRESS NOTES
CLINICAL PHARMACY NOTE: MEDS TO BEDS    Total # of Prescriptions Filled: 2   The following medications were delivered to the patient:  Metoprolol Succ Er 25 mg Tab  Prednisone 10 mg Tab    Additional Documentation:

## 2024-05-16 NOTE — ADT AUTH CERT
Patient Demographics    Name Patient ID SSN Gender Identity Birth Date   Dayami Christensen [id number]  Female [birth year] (89 yrs)     Address Phone Email Employer    [employer] [address]  [place] [address] [phone] (H)  [phone] (M) -- OTHER-[employer]  1      [employer] [employer] [facility] Race Occupation Emp Status    [place]  /  -- Retired      Reg Status PCP Date Last Verified Next Review Date    Verified Chan Decker DO  [phone] 05/10/24 06/09/24      Admission Date Discharge Date Admitting Provider     05/10/24 -- Liana Young MD       Marital Status Bahai       Yarsani        Emergency Contact 1   Fanny Ivy ()  [place]  [phone] ()     Physician Progress Notes  Notes from 05/13/24 through 05/16/24  Progress Notes by Domingo Villalta MD at 5/15/2024  3:55 PM    Author: Domingo Villalta MD Service: -- Author Type: Physician   Filed: 5/16/2024 12:38 AM Date of Service: 5/15/2024  3:55 PM Status: Addendum   : Domingo Villalta MD (Physician)   Related Notes: Original Note by Domingo Villalta MD (Physician) filed at 5/16/2024 12:38 AM   Expand All Collapse All  Hospitalist Progress Note        PCP: Chan Decker DO     Date of Admission: 5/10/2024     Chief Complaint:  Patient resting in bed, no cp, sob.       Medications:  Reviewed     Infusion Medications   Infusions Meds    sodium chloride      dextrose           Scheduled Medications   Scheduled Medications    metoprolol succinate  100 mg Oral Daily    dilTIAZem  30 mg Oral 4 times per day    apixaban  5 mg Oral BID    atorvastatin  40 mg Oral Daily    lisinopril  2.5 mg Oral Daily    trospium  20 mg Oral BID AC    pantoprazole  40 mg Oral QAM AC    methylPREDNISolone  40 mg IntraVENous Daily    ipratropium 0.5 mg-albuterol 2.5 mg  1 Dose Inhalation BID RT    sodium chloride flush  5-40 mL IntraVENous 2 times per day    [Held by provider] enoxaparin  40 mg SubCUTAneous Daily    insulin lispro  0-8  Differential:         Lab Results   Component Value Date/Time     WBC 12.9 05/14/2024 05:48 AM     RBC 4.59 05/14/2024 05:48 AM     HGB 12.2 05/14/2024 05:48 AM     HCT 39.0 05/14/2024 05:48 AM      05/14/2024 05:48 AM     MCV 85.0 05/14/2024 05:48 AM     MCH 26.6 05/14/2024 05:48 AM     MCHC 31.3 05/14/2024 05:48 AM     RDW 14.5 05/14/2024 05:48 AM     LYMPHOPCT 19.9 05/14/2024 05:48 AM     MONOPCT 5.8 05/14/2024 05:48 AM     EOSPCT 1.8 05/14/2024 05:48 AM     BASOPCT 0.1 05/14/2024 05:48 AM     MONOSABS 0.8 05/14/2024 05:48 AM     EOSABS 0.2 05/14/2024 05:48 AM     BASOSABS 0.0 05/14/2024 05:48 AM      CMP:          Lab Results   Component Value Date/Time      05/14/2024 05:48 AM     K 4.2 05/14/2024 05:48 AM      05/14/2024 05:48 AM     CO2 26 05/14/2024 05:48 AM     BUN 18 05/14/2024 05:48 AM     CREATININE 0.58 05/14/2024 05:48 AM     GFRAA >60.0 09/24/2018 09:28 AM     LABGLOM 86.2 05/14/2024 05:48 AM     GLUCOSE 154 05/14/2024 05:48 AM     CALCIUM 8.7 05/14/2024 05:48 AM     BILITOT 0.3 05/14/2024 05:48 AM     ALKPHOS 80 05/14/2024 05:48 AM     AST 12 05/14/2024 05:48 AM     ALT 15 05/14/2024 05:48 AM      BMP:          Lab Results   Component Value Date/Time      05/14/2024 05:48 AM     K 4.2 05/14/2024 05:48 AM      05/14/2024 05:48 AM     CO2 26 05/14/2024 05:48 AM     BUN 18 05/14/2024 05:48 AM     CREATININE 0.58 05/14/2024 05:48 AM     CALCIUM 8.7 05/14/2024 05:48 AM     GFRAA >60.0 09/24/2018 09:28 AM     LABGLOM 86.2 05/14/2024 05:48 AM     GLUCOSE 154 05/14/2024 05:48 AM      Magnesium:          Lab Results   Component Value Date/Time     MG 2.4 05/14/2024 05:48 AM      Troponin:          Lab Results   Component Value Date/Time     TROPONINI <0.010 08/17/2017 03:15 PM         Radiology:  XR CHEST PORTABLE     Result Date: 5/12/2024  EXAMINATION: ONE XRAY VIEW OF THE CHEST 5/12/2024 12:22 pm COMPARISON: 05/10/2024. HISTORY: ORDERING SYSTEM PROVIDED HISTORY: short of  insulin glargine (LANTUS) injection vial 10 Units 10 Units SubCUTAneous Given Sania Altamirano RN    05/14/2024 1102 EDT insulin glargine (LANTUS) injection vial 10 Units 10 Units SubCUTAneous Given Mily Daugherty RN    05/13/2024 2135 EDT insulin glargine (LANTUS) injection vial 10 Units 10 Units SubCUTAneous Given Sania Altamirano RN    05/16/2024 0832 EDT atorvastatin (LIPITOR) tablet 40 mg 40 mg Oral Given Latonia Medina RN    05/15/2024 0814 EDT atorvastatin (LIPITOR) tablet 40 mg 40 mg Oral Given Latonia Medina RN    05/14/2024 1101 EDT atorvastatin (LIPITOR) tablet 40 mg 40 mg Oral Given Mily Daugherty RN    05/13/2024 2136 EDT atorvastatin (LIPITOR) tablet 40 mg 40 mg Oral Given Sania Altamirano RN    05/16/2024 0832 EDT lisinopril (PRINIVIL;ZESTRIL) tablet 2.5 mg 2.5 mg Oral Given Latonia Medina RN    05/15/2024 0814 EDT lisinopril (PRINIVIL;ZESTRIL) tablet 2.5 mg 2.5 mg Oral Given Latonia Medina RN    05/14/2024 1101 EDT lisinopril (PRINIVIL;ZESTRIL) tablet 2.5 mg 2.5 mg Oral Given Mily Daugherty RN    05/16/2024 0622 EDT trospium (SANCTURA) tablet 20 mg 20 mg Oral Given Sania Altamirano RN    05/15/2024 1616 EDT trospium (SANCTURA) tablet 20 mg 20 mg Oral Given Latonia Medina RN    05/15/2024 0620 EDT trospium (SANCTURA) tablet 20 mg 20 mg Oral Given Sania Altamirano RN    05/14/2024 1739 EDT trospium (SANCTURA) tablet 20 mg 20 mg Oral Given Mily Daugherty RN    05/14/2024 0606 EDT trospium (SANCTURA) tablet 20 mg 20 mg Oral Given Sania Altamirano RN    05/13/2024 1826 EDT trospium (SANCTURA) tablet 20 mg 20 mg Oral Given Mily Daugherty RN    05/16/2024 0622 EDT pantoprazole (PROTONIX) tablet 40 mg 40 mg Oral Given Sania Altamirano RN    05/15/2024 0621 EDT pantoprazole (PROTONIX) tablet 40 mg 40 mg Oral Given Sania Altamirano RN    05/14/2024 0606 EDT pantoprazole (PROTONIX) tablet 40 mg 40 mg Oral Given Sania Altamirano RN    05/15/2024 0815 EDT sodium chloride

## 2024-05-16 NOTE — PROGRESS NOTES
Physical Therapy Med Surg Daily Treatment Note  [place] [facility] [place]  Room: [place] [address]       NAME: Dayami Christensen  : 1934 (89 y.o.)  MRN: [id number]  CODE STATUS: Full Code    Date of Service: 2024    Patient Diagnosis(es): Acute respiratory failure with hypoxia (HCC) [J96.01]  Acute on chronic respiratory failure with hypoxia (HCC) [J96.21]   Chief Complaint   Patient presents with    Shortness of Breath     SOB for the last few days.      Patient Active Problem List    Diagnosis Date Noted    Acute respiratory failure with hypoxia (HCC) 2024    Lung mass 05/10/2024    Dizzy 2018    History of cardiac radiofrequency ablation (RFA) 2018    Dyslipidemia 2017    Gastroesophageal reflux disease without esophagitis 2017    Primary osteoarthritis involving multiple joints 2017    Abnormal gait 2017    Risk for falls 2017    Chronic midline low back pain without sciatica 2017    Osteopenia 2017    Essential hypertension 2017    New onset atrial fibrillation (HCC) 2017    Type 2 diabetes mellitus without complication, without long-term current use of insulin (HCC) 2017    Class 2 severe obesity due to excess calories with serious comorbidity and body mass index (BMI) of 36.0 to 36.9 in adult (HCC) 2017        Past Medical History:   Diagnosis Date    Arthritis     Chronic kidney disease     Diabetes mellitus (HCC)     borderline    Gastroesophageal reflux disease without esophagitis 2017    History of blood transfusion     hemorrhage after an     Hyperlipidemia     Hypertension     Osteoporosis     Urinary frequency      Past Surgical History:   Procedure Laterality Date    BRONCHOSCOPY N/A 5/15/2024    NAVIGATIONAL  BRONCHOSCOPY WITH ENDOBRONCHIAL ULTRASOUND  TBNA TRANSBRONCHIAL BIOPIES performed [place] [facility] [place]    CATARACT REMOVAL      COLONOSCOPY      EYE SURGERY      TUBAL LIGATION

## 2024-05-16 NOTE — CARE COORDINATION
Quality round completed with care management team. LSW met with pt and both sons at bedside. Pt is alert and oriented x 4. Discussed DC plan with pt and son. Pt agreed with SNF at DC. Pt report she is not comfortable going home by herself. Currently not at her baseline.   SNF list provided to pt and family. Will need SNF choices, acceptance and pre-cert.     LSW will follow.     Electronically signed by CHANNING Caban on 5/16/2024 at 11:23 AM    LSW followed up with pt this afternoon twice and no family at bedside. Pt does not know if her son is coming back today. No son's contact information on file. Pt does not know son's contact.     LSW will follow up tomorrow regarding SNF choices.     Electronically signed by CHANNING Caban on 5/16/2024 at 4:07 PM    LSW received a call from pt's son via phone. Son would like [facility] SN[facility].   Referral sent to [facility].     Pending acceptance at this time. Will need pre-cert.     LSW will follow.     Electronically signed by CHANNING Caban on 5/16/2024 at 4:40 PM

## 2024-05-16 NOTE — PROGRESS NOTES
Hematology/Oncology  Attending Progress Note        CHIEF COMPLAINT/HPI:  Pt has mild cough; no hemoptysis; no SOB at rest;   --pt had bronchoscopy with TBNA on 5/15/24 by  Dr. uCenca; cytology report pending    REVIEW OF SYSTEMS:    Unremarkable except for symptoms mentioned in HPI.    Current Inpatient Medications:    Current Facility-Administered Medications: ipratropium 0.5 mg-albuterol 2.5 mg (DUONEB) nebulizer solution 1 Dose, 1 Dose, Inhalation, Q4H PRN  dilTIAZem (CARDIZEM CD) extended release capsule 120 mg, 120 mg, Oral, Daily  metoprolol succinate (TOPROL XL) extended release tablet 100 mg, 100 mg, Oral, Daily  apixaban (ELIQUIS) tablet 5 mg, 5 mg, Oral, BID  atorvastatin (LIPITOR) tablet 40 mg, 40 mg, Oral, Daily  lisinopril (PRINIVIL;ZESTRIL) tablet 2.5 mg, 2.5 mg, Oral, Daily  trospium (SANCTURA) tablet 20 mg, 20 mg, Oral, BID AC  pantoprazole (PROTONIX) tablet 40 mg, 40 mg, Oral, QAM AC  methylPREDNISolone sodium succ (SOLU-MEDROL) 40 mg in bacteriostatic water 1 mL injection, 40 mg, IntraVENous, Daily  metoprolol (LOPRESSOR) injection 5 mg, 5 mg, IntraVENous, Q6H PRN  ipratropium 0.5 mg-albuterol 2.5 mg (DUONEB) nebulizer solution 1 Dose, 1 Dose, Inhalation, BID RT  sodium chloride flush 0.9 % injection 5-40 mL, 5-40 mL, IntraVENous, 2 times per day  sodium chloride flush 0.9 % injection 5-40 mL, 5-40 mL, IntraVENous, PRN  0.9 % sodium chloride infusion, , IntraVENous, PRN  potassium chloride (KLOR-CON M) extended release tablet 40 mEq, 40 mEq, Oral, PRN **OR** potassium bicarb-citric acid (EFFER-K) effervescent tablet 40 mEq, 40 mEq, Oral, PRN **OR** potassium chloride 10 mEq/100 mL IVPB (Peripheral Line), 10 mEq, IntraVENous, PRN  magnesium sulfate 2000 mg in 50 mL IVPB premix, 2,000 mg, IntraVENous, PRN  ondansetron (ZOFRAN-ODT) disintegrating tablet 4 mg, 4 mg, Oral, Q8H PRN **OR** ondansetron (ZOFRAN) injection 4 mg, 4 mg, IntraVENous, Q6H PRN  polyethylene glycol (GLYCOLAX) packet 17 g, 17 g,  spectral analysis and color flow was obtained of the deep venous structures of the bilateral extremities. COMPARISON: None. HISTORY: ORDERING SYSTEM PROVIDED HISTORY: Elevated D-dimer.  Rule out DVT, HYPOXIA, SHORTNESS OF BREATH FINDINGS: The visualized veins of the bilateral lower extremities are patent and free of echogenic thrombus. The veins demonstrate good compressibility with normal color flow study and spectral analysis.     No evidence of DVT in either lower extremity.     CTA CHEST W WO CONTRAST    Result Date: 5/11/2024  EXAMINATION: CTA OF THE CHEST WITH AND WITHOUT CONTRAST 5/11/2024 12:34 am TECHNIQUE: CTA of the chest was performed before and after the administration of intravenous contrast.  Multiplanar reformatted images are provided for review.  MIP images are provided for review. Automated exposure control, iterative reconstruction, and/or weight based adjustment of the mA/kV was utilized to reduce the radiation dose to as low as reasonably achievable. COMPARISON: None. HISTORY: ORDERING SYSTEM PROVIDED HISTORY: elevated ddimer TECHNOLOGIST PROVIDED HISTORY: Reason for exam:->elevated ddimer Additional Contrast?->1 What reading provider will be dictating this exam?->CRC FINDINGS: Pulmonary Arteries: Pulmonary arteries are adequately opacified for evaluation.  No evidence of intraluminal filling defect to suggest pulmonary embolism.  Main pulmonary artery is normal in caliber. Mediastinum: There is left perihilar lymphadenopathy.  A left mediastinal/subaortic lymph node measures 2.7 x 2.1 cm.  The heart and pericardium demonstrate no acute abnormality.  There is no acute abnormality of the thoracic aorta. Lungs/pleura: There is a lingular masslike consolidation measuring 4.6 x 4.0 cm.  The remainder of the lungs are clear.  No pneumothorax or pleural effusion. Upper Abdomen:  Limited images of the upper abdomen demonstrate no acute abnormality. Soft Tissues/Bones: No acute bone or soft tissue

## 2024-05-16 NOTE — DISCHARGE SUMMARY
Physician Discharge Summary     Patient ID:  Dayami Christensen  [id number]  89 y.o.  11/5/1934    Admit date: 5/10/2024    Discharge date : 05/16/24     Admitting Physician: Liana Young MD     Discharge Physician: SHAWN GREEN MD     Admission Diagnoses: Acute respiratory failure with hypoxia (HCC) [J96.01]  Acute on chronic respiratory failure with hypoxia (HCC) [J96.21]    Discharge Diagnoses: Lung mass, hypoxia, afib with rvr    Admission Condition: fair    Discharged Condition: good    Hospital Course:   89 y/o female with history of PSVT s/p RFA, HTN, DM2, obesity who presented with:     Exertional dyspnea and hypoxia  - CTA of the chest was negative for PE, it showed a lingular mass concerning for malignancy  - plan for bronchoscopy with biopsy tomorrow  - feels very short of breath at rest, is very diminished with faint wheezing on lung exam,   - CXR was negative  - started Duonebs and Solumedrol   - followed by pulmonology and oncology  5/13 - await reschedule of bronch  5/14 - d/w Dr. Cuenca, plan for bronch next 1-2 days pending schedule.    5/15 - s/p bronch biopsy, some bleeding noted prior to procedure, plan to monitor overnight, monitor o2 sat.  5/16 - monitored overnight for minimal bleeding prior to procedure, no complications overnight, ok for dc home and outpatient follow up     New onset AFIB with RVR  - resumed home dose of Toprol  - IV Lopressor as needed  - consulted cardiology  - monitor on telemetry     DM2 with hyperglycemia  - on ISS, Lantus      HTN  - resumed home meds         Diet: ADULT DIET; Regular; 4 carb choices (60 gm/meal)     Code Status: Full Code    Consults: cardiology and pulmonary/intensive care    Significant Diagnostic Studies: as below    Discharge Exam:  /78   Pulse 72   Temp 98.1 °F (36.7 °C) (Oral)   Resp 18   Ht 1.49 m (4' 10.66\")   Wt 70.3 kg (154 lb 15.7 oz)   SpO2 98%   BMI 31.67 kg/m²   General appearance: alert, appears stated age, and  cooperative  Lungs: clear to auscultation bilaterally  Heart: regular rate and rhythm, S1, S2 normal, no murmur, click, rub or gallop  Abdomen: soft, non-tender; bowel sounds normal; no masses,  no organomegaly  Extremities: extremities normal, atraumatic, no cyanosis or edema  Skin: Skin color, texture, turgor normal. No rashes or lesions    Labs:   Recent Labs     05/14/24  0548 05/15/24  0602 05/16/24  0546   WBC 12.9* 14.0* 18.1*   HGB 12.2 13.0 12.6   HCT 39.0 41.3 40.8    CLUMPS 403*     Recent Labs     05/14/24  0548 05/15/24  0602 05/16/24  0546    138 139   K 4.2 4.3 4.7    99 101   CO2 26 26 26   BUN 18 18 21   CREATININE 0.58 0.54 0.63   CALCIUM 8.7 8.9 8.8     Recent Labs     05/14/24  0548 05/15/24  0602 05/16/24  0546   AST 12 11 9   ALT 15 16 14   BILITOT 0.3 0.3 0.4   ALKPHOS 80 88 76     No results for input(s): \"INR\" in the last 72 hours.  No results for input(s): \"CKTOTAL\", \"TROPONINI\" in the last 72 hours.    Urinalysis:   Lab Results   Component Value Date/Time    NITRU Negative 05/10/2024 11:15 PM    BLOODU Negative 05/10/2024 11:15 PM    GLUCOSEU >=1000 05/10/2024 11:15 PM       Radiology:   Most recent    Chest CT      WITH CONTRAST:No results found for this or any previous visit.       WITHOUT CONTRAST: No results found for this or any previous visit.      CXR      2-view: No results found for this or any previous visit.       Portable: Results for orders placed during the hospital encounter of 05/10/24    XR CHEST PORTABLE    Narrative  EXAMINATION:  ONE XRAY VIEW OF THE CHEST    5/15/2024 1:37 pm    COMPARISON:  None.    HISTORY:  ORDERING SYSTEM PROVIDED HISTORY: post op  TECHNOLOGIST PROVIDED HISTORY:  Reason for exam:->post op  What reading provider will be dictating this exam?->CRC    FINDINGS:  Single AP erect portable chest demonstrates a 4 cm mass in the lingula as  previously noted.  There is no evidence of a pneumothorax.  There is mild  left-sided apical

## 2024-05-16 NOTE — PROGRESS NOTES
05/16/24 0100   RT Protocol   History Pulmonary Disease 1   Respiratory pattern 0   Breath sounds 4   Cough 0   Bronchodilator Assessment Score 5

## 2024-05-16 NOTE — PROGRESS NOTES
Hospitalist Progress Note      PCP: Chan Decker DO    Date of Admission: 5/10/2024    Chief Complaint:  Patient resting in bed, no cp, sob.  Awaiting bronch plan.    Medications:  Reviewed    Infusion Medications    sodium chloride      dextrose       Scheduled Medications    metoprolol succinate  100 mg Oral Daily    dilTIAZem  30 mg Oral 4 times per day    apixaban  5 mg Oral BID    atorvastatin  40 mg Oral Daily    lisinopril  2.5 mg Oral Daily    trospium  20 mg Oral BID AC    pantoprazole  40 mg Oral QAM AC    methylPREDNISolone  40 mg IntraVENous Daily    ipratropium 0.5 mg-albuterol 2.5 mg  1 Dose Inhalation BID RT    sodium chloride flush  5-40 mL IntraVENous 2 times per day    [Held by provider] enoxaparin  40 mg SubCUTAneous Daily    insulin lispro  0-8 Units SubCUTAneous TID WC    insulin lispro  0-4 Units SubCUTAneous Nightly    insulin glargine  10 Units SubCUTAneous BID     PRN Meds: metoprolol, sodium chloride flush, sodium chloride, potassium chloride **OR** potassium alternative oral replacement **OR** potassium chloride, magnesium sulfate, ondansetron **OR** ondansetron, polyethylene glycol, acetaminophen **OR** acetaminophen, glucose, dextrose bolus **OR** dextrose bolus, glucagon (rDNA), dextrose      Intake/Output Summary (Last 24 hours) at 5/16/2024 0037  Last data filed at 5/15/2024 2000  Gross per 24 hour   Intake 318 ml   Output 1600 ml   Net -1282 ml       Exam:    BP (!) 103/55   Pulse 91   Temp 97.5 °F (36.4 °C) (Oral)   Resp 18   Ht 1.49 m (4' 10.66\")   Wt 70.3 kg (154 lb 15.7 oz)   SpO2 97%   BMI 31.67 kg/m²     General appearance: appears stated age and cooperative.  Respiratory:  very diminished with faint wheezing bilaterally.  Cardiovascular: irregular, tachycardic.  Abdomen: Soft, active bowel sounds.  Musculoskeletal: No edema bilaterally.      Labs:   Recent Labs     05/13/24  0530 05/14/24  0548 05/15/24  0602   WBC 12.7* 12.9* 14.0*   HGB 12.4 12.2 13.0   HCT 39.9  Solumedrol   - followed by pulmonology and oncology  5/13 - await reschedule of bronch  5/14 - d/w Dr. Cuenca, plan for bronch next 1-2 days pending schedule.    5/15 - s/p bronch biopsy, some bleeding noted prior to procedure, plan to monitor overnight, monitor o2 sat.    New onset AFIB with RVR  - resumed home dose of Toprol  - IV Lopressor as needed  - consulted cardiology  - monitor on telemetry    DM2 with hyperglycemia  - on ISS, Lantus     HTN  - resumed home meds       Diet: ADULT DIET; Regular; 4 carb choices (60 gm/meal)    Code Status: Full Code      Disposition - home with [facility] when medically ready        Electronically signed by SHAWN GREEN MD on 5/16/2024 at 12:37 AM

## 2024-05-16 NOTE — PROGRESS NOTES
Cardiology Follow up Note    Subjective:  Resting comfortably. Patient denies any chest pain, SOB, or palpitaitons. No hemoptysis.     Was sinus earlier but came back to room and now in Afib, -130s but currently 90s.       Review of Systems:   As noted in the HPI*    Objective:  /78   Pulse 72   Temp 98.1 °F (36.7 °C) (Oral)   Resp 18   Ht 1.49 m (4' 10.66\")   Wt 70.3 kg (154 lb 15.7 oz)   SpO2 98%   BMI 31.67 kg/m²   Vitals stable.   Constitutional: alert, cooperative, in no distress  Eyes: Conjunctiva clear; no scleral icturus. PERRLA   Respiratory: clear to auscultation bilaterally  Musculoskeletal: No chest wall tenderness. No clubbing or cyanosis.   Cardiovascular: irregularly irregular. No murmur, click, rub or gallop. No carotid bruit. No JVD. Pulses 2+ and symmetric.   GI: soft, non-tender, non-distended. Bowel sounds normal. No masses,  no organomegaly  MSK: extremities normal, atraumatic, no clubbing. No chest wall pain.    Neurologic: Cranial nerves grossly intact.  No focal motor and/or sensory deficits.  Skin: No rashes or lesions. No cyanosis.       Intake/Output Summary (Last 24 hours) at 5/16/2024 1006  Last data filed at 5/16/2024 0838  Gross per 24 hour   Intake 618 ml   Output 1950 ml   Net -1332 ml         Medications:  metoprolol succinate, 100 mg, Daily  dilTIAZem, 30 mg, 4 times per day  apixaban, 5 mg, BID  atorvastatin, 40 mg, Daily  lisinopril, 2.5 mg, Daily  trospium, 20 mg, BID AC  pantoprazole, 40 mg, QAM AC  methylPREDNISolone, 40 mg, Daily  ipratropium 0.5 mg-albuterol 2.5 mg, 1 Dose, BID RT  sodium chloride flush, 5-40 mL, 2 times per day  insulin lispro, 0-8 Units, TID WC  insulin lispro, 0-4 Units, Nightly  insulin glargine, 10 Units, BID      sodium chloride  dextrose      Recent Labs     05/14/24  0548 05/15/24  0602 05/16/24  0546   HGB 12.2 13.0 12.6   WBC 12.9* 14.0* 18.1*    CLUMPS 403*    138 139   K 4.2 4.3 4.7   CO2 26 26 26   BUN 18 18 21

## 2024-05-16 NOTE — DISCHARGE INSTR - COC
Continuity of Care Form    Patient Name: Dayami Christensen   :  1934  MRN:  [id number]    Admit date:  5/10/2024  Discharge date:  24    Code Status Order: Full Code   Advance Directives:   Advance Care Flowsheet Documentation       Date/Time Healthcare Directive Type of Healthcare Directive Copy in Chart Healthcare Agent Appointed Healthcare Agent's Name Healthcare Agent's Phone Number    05/15/24 0945 No, patient does not have an advance directive for healthcare treatment -- -- -- -- --            Admitting Physician:  Liana Young MD  PCP: Chan Decker DO    Discharging Nurse:   Discharging Hospital Unit/Room#: W192/W192-01  Discharging Unit Phone Number: [phone]    Emergency Contact:   Extended Emergency Contact Information  Primary Emergency Contact: Fanny Ivy   [facility]  Home Phone: [phone]  Relation: Child    Past Surgical History:  Past Surgical History:   Procedure Laterality Date    BRONCHOSCOPY N/A 5/15/2024    NAVIGATIONAL  BRONCHOSCOPY WITH ENDOBRONCHIAL ULTRASOUND  TBNA TRANSBRONCHIAL BIOPIES [place] [facility] [place]    CATARACT REMOVAL      COLONOSCOPY      EYE SURGERY      TUBAL LIGATION         Immunization History:   Immunization History   Administered Date(s) Administered    COVID-19, MODERNA Bivalent, (age 12y+), IM, 50 mcg/0.5 mL 10/04/2022    Influenza, High Dose (Fluzone 65 yrs and older) 2017, 2018    Pneumococcal, PCV-13, PREVNAR 13, (age 6w+), IM, 0.5mL 2017    Pneumococcal, PPSV23, PNEUMOVAX 23, (age 2y+), SC/IM, 0.5mL 2018    TDaP, ADACEL (age 10y-64y), BOOSTRIX (age 10y+), IM, 0.5mL 2016       Active Problems:  Patient Active Problem List   Diagnosis Code    Essential hypertension I10    New onset atrial fibrillation (HCC) I48.91    Type 2 diabetes mellitus without complication, without long-term current use of insulin (HCC) E11.9    Class 2 severe obesity due to excess calories with serious

## 2024-05-16 NOTE — PLAN OF CARE
Problem: Discharge Planning  Goal: Discharge to home or other facility with appropriate resources  5/15/2024 2028 by Sania Altmairano RN  Outcome: Progressing  5/15/2024 1044 by Latonia Medina RN  Outcome: Progressing     Problem: Safety - Adult  Goal: Free from fall injury  5/15/2024 2028 by Sania Altamirano RN  Outcome: Progressing  5/15/2024 1044 by Latonia Medina RN  Outcome: Progressing     Problem: ABCDS Injury Assessment  Goal: Absence of physical injury  5/15/2024 2028 by Sania Altamirano RN  Outcome: Progressing  5/15/2024 1044 by Latonia Medina RN  Outcome: Progressing     Problem: Chronic Conditions and Co-morbidities  Goal: Patient's chronic conditions and co-morbidity symptoms are monitored and maintained or improved  5/15/2024 2028 by Sania Altamirano RN  Outcome: Progressing  5/15/2024 1044 by Latonia Medina RN  Outcome: Progressing     Problem: Pain  Goal: Verbalizes/displays adequate comfort level or baseline comfort level  5/15/2024 2028 by Sania Altamirano RN  Outcome: Progressing  5/15/2024 1044 by Latonia eMdina RN  Outcome: Progressing

## 2024-05-17 VITALS
RESPIRATION RATE: 18 BRPM | BODY MASS INDEX: 33.24 KG/M2 | TEMPERATURE: 98.2 F | DIASTOLIC BLOOD PRESSURE: 52 MMHG | OXYGEN SATURATION: 97 % | HEART RATE: 69 BPM | WEIGHT: 164.9 LBS | SYSTOLIC BLOOD PRESSURE: 130 MMHG | HEIGHT: 59 IN

## 2024-05-17 LAB
ALBUMIN SERPL-MCNC: 3.2 G/DL (ref 3.5–4.6)
ALP SERPL-CCNC: 82 U/L (ref 40–130)
ALT SERPL-CCNC: 17 U/L (ref 0–33)
ANION GAP SERPL CALCULATED.3IONS-SCNC: 11 MEQ/L (ref 9–15)
AST SERPL-CCNC: 11 U/L (ref 0–35)
BASOPHILS # BLD: 0 K/UL (ref 0–0.2)
BASOPHILS NFR BLD: 0.1 %
BILIRUB SERPL-MCNC: 0.3 MG/DL (ref 0.2–0.7)
BUN SERPL-MCNC: 22 MG/DL (ref 8–23)
CALCIUM SERPL-MCNC: 9 MG/DL (ref 8.5–9.9)
CHLORIDE SERPL-SCNC: 100 MEQ/L (ref 95–107)
CO2 SERPL-SCNC: 25 MEQ/L (ref 20–31)
CREAT SERPL-MCNC: 0.51 MG/DL (ref 0.5–0.9)
EOSINOPHIL # BLD: 0.2 K/UL (ref 0–0.7)
EOSINOPHIL NFR BLD: 0.9 %
ERYTHROCYTE [DISTWIDTH] IN BLOOD BY AUTOMATED COUNT: 14.4 % (ref 11.5–14.5)
GLOBULIN SER CALC-MCNC: 2.7 G/DL (ref 2.3–3.5)
GLUCOSE BLD-MCNC: 200 MG/DL (ref 70–99)
GLUCOSE BLD-MCNC: 340 MG/DL (ref 70–99)
GLUCOSE BLD-MCNC: 355 MG/DL (ref 70–99)
GLUCOSE BLD-MCNC: 358 MG/DL (ref 70–99)
GLUCOSE SERPL-MCNC: 221 MG/DL (ref 70–99)
HCT VFR BLD AUTO: 40.2 % (ref 37–47)
HGB BLD-MCNC: 12.7 G/DL (ref 12–16)
LYMPHOCYTES # BLD: 2 K/UL (ref 1–4.8)
LYMPHOCYTES NFR BLD: 11 %
MAGNESIUM SERPL-MCNC: 2.1 MG/DL (ref 1.7–2.4)
MCH RBC QN AUTO: 26.7 PG (ref 27–31.3)
MCHC RBC AUTO-ENTMCNC: 31.6 % (ref 33–37)
MCV RBC AUTO: 84.6 FL (ref 79.4–94.8)
MONOCYTES # BLD: 1 K/UL (ref 0.2–0.8)
MONOCYTES NFR BLD: 5.6 %
NEUTROPHILS # BLD: 14.6 K/UL (ref 1.4–6.5)
NEUTS SEG NFR BLD: 81.5 %
PERFORMED ON: ABNORMAL
PLATELET # BLD AUTO: 374 K/UL (ref 130–400)
POTASSIUM SERPL-SCNC: 4.2 MEQ/L (ref 3.4–4.9)
PROT SERPL-MCNC: 5.9 G/DL (ref 6.3–8)
RBC # BLD AUTO: 4.75 M/UL (ref 4.2–5.4)
SODIUM SERPL-SCNC: 136 MEQ/L (ref 135–144)
WBC # BLD AUTO: 17.9 K/UL (ref 4.8–10.8)

## 2024-05-17 PROCEDURE — 94640 AIRWAY INHALATION TREATMENT: CPT

## 2024-05-17 PROCEDURE — 6370000000 HC RX 637 (ALT 250 FOR IP): Performed by: INTERNAL MEDICINE

## 2024-05-17 PROCEDURE — 36415 COLL VENOUS BLD VENIPUNCTURE: CPT

## 2024-05-17 PROCEDURE — 6370000000 HC RX 637 (ALT 250 FOR IP): Performed by: NURSE PRACTITIONER

## 2024-05-17 PROCEDURE — 6360000002 HC RX W HCPCS: Performed by: INTERNAL MEDICINE

## 2024-05-17 PROCEDURE — 2580000003 HC RX 258: Performed by: NURSE PRACTITIONER

## 2024-05-17 PROCEDURE — 85025 COMPLETE CBC W/AUTO DIFF WBC: CPT

## 2024-05-17 PROCEDURE — 80053 COMPREHEN METABOLIC PANEL: CPT

## 2024-05-17 PROCEDURE — 2500000003 HC RX 250 WO HCPCS: Performed by: INTERNAL MEDICINE

## 2024-05-17 PROCEDURE — 2060000000 HC ICU INTERMEDIATE R&B

## 2024-05-17 PROCEDURE — 94761 N-INVAS EAR/PLS OXIMETRY MLT: CPT

## 2024-05-17 PROCEDURE — 2700000000 HC OXYGEN THERAPY PER DAY

## 2024-05-17 PROCEDURE — 6370000000 HC RX 637 (ALT 250 FOR IP): Performed by: REGISTERED NURSE

## 2024-05-17 PROCEDURE — 83735 ASSAY OF MAGNESIUM: CPT

## 2024-05-17 RX ADMIN — POLYETHYLENE GLYCOL 3350 17 G: 17 POWDER, FOR SOLUTION ORAL at 12:18

## 2024-05-17 RX ADMIN — TROSPIUM CHLORIDE 20 MG: 20 TABLET, FILM COATED ORAL at 06:54

## 2024-05-17 RX ADMIN — INSULIN LISPRO 8 UNITS: 100 INJECTION, SOLUTION INTRAVENOUS; SUBCUTANEOUS at 16:55

## 2024-05-17 RX ADMIN — SODIUM CHLORIDE, PRESERVATIVE FREE 5 ML: 5 INJECTION INTRAVENOUS at 09:17

## 2024-05-17 RX ADMIN — DILTIAZEM HYDROCHLORIDE 120 MG: 120 CAPSULE, EXTENDED RELEASE ORAL at 09:18

## 2024-05-17 RX ADMIN — ATORVASTATIN CALCIUM 40 MG: 40 TABLET, FILM COATED ORAL at 09:18

## 2024-05-17 RX ADMIN — PANTOPRAZOLE SODIUM 40 MG: 40 TABLET, DELAYED RELEASE ORAL at 06:54

## 2024-05-17 RX ADMIN — LISINOPRIL 2.5 MG: 5 TABLET ORAL at 09:18

## 2024-05-17 RX ADMIN — APIXABAN 5 MG: 5 TABLET, FILM COATED ORAL at 09:18

## 2024-05-17 RX ADMIN — METOPROLOL SUCCINATE 100 MG: 100 TABLET, EXTENDED RELEASE ORAL at 09:18

## 2024-05-17 RX ADMIN — APIXABAN 5 MG: 5 TABLET, FILM COATED ORAL at 21:08

## 2024-05-17 RX ADMIN — INSULIN LISPRO 6 UNITS: 100 INJECTION, SOLUTION INTRAVENOUS; SUBCUTANEOUS at 12:18

## 2024-05-17 RX ADMIN — INSULIN GLARGINE 10 UNITS: 100 INJECTION, SOLUTION SUBCUTANEOUS at 09:18

## 2024-05-17 RX ADMIN — IPRATROPIUM BROMIDE AND ALBUTEROL SULFATE 1 DOSE: 2.5; .5 SOLUTION RESPIRATORY (INHALATION) at 07:37

## 2024-05-17 RX ADMIN — INSULIN GLARGINE 10 UNITS: 100 INJECTION, SOLUTION SUBCUTANEOUS at 21:08

## 2024-05-17 RX ADMIN — METHYLPREDNISOLONE SODIUM SUCCINATE 40 MG: 40 INJECTION INTRAMUSCULAR; INTRAVENOUS at 09:17

## 2024-05-17 RX ADMIN — INSULIN LISPRO 4 UNITS: 100 INJECTION, SOLUTION INTRAVENOUS; SUBCUTANEOUS at 21:08

## 2024-05-17 RX ADMIN — INSULIN LISPRO 2 UNITS: 100 INJECTION, SOLUTION INTRAVENOUS; SUBCUTANEOUS at 09:18

## 2024-05-17 RX ADMIN — TROSPIUM CHLORIDE 20 MG: 20 TABLET, FILM COATED ORAL at 16:55

## 2024-05-17 NOTE — PROGRESS NOTES
Hospitalist Progress Note      PCP: Chan Decker DO    Date of Admission: 5/10/2024    Chief Complaint:  Patient resting in bed, no cp, sob.      Medications:  Reviewed    Infusion Medications    sodium chloride      dextrose       Scheduled Medications    dilTIAZem  120 mg Oral Daily    metoprolol succinate  100 mg Oral Daily    apixaban  5 mg Oral BID    atorvastatin  40 mg Oral Daily    lisinopril  2.5 mg Oral Daily    trospium  20 mg Oral BID AC    pantoprazole  40 mg Oral QAM AC    methylPREDNISolone  40 mg IntraVENous Daily    ipratropium 0.5 mg-albuterol 2.5 mg  1 Dose Inhalation BID RT    sodium chloride flush  5-40 mL IntraVENous 2 times per day    insulin lispro  0-8 Units SubCUTAneous TID WC    insulin lispro  0-4 Units SubCUTAneous Nightly    insulin glargine  10 Units SubCUTAneous BID     PRN Meds: ipratropium 0.5 mg-albuterol 2.5 mg, metoprolol, sodium chloride flush, sodium chloride, potassium chloride **OR** potassium alternative oral replacement **OR** potassium chloride, magnesium sulfate, ondansetron **OR** ondansetron, polyethylene glycol, acetaminophen **OR** acetaminophen, glucose, dextrose bolus **OR** dextrose bolus, glucagon (rDNA), dextrose      Intake/Output Summary (Last 24 hours) at 5/17/2024 1503  Last data filed at 5/17/2024 1217  Gross per 24 hour   Intake 1290 ml   Output 4000 ml   Net -2710 ml       Exam:    BP (!) 122/52   Pulse 89   Temp 97.3 °F (36.3 °C) (Axillary)   Resp 18   Ht 1.49 m (4' 10.66\")   Wt 74.8 kg (164 lb 14.5 oz)   SpO2 92%   BMI 33.69 kg/m²     General appearance: appears stated age and cooperative.  Respiratory:  very diminished with faint wheezing bilaterally.  Cardiovascular: irregular, tachycardic.  Abdomen: Soft, active bowel sounds.  Musculoskeletal: No edema bilaterally.      Labs:   Recent Labs     05/15/24  0602 05/16/24  0546 05/17/24  0552   WBC 14.0* 18.1* 17.9*   HGB 13.0 12.6 12.7   HCT 41.3 40.8 40.2   PLT CLUMPS 403* 374     Recent  by pulmonology and oncology  5/13 - await reschedule of bronch  5/14 - d/w Dr. Cuenca, plan for bronch next 1-2 days pending schedule.    5/15 - s/p bronch biopsy, some bleeding noted prior to procedure, plan to monitor overnight, monitor o2 sat.  5/17 - dc held for snf approval, now able to dc today.    New onset AFIB with RVR  - resumed home dose of Toprol  - IV Lopressor as needed  - consulted cardiology  - monitor on telemetry    DM2 with hyperglycemia  - on ISS, Lantus     HTN  - resumed home meds       Diet: ADULT DIET; Regular; 4 carb choices (60 gm/meal)    Code Status: Full Code      Electronically signed by SHAWN GREEN MD on 5/17/2024 at 3:03 PM

## 2024-05-17 NOTE — PLAN OF CARE
Problem: Discharge Planning  Goal: Discharge to home or other facility with appropriate resources  5/17/2024 1119 by Rayna Alvarado RN  Outcome: Progressing  Discharge to home or other facility with appropriate resources:   Identify barriers to discharge with patient and caregiver   Arrange for needed discharge resources and transportation as appropriate   Identify discharge learning needs (meds, wound care, etc)   Refer to discharge planning if patient needs post-hospital services based on physician order or complex needs related to functional status, cognitive ability or social support system    Problem: Safety - Adult  Goal: Free from fall injury  5/17/2024 1119 by Rayna Alvarado RN  Outcome: Progressing  Free From Fall Injury: Instruct family/caregiver on patient safety    Problem: ABCDS Injury Assessment  Goal: Absence of physical injury  5/17/2024 1119 by Rayna Alvarado RN  Outcome: Progressing  Flowsheets (Taken 5/17/2024 1119)  Absence of Physical Injury: Implement safety measures based on patient assessment    Problem: Chronic Conditions and Co-morbidities  Goal: Patient's chronic conditions and co-morbidity symptoms are monitored and maintained or improved  5/17/2024 1119 by Rayna Alvarado RN  Outcome: Progressing  Care Plan - Patient's Chronic Conditions and Co-Morbidity Symptoms are Monitored and Maintained or Improved:   Monitor and assess patient's chronic conditions and comorbid symptoms for stability, deterioration, or improvement   Collaborate with multidisciplinary team to address chronic and comorbid conditions and prevent exacerbation or deterioration   Update acute care plan with appropriate goals if chronic or comorbid symptoms are exacerbated and prevent overall improvement and discharge    Problem: Pain  Goal: Verbalizes/displays adequate comfort level or baseline comfort level  5/17/2024 1119 by Rayna Alvarado RN  Outcome: Progressing  Flowsheets (Taken 5/17/2024  1119)  Verbalizes/displays adequate comfort level or baseline comfort level:   Encourage patient to monitor pain and request assistance   Administer analgesics based on type and severity of pain and evaluate response   Consider cultural and social influences on pain and pain management   Assess pain using appropriate pain scale   Notify Licensed Independent Practitioner if interventions unsuccessful or patient reports new pain   Implement non-pharmacological measures as appropriate and evaluate response    Electronically signed by Rayna Alvarado RN on 5/17/2024 at 11:21 AM

## 2024-05-17 NOTE — PROGRESS NOTES
05/17/24 0800   RT Protocol   History Pulmonary Disease 2   Respiratory pattern 0   Breath sounds 2   Cough 0   Indications for Bronchodilator Therapy Decreased or absent breath sounds   Bronchodilator Assessment Score 4

## 2024-05-17 NOTE — PLAN OF CARE
Problem: Discharge Planning  Goal: Discharge to home or other facility with appropriate resources  5/17/2024 0057 by Asmita Hardwick RN  Outcome: Progressing  Flowsheets (Taken 5/16/2024 2130)  Discharge to home or other facility with appropriate resources:   Identify barriers to discharge with patient and caregiver   Arrange for needed discharge resources and transportation as appropriate   Identify discharge learning needs (meds, wound care, etc)   Refer to discharge planning if patient needs post-hospital services based on physician order or complex needs related to functional status, cognitive ability or social support system  5/16/2024 1111 by Latonia Medina RN  Outcome: Progressing     Problem: Safety - Adult  Goal: Free from fall injury  5/17/2024 0057 by Asmita Hardwick RN  Outcome: Progressing  5/16/2024 1111 by Latonia Medina RN  Outcome: Progressing     Problem: ABCDS Injury Assessment  Goal: Absence of physical injury  5/17/2024 0057 by Asmita Hardwick RN  Outcome: Progressing  5/16/2024 1111 by Latonia Medina RN  Outcome: Progressing     Problem: Chronic Conditions and Co-morbidities  Goal: Patient's chronic conditions and co-morbidity symptoms are monitored and maintained or improved  5/17/2024 0057 by Asmita Hardwick RN  Outcome: Progressing  Flowsheets (Taken 5/16/2024 2130)  Care Plan - Patient's Chronic Conditions and Co-Morbidity Symptoms are Monitored and Maintained or Improved:   Monitor and assess patient's chronic conditions and comorbid symptoms for stability, deterioration, or improvement   Collaborate with multidisciplinary team to address chronic and comorbid conditions and prevent exacerbation or deterioration   Update acute care plan with appropriate goals if chronic or comorbid symptoms are exacerbated and prevent overall improvement and discharge  5/16/2024 1111 by Latonia Medina RN  Outcome: Progressing     Problem: Pain  Goal: Verbalizes/displays adequate comfort level or

## 2024-05-17 NOTE — CARE COORDINATION
Quality round completed with care management team. DC plan to [facility]. Referral sent to [facility]. [facility] is able to accept pt. Pending pre-cert at this time.     DC plan: [facility] (Pend pre-cert; started on 5/16)    LSW will follow.     Electronically signed by CHANNING Caban on 5/17/2024 at 9:15 AM    PRE-CERT APPROVED PER ROBYN AT [facility]. PRE-CERT IS GOOD TIL 5/21/2024. PENDING MEDICAL CLEARANCE.    NOTIFY RN.     LSW WILL FOLLOW.     Electronically signed by CHANINNG Caban on 5/17/2024 at 12:33 PM    PASSR COMPLETED.     Electronically signed by CHANNING Caban on 5/17/2024 at 12:42 PM    LSW MET WITH PT AT BEDSIDE UPDATED PT. PT AGREED WITH DC PLAN TO [facility].   PT WOULD LIKE LSW TO CALL PT'S SON, FRANCES. LSW CALLED AND UNABLE TO LEAVE .   LSW CALLED NESSA, PT'S DAUGHTER VIA PHONE. UPDATED NESSA AND AGREED WITH DC PLAN TO [facility] WHEN MEDICALLY CLEAR.     Electronically signed by CHANNING Caban on 5/17/2024 at 1:13 PM    PER  RN PT IS ABLE TO GO. NEED TRANSPORTATION SET UP.   [facility]SP[facility] SET [facility]  TIME @ 0630 PM.     NOTIFY RN, FACILITY, PT AND PT'S DAUGHTER, NESSA.     Electronically signed by CHANNING Caban on 5/17/2024 at 2:01 PM

## 2024-05-17 NOTE — PROGRESS NOTES
Verbal okay from Dr. Knott pt can be discharged  Verbal okay from pulmonology pt can be discharged with follow up in 2 wk with Dr. DICKINSON  Pt aware of  time 1830

## 2024-05-17 NOTE — PROGRESS NOTES
05/17/24 1900   RT Protocol   History Pulmonary Disease 2   Respiratory pattern 0   Breath sounds 0   Cough 0   Indications for Bronchodilator Therapy None   Bronchodilator Assessment Score 2

## 2024-05-17 NOTE — PROGRESS NOTES
05/17/24 0000   RT Protocol   History Pulmonary Disease 1   Respiratory pattern 0   Breath sounds 4   Cough 0   Bronchodilator Assessment Score 5

## 2024-05-18 LAB
AFB STAIN: NORMAL
BACTERIA SPEC RESP CULT: ABNORMAL
BACTERIA SPEC RESP CULT: ABNORMAL
ORGANISM: ABNORMAL
PRELIMINARY: NORMAL
PRELIMINARY: NORMAL

## 2024-05-18 NOTE — PROGRESS NOTES
2100- HS assessment completed. Vitals stable on RA. Tele off and IV out prior to start of this RN shift for DC. Pt reports mild SOB. 100% on RA. Wheezes noted to lower lobes. Plan of care/transfer discussed. Pt denies any needs at this time. Falls precautions in place. Call light in reach.    2220- [facility] to [facility]. Reports they will be here in 30 mins. Pt updated.    2300- Pt DCed to [facility] via ambulance with no distress noted. Dentures with pt. Pt reports she sent everything else home with family.

## 2024-05-22 ENCOUNTER — TELEPHONE (OUTPATIENT)
Dept: CARDIOLOGY CLINIC | Age: 89
End: 2024-05-22

## 2024-05-22 RX ORDER — METOPROLOL SUCCINATE 100 MG/1
100 TABLET, EXTENDED RELEASE ORAL DAILY
Qty: 90 TABLET | Refills: 3 | Status: CANCELLED | OUTPATIENT
Start: 2024-05-22

## 2024-05-22 NOTE — TELEPHONE ENCOUNTER
URIAH Callejas calling to find out what dosage of Metoprolol is supposed to take?   Since medications were changed while pt was in the hospital.     Metoprolol has 2 orders-  25 MG 3 tablets once daily or 100 MG twice a day.    Which order/ dosage did you want pt to take?    New onset of afib     Pt was seen by Dr Knott in the hospital and has a follow up with Dr Rangel on 6/3/24.      URIAH Callejas # [phone]

## 2024-05-23 ENCOUNTER — OFFICE VISIT (OUTPATIENT)
Dept: PULMONOLOGY | Age: 89
End: 2024-05-23

## 2024-05-23 DIAGNOSIS — R91.8 LUNG MASS: ICD-10-CM

## 2024-05-23 DIAGNOSIS — C34.92 SQUAMOUS CELL CARCINOMA OF LEFT LUNG (HCC): Primary | ICD-10-CM

## 2024-05-23 NOTE — PROGRESS NOTES
Unable to contact patient to complete phone visit, I spoke with her daughter Fanny, reviewed biopsy result, squamous cell carcinoma, she will need PET scan, PFT, and referral to radiation oncology.  Patient has appointment with medical oncology tomorrow.  All questions answered, will cancel visit for today will reschedule for July.

## 2024-05-25 LAB — PRELIMINARY: NORMAL

## 2024-05-31 RX ORDER — LEVOFLOXACIN 750 MG/1
750 TABLET, FILM COATED ORAL DAILY
Qty: 14 TABLET | Refills: 0 | Status: SHIPPED | OUTPATIENT
Start: 2024-05-31 | End: 2024-06-14

## 2024-05-31 NOTE — PROGRESS NOTES
Bronchoscopy culture positive for Pseudomonas will start treatment with levofloxacin for 2 weeks  Attempted to call patient and her daughter, no answer after multiple attempts, and unable to leave any voicemail.      Quyen could you please try to contact patient or her daughter please let her know that we will be starting levofloxacin.

## 2024-06-01 ENCOUNTER — APPOINTMENT (OUTPATIENT)
Dept: CT IMAGING | Age: 89
End: 2024-06-01
Payer: MEDICARE

## 2024-06-01 ENCOUNTER — HOSPITAL ENCOUNTER (EMERGENCY)
Age: 89
Discharge: HOME | End: 2024-06-01
Payer: MEDICARE

## 2024-06-01 VITALS
BODY MASS INDEX: 34.01 KG/M2 | TEMPERATURE: 97.6 F | RESPIRATION RATE: 18 BRPM | WEIGHT: 162.04 LBS | HEIGHT: 58 IN | SYSTOLIC BLOOD PRESSURE: 126 MMHG | HEART RATE: 85 BPM | OXYGEN SATURATION: 96 % | DIASTOLIC BLOOD PRESSURE: 76 MMHG

## 2024-06-01 DIAGNOSIS — M79.10 MUSCLE PAIN: Primary | ICD-10-CM

## 2024-06-01 LAB
ALBUMIN SERPL-MCNC: 3 G/DL (ref 3.5–4.6)
ALP SERPL-CCNC: 140 U/L (ref 40–130)
ALT SERPL-CCNC: 15 U/L (ref 0–33)
ANION GAP SERPL CALCULATED.3IONS-SCNC: 14 MEQ/L (ref 9–15)
AST SERPL-CCNC: 15 U/L (ref 0–35)
BASOPHILS # BLD: 0 K/UL (ref 0–0.2)
BASOPHILS NFR BLD: 0.2 %
BILIRUB SERPL-MCNC: 0.3 MG/DL (ref 0.2–0.7)
BUN SERPL-MCNC: 11 MG/DL (ref 8–23)
CALCIUM SERPL-MCNC: 9 MG/DL (ref 8.5–9.9)
CHLORIDE SERPL-SCNC: 95 MEQ/L (ref 95–107)
CO2 SERPL-SCNC: 25 MEQ/L (ref 20–31)
CREAT SERPL-MCNC: 0.59 MG/DL (ref 0.5–0.9)
EKG ATRIAL RATE: 80 BPM
EKG Q-T INTERVAL: 392 MS
EKG QRS DURATION: 82 MS
EKG QTC CALCULATION (BAZETT): 441 MS
EKG R AXIS: -19 DEGREES
EKG T AXIS: 34 DEGREES
EKG VENTRICULAR RATE: 76 BPM
EOSINOPHIL # BLD: 0.3 K/UL (ref 0–0.7)
EOSINOPHIL NFR BLD: 2.4 %
ERYTHROCYTE [DISTWIDTH] IN BLOOD BY AUTOMATED COUNT: 14.9 % (ref 11.5–14.5)
GLOBULIN SER CALC-MCNC: 3.5 G/DL (ref 2.3–3.5)
GLUCOSE SERPL-MCNC: 208 MG/DL (ref 70–99)
HCT VFR BLD AUTO: 41.7 % (ref 37–47)
HGB BLD-MCNC: 12.9 G/DL (ref 12–16)
LYMPHOCYTES # BLD: 1.9 K/UL (ref 1–4.8)
LYMPHOCYTES NFR BLD: 16.4 %
MAGNESIUM SERPL-MCNC: 2.3 MG/DL (ref 1.7–2.4)
MCH RBC QN AUTO: 26.4 PG (ref 27–31.3)
MCHC RBC AUTO-ENTMCNC: 30.9 % (ref 33–37)
MCV RBC AUTO: 85.5 FL (ref 79.4–94.8)
MONOCYTES # BLD: 0.8 K/UL (ref 0.2–0.8)
MONOCYTES NFR BLD: 6.5 %
NEUTROPHILS # BLD: 8.6 K/UL (ref 1.4–6.5)
NEUTS SEG NFR BLD: 73.7 %
PERFORMED ON: NORMAL
PLATELET # BLD AUTO: 322 K/UL (ref 130–400)
POC CREATININE WHOLE BLOOD: 0.6
POC CREATININE: 0.6 MG/DL (ref 0.6–1.2)
POC SAMPLE TYPE: NORMAL
POTASSIUM SERPL-SCNC: 4.9 MEQ/L (ref 3.4–4.9)
PROT SERPL-MCNC: 6.5 G/DL (ref 6.3–8)
RBC # BLD AUTO: 4.88 M/UL (ref 4.2–5.4)
SODIUM SERPL-SCNC: 134 MEQ/L (ref 135–144)
TROPONIN, HIGH SENSITIVITY: 10 NG/L (ref 0–19)
TROPONIN, HIGH SENSITIVITY: 10 NG/L (ref 0–19)
WBC # BLD AUTO: 11.6 K/UL (ref 4.8–10.8)

## 2024-06-01 PROCEDURE — 83735 ASSAY OF MAGNESIUM: CPT

## 2024-06-01 PROCEDURE — 6360000004 HC RX CONTRAST MEDICATION

## 2024-06-01 PROCEDURE — 36415 COLL VENOUS BLD VENIPUNCTURE: CPT

## 2024-06-01 PROCEDURE — 80053 COMPREHEN METABOLIC PANEL: CPT

## 2024-06-01 PROCEDURE — 84484 ASSAY OF TROPONIN QUANT: CPT

## 2024-06-01 PROCEDURE — 93005 ELECTROCARDIOGRAM TRACING: CPT

## 2024-06-01 PROCEDURE — 6370000000 HC RX 637 (ALT 250 FOR IP)

## 2024-06-01 PROCEDURE — 85025 COMPLETE CBC W/AUTO DIFF WBC: CPT

## 2024-06-01 PROCEDURE — 71275 CT ANGIOGRAPHY CHEST: CPT

## 2024-06-01 PROCEDURE — 99285 EMERGENCY DEPT VISIT HI MDM: CPT

## 2024-06-01 RX ORDER — LIDOCAINE 4 G/G
1 PATCH TOPICAL ONCE
Status: DISCONTINUED | OUTPATIENT
Start: 2024-06-01 | End: 2024-06-01 | Stop reason: HOSPADM

## 2024-06-01 RX ORDER — LIDOCAINE 4 G/G
1 PATCH TOPICAL DAILY
Qty: 30 PATCH | Refills: 0 | Status: SHIPPED | OUTPATIENT
Start: 2024-06-01 | End: 2024-07-01

## 2024-06-01 RX ADMIN — IOPAMIDOL 75 ML: 755 INJECTION, SOLUTION INTRAVENOUS at 03:29

## 2024-06-01 ASSESSMENT — ENCOUNTER SYMPTOMS
NAUSEA: 0
SHORTNESS OF BREATH: 0
ABDOMINAL PAIN: 0
VOMITING: 0
COUGH: 0
DIARRHEA: 0
PHOTOPHOBIA: 0

## 2024-06-01 ASSESSMENT — PAIN SCALES - GENERAL
PAINLEVEL_OUTOF10: 5
PAINLEVEL_OUTOF10: 5

## 2024-06-01 ASSESSMENT — PAIN DESCRIPTION - DESCRIPTORS
DESCRIPTORS: ACHING
DESCRIPTORS: ACHING;PRESSURE

## 2024-06-01 ASSESSMENT — PAIN DESCRIPTION - LOCATION
LOCATION: BACK;ARM
LOCATION: BACK

## 2024-06-01 ASSESSMENT — PAIN DESCRIPTION - ONSET
ONSET: ON-GOING
ONSET: AWAKENED FROM SLEEP

## 2024-06-01 ASSESSMENT — PAIN DESCRIPTION - FREQUENCY
FREQUENCY: CONTINUOUS
FREQUENCY: CONTINUOUS

## 2024-06-01 ASSESSMENT — PAIN DESCRIPTION - PAIN TYPE
TYPE: CHRONIC PAIN
TYPE: ACUTE PAIN

## 2024-06-01 ASSESSMENT — PAIN DESCRIPTION - ORIENTATION: ORIENTATION: LEFT

## 2024-06-01 ASSESSMENT — PAIN - FUNCTIONAL ASSESSMENT
PAIN_FUNCTIONAL_ASSESSMENT: 0-10
PAIN_FUNCTIONAL_ASSESSMENT: 0-10

## 2024-06-01 NOTE — ED TRIAGE NOTES
Pt to ER with C/O left sided shoulder and back pain that radiates up into her left neck. Pt reports increase weakness to the left arm. Pt reports pain woke her up et was given tylenol and a warm compress of pain control. Reports not effective. Keagan SOB or injury to area. On arrival pt alert and oriented x 4. Bruises noted to arms.

## 2024-06-01 NOTE — ED PROVIDER NOTES
longer appreciating left arm weakness and she has improved left-sided movement of her neck with decreased pain she states.  EKG shows rate 76 irregular rhythm, patient with history of A-fib, no ischemia.  Troponins are flat at 10, 10.  Remaining labs reassuring.  CTA chest does not show evidence of acute aortic pathology, malignancy changes that are relatively stable.  Patient appropriate for discharge back to nursing facility as she is feeling better and workup reassuring.  She is agreeable to this plan with no questions.    REASSESSMENT         CRITICAL CARE TIME   Total Critical Care time was 0 minutes, excluding separately reportable procedures.  There was a high probability of clinically significant/life threatening deterioration in the patient's condition which required my urgent intervention.      CONSULTS:  None    PROCEDURES:  Unless otherwise noted below, none     Procedures        FINAL IMPRESSION      1. Muscle pain          DISPOSITION/PLAN   DISPOSITION Discharge - Pending Orders Complete 06/01/2024 05:03:53 AM      PATIENT REFERRED TO:  Shashi Gan MD  [id number] [id number] [phone]    Schedule an appointment as soon as possible for a visit   As needed      DISCHARGE MEDICATIONS:  New Prescriptions    LIDOCAINE 4 % EXTERNAL PATCH    Place 1 patch onto the skin daily     Controlled Substances Monitoring:          No data to display                (Please note that portions of this note were completed with a voice recognition program.  Efforts were made to edit the dictations but occasionally words are mis-transcribed.)    TYLER Garrison (electronically signed)  Attending Emergency Physician  Supervising Physician Zoraida Lind PA  06/01/24 0519

## 2024-06-03 ENCOUNTER — HOSPITAL ENCOUNTER (OUTPATIENT)
Dept: RADIATION ONCOLOGY | Age: 89
Discharge: HOME | End: 2024-06-03
Payer: MEDICARE

## 2024-06-03 ENCOUNTER — OFFICE VISIT (OUTPATIENT)
Dept: CARDIOLOGY CLINIC | Age: 89
End: 2024-06-03
Payer: MEDICARE

## 2024-06-03 VITALS
BODY MASS INDEX: 32.25 KG/M2 | HEIGHT: 59 IN | WEIGHT: 160 LBS | OXYGEN SATURATION: 95 % | SYSTOLIC BLOOD PRESSURE: 125 MMHG | TEMPERATURE: 96.2 F | DIASTOLIC BLOOD PRESSURE: 54 MMHG | HEART RATE: 80 BPM | RESPIRATION RATE: 18 BRPM

## 2024-06-03 VITALS
OXYGEN SATURATION: 98 % | DIASTOLIC BLOOD PRESSURE: 68 MMHG | SYSTOLIC BLOOD PRESSURE: 120 MMHG | HEART RATE: 82 BPM | RESPIRATION RATE: 14 BRPM

## 2024-06-03 DIAGNOSIS — R06.02 SHORTNESS OF BREATH: ICD-10-CM

## 2024-06-03 DIAGNOSIS — E11.9 TYPE 2 DIABETES MELLITUS WITHOUT COMPLICATION, WITHOUT LONG-TERM CURRENT USE OF INSULIN (HCC): ICD-10-CM

## 2024-06-03 DIAGNOSIS — I10 ESSENTIAL HYPERTENSION: ICD-10-CM

## 2024-06-03 DIAGNOSIS — R09.89 BILATERAL CAROTID BRUITS: ICD-10-CM

## 2024-06-03 DIAGNOSIS — R06.09 DOE (DYSPNEA ON EXERTION): ICD-10-CM

## 2024-06-03 DIAGNOSIS — I48.0 PAF (PAROXYSMAL ATRIAL FIBRILLATION) (HCC): ICD-10-CM

## 2024-06-03 DIAGNOSIS — C34.82 MALIGNANT NEOPLASM OF OVERLAPPING SITES OF LEFT LUNG (HCC): Primary | ICD-10-CM

## 2024-06-03 DIAGNOSIS — R07.9 CHEST PAIN, UNSPECIFIED TYPE: Primary | ICD-10-CM

## 2024-06-03 DIAGNOSIS — D68.69 SECONDARY HYPERCOAGULABLE STATE (HCC): ICD-10-CM

## 2024-06-03 LAB
EKG ATRIAL RATE: 80 BPM
EKG Q-T INTERVAL: 392 MS
EKG QRS DURATION: 82 MS
EKG QTC CALCULATION (BAZETT): 441 MS
EKG R AXIS: -19 DEGREES
EKG T AXIS: 34 DEGREES
EKG VENTRICULAR RATE: 76 BPM

## 2024-06-03 PROCEDURE — 99212 OFFICE O/P EST SF 10 MIN: CPT | Performed by: RADIOLOGY

## 2024-06-03 PROCEDURE — 93000 ELECTROCARDIOGRAM COMPLETE: CPT | Performed by: INTERNAL MEDICINE

## 2024-06-03 PROCEDURE — 1123F ACP DISCUSS/DSCN MKR DOCD: CPT | Performed by: INTERNAL MEDICINE

## 2024-06-03 PROCEDURE — 1111F DSCHRG MED/CURRENT MED MERGE: CPT | Performed by: INTERNAL MEDICINE

## 2024-06-03 PROCEDURE — 99214 OFFICE O/P EST MOD 30 MIN: CPT | Performed by: INTERNAL MEDICINE

## 2024-06-03 RX ORDER — ERGOCALCIFEROL 1.25 MG/1
50000 CAPSULE ORAL WEEKLY
COMMUNITY

## 2024-06-03 RX ORDER — ALBUTEROL SULFATE 0.63 MG/3ML
1 SOLUTION RESPIRATORY (INHALATION) EVERY 6 HOURS PRN
COMMUNITY

## 2024-06-03 RX ORDER — ALBUTEROL SULFATE 90 UG/1
2 AEROSOL, METERED RESPIRATORY (INHALATION) EVERY 6 HOURS PRN
COMMUNITY

## 2024-06-03 RX ORDER — ACETAMINOPHEN 325 MG/1
325 TABLET ORAL EVERY 6 HOURS PRN
COMMUNITY

## 2024-06-03 ASSESSMENT — ENCOUNTER SYMPTOMS
SHORTNESS OF BREATH: 0
EYES NEGATIVE: 1
NAUSEA: 0
COUGH: 0
CHEST TIGHTNESS: 0
GASTROINTESTINAL NEGATIVE: 1
STRIDOR: 0
RESPIRATORY NEGATIVE: 1
BLOOD IN STOOL: 0
WHEEZING: 0

## 2024-06-03 NOTE — PROGRESS NOTES
obesity due to excess calories with serious comorbidity and body mass index (BMI) of 36.0 to 36.9 in adult (HCC)    Gastroesophageal reflux disease without esophagitis    Primary osteoarthritis involving multiple joints    Abnormal gait    Risk for falls    Chronic midline low back pain without sciatica    Osteopenia    Dyslipidemia    Dizzy    History of cardiac radiofrequency ablation (RFA)    Acute respiratory failure with hypoxia (HCC)    Lung mass    Secondary hypercoagulable state (HCC)       There are no discontinued medications.    Modified Medications    No medications on file       No orders of the defined types were placed in this encounter.      Assessment/Plan:    1. Dizzy   stable    2. History of cardiac radiofrequency ablation (RFA)  SVT    3. Dyslipidemia  statin    4. Essential hypertension  Stable on meds    5. SVT (supraventricular tachycardia) (HCC)    6. PAF - in SR continue rate control and Eliquis    7. CADENA/CP - SPECT.     8/ Carotid Bruits - CUS          Counseling:  Heart Healthy Lifestyle, Improve BMI, Low Salt Diet, Take Precautions to Prevent Falls, Regular Exercise and Walk Daily    Return for AFTER TESTS.      Electronically signed by RUT TANG MD on 6/3/2024 at 2:37 PM

## 2024-06-03 NOTE — PROGRESS NOTES
RAY met with pt, her daughter, Fanny, and grandson, Robert, as pt is being seen for consultation in radiation oncology for treatment of lung cancer.  Pt endorsed a distress of 0/10 today. It is noted, however, that pt is currently in a nursing facility following her hospital stay, and she is candid that this is causing her some distress. She is hoping to be discharged soon, but the timeline is uncertain for the family.      Fanny reports that she is currently making application to the [employer] program on behalf of her mother, to receive home health assistance through the Medicaid program. She states ideally, a home health aid would assist pt by day, and Fanny will stay with her mother in the evenings.  Fanny also offers that pt may stay at her home at present. While this is being resolved, Fanny affirms that they will speak with the   or  to discuss transportation and plans.  Should pt be discharged, Robert will transport his grandmother to and from treatment.      Pt does have completed Advance Directive documents on file in [place]. SW provided family with copies of these, per their request.  Because of address changes, new documents were also provided, as pt states her wishes remain the same, but simply to update Fanny's address.  Pt invited to seek SW assistance with updating forms, and also instructed family how to do this independently based on their preference.    Supportive services at the cancer center were discussed.  SW contact information was provided, and family invited to contact RAY should they have further question or concern, to which they were agreeable. SW will continue to follow through radiation treatment.

## 2024-06-03 NOTE — CONSULTS
NURSING ASSESSMENT     Date: 6/3/2024        Patient Name: Dayami Christensen     YOB: 1934      Age:  89 y.o.      MRN: [id number]       Chaperone [x] Yes   [] No  Daughter-Fanny and Grandson-Robert    Advance Directives:   Do you currently have completed advance directives (living will)? [x] Yes   [] No         *If yes, please bring us a copy for your records.  *If no, would you like info or assistance in completing advance directives (living will)?   [] Yes   [x] No    Pain Score:   Pain Score (1-10): Mild to severe   Pain Location: Neck and left shoulder   Pain Duration: Started in the last couple days   Pain Management/Control: Lidocaine patches and Tylenol with mininal relief      Is pain affecting your ability to take care of yourself or move throughout your home?                        [x] Yes   [] No    General: No Problems-appetite is good  Patient has gained weight [] Yes   [x] No  Patient has lost weight [] Yes   [x] No  How much weight in pounds and over what length of time:     Eyes (Ophthalmic): Excessive watery eyes  recently     Skin (Dermatological): Bruises to arms     ENT: Dentures     Respiratory: Chronic dry cough and CADENA. Using O2 at 2L NC since 5/10/24     Cardiovascular: Chest Pain with CADENA in the last couple weeks. Bilat LE edema 2+ for the last 2 weeks.      Device   [] Yes   [x] No   Copy of Card Obtained [] Yes   [x] No    Gastrointestinal: No Problems    Genito-Urinary:        Urge Incontinence at baseline       Breast: No Problems     Musculoskeletal: Uses either rolator or walker for ambulation. C/O to neck and left shoulder for the last couple weeks.    Neurological: Weakness to arms since starting P.T. Dizzy episodes for the last 2 weeks      Hematological and Lymphatic: Easy Bruising     Endocrine: Diabetes Mellitus    Gyn History:   /Para: 9 live children  Age of Menarche: unknown  Age of Menopause;unknown  Vaginal Bleeding: None    First Pregnancy: unknown  Breast

## 2024-06-05 ENCOUNTER — HOSPITAL ENCOUNTER (OUTPATIENT)
Dept: CT IMAGING | Age: 89
Discharge: HOME | End: 2024-06-07
Payer: MEDICARE

## 2024-06-05 ENCOUNTER — CLINICAL DOCUMENTATION (OUTPATIENT)
Dept: RADIATION ONCOLOGY | Age: 89
End: 2024-06-05

## 2024-06-05 DIAGNOSIS — C34.92 SQUAMOUS CELL CARCINOMA OF LEFT LUNG (HCC): ICD-10-CM

## 2024-06-05 DIAGNOSIS — R91.8 LUNG MASS: ICD-10-CM

## 2024-06-05 PROCEDURE — 3430000000 HC RX DIAGNOSTIC RADIOPHARMACEUTICAL: Performed by: INTERNAL MEDICINE

## 2024-06-05 PROCEDURE — A9609 HC RX DIAGNOSTIC RADIOPHARMACEUTICAL: HCPCS | Performed by: INTERNAL MEDICINE

## 2024-06-05 PROCEDURE — 78815 PET IMAGE W/CT SKULL-THIGH: CPT

## 2024-06-05 RX ORDER — FLUDEOXYGLUCOSE F 18 200 MCI/ML
15.7 INJECTION, SOLUTION INTRAVENOUS
Status: COMPLETED | OUTPATIENT
Start: 2024-06-05 | End: 2024-06-05

## 2024-06-05 RX ADMIN — FLUDEOXYGLUCOSE F 18 15.7 MILLICURIE: 200 INJECTION, SOLUTION INTRAVENOUS at 11:20

## 2024-06-05 NOTE — PROGRESS NOTES
RAY received telephone call from pt's daughter Fanny, who states that pt is to be discharged home from [facility] tomorrow, and she does not have an oxygen order. Fanny states she is very worried about this.     Per Fanny's wishes, RAY contacted Florencedaya Deshpande at the nursing home, who assured that pt is being evaluated tomorrow (as of now she does not meet criteria for home 02 but they are still assessing).  She stated this is being overseen by nurse practitioner at the facility. She also states pt will be sent home with rescue inhaler and any other indicated medical equipment.  Ms. Deshpande reports they are working together with pt and her family for discharge plans.    ARY contacted Fanny and related information of telephone call. She states she understands the process, feels more reassured, and will continue to coordinate with [facility].

## 2024-06-07 ENCOUNTER — CARE COORDINATION (OUTPATIENT)
Dept: CASE MANAGEMENT | Age: 89
End: 2024-06-07

## 2024-06-07 ENCOUNTER — CARE COORDINATION (OUTPATIENT)
Dept: CARE COORDINATION | Age: 89
End: 2024-06-07

## 2024-06-07 DIAGNOSIS — I48.91 NEW ONSET ATRIAL FIBRILLATION (HCC): Primary | ICD-10-CM

## 2024-06-07 PROCEDURE — 1111F DSCHRG MED/CURRENT MED MERGE: CPT | Performed by: INTERNAL MEDICINE

## 2024-06-07 NOTE — CARE COORDINATION
Care Transitions Outreach Attempt    Call within 2 business days of discharge: Yes     Attempted to reach patient for initial transitions of care call. Unable to reach patient.Caller left a HIPAA compliant message with contact information asking for a return call.    Patient: Dayami Christensen Patient : 1934 MRN: <[id number]>    Last Discharge Facility       Date Complaint Diagnosis Description Type Department Provider    24 Back Pain Muscle pain ED (DISCHARGE) [facility]               Was this an external facility discharge? Yes, 24  Discharge Facility Name: [facility]    Noted following upcoming appointments from discharge chart review:   [facility] follow up appointment(s):   Future Appointments   Date Time Provider Department Center   2024  8:45 AM [facility] ROOM 1 [facility]   2024 10:00 AM SCHEDULE, MLOZ CT SIM [facility] RAD ONC [facility]   2024 11:45 AM Melva Agrawal MD [facility]

## 2024-06-07 NOTE — CARE COORDINATION
Care Transitions Initial Follow Up Call    Call within 2 business days of discharge: Yes    Patient Current Location:  Home: [address] [place], [place] [id number]    Care Transition Nurse contacted the family by telephone to perform post hospital discharge assessment. Verified name and  with family as identifiers. Provided introduction to self, and explanation of the Care Transition Nurse role.     Patient: Dayami Christensen Patient : 1934   MRN: [id number]  Reason for Admission: Acute respiratory failure with hypoxia  Discharge Date: 24 RARS: Readmission Risk Score: 14      Last Discharge Facility       Date Complaint Diagnosis Description Type Department Provider    24 Back Pain Muscle pain ED (DISCHARGE) [facility]             Was this an external facility discharge? Yes, 24  Discharge Facility: [facility]     Challenges to be reviewed by the provider   Additional needs identified to be addressed with provider: No  none               Method of communication with provider: none  Spoke with daughter Fanny. She states that patient is doing very well. She had a good nights sleep. Denies sob, cp, chest tightness/palpitations, n/v. Patient is on O2@2L continuous. BLE edema. Fanny states patient is taking Lasix. Dose unknown, also not in Epic. Appetite good. She is drinking water. Elimination is regular. Medications regular. Patient will be evaluated for aide services by the [facility] on aging on 6/10/24. She is scheduled for a MRI 24. She is scheduled to see oncologist on 24. Fanny states patient is set up with [facility]. No contact made. Request to  to confirm referral received. CTN explained that patient will need PCP appointment. She states she will call.    Care Transition Nurse reviewed medical action plan with family who verbalized understanding. The family was given an opportunity to ask questions and does not have any further questions or concerns

## 2024-06-07 NOTE — CARE COORDINATION
Contacted [facility] to confirm referral received and SOC date.  My contact information was provided and now waiting for a call back from facility.

## 2024-06-11 ENCOUNTER — HOSPITAL ENCOUNTER (OUTPATIENT)
Dept: GENERAL RADIOLOGY | Age: 89
Discharge: HOME | End: 2024-06-13
Payer: MEDICARE

## 2024-06-11 ENCOUNTER — HOSPITAL ENCOUNTER (OUTPATIENT)
Dept: MRI IMAGING | Age: 89
Discharge: HOME | End: 2024-06-13
Payer: MEDICARE

## 2024-06-11 DIAGNOSIS — C77.1 SECONDARY AND UNSPECIFIED MALIGNANT NEOPLASM OF INTRATHORACIC LYMPH NODES (HCC): ICD-10-CM

## 2024-06-11 DIAGNOSIS — C34.12 SQUAMOUS CELL CARCINOMA OF BRONCHUS IN LEFT UPPER LOBE (HCC): ICD-10-CM

## 2024-06-11 DIAGNOSIS — T15.92XS: ICD-10-CM

## 2024-06-11 PROCEDURE — 6360000004 HC RX CONTRAST MEDICATION: Performed by: INTERNAL MEDICINE

## 2024-06-11 PROCEDURE — A9577 INJ MULTIHANCE: HCPCS | Performed by: INTERNAL MEDICINE

## 2024-06-11 PROCEDURE — 70553 MRI BRAIN STEM W/O & W/DYE: CPT

## 2024-06-11 PROCEDURE — 70200 X-RAY EXAM OF EYE SOCKETS: CPT

## 2024-06-11 RX ADMIN — GADOBENATE DIMEGLUMINE 20 ML: 529 INJECTION, SOLUTION INTRAVENOUS at 12:09

## 2024-06-11 NOTE — CARE COORDINATION
Contacted [facility] to confirm referral received and SOC date.  SOC was 6/10/2024- will notify CTN at this time.

## 2024-06-12 ENCOUNTER — HOSPITAL ENCOUNTER (OUTPATIENT)
Dept: RADIATION ONCOLOGY | Age: 89
Discharge: HOME | End: 2024-06-12
Payer: MEDICARE

## 2024-06-12 DIAGNOSIS — R91.8 LUNG MASS: Primary | ICD-10-CM

## 2024-06-12 PROCEDURE — 77334 RADIATION TREATMENT AID(S): CPT | Performed by: RADIOLOGY

## 2024-06-12 NOTE — PROGRESS NOTES
Teaching & Instructions - Chest  General  Simulation  Initial Treatment  Self-Care Info  Nutrition  Social Service    Site-Specific  Side Effects  Cough Mgmt  Esophagitis/Pharyngitis  Fatigue Mgmt  Pain Mgmt  SOB Mgmt  Skin Care    Other/Prevention  Smoking Cessation    Educational Handouts  Radiation Therapy & You  Site Specific Instructions  Radiation Oncology Dept Information  CBMS Program    Patient eager to learn, verbalized understanding of verbal education and handouts.

## 2024-06-13 PROCEDURE — 77338 DESIGN MLC DEVICE FOR IMRT: CPT | Performed by: RADIOLOGY

## 2024-06-13 PROCEDURE — 77300 RADIATION THERAPY DOSE PLAN: CPT | Performed by: RADIOLOGY

## 2024-06-13 PROCEDURE — 77293 RESPIRATOR MOTION MGMT SIMUL: CPT | Performed by: RADIOLOGY

## 2024-06-13 PROCEDURE — 77301 RADIOTHERAPY DOSE PLAN IMRT: CPT | Performed by: RADIOLOGY

## 2024-06-15 LAB
FINAL REPORT: NORMAL
PRELIMINARY: NORMAL

## 2024-06-17 ENCOUNTER — HOSPITAL ENCOUNTER (OUTPATIENT)
Dept: RADIATION ONCOLOGY | Age: 89
Discharge: HOME | End: 2024-06-17
Payer: MEDICARE

## 2024-06-17 DIAGNOSIS — C34.82 MALIGNANT NEOPLASM OF OVERLAPPING SITES OF LEFT LUNG (HCC): Primary | ICD-10-CM

## 2024-06-17 PROCEDURE — 77386 HC NTSTY MODUL RAD TX DLVR CPLX: CPT | Performed by: RADIOLOGY

## 2024-06-17 PROCEDURE — 77014 CHG CT GUIDANCE RADIATION THERAPY FLDS PLACEMENT: CPT | Performed by: RADIOLOGY

## 2024-06-18 ENCOUNTER — HOSPITAL ENCOUNTER (OUTPATIENT)
Dept: RADIATION ONCOLOGY | Age: 89
Discharge: HOME | End: 2024-06-18
Payer: MEDICARE

## 2024-06-18 PROCEDURE — 77386 HC NTSTY MODUL RAD TX DLVR CPLX: CPT | Performed by: RADIOLOGY

## 2024-06-18 PROCEDURE — 77014 CHG CT GUIDANCE RADIATION THERAPY FLDS PLACEMENT: CPT | Performed by: RADIOLOGY

## 2024-06-19 ENCOUNTER — HOSPITAL ENCOUNTER (OUTPATIENT)
Dept: RADIATION ONCOLOGY | Age: 89
Discharge: HOME | End: 2024-06-19
Payer: MEDICARE

## 2024-06-19 PROCEDURE — 77386 HC NTSTY MODUL RAD TX DLVR CPLX: CPT | Performed by: RADIOLOGY

## 2024-06-20 ENCOUNTER — HOSPITAL ENCOUNTER (OUTPATIENT)
Dept: RADIATION ONCOLOGY | Age: 89
Discharge: HOME | End: 2024-06-20
Payer: MEDICARE

## 2024-06-20 VITALS
SYSTOLIC BLOOD PRESSURE: 118 MMHG | TEMPERATURE: 97.7 F | DIASTOLIC BLOOD PRESSURE: 59 MMHG | OXYGEN SATURATION: 91 % | HEART RATE: 93 BPM | BODY MASS INDEX: 32.44 KG/M2 | RESPIRATION RATE: 20 BRPM | WEIGHT: 160.6 LBS

## 2024-06-20 DIAGNOSIS — C34.82 MALIGNANT NEOPLASM OF OVERLAPPING SITES OF LEFT LUNG (HCC): Primary | ICD-10-CM

## 2024-06-20 PROCEDURE — 77386 HC NTSTY MODUL RAD TX DLVR CPLX: CPT | Performed by: RADIOLOGY

## 2024-06-20 NOTE — PROGRESS NOTES
[facility]           Radiation Oncology      [address] [id number]        O: [phone]        F: [phone]       [facility]                   Dr. José Garrido MD PhD    ON TREATMENT VISIT (OTV) NOTE     Date of Service: 2024  Patient ID: Dayami Christensen   : 1934  MRN: [id number]   Acct Number: [id number]     DIAGNOSIS:   Cancer Staging   Malignant neoplasm of overlapping sites of left lung (HCC)  Staging form: Lung, AJCC 8th Edition  - Clinical: Stage IV (cT2b, cN2, cM1) - Signed by José Garrido MD on 2024      Treatment Area: Thoracic    Current Total Dose(cGy): 1600  Current Fraction: 4    Patient was seen today for weekly visit.     Wt Readings from Last 3 Encounters:   24 72.8 kg (160 lb 9.6 oz)   24 72.6 kg (160 lb)   24 73.5 kg (162 lb 0.6 oz)       BP (!) 118/59   Pulse 93   Temp 97.7 °F (36.5 °C)   Resp 20   Wt 72.8 kg (160 lb 9.6 oz)   SpO2 91%   BMI 32.44 kg/m²     Lab Results   Component Value Date    WBC 11.6 (H) 2024    HGB 12.9 2024    HCT 41.7 2024     2024       Comfort Alteration  Fatigue: baseline, takes naps throughout the day    Pain Location: left chest and back  Pain Intensity (Current):   Pain Treatment:   Pain Relief: n/a    Emotional Alteration:   Coping: effective    Nutritional Alteration  Anorexia: none   Nausea: No nausea noted  Vomiting: No vomiting   Dyspepsia/Heartburn: None  Dysphagia/Esophagitis: None    Skin Alteration   Skin reaction: No changes noted, pt encouraged to use aquaphor to left chest and back  Alopecia: No loss    Mucous Membrane Alteration  Mucositis XRT Related: None  Pharynx and Esophagus- Acute: No change over baseline  Voice Changes: Normal    Ventilation Alteration  Cough: Nonproductive  Hemoptysis: None  Dyspnea:  \"my breathing is killing me\" , is SOB at rest and when eating and CADENA. Pulse ox 91% with oxygen at 2l nc. Reported to

## 2024-06-21 ENCOUNTER — HOSPITAL ENCOUNTER (OUTPATIENT)
Dept: GENERAL RADIOLOGY | Age: 89
End: 2024-06-21
Attending: INTERNAL MEDICINE
Payer: MEDICARE

## 2024-06-21 ENCOUNTER — OFFICE VISIT (OUTPATIENT)
Dept: PULMONOLOGY | Age: 89
End: 2024-06-21
Payer: MEDICARE

## 2024-06-21 ENCOUNTER — HOSPITAL ENCOUNTER (INPATIENT)
Age: 89
LOS: 11 days | Discharge: HOME HEALTH | DRG: 181 | End: 2024-07-02
Attending: EMERGENCY MEDICINE | Admitting: INTERNAL MEDICINE
Payer: MEDICARE

## 2024-06-21 ENCOUNTER — APPOINTMENT (OUTPATIENT)
Dept: RADIATION ONCOLOGY | Age: 89
End: 2024-06-21
Payer: MEDICARE

## 2024-06-21 ENCOUNTER — APPOINTMENT (OUTPATIENT)
Dept: CT IMAGING | Age: 89
DRG: 181 | End: 2024-06-21
Payer: MEDICARE

## 2024-06-21 VITALS
WEIGHT: 160 LBS | TEMPERATURE: 97.6 F | HEART RATE: 86 BPM | DIASTOLIC BLOOD PRESSURE: 64 MMHG | OXYGEN SATURATION: 98 % | SYSTOLIC BLOOD PRESSURE: 118 MMHG | BODY MASS INDEX: 32.25 KG/M2 | HEIGHT: 59 IN

## 2024-06-21 DIAGNOSIS — R06.02 SOB (SHORTNESS OF BREATH): ICD-10-CM

## 2024-06-21 DIAGNOSIS — C34.92 SQUAMOUS CELL CARCINOMA OF LEFT LUNG (HCC): ICD-10-CM

## 2024-06-21 DIAGNOSIS — R91.8 LUNG MASS: ICD-10-CM

## 2024-06-21 DIAGNOSIS — E11.9 TYPE 2 DIABETES MELLITUS WITHOUT COMPLICATION, WITHOUT LONG-TERM CURRENT USE OF INSULIN (HCC): ICD-10-CM

## 2024-06-21 DIAGNOSIS — R05.9 COUGH, UNSPECIFIED TYPE: ICD-10-CM

## 2024-06-21 DIAGNOSIS — K21.9 GASTROESOPHAGEAL REFLUX DISEASE WITHOUT ESOPHAGITIS: ICD-10-CM

## 2024-06-21 DIAGNOSIS — E78.5 DYSLIPIDEMIA: ICD-10-CM

## 2024-06-21 DIAGNOSIS — C34.92 MALIGNANT NEOPLASM OF LEFT LUNG, UNSPECIFIED PART OF LUNG (HCC): Primary | ICD-10-CM

## 2024-06-21 DIAGNOSIS — J90 PLEURAL EFFUSION ON LEFT: ICD-10-CM

## 2024-06-21 DIAGNOSIS — R06.02 SOB (SHORTNESS OF BREATH): Primary | ICD-10-CM

## 2024-06-21 PROBLEM — J98.11 COLLAPSE OF LEFT LUNG: Status: ACTIVE | Noted: 2024-06-21

## 2024-06-21 LAB
ALBUMIN SERPL-MCNC: 3.3 G/DL (ref 3.5–4.6)
ALP SERPL-CCNC: 114 U/L (ref 40–130)
ALT SERPL-CCNC: 10 U/L (ref 0–33)
ANION GAP SERPL CALCULATED.3IONS-SCNC: 10 MEQ/L (ref 9–15)
AST SERPL-CCNC: 14 U/L (ref 0–35)
BASOPHILS # BLD: 0 K/UL (ref 0–0.2)
BASOPHILS NFR BLD: 0.1 %
BILIRUB SERPL-MCNC: 0.6 MG/DL (ref 0.2–0.7)
BNP BLD-MCNC: 1004 PG/ML
BUN SERPL-MCNC: 14 MG/DL (ref 8–23)
CALCIUM SERPL-MCNC: 9.5 MG/DL (ref 8.5–9.9)
CHLORIDE SERPL-SCNC: 93 MEQ/L (ref 95–107)
CO2 SERPL-SCNC: 35 MEQ/L (ref 20–31)
CREAT SERPL-MCNC: 0.49 MG/DL (ref 0.5–0.9)
EOSINOPHIL # BLD: 0.1 K/UL (ref 0–0.7)
EOSINOPHIL NFR BLD: 0.7 %
ERYTHROCYTE [DISTWIDTH] IN BLOOD BY AUTOMATED COUNT: 15.5 % (ref 11.5–14.5)
GLOBULIN SER CALC-MCNC: 3.6 G/DL (ref 2.3–3.5)
GLUCOSE BLD-MCNC: 256 MG/DL (ref 70–99)
GLUCOSE SERPL-MCNC: 214 MG/DL (ref 70–99)
HCT VFR BLD AUTO: 37.6 % (ref 37–47)
HGB BLD-MCNC: 11.5 G/DL (ref 12–16)
INR PPP: 1
LACTATE BLDV-SCNC: 1.7 MMOL/L (ref 0.5–2.2)
LYMPHOCYTES # BLD: 0.8 K/UL (ref 1–4.8)
LYMPHOCYTES NFR BLD: 5.7 %
MCH RBC QN AUTO: 26 PG (ref 27–31.3)
MCHC RBC AUTO-ENTMCNC: 30.6 % (ref 33–37)
MCV RBC AUTO: 85.1 FL (ref 79.4–94.8)
MONOCYTES # BLD: 0.6 K/UL (ref 0.2–0.8)
MONOCYTES NFR BLD: 4.3 %
NEUTROPHILS # BLD: 11.9 K/UL (ref 1.4–6.5)
NEUTS SEG NFR BLD: 88.5 %
PERFORMED ON: ABNORMAL
PLATELET # BLD AUTO: 321 K/UL (ref 130–400)
POTASSIUM SERPL-SCNC: 4.2 MEQ/L (ref 3.4–4.9)
PROT SERPL-MCNC: 6.9 G/DL (ref 6.3–8)
PROTHROMBIN TIME: 13.3 SEC (ref 12.3–14.9)
RBC # BLD AUTO: 4.42 M/UL (ref 4.2–5.4)
REASON FOR REJECTION: NORMAL
REJECTED TEST: NORMAL
SODIUM SERPL-SCNC: 138 MEQ/L (ref 135–144)
WBC # BLD AUTO: 13.4 K/UL (ref 4.8–10.8)

## 2024-06-21 PROCEDURE — 99214 OFFICE O/P EST MOD 30 MIN: CPT | Performed by: INTERNAL MEDICINE

## 2024-06-21 PROCEDURE — 6370000000 HC RX 637 (ALT 250 FOR IP): Performed by: INTERNAL MEDICINE

## 2024-06-21 PROCEDURE — 85025 COMPLETE CBC W/AUTO DIFF WBC: CPT

## 2024-06-21 PROCEDURE — 1123F ACP DISCUSS/DSCN MKR DOCD: CPT | Performed by: INTERNAL MEDICINE

## 2024-06-21 PROCEDURE — 6360000004 HC RX CONTRAST MEDICATION: Performed by: EMERGENCY MEDICINE

## 2024-06-21 PROCEDURE — 85610 PROTHROMBIN TIME: CPT

## 2024-06-21 PROCEDURE — 36415 COLL VENOUS BLD VENIPUNCTURE: CPT

## 2024-06-21 PROCEDURE — 2060000000 HC ICU INTERMEDIATE R&B

## 2024-06-21 PROCEDURE — 6360000002 HC RX W HCPCS: Performed by: INTERNAL MEDICINE

## 2024-06-21 PROCEDURE — 80053 COMPREHEN METABOLIC PANEL: CPT

## 2024-06-21 PROCEDURE — 99285 EMERGENCY DEPT VISIT HI MDM: CPT

## 2024-06-21 PROCEDURE — 94761 N-INVAS EAR/PLS OXIMETRY MLT: CPT

## 2024-06-21 PROCEDURE — 71046 X-RAY EXAM CHEST 2 VIEWS: CPT

## 2024-06-21 PROCEDURE — 83605 ASSAY OF LACTIC ACID: CPT

## 2024-06-21 PROCEDURE — 71275 CT ANGIOGRAPHY CHEST: CPT

## 2024-06-21 PROCEDURE — 94640 AIRWAY INHALATION TREATMENT: CPT

## 2024-06-21 PROCEDURE — 77336 RADIATION PHYSICS CONSULT: CPT | Performed by: RADIOLOGY

## 2024-06-21 RX ORDER — ACETAMINOPHEN 325 MG/1
650 TABLET ORAL EVERY 6 HOURS PRN
Status: DISCONTINUED | OUTPATIENT
Start: 2024-06-21 | End: 2024-07-02 | Stop reason: HOSPADM

## 2024-06-21 RX ORDER — INSULIN LISPRO 100 [IU]/ML
0-8 INJECTION, SOLUTION INTRAVENOUS; SUBCUTANEOUS
Status: DISCONTINUED | OUTPATIENT
Start: 2024-06-21 | End: 2024-07-02 | Stop reason: HOSPADM

## 2024-06-21 RX ORDER — DEXTROSE MONOHYDRATE 100 MG/ML
INJECTION, SOLUTION INTRAVENOUS CONTINUOUS PRN
Status: DISCONTINUED | OUTPATIENT
Start: 2024-06-21 | End: 2024-07-02 | Stop reason: HOSPADM

## 2024-06-21 RX ORDER — PREDNISONE 10 MG/1
20 TABLET ORAL DAILY
Qty: 10 TABLET | Refills: 0 | Status: SHIPPED | OUTPATIENT
Start: 2024-06-21 | End: 2024-06-21

## 2024-06-21 RX ORDER — POLYETHYLENE GLYCOL 3350 17 G/17G
17 POWDER, FOR SOLUTION ORAL DAILY PRN
Status: DISCONTINUED | OUTPATIENT
Start: 2024-06-21 | End: 2024-07-01

## 2024-06-21 RX ORDER — IPRATROPIUM BROMIDE AND ALBUTEROL SULFATE 2.5; .5 MG/3ML; MG/3ML
1 SOLUTION RESPIRATORY (INHALATION)
Status: DISCONTINUED | OUTPATIENT
Start: 2024-06-21 | End: 2024-06-22

## 2024-06-21 RX ORDER — ENOXAPARIN SODIUM 100 MG/ML
40 INJECTION SUBCUTANEOUS EVERY 24 HOURS
Status: DISCONTINUED | OUTPATIENT
Start: 2024-06-21 | End: 2024-06-25

## 2024-06-21 RX ORDER — ACETAMINOPHEN 650 MG/1
650 SUPPOSITORY RECTAL EVERY 6 HOURS PRN
Status: DISCONTINUED | OUTPATIENT
Start: 2024-06-21 | End: 2024-07-02 | Stop reason: HOSPADM

## 2024-06-21 RX ORDER — SODIUM CHLORIDE 0.9 % (FLUSH) 0.9 %
5-40 SYRINGE (ML) INJECTION EVERY 12 HOURS SCHEDULED
Status: DISCONTINUED | OUTPATIENT
Start: 2024-06-21 | End: 2024-07-02 | Stop reason: HOSPADM

## 2024-06-21 RX ORDER — METOPROLOL SUCCINATE 100 MG/1
100 TABLET, EXTENDED RELEASE ORAL DAILY
Status: DISCONTINUED | OUTPATIENT
Start: 2024-06-22 | End: 2024-07-02 | Stop reason: HOSPADM

## 2024-06-21 RX ORDER — PANTOPRAZOLE SODIUM 40 MG/1
40 TABLET, DELAYED RELEASE ORAL
Status: DISCONTINUED | OUTPATIENT
Start: 2024-06-22 | End: 2024-07-02 | Stop reason: HOSPADM

## 2024-06-21 RX ORDER — POTASSIUM CHLORIDE 7.45 MG/ML
10 INJECTION INTRAVENOUS PRN
Status: DISCONTINUED | OUTPATIENT
Start: 2024-06-21 | End: 2024-07-02 | Stop reason: HOSPADM

## 2024-06-21 RX ORDER — SODIUM CHLORIDE 9 MG/ML
INJECTION, SOLUTION INTRAVENOUS PRN
Status: DISCONTINUED | OUTPATIENT
Start: 2024-06-21 | End: 2024-07-02 | Stop reason: HOSPADM

## 2024-06-21 RX ORDER — MAGNESIUM SULFATE IN WATER 40 MG/ML
2000 INJECTION, SOLUTION INTRAVENOUS PRN
Status: DISCONTINUED | OUTPATIENT
Start: 2024-06-21 | End: 2024-07-02 | Stop reason: HOSPADM

## 2024-06-21 RX ORDER — INSULIN GLARGINE 100 [IU]/ML
0.15 INJECTION, SOLUTION SUBCUTANEOUS NIGHTLY
Status: DISCONTINUED | OUTPATIENT
Start: 2024-06-21 | End: 2024-07-02 | Stop reason: HOSPADM

## 2024-06-21 RX ORDER — ONDANSETRON 2 MG/ML
4 INJECTION INTRAMUSCULAR; INTRAVENOUS EVERY 6 HOURS PRN
Status: DISCONTINUED | OUTPATIENT
Start: 2024-06-21 | End: 2024-07-02 | Stop reason: HOSPADM

## 2024-06-21 RX ORDER — BUDESONIDE AND FORMOTEROL FUMARATE DIHYDRATE 160; 4.5 UG/1; UG/1
2 AEROSOL RESPIRATORY (INHALATION)
Status: DISCONTINUED | OUTPATIENT
Start: 2024-06-21 | End: 2024-07-02 | Stop reason: HOSPADM

## 2024-06-21 RX ORDER — POTASSIUM CHLORIDE 20 MEQ/1
40 TABLET, EXTENDED RELEASE ORAL PRN
Status: DISCONTINUED | OUTPATIENT
Start: 2024-06-21 | End: 2024-07-02 | Stop reason: HOSPADM

## 2024-06-21 RX ORDER — GLUCAGON 1 MG/ML
1 KIT INJECTION PRN
Status: DISCONTINUED | OUTPATIENT
Start: 2024-06-21 | End: 2024-07-02 | Stop reason: HOSPADM

## 2024-06-21 RX ORDER — BUDESONIDE, GLYCOPYRROLATE, AND FORMOTEROL FUMARATE 160; 9; 4.8 UG/1; UG/1; UG/1
2 AEROSOL, METERED RESPIRATORY (INHALATION) 2 TIMES DAILY
Qty: 1 EACH | Refills: 0 | Status: ON HOLD | OUTPATIENT
Start: 2024-06-21

## 2024-06-21 RX ORDER — ATORVASTATIN CALCIUM 40 MG/1
40 TABLET, FILM COATED ORAL DAILY
Status: DISCONTINUED | OUTPATIENT
Start: 2024-06-22 | End: 2024-07-02 | Stop reason: HOSPADM

## 2024-06-21 RX ORDER — INSULIN LISPRO 100 [IU]/ML
0-4 INJECTION, SOLUTION INTRAVENOUS; SUBCUTANEOUS NIGHTLY
Status: DISCONTINUED | OUTPATIENT
Start: 2024-06-21 | End: 2024-07-02 | Stop reason: HOSPADM

## 2024-06-21 RX ORDER — DILTIAZEM HYDROCHLORIDE 120 MG/1
120 CAPSULE, COATED, EXTENDED RELEASE ORAL DAILY
Status: DISCONTINUED | OUTPATIENT
Start: 2024-06-22 | End: 2024-07-02 | Stop reason: HOSPADM

## 2024-06-21 RX ORDER — ONDANSETRON 4 MG/1
4 TABLET, ORALLY DISINTEGRATING ORAL EVERY 8 HOURS PRN
Status: DISCONTINUED | OUTPATIENT
Start: 2024-06-21 | End: 2024-07-02 | Stop reason: HOSPADM

## 2024-06-21 RX ORDER — SODIUM CHLORIDE 0.9 % (FLUSH) 0.9 %
5-40 SYRINGE (ML) INJECTION PRN
Status: DISCONTINUED | OUTPATIENT
Start: 2024-06-21 | End: 2024-07-02 | Stop reason: HOSPADM

## 2024-06-21 RX ADMIN — ACETAMINOPHEN 325MG 650 MG: 325 TABLET ORAL at 17:48

## 2024-06-21 RX ADMIN — INSULIN GLARGINE 11 UNITS: 100 INJECTION, SOLUTION SUBCUTANEOUS at 20:16

## 2024-06-21 RX ADMIN — IOPAMIDOL 75 ML: 755 INJECTION, SOLUTION INTRAVENOUS at 15:18

## 2024-06-21 RX ADMIN — IPRATROPIUM BROMIDE AND ALBUTEROL SULFATE 1 DOSE: 2.5; .5 SOLUTION RESPIRATORY (INHALATION) at 19:21

## 2024-06-21 RX ADMIN — BUDESONIDE AND FORMOTEROL FUMARATE DIHYDRATE 2 PUFF: 160; 4.5 AEROSOL RESPIRATORY (INHALATION) at 19:21

## 2024-06-21 RX ADMIN — ENOXAPARIN SODIUM 40 MG: 100 INJECTION SUBCUTANEOUS at 17:48

## 2024-06-21 ASSESSMENT — PAIN DESCRIPTION - LOCATION: LOCATION: SHOULDER

## 2024-06-21 ASSESSMENT — LIFESTYLE VARIABLES
HOW OFTEN DO YOU HAVE A DRINK CONTAINING ALCOHOL: NEVER
HOW MANY STANDARD DRINKS CONTAINING ALCOHOL DO YOU HAVE ON A TYPICAL DAY: PATIENT DOES NOT DRINK

## 2024-06-21 ASSESSMENT — PAIN SCALES - GENERAL: PAINLEVEL_OUTOF10: 6

## 2024-06-21 ASSESSMENT — PAIN DESCRIPTION - PAIN TYPE: TYPE: ACUTE PAIN

## 2024-06-21 ASSESSMENT — PAIN - FUNCTIONAL ASSESSMENT: PAIN_FUNCTIONAL_ASSESSMENT: 0-10

## 2024-06-21 ASSESSMENT — PAIN DESCRIPTION - ORIENTATION: ORIENTATION: LEFT

## 2024-06-21 NOTE — ED PROVIDER NOTES
[facility] ED  EMERGENCY DEPARTMENT ENCOUNTER      Pt Name: Dayami Christensen  MRN: [id number]  Birthdate 11/5/1934  Date of evaluation: 6/21/2024  Provider: Laura Rao DO    CHIEF COMPLAINT       Chief Complaint   Patient presents with    Shortness of Breath     SOB, collapsed lung, lung ca         HISTORY OF PRESENT ILLNESS   (Location/Symptom, Timing/Onset, Context/Setting, Quality, Duration, Modifying Factors, Severity)  Note limiting factors.   Dayami Christensen is a 89 y.o. female who presents to the emergency department .  Patient was sent to the ER by , pulmonologist.  Patient has known lung mass and today chest x-ray showed complete opacification of the left hemithorax.  Family states the patient does not seem any more short of breath than usual.  She gets very short of breath with exertion but if she stops for a minute she generally recovers and can go on.  Patient has had radiation to that left lung.  History of diabetes chronic kidney disease hyperlipidemia hypertension and lung cancer.    HPI    Nursing Notes were reviewed.    REVIEW OF SYSTEMS    (2-9 systems for level 4, 10 or more for level 5)     Review of Systems   Constitutional:  Negative for activity change, appetite change and fatigue.   HENT:  Negative for congestion and sore throat.    Eyes:  Negative for pain and visual disturbance.   Respiratory:  Positive for shortness of breath. Negative for chest tightness.    Cardiovascular:  Negative for chest pain.   Gastrointestinal:  Negative for abdominal pain, nausea and vomiting.   Endocrine: Negative for polydipsia.   Genitourinary:  Negative for flank pain and urgency.   Musculoskeletal:  Negative for gait problem and neck stiffness.   Skin:  Negative for rash.   Neurological:  Negative for weakness, light-headedness and headaches.   Psychiatric/Behavioral:  Negative for confusion and sleep disturbance.        Except as noted above the remainder of the review of systems was  oropharyngeal exudate.   Eyes:      Extraocular Movements: Extraocular movements intact.      Conjunctiva/sclera: Conjunctivae normal.      Pupils: Pupils are equal, round, and reactive to light.   Neck:      Thyroid: No thyromegaly.      Vascular: No JVD.      Trachea: No tracheal deviation.   Cardiovascular:      Rate and Rhythm: Normal rate.      Heart sounds: Normal heart sounds. No murmur heard.  Pulmonary:      Effort: Pulmonary effort is normal. No respiratory distress.      Breath sounds: Normal breath sounds. No wheezing.   Abdominal:      General: Bowel sounds are normal.      Palpations: Abdomen is soft.      Tenderness: There is no abdominal tenderness. There is no guarding.   Musculoskeletal:         General: Normal range of motion.      Cervical back: Normal range of motion and neck supple.      Right lower leg: No edema.      Left lower leg: No edema.   Skin:     General: Skin is warm and dry.      Findings: No rash.   Neurological:      General: No focal deficit present.      Mental Status: She is alert and oriented to person, place, and time.      Cranial Nerves: No cranial nerve deficit.   Psychiatric:         Behavior: Behavior normal.         DIAGNOSTIC RESULTS     EKG: All EKG's are interpreted by the Emergency Department Physician who either signs or Co-signs this chart in the absence of a cardiologist.        RADIOLOGY:   Non-plain film images such as CT, Ultrasound and MRI are read by the radiologist. Plain radiographic images are visualized and preliminarily interpreted by the emergency physician with the below findings:        Interpretation per the Radiologist below, if available at the time of this note:    CTA CHEST W WO CONTRAST    (Results Pending)         ED BEDSIDE ULTRASOUND:   Performed by ED Physician - none    LABS:  Labs Reviewed   COMPREHENSIVE METABOLIC PANEL - Abnormal; Notable for the following components:       Result Value    Chloride 93 (*)     CO2 35 (*)     Glucose 214

## 2024-06-21 NOTE — H&P
HISTORY AND PHYSICAL             Date: 2024        Patient Name: Dayami Christensen     YOB: 1934      Age:  89 y.o.    Chief Complaint     Chief Complaint   Patient presents with    Shortness of Breath     SOB, collapsed lung, lung ca          History Obtained From   patient, electronic medical record    History of Present Illness   Dayami Christensen is a 89-year-old female with past medical history of lung cancer ,arthritis, diabetes type 2, GERD, HTN,  & hyperlipidemia who presents to ED  from doctor's office for worsening shortness of breath and abnormal CXR.  Per EMR patient was sent by pulmonologist patient has a known lung mass and today CXR revealed complete opacity of the left hemothorax.  Patient endorses home use of oxygen 2 to 4 L.  She reports her breathing has become increasingly worse especially with exertion.  She reports a dry cough.  Denies fever, chills, hemoptysis.    Past Medical History     Past Medical History:   Diagnosis Date    Arthritis     Chronic kidney disease     Diabetes mellitus (HCC)     borderline    Gastroesophageal reflux disease without esophagitis 2017    History of blood transfusion     hemorrhage after an     Hyperlipidemia     Hypertension     Osteoporosis     Urinary frequency         Past Surgical History     Past Surgical History:   Procedure Laterality Date    BRONCHOSCOPY N/A 5/15/2024    NAVIGATIONAL  BRONCHOSCOPY WITH ENDOBRONCHIAL ULTRASOUND  TBNA TRANSBRONCHIAL BIOPIES performed by Benedict Cuenca MD at [facility] OR    CATARACT REMOVAL      COLONOSCOPY      EYE SURGERY      TUBAL LIGATION          Medications Prior to Admission     Prior to Admission medications    Medication Sig Start Date End Date Taking? Authorizing Provider   Budeson-Glycopyrrol-Formoterol (BREZTRI AEROSPHERE) 160-9-4.8 MCG/ACT AERO Inhale 2 puffs into the lungs in the morning and at bedtime 24   Benedict Cuenca MD   acetaminophen (TYLENOL) 325 MG tablet Take 1 tablet by mouth

## 2024-06-21 NOTE — PROGRESS NOTES
pink  Throat: Clear,  [place] 3  Neck: Supple, No JVD. Nothyromegaly. Neck is thick   Lungs: Clear bilaterally. No wheezing. No crackles. No use of accessory muscles.   Heart: RRR, S1, S2 normal, no murmur, click, rub or gallop   Abdomen: soft, non-tender, nondistended. Bowel sounds normal. No hernia. No organomegaly.   Extremities: extremities normal, atraumatic, no cyanosis, no edema  Skin: Skin color, texture, turgor normal. No rashes or lesions   Neurological: No focal deficits,cranial nerves grossly intact. No weakness. Sensation normal   Psych: Normal Mood  Musculoskeletal: No joint abnormalities.             Impression:   Diagnosis Orders   1. SOB (shortness of breath)  XR CHEST STANDARD (2 VW)    Brain Natriuretic Peptide      2. Squamous cell carcinoma of left lung (HCC)        3. Lung mass        4. Cough, unspecified type                   Orders Placed This Encounter   Procedures    XR CHEST STANDARD (2 VW)     Standing Status:   Future     Number of Occurrences:   1     Standing Expiration Date:   6/21/2025    Brain Natriuretic Peptide     Standing Status:   Future     Number of Occurrences:   1     Standing Expiration Date:   6/21/2025      Orders Placed This Encounter   Medications    DISCONTD: predniSONE (DELTASONE) 10 MG tablet     Sig: Take 2 tablets by mouth daily for 5 days     Dispense:  10 tablet     Refill:  0    Budeson-Glycopyrrol-Formoterol (BREZTRI AEROSPHERE) 160-9-4.8 MCG/ACT AERO     Sig: Inhale 2 puffs into the lungs in the morning and at bedtime     Dispense:  1 each     Refill:  0           Recommendations:     - Has worsening shortness of breath and hypoxia. ? Worsening mass vs complications like pneumonia or atelectasis.   - check CXR  - check Bnp. Has swelling in LE  - consider steroids  - increase O2 to 4 L  - Add ICS/LABA/LAMA. She is not on controller inhalers.  - will need referral to palliative.     Return in about 4 weeks (around 7/19/2024).       Electronically signed by

## 2024-06-21 NOTE — ED TRIAGE NOTES
Patient arrived via private car due to seeing her , this morning and then sent for CXR that shows she has a collapsed lung. Patient is currently receiving radiation to her L lung for lung ca. Patient wears O2 24/7 @ 2L via NC.

## 2024-06-21 NOTE — CONSULTS
[facility]           Radiation Oncology      [id number] [place], [place] [address]        O: [phone]        F: [phone]       [facility]                   Dr. José Garrido MD PhD    CONSULT NOTE     Date of Service: 6/3/2024  Patient ID: Dayami Christensen   : 1934  MRN: [id number]   Acct Number: [id number]       Dayami Christensen  89 y.o.   1934    REFERRING PROVIDER: No ref. provider found    PCP:  Shashi Gan MD    DIAGNOSIS:  1. Malignant neoplasm of overlapping sites of left lung (HCC)        STAGING: Cancer Staging   Malignant neoplasm of overlapping sites of left lung (HCC)  Staging form: Lung, AJCC 8th Edition  - Clinical: Stage IV (cT2b, cN2, cM1) - Signed by José Garrido MD on 2024      HISTORY OF PRESENT ILLNESS: Ms. Dayami Christensen  is a 89 y.o. year old female, former smoker, with history of A-fib with RVR, hypertension, type 2 diabetes, osteoporosis, and more recent finding of locally advanced non-small cell lung cancer, squamous cell histology, of the left lung with involvement of the mediastinum and concern for a lytic metastatic focus of the vertebral body at the thoracic inlet.    Her oncologic history dates back to presentation at the hospital on 5/10/2024 when she had acute respiratory failure with hypoxia.  She was admitted from 5/10/2024 to 2024.  CT angiogram of the chest on 2024 revealed a 4.6 cm lingular masslike consolidation with left perihilar and mediastinal lymphadenopathy.  The patient underwent a navigational bronchoscopy with EBUS on 5/15/2024 with pathology revealing squamous cell carcinoma.  The patient met with medical oncology on 2024 and recommendation for palliative radiation therapy was made.  The patient presents today to discuss this in further detail.  She has an MRI of the brain scheduled for 2024 and a PET scan scheduled for 2024.    The patient is currently at [facility] for

## 2024-06-22 PROBLEM — C34.92 SQUAMOUS CELL CARCINOMA OF LEFT LUNG (HCC): Status: ACTIVE | Noted: 2024-06-22

## 2024-06-22 PROBLEM — J90 PLEURAL EFFUSION, LEFT: Status: ACTIVE | Noted: 2024-06-22

## 2024-06-22 LAB
ANION GAP SERPL CALCULATED.3IONS-SCNC: 9 MEQ/L (ref 9–15)
BASOPHILS # BLD: 0 K/UL (ref 0–0.2)
BASOPHILS NFR BLD: 0.2 %
BUN SERPL-MCNC: 9 MG/DL (ref 8–23)
CALCIUM SERPL-MCNC: 8.9 MG/DL (ref 8.5–9.9)
CHLORIDE SERPL-SCNC: 96 MEQ/L (ref 95–107)
CO2 SERPL-SCNC: 36 MEQ/L (ref 20–31)
CREAT SERPL-MCNC: 0.46 MG/DL (ref 0.5–0.9)
EOSINOPHIL # BLD: 0.2 K/UL (ref 0–0.7)
EOSINOPHIL NFR BLD: 1.6 %
ERYTHROCYTE [DISTWIDTH] IN BLOOD BY AUTOMATED COUNT: 15.5 % (ref 11.5–14.5)
GLUCOSE BLD-MCNC: 147 MG/DL (ref 70–99)
GLUCOSE BLD-MCNC: 168 MG/DL (ref 70–99)
GLUCOSE BLD-MCNC: 171 MG/DL (ref 70–99)
GLUCOSE BLD-MCNC: 180 MG/DL (ref 70–99)
GLUCOSE SERPL-MCNC: 135 MG/DL (ref 70–99)
HCT VFR BLD AUTO: 34.8 % (ref 37–47)
HGB BLD-MCNC: 10.9 G/DL (ref 12–16)
LYMPHOCYTES # BLD: 0.7 K/UL (ref 1–4.8)
LYMPHOCYTES NFR BLD: 6.5 %
MAGNESIUM SERPL-MCNC: 1.8 MG/DL (ref 1.7–2.4)
MCH RBC QN AUTO: 26.7 PG (ref 27–31.3)
MCHC RBC AUTO-ENTMCNC: 31.3 % (ref 33–37)
MCV RBC AUTO: 85.1 FL (ref 79.4–94.8)
MONOCYTES # BLD: 0.6 K/UL (ref 0.2–0.8)
MONOCYTES NFR BLD: 5.2 %
NEUTROPHILS # BLD: 9 K/UL (ref 1.4–6.5)
NEUTS SEG NFR BLD: 85.7 %
PERFORMED ON: ABNORMAL
PERFORMED ON: NORMAL
PLATELET # BLD AUTO: 297 K/UL (ref 130–400)
POC CREATININE: 0.7 MG/DL (ref 0.6–1.2)
POC SAMPLE TYPE: NORMAL
POTASSIUM SERPL-SCNC: 3.2 MEQ/L (ref 3.4–4.9)
RBC # BLD AUTO: 4.09 M/UL (ref 4.2–5.4)
SODIUM SERPL-SCNC: 141 MEQ/L (ref 135–144)
WBC # BLD AUTO: 10.5 K/UL (ref 4.8–10.8)

## 2024-06-22 PROCEDURE — 2060000000 HC ICU INTERMEDIATE R&B

## 2024-06-22 PROCEDURE — 2700000000 HC OXYGEN THERAPY PER DAY

## 2024-06-22 PROCEDURE — 94760 N-INVAS EAR/PLS OXIMETRY 1: CPT

## 2024-06-22 PROCEDURE — 2580000003 HC RX 258: Performed by: INTERNAL MEDICINE

## 2024-06-22 PROCEDURE — 94761 N-INVAS EAR/PLS OXIMETRY MLT: CPT

## 2024-06-22 PROCEDURE — 6370000000 HC RX 637 (ALT 250 FOR IP): Performed by: INTERNAL MEDICINE

## 2024-06-22 PROCEDURE — 80048 BASIC METABOLIC PNL TOTAL CA: CPT

## 2024-06-22 PROCEDURE — 36415 COLL VENOUS BLD VENIPUNCTURE: CPT

## 2024-06-22 PROCEDURE — 94640 AIRWAY INHALATION TREATMENT: CPT

## 2024-06-22 PROCEDURE — 99223 1ST HOSP IP/OBS HIGH 75: CPT | Performed by: INTERNAL MEDICINE

## 2024-06-22 PROCEDURE — 94667 MNPJ CHEST WALL 1ST: CPT

## 2024-06-22 PROCEDURE — 85025 COMPLETE CBC W/AUTO DIFF WBC: CPT

## 2024-06-22 PROCEDURE — 6360000002 HC RX W HCPCS: Performed by: INTERNAL MEDICINE

## 2024-06-22 PROCEDURE — 83735 ASSAY OF MAGNESIUM: CPT

## 2024-06-22 RX ORDER — LANOLIN ALCOHOL/MO/W.PET/CERES
400 CREAM (GRAM) TOPICAL ONCE
Status: COMPLETED | OUTPATIENT
Start: 2024-06-22 | End: 2024-06-22

## 2024-06-22 RX ORDER — IPRATROPIUM BROMIDE AND ALBUTEROL SULFATE 2.5; .5 MG/3ML; MG/3ML
1 SOLUTION RESPIRATORY (INHALATION)
Status: DISCONTINUED | OUTPATIENT
Start: 2024-06-22 | End: 2024-07-02 | Stop reason: HOSPADM

## 2024-06-22 RX ADMIN — Medication 10 ML: at 22:24

## 2024-06-22 RX ADMIN — DILTIAZEM HYDROCHLORIDE 120 MG: 120 CAPSULE, COATED, EXTENDED RELEASE ORAL at 07:55

## 2024-06-22 RX ADMIN — BUDESONIDE AND FORMOTEROL FUMARATE DIHYDRATE 2 PUFF: 160; 4.5 AEROSOL RESPIRATORY (INHALATION) at 19:47

## 2024-06-22 RX ADMIN — ENOXAPARIN SODIUM 40 MG: 100 INJECTION SUBCUTANEOUS at 17:33

## 2024-06-22 RX ADMIN — Medication 400 MG: at 09:53

## 2024-06-22 RX ADMIN — INSULIN GLARGINE 11 UNITS: 100 INJECTION, SOLUTION SUBCUTANEOUS at 22:24

## 2024-06-22 RX ADMIN — METOPROLOL SUCCINATE 100 MG: 100 TABLET, EXTENDED RELEASE ORAL at 07:55

## 2024-06-22 RX ADMIN — IPRATROPIUM BROMIDE AND ALBUTEROL SULFATE 1 DOSE: 2.5; .5 SOLUTION RESPIRATORY (INHALATION) at 19:47

## 2024-06-22 RX ADMIN — TIOTROPIUM BROMIDE INHALATION SPRAY 2 PUFF: 3.12 SPRAY, METERED RESPIRATORY (INHALATION) at 07:01

## 2024-06-22 RX ADMIN — BUDESONIDE AND FORMOTEROL FUMARATE DIHYDRATE 2 PUFF: 160; 4.5 AEROSOL RESPIRATORY (INHALATION) at 07:01

## 2024-06-22 RX ADMIN — POTASSIUM BICARBONATE 50 MEQ: 978 TABLET, EFFERVESCENT ORAL at 15:07

## 2024-06-22 RX ADMIN — POTASSIUM BICARBONATE 50 MEQ: 978 TABLET, EFFERVESCENT ORAL at 09:52

## 2024-06-22 RX ADMIN — ATORVASTATIN CALCIUM 40 MG: 40 TABLET, FILM COATED ORAL at 07:55

## 2024-06-22 RX ADMIN — IPRATROPIUM BROMIDE AND ALBUTEROL SULFATE 1 DOSE: 2.5; .5 SOLUTION RESPIRATORY (INHALATION) at 07:01

## 2024-06-22 RX ADMIN — ONDANSETRON 4 MG: 2 INJECTION INTRAMUSCULAR; INTRAVENOUS at 15:35

## 2024-06-22 RX ADMIN — Medication 10 ML: at 07:55

## 2024-06-22 ASSESSMENT — PAIN SCALES - GENERAL: PAINLEVEL_OUTOF10: 0

## 2024-06-22 NOTE — CONSULTS
Pulmonary Medicine  Consult Note      Reason for consultation: Shortness of breath    Consulting physician: Dr. Us    HISTORY OF PRESENT ILLNESS:      This is 89-year-old female with past medical history of recently diagnosed lung cancer ,arthritis, diabetes type 2, GERD, HTN,  & hyperlipidemia who presents to ED  from pulmonary office for worsening shortness of breath and abnormal CXR.  Patient was seen by Dr. Cuenca.  Patient has a known lung mass and today CXR revealed complete opacity of the left hemothorax.  Patient use of oxygen 2 to 4 L.  She reports her breathing has become increasingly worse especially with exertion.  She reports a dry cough.  Denies fever, chills, hemoptysis.     CT chest done shows findings of advanced malignancy in the left chest/left side of the mediastinum , Development of a large size left-sided pleural effusions Severe endobronchial obstructive process of the bronchial system. Patient's potential to benefit from evaluation after drainage of left-sided effusion.   No indication for acute pulmonary emboli.         Past Medical History:        Diagnosis Date    Arthritis     Chronic kidney disease     Diabetes mellitus (HCC)     borderline    Gastroesophageal reflux disease without esophagitis 2017    History of blood transfusion     hemorrhage after an     Hyperlipidemia     Hypertension     Osteoporosis     Urinary frequency        Past Surgical History:        Procedure Laterality Date    BRONCHOSCOPY N/A 5/15/2024    NAVIGATIONAL  BRONCHOSCOPY WITH ENDOBRONCHIAL ULTRASOUND  TBNA TRANSBRONCHIAL BIOPIES performed by Benedict Cuenca [place] [facility] [place]    CATARACT REMOVAL      COLONOSCOPY      EYE SURGERY      TUBAL LIGATION         Social History:     reports that she quit smoking about 36 years ago. Her smoking use included cigarettes. She has never used smokeless tobacco. She reports that she does not drink alcohol and does not use drugs.    Family History:   No family  MD Mildred, FCCP on 6/22/2024 at 9:14 AM

## 2024-06-22 NOTE — PROGRESS NOTES
06/22/24 0134   RT Protocol   History Pulmonary Disease 1   Respiratory pattern 0   Breath sounds 2   Cough 0   Indications for Bronchodilator Therapy On home bronchodilators   Bronchodilator Assessment Score 3

## 2024-06-22 NOTE — PROGRESS NOTES
Physician Progress Note    6/22/2024   4:16 PM    Name:  Dayami Christensen  MRN:    [id number]      Day: 1     Admit Date: 6/21/2024 12:45 PM  PCP: Shashi Gan MD    Code Status:  Full Code    Subjective:     Complains of dyspnea but at rest it is okay.  Eating and drinking well    Current Facility-Administered Medications   Medication Dose Route Frequency Provider Last Rate Last Admin    ipratropium 0.5 mg-albuterol 2.5 mg (DUONEB) nebulizer solution 1 Dose  1 Dose Inhalation BID RT Abeba, Devyn R, DO   1 Dose at 06/22/24 0701    sodium chloride flush 0.9 % injection 5-40 mL  5-40 mL IntraVENous 2 times per day Abeba, Devyn R, DO   10 mL at 06/22/24 0755    sodium chloride flush 0.9 % injection 5-40 mL  5-40 mL IntraVENous PRN Abeba, Devyn R, DO        0.9 % sodium chloride infusion   IntraVENous PRN Abeba, Devyn R, DO        potassium chloride (KLOR-CON M) extended release tablet 40 mEq  40 mEq Oral PRN Abeba, Devyn R, DO        Or    potassium bicarb-citric acid (EFFER-K) effervescent tablet 40 mEq  40 mEq Oral PRN Abeba, Devyn R, DO        Or    potassium chloride 10 mEq/100 mL IVPB (Peripheral Line)  10 mEq IntraVENous PRN Abeba, Devyn R, DO        magnesium sulfate 2000 mg in 50 mL IVPB premix  2,000 mg IntraVENous PRN Abeba, Devyn R, DO        enoxaparin (LOVENOX) injection 40 mg  40 mg SubCUTAneous Q24H Abeba, Devyn R, DO   40 mg at 06/21/24 1748    ondansetron (ZOFRAN-ODT) disintegrating tablet 4 mg  4 mg Oral Q8H PRN Abeba, Devyn R, DO        Or    ondansetron (ZOFRAN) injection 4 mg  4 mg IntraVENous Q6H PRN Abeba, Devyn R, DO   4 mg at 06/22/24 1535    polyethylene glycol (GLYCOLAX) packet 17 g  17 g Oral Daily PRN Abeba, Devyn R, DO        acetaminophen (TYLENOL) tablet 650 mg  650 mg Oral Q6H PRN Abeba, Devyn R, DO   650 mg at 06/21/24 1748    Or    acetaminophen (TYLENOL) suppository 650 mg  650 mg Rectal Q6H PRN Devyn Us DO        glucose chewable tablet 16 g  4

## 2024-06-23 LAB
ANION GAP SERPL CALCULATED.3IONS-SCNC: 10 MEQ/L (ref 9–15)
BASOPHILS # BLD: 0.1 K/UL (ref 0–0.2)
BASOPHILS NFR BLD: 0.4 %
BUN SERPL-MCNC: 8 MG/DL (ref 8–23)
CALCIUM SERPL-MCNC: 8.5 MG/DL (ref 8.5–9.9)
CHLORIDE SERPL-SCNC: 93 MEQ/L (ref 95–107)
CO2 SERPL-SCNC: 32 MEQ/L (ref 20–31)
CREAT SERPL-MCNC: 0.51 MG/DL (ref 0.5–0.9)
EOSINOPHIL # BLD: 0.2 K/UL (ref 0–0.7)
EOSINOPHIL NFR BLD: 1.5 %
ERYTHROCYTE [DISTWIDTH] IN BLOOD BY AUTOMATED COUNT: 15.4 % (ref 11.5–14.5)
GLUCOSE BLD-MCNC: 169 MG/DL (ref 70–99)
GLUCOSE BLD-MCNC: 173 MG/DL (ref 70–99)
GLUCOSE BLD-MCNC: 188 MG/DL (ref 70–99)
GLUCOSE BLD-MCNC: 258 MG/DL (ref 70–99)
GLUCOSE SERPL-MCNC: 229 MG/DL (ref 70–99)
HCT VFR BLD AUTO: 35.2 % (ref 37–47)
HGB BLD-MCNC: 10.9 G/DL (ref 12–16)
LYMPHOCYTES # BLD: 0.8 K/UL (ref 1–4.8)
LYMPHOCYTES NFR BLD: 6.6 %
MCH RBC QN AUTO: 26.6 PG (ref 27–31.3)
MCHC RBC AUTO-ENTMCNC: 31 % (ref 33–37)
MCV RBC AUTO: 85.9 FL (ref 79.4–94.8)
MONOCYTES # BLD: 0.7 K/UL (ref 0.2–0.8)
MONOCYTES NFR BLD: 6 %
NEUTROPHILS # BLD: 9.5 K/UL (ref 1.4–6.5)
NEUTS SEG NFR BLD: 84.4 %
PERFORMED ON: ABNORMAL
PLATELET # BLD AUTO: 321 K/UL (ref 130–400)
POTASSIUM SERPL-SCNC: 4.6 MEQ/L (ref 3.4–4.9)
PROCALCITONIN SERPL IA-MCNC: 0.12 NG/ML (ref 0–0.15)
RBC # BLD AUTO: 4.1 M/UL (ref 4.2–5.4)
SODIUM SERPL-SCNC: 135 MEQ/L (ref 135–144)
WBC # BLD AUTO: 11.3 K/UL (ref 4.8–10.8)

## 2024-06-23 PROCEDURE — 2060000000 HC ICU INTERMEDIATE R&B

## 2024-06-23 PROCEDURE — 2580000003 HC RX 258: Performed by: INTERNAL MEDICINE

## 2024-06-23 PROCEDURE — 6370000000 HC RX 637 (ALT 250 FOR IP): Performed by: INTERNAL MEDICINE

## 2024-06-23 PROCEDURE — 94761 N-INVAS EAR/PLS OXIMETRY MLT: CPT

## 2024-06-23 PROCEDURE — 99232 SBSQ HOSP IP/OBS MODERATE 35: CPT | Performed by: INTERNAL MEDICINE

## 2024-06-23 PROCEDURE — 84145 PROCALCITONIN (PCT): CPT

## 2024-06-23 PROCEDURE — 36415 COLL VENOUS BLD VENIPUNCTURE: CPT

## 2024-06-23 PROCEDURE — 85025 COMPLETE CBC W/AUTO DIFF WBC: CPT

## 2024-06-23 PROCEDURE — 80048 BASIC METABOLIC PNL TOTAL CA: CPT

## 2024-06-23 PROCEDURE — 94640 AIRWAY INHALATION TREATMENT: CPT

## 2024-06-23 PROCEDURE — 6360000002 HC RX W HCPCS: Performed by: INTERNAL MEDICINE

## 2024-06-23 PROCEDURE — 2700000000 HC OXYGEN THERAPY PER DAY

## 2024-06-23 RX ADMIN — ATORVASTATIN CALCIUM 40 MG: 40 TABLET, FILM COATED ORAL at 08:05

## 2024-06-23 RX ADMIN — BUDESONIDE AND FORMOTEROL FUMARATE DIHYDRATE 2 PUFF: 160; 4.5 AEROSOL RESPIRATORY (INHALATION) at 07:17

## 2024-06-23 RX ADMIN — BUDESONIDE AND FORMOTEROL FUMARATE DIHYDRATE 2 PUFF: 160; 4.5 AEROSOL RESPIRATORY (INHALATION) at 22:38

## 2024-06-23 RX ADMIN — PANTOPRAZOLE SODIUM 40 MG: 40 TABLET, DELAYED RELEASE ORAL at 06:12

## 2024-06-23 RX ADMIN — IPRATROPIUM BROMIDE AND ALBUTEROL SULFATE 1 DOSE: 2.5; .5 SOLUTION RESPIRATORY (INHALATION) at 22:37

## 2024-06-23 RX ADMIN — IPRATROPIUM BROMIDE AND ALBUTEROL SULFATE 1 DOSE: 2.5; .5 SOLUTION RESPIRATORY (INHALATION) at 07:17

## 2024-06-23 RX ADMIN — Medication 10 ML: at 20:11

## 2024-06-23 RX ADMIN — INSULIN GLARGINE 11 UNITS: 100 INJECTION, SOLUTION SUBCUTANEOUS at 20:15

## 2024-06-23 RX ADMIN — DILTIAZEM HYDROCHLORIDE 120 MG: 120 CAPSULE, COATED, EXTENDED RELEASE ORAL at 08:05

## 2024-06-23 RX ADMIN — Medication 10 ML: at 08:05

## 2024-06-23 RX ADMIN — ENOXAPARIN SODIUM 40 MG: 100 INJECTION SUBCUTANEOUS at 17:22

## 2024-06-23 RX ADMIN — INSULIN LISPRO 4 UNITS: 100 INJECTION, SOLUTION INTRAVENOUS; SUBCUTANEOUS at 08:05

## 2024-06-23 RX ADMIN — ACETAMINOPHEN 325MG 650 MG: 325 TABLET ORAL at 19:40

## 2024-06-23 RX ADMIN — TIOTROPIUM BROMIDE INHALATION SPRAY 2 PUFF: 3.12 SPRAY, METERED RESPIRATORY (INHALATION) at 07:17

## 2024-06-23 RX ADMIN — METOPROLOL SUCCINATE 100 MG: 100 TABLET, EXTENDED RELEASE ORAL at 08:05

## 2024-06-23 ASSESSMENT — PAIN SCALES - GENERAL
PAINLEVEL_OUTOF10: 0
PAINLEVEL_OUTOF10: 8
PAINLEVEL_OUTOF10: 0
PAINLEVEL_OUTOF10: 0

## 2024-06-23 ASSESSMENT — PAIN DESCRIPTION - LOCATION: LOCATION: BACK

## 2024-06-23 ASSESSMENT — PAIN - FUNCTIONAL ASSESSMENT: PAIN_FUNCTIONAL_ASSESSMENT: ACTIVITIES ARE NOT PREVENTED

## 2024-06-23 ASSESSMENT — PAIN DESCRIPTION - DESCRIPTORS: DESCRIPTORS: ACHING;DISCOMFORT

## 2024-06-23 NOTE — PROGRESS NOTES
Physician Progress Note      PATIENT:               EMIL MOE  CSN #:                  [id number]  :                       1934  ADMIT DATE:       2024 12:45 PM  DISCH DATE:  RESPONDING  PROVIDER #:        Devyn Way DO          QUERY TEXT:    Patient admitted with lung cancer , noted to have paroxysmal atrial   fibrillation and is maintained on Eliquis (anticoagulation medication   specified). If possible, please document in progress notes and discharge   summary if you are evaluating and/or treating any of the following:?  ?  The medical record reflects the following:  Risk Factors: female, age 89, HTN, DM, AFIB  Clinical Indicators: same as above  Treatment: Diane MAK, RN, CDS  [phone]  Options provided:  -- Secondary hypercoagulable state in a patient with atrial fibrillation  -- Other - I will add my own diagnosis  -- Disagree - Not applicable / Not valid  -- Disagree - Clinically unable to determine / Unknown  -- Refer to Clinical Documentation Reviewer    PROVIDER RESPONSE TEXT:    This patient has secondary hypercoagulable state in a patient with atrial   fibrillation.    Query created by: Jana Christiansen on 2024 11:40 AM      Electronically signed by:  Devyn Way DO 2024 12:55 PM

## 2024-06-23 NOTE — CARE COORDINATION
Case Management Assessment  Initial Evaluation    Date/Time of Evaluation: 6/23/2024 11:11 AM  Assessment Completed by: CHANNING Recinos    If patient is discharged prior to next notation, then this note serves as note for discharge by case management.    Patient Name: Dayami Christensen                   YOB: 1934  Diagnosis: Collapse of left lung [J98.11]  Pleural effusion on left [J90]  Malignant neoplasm of left lung, unspecified part of lung (HCC) [C34.92]                   Date / Time: 6/21/2024 12:45 PM    Patient Admission Status: Inpatient   Readmission Risk (Low < 19, Mod (19-27), High > 27): Readmission Risk Score: 22.5    Current PCP: Shashi Gan MD  PCP verified by CM? Yes    Chart Reviewed: Yes      History Provided by: Patient  Patient Orientation: Alert and Oriented, Person, Place, Situation, Self    Patient Cognition: Alert    Hospitalization in the last 30 days (Readmission):  No    If yes, Readmission Assessment in CM Navigator will be completed.    Advance Directives:      Code Status: Full Code   Patient's Primary Decision Maker is: Named in Scanned ACP Document    Primary Decision Maker: Fanny Ivy - Child - [phone]    Secondary Decision Maker: Inna Shelton - Child - [phone]    Discharge Planning:    Patient lives with: Alone Type of Home: House  Primary Care Giver:    Patient Support Systems include: Children   Current Financial resources:    Current community resources:    Current services prior to admission: None            Current DME:              Type of Home Care services:  None    ADLS  Prior functional level: Independent in ADLs/IADLs  Current functional level: Assistance with the following:, Bathing, Dressing, Toileting    PT AM-PAC:   /24  OT AM-PAC:   /24    Family can provide assistance at DC: Yes  Would you like Case Management to discuss the discharge plan with any other family members/significant others, and if so, who? Yes (Daughter,  Fanny)  Plans to Return to Present Housing: Yes  Other Identified Issues/Barriers to RETURNING to current housing: MEDICAL COMPLICATIONS   Potential Assistance needed at discharge: N/A            Potential DME:    Patient expects to discharge to: House  Plan for transportation at discharge:      Financial    Payor: [employer] / Plan: ANTHCRISTOBAL MEDIBLUE ESSENTIAL/PLUS / Product Type: *No Product type* /     Does insurance require precert for SNF: Yes    Potential assistance Purchasing Medications:    Meds-to-Beds request:        [id number] - [place], [place] - [address] - P [phone] - F [phone]  [address] [place] [address]  Phone: [phone] Fax: [phone]      Notes:    Factors facilitating achievement of predicted outcomes: Family support    Barriers to discharge: Medical complications    Additional Case Management Notes:   Patient is alert, oriented. Patient lives with her daughter, Fanny. Patient does drive, family is also able to assist with transportation to/from appointments,shopping.Patient does not receive dialysis services. Patient does not receive HHC services but is requesting [facility] upon discharge if recommended by MD.  Patient is not a .  Patient does wear 2L of oxygen at home which is provided by [employer].  Patient plans to return home upon discharge.     The Plan for Transition of Care is related to the following treatment goals of Collapse of left lung [J98.11]  Pleural effusion on left [J90]  Malignant neoplasm of left lung, unspecified part of lung (HCC) [C34.92]    IF APPLICABLE: The Patient and/or patient representative Dayami and her family were provided with a choice of provider and agrees with the discharge plan. Freedom of choice list with basic dialogue that supports the patient's individualized plan of care/goals and shares the quality data associated with the providers was provided to:     Patient Representative Name:

## 2024-06-23 NOTE — PROGRESS NOTES
Physician Progress Note    6/23/2024   1:21 PM    Name:  Dayami Christensen  MRN:    [id number]      Day: 2     Admit Date: 6/21/2024 12:45 PM  PCP: Shashi Gan MD    Code Status:  Full Code    Subjective:     Still having intermittent dyspnea.    Current Facility-Administered Medications   Medication Dose Route Frequency Provider Last Rate Last Admin    ipratropium 0.5 mg-albuterol 2.5 mg (DUONEB) nebulizer solution 1 Dose  1 Dose Inhalation BID RT Abeba, Devyn R, DO   1 Dose at 06/23/24 0717    sodium chloride flush 0.9 % injection 5-40 mL  5-40 mL IntraVENous 2 times per day Abeba, Devyn R, DO   10 mL at 06/23/24 0805    sodium chloride flush 0.9 % injection 5-40 mL  5-40 mL IntraVENous PRN Abeba, Devyn R, DO        0.9 % sodium chloride infusion   IntraVENous PRN Abeba, Devyn R, DO        potassium chloride (KLOR-CON M) extended release tablet 40 mEq  40 mEq Oral PRN Abeba, Devyn R, DO        Or    potassium bicarb-citric acid (EFFER-K) effervescent tablet 40 mEq  40 mEq Oral PRN Abeba, Devyn R, DO        Or    potassium chloride 10 mEq/100 mL IVPB (Peripheral Line)  10 mEq IntraVENous PRN Abeba, Devyn R, DO        magnesium sulfate 2000 mg in 50 mL IVPB premix  2,000 mg IntraVENous PRN Abeba, Devyn R, DO        enoxaparin (LOVENOX) injection 40 mg  40 mg SubCUTAneous Q24H Abeba, Devyn R, DO   40 mg at 06/22/24 1733    ondansetron (ZOFRAN-ODT) disintegrating tablet 4 mg  4 mg Oral Q8H PRN Abeba, Devyn R, DO        Or    ondansetron (ZOFRAN) injection 4 mg  4 mg IntraVENous Q6H PRN Abeba, Devyn R, DO   4 mg at 06/22/24 1535    polyethylene glycol (GLYCOLAX) packet 17 g  17 g Oral Daily PRN Abeba, Devyn R, DO        acetaminophen (TYLENOL) tablet 650 mg  650 mg Oral Q6H PRN Abeba, Devyn R, DO   650 mg at 06/21/24 1748    Or    acetaminophen (TYLENOL) suppository 650 mg  650 mg Rectal Q6H PRN Devyn Us DO        glucose chewable tablet 16 g  4 tablet Oral PRN Devyn Us DO

## 2024-06-23 NOTE — CARE COORDINATION
This LSW completed 1st IMM with patient, copy provided to patient.  Electronically signed by CHANNING Recinos on 6/23/24 at 11:06 AM EDT

## 2024-06-23 NOTE — ACP (ADVANCE CARE PLANNING)
Advance Care Planning   Healthcare Decision Maker:    Primary Decision Maker: Fanny Ivy - Child - [phone]    Secondary Decision Maker: Inna Shelton - Child - [phone]    Click here to complete Healthcare Decision Makers including selection of the Healthcare Decision Maker Relationship (ie \"Primary\").

## 2024-06-23 NOTE — PROGRESS NOTES
06/23/24 0800   RT Protocol   History Pulmonary Disease 1   Respiratory pattern 0   Breath sounds 2   Cough 0   Indications for Bronchodilator Therapy Decreased or absent breath sounds   Bronchodilator Assessment Score 3

## 2024-06-23 NOTE — PROGRESS NOTES
INPATIENT PROGRESS NOTES    PATIENT NAME: Dayami Christensen  MRN: [id number]  SERVICE DATE:  June 23, 2024   SERVICE TIME:  11:11 AM      PRIMARY SERVICE: Pulmonary Disease    CHIEF COMPLAIN: Shortness of breath      INTERVAL HPI: Patient seen and examined at bedside, Interval Notes, orders reviewed. Nursing notes noted  Patient is feeling same.  O2 saturation 97% on 3 L O2 via nasal cannula which is her baseline O2 use at home.  Discussed with daughter on phone.  She received 4 radiation therapy, see is going to have total radiation for 15 session.daughter said they will decide for chemotherapy after radiation therapy.  She mainly has shortness of breath with exertion.  She denies having any fever or chills.  No chest pain.  No nausea vomiting diarrhea abdominal pain.  Procalcitonin is within normal range.         OBJECTIVE   I/O:24HR INTAKE/OUTPUT:  No intake or output data in the 24 hours ending 06/23/24 1111  No intake/output data recorded.  Body mass index is 32.32 kg/m².    PHYSICAL EXAM:  Vitals:  BP (!) 133/45   Pulse 88   Temp 97.5 °F (36.4 °C) (Oral)   Resp 18   Ht 1.499 m (4' 11\")   Wt 72.6 kg (160 lb)   SpO2 97%   BMI 32.32 kg/m²     General: Alert, awake .comfortable in bed, No distress.  Head: Atraumatic , Normocephalic   Eyes: PERRL. No sclera icterus. No conjunctival injection. No discharge   ENT: No nasal  discharge. Pharynx clear.  Neck:  Trachea midline. No thyromegaly, no JVD, No cervical adenopathy.  Chest : Bilaterally symmetrical ,Normal effort,  No accessory muscle use  Lung : Diminished breath sound on left side.  No Rales. No wheezing. No rhonchi.   Heart:: Normal rate. Regular rhythm. No mumur ,  Rub or gallop  ABD: Non-tender. Non-distended. No masses. No organmegaly. Normal bowel sounds. No hernia.  Ext : No Pitting both leg , No Cyanosis No clubbing  Neuro: no focal weakness    Labs:  Recent Labs     06/21/24  1431 06/22/24  0600 06/23/24  0532   WBC 13.4* 10.5 11.3*   HGB 11.5* 10.9*  of radiation and going to have 15 session.  IR consulted for thoracentesis but lung may not open up due to bronchial obstruction extrinsic and intrinsic due to malignancy.  Consult with oncology pending.    NOTE: This report was transcribed using voice recognition software. Every effort was made to ensure accuracy; however, inadvertent computerized transcription errors may be present.      Electronically signed by Ayush Levy MD, FCCP on 6/23/2024 at 11:11 AM

## 2024-06-23 NOTE — FLOWSHEET NOTE
1630- Assumed patient care. Patient is in bed awake and oriented. Patient continues to be on 2 L NC no signs of acute distress. Patient denies any needs at this time. Patient is Afib on tele. Vital signs stable /55, HR 93 , 99.2F temp    1815- Patient remains in bed. Denies any needs at this time. Call light in reach     Electronically signed by Elvira Clarke RN on 6/23/2024 at 6:41 PM

## 2024-06-23 NOTE — CONSULTS
Hematology/Oncology Consult  Encounter Date: 2024 3:07 PM    Ms. Dayami Christensen is a 89 y.o. female  : 1934  MRN: [id number]  Acct Number: [id number]  Requesting Provider: DR Us    Reason for request: lung cancer      CONSULTANT: Bert Coreas MD    HPI: Dayami was admitted for shortness of breathe. CT chest showed collapsed of the left lung with obstructive lesion in left main bronchus. She has squamous cell lung cancer. She has been discharged from NH and now at home.     Patient Active Problem List   Diagnosis    Essential hypertension    New onset atrial fibrillation (HCC)    Type 2 diabetes mellitus without complication, without long-term current use of insulin (HCC)    Class 2 severe obesity due to excess calories with serious comorbidity and body mass index (BMI) of 36.0 to 36.9 in adult (HCC)    Gastroesophageal reflux disease without esophagitis    Primary osteoarthritis involving multiple joints    Abnormal gait    Risk for falls    Chronic midline low back pain without sciatica    Osteopenia    Dyslipidemia    Dizzy    History of cardiac radiofrequency ablation (RFA)    Acute respiratory failure with hypoxia (HCC)    Lung mass    Secondary hypercoagulable state (HCC)    Malignant neoplasm of overlapping sites of left lung (HCC)    Collapse of left lung    Squamous cell carcinoma of left lung (HCC)    Pleural effusion, left     Past Medical History:   Diagnosis Date    Arthritis     Chronic kidney disease     Diabetes mellitus (HCC)     borderline    Gastroesophageal reflux disease without esophagitis 2017    History of blood transfusion     hemorrhage after an     Hyperlipidemia     Hypertension     Osteoporosis     Urinary frequency      @PSH@  No family history on file.  Social History     Socioeconomic History    Marital status:      Spouse name: Not on file    Number of children: Not on file    Years of education: Not on file    Highest education level: Not on  foci of abnormal increased T2 and FLAIR signal are present in white matter of both hemispheres suggestive of chronic microvascular ischemia.  Pituitary gland is not visualized.  This finding is suggestive of empty sella syndrome.     1. No evidence of intracranial metastases. 2. No acute intracranial ischemia, edema, or hemorrhage. 3. Non-visualization of the pituitary gland suggesting empty sella syndrome.     XR ORBITS (MIN 4 VIEWS)    Result Date: 6/11/2024  EXAMINATION: TWO XRAY VIEWS OF THE ORBITS 6/11/2024 COMPARISON: None. HISTORY: ORDERING SYSTEM PROVIDED HISTORY: Foreign body of left eye, sequela TECHNOLOGIST PROVIDED HISTORY: What reading provider will be dictating this exam?->CRC FINDINGS: No evidence of radiopaque foreign body within the orbits.  No other acute abnormalities noted.  There are upper and lower dentures seen in place.     No evidence of metallic foreign body within the orbits.     PET CT SKULL BASE TO MID THIGH    Result Date: 6/5/2024  EXAMINATION: WHOLE BODY PET/CT 6/5/2024 TECHNIQUE: Following IV injection of 15.7 mCi of F-18 FDG, PET  tumor imaging was acquired from the base of the skull to the mid thighs.  Computed tomography was used for purposes of attenuation correction and anatomic localization. Fusion imaging was utilized for interpretation. Uptake time 55 min.  Glucose level 154 mg/dl. COMPARISON: CT of the chest dated 06/01/2024. HISTORY: ORDERING SYSTEM PROVIDED HISTORY: Squamous cell carcinoma of left lung (HCC), Lung mass TECHNOLOGIST PROVIDED HISTORY: What reading provider will be dictating this exam?->CRC FINDINGS: HEAD/NECK: There is no evidence of hypermetabolic focus within the imaged portions of the intracranial space.  Linear increased metabolic activity in the left masseter muscle is likely physiologic.  No definite hypermetabolic cervical lymph nodes are identified. CHEST: There is a 2.5 cm solid lesion in the left pulmonary apex invading the chest wall with

## 2024-06-24 ENCOUNTER — APPOINTMENT (OUTPATIENT)
Dept: INTERVENTIONAL RADIOLOGY/VASCULAR | Age: 89
DRG: 181 | End: 2024-06-24
Payer: MEDICARE

## 2024-06-24 ENCOUNTER — APPOINTMENT (OUTPATIENT)
Dept: GENERAL RADIOLOGY | Age: 89
DRG: 181 | End: 2024-06-24
Payer: MEDICARE

## 2024-06-24 ENCOUNTER — HOSPITAL ENCOUNTER (OUTPATIENT)
Dept: RADIATION ONCOLOGY | Age: 89
End: 2024-06-24
Payer: MEDICARE

## 2024-06-24 DIAGNOSIS — C34.82 MALIGNANT NEOPLASM OF OVERLAPPING SITES OF LEFT LUNG (HCC): Primary | ICD-10-CM

## 2024-06-24 PROBLEM — Z71.89 GOALS OF CARE, COUNSELING/DISCUSSION: Status: ACTIVE | Noted: 2024-06-24

## 2024-06-24 PROBLEM — R06.02 SHORTNESS OF BREATH AT REST: Status: ACTIVE | Noted: 2024-06-24

## 2024-06-24 PROBLEM — Z51.5 PALLIATIVE CARE ENCOUNTER: Status: ACTIVE | Noted: 2024-06-24

## 2024-06-24 PROBLEM — G89.3 NEOPLASM RELATED PAIN: Status: ACTIVE | Noted: 2024-06-24

## 2024-06-24 PROBLEM — C34.92 MALIGNANT NEOPLASM OF LEFT LUNG (HCC): Status: ACTIVE | Noted: 2024-06-24

## 2024-06-24 PROBLEM — Z71.89 ADVANCED CARE PLANNING/COUNSELING DISCUSSION: Status: ACTIVE | Noted: 2024-06-24

## 2024-06-24 LAB
ALBUMIN SERPL-MCNC: 2.7 G/DL (ref 3.5–4.6)
ALP SERPL-CCNC: 181 U/L (ref 40–130)
ALT SERPL-CCNC: 14 U/L (ref 0–33)
ANION GAP SERPL CALCULATED.3IONS-SCNC: 13 MEQ/L (ref 9–15)
APPEARANCE FLUID: NORMAL
AST SERPL-CCNC: 18 U/L (ref 0–35)
BASOPHILS # BLD: 0.1 K/UL (ref 0–0.2)
BASOPHILS NFR BLD: 0.5 %
BILIRUB DIRECT SERPL-MCNC: <0.2 MG/DL (ref 0–0.4)
BILIRUB INDIRECT SERPL-MCNC: ABNORMAL MG/DL (ref 0–0.6)
BILIRUB SERPL-MCNC: 0.5 MG/DL (ref 0.2–0.7)
BUN SERPL-MCNC: 10 MG/DL (ref 8–23)
CALCIUM SERPL-MCNC: 8.4 MG/DL (ref 8.5–9.9)
CELL COUNT FLUID TYPE: NORMAL
CHLORIDE SERPL-SCNC: 92 MEQ/L (ref 95–107)
CLOT EVALUATION: NORMAL
CO2 SERPL-SCNC: 30 MEQ/L (ref 20–31)
COLOR FLUID: YELLOW
CREAT SERPL-MCNC: 0.44 MG/DL (ref 0.5–0.9)
EOSINOPHIL # BLD: 0.1 K/UL (ref 0–0.7)
EOSINOPHIL NFR BLD: 0.7 %
ERYTHROCYTE [DISTWIDTH] IN BLOOD BY AUTOMATED COUNT: 15.3 % (ref 11.5–14.5)
FLUID PATH CONSULT: YES
FLUID TYPE: NORMAL
FLUID TYPE: NORMAL
GLUCOSE BLD-MCNC: 184 MG/DL (ref 70–99)
GLUCOSE BLD-MCNC: 193 MG/DL (ref 70–99)
GLUCOSE BLD-MCNC: 268 MG/DL (ref 70–99)
GLUCOSE FLD-MCNC: 188 MG/DL
GLUCOSE SERPL-MCNC: 187 MG/DL (ref 70–99)
HCT VFR BLD AUTO: 35.1 % (ref 37–47)
HGB BLD-MCNC: 10.8 G/DL (ref 12–16)
LACTATE DEHYDROGENASE, FLUID: 107 U/L
LIPASE SERPL-CCNC: 32 U/L (ref 12–95)
LYMPHOCYTES # BLD: 0.5 K/UL (ref 1–4.8)
LYMPHOCYTES NFR BLD: 4.7 %
LYMPHOCYTES, BODY FLUID: 45 %
MCH RBC QN AUTO: 26.4 PG (ref 27–31.3)
MCHC RBC AUTO-ENTMCNC: 30.8 % (ref 33–37)
MCV RBC AUTO: 85.8 FL (ref 79.4–94.8)
MONOCYTES # BLD: 0.5 K/UL (ref 0.2–0.8)
MONOCYTES NFR BLD: 5.1 %
NEUTROPHIL, FLUID: 55 %
NEUTROPHILS # BLD: 8.6 K/UL (ref 1.4–6.5)
NEUTS SEG NFR BLD: 87.4 %
NUCLEATED CELLS FLUID: 1818 /CUMM
NUMBER OF CELLS COUNTED FLUID: 100
PERFORMED ON: ABNORMAL
PH, BODY FLUID: 7.5
PLATELET # BLD AUTO: 287 K/UL (ref 130–400)
POTASSIUM SERPL-SCNC: 4.3 MEQ/L (ref 3.4–4.9)
PROT FLD-MCNC: 3.2 G/DL
PROT SERPL-MCNC: 5.9 G/DL (ref 6.3–8)
RBC # BLD AUTO: 4.09 M/UL (ref 4.2–5.4)
RBC FLUID: 2000 /CUMM
SODIUM SERPL-SCNC: 135 MEQ/L (ref 135–144)
SPECIMEN TYPE: NORMAL
WBC # BLD AUTO: 9.9 K/UL (ref 4.8–10.8)

## 2024-06-24 PROCEDURE — 85025 COMPLETE CBC W/AUTO DIFF WBC: CPT

## 2024-06-24 PROCEDURE — 99233 SBSQ HOSP IP/OBS HIGH 50: CPT | Performed by: INTERNAL MEDICINE

## 2024-06-24 PROCEDURE — 87116 MYCOBACTERIA CULTURE: CPT

## 2024-06-24 PROCEDURE — 87102 FUNGUS ISOLATION CULTURE: CPT

## 2024-06-24 PROCEDURE — 80076 HEPATIC FUNCTION PANEL: CPT

## 2024-06-24 PROCEDURE — 88112 CYTOPATH CELL ENHANCE TECH: CPT

## 2024-06-24 PROCEDURE — 2060000000 HC ICU INTERMEDIATE R&B

## 2024-06-24 PROCEDURE — 84157 ASSAY OF PROTEIN OTHER: CPT

## 2024-06-24 PROCEDURE — 82945 GLUCOSE OTHER FLUID: CPT

## 2024-06-24 PROCEDURE — 99223 1ST HOSP IP/OBS HIGH 75: CPT | Performed by: NURSE PRACTITIONER

## 2024-06-24 PROCEDURE — 6360000002 HC RX W HCPCS: Performed by: INTERNAL MEDICINE

## 2024-06-24 PROCEDURE — 6370000000 HC RX 637 (ALT 250 FOR IP): Performed by: NURSE PRACTITIONER

## 2024-06-24 PROCEDURE — C1729 CATH, DRAINAGE: HCPCS

## 2024-06-24 PROCEDURE — 88305 TISSUE EXAM BY PATHOLOGIST: CPT

## 2024-06-24 PROCEDURE — 87205 SMEAR GRAM STAIN: CPT

## 2024-06-24 PROCEDURE — 2580000003 HC RX 258: Performed by: INTERNAL MEDICINE

## 2024-06-24 PROCEDURE — 83690 ASSAY OF LIPASE: CPT

## 2024-06-24 PROCEDURE — 2700000000 HC OXYGEN THERAPY PER DAY

## 2024-06-24 PROCEDURE — 89051 BODY FLUID CELL COUNT: CPT

## 2024-06-24 PROCEDURE — 97166 OT EVAL MOD COMPLEX 45 MIN: CPT

## 2024-06-24 PROCEDURE — 83986 ASSAY PH BODY FLUID NOS: CPT

## 2024-06-24 PROCEDURE — 94761 N-INVAS EAR/PLS OXIMETRY MLT: CPT

## 2024-06-24 PROCEDURE — 97162 PT EVAL MOD COMPLEX 30 MIN: CPT

## 2024-06-24 PROCEDURE — 32555 ASPIRATE PLEURA W/ IMAGING: CPT

## 2024-06-24 PROCEDURE — 99222 1ST HOSP IP/OBS MODERATE 55: CPT | Performed by: NURSE PRACTITIONER

## 2024-06-24 PROCEDURE — 6370000000 HC RX 637 (ALT 250 FOR IP): Performed by: INTERNAL MEDICINE

## 2024-06-24 PROCEDURE — 94640 AIRWAY INHALATION TREATMENT: CPT

## 2024-06-24 PROCEDURE — 71045 X-RAY EXAM CHEST 1 VIEW: CPT

## 2024-06-24 PROCEDURE — 36415 COLL VENOUS BLD VENIPUNCTURE: CPT

## 2024-06-24 PROCEDURE — 2500000003 HC RX 250 WO HCPCS: Performed by: RADIOLOGY

## 2024-06-24 PROCEDURE — 83615 LACTATE (LD) (LDH) ENZYME: CPT

## 2024-06-24 PROCEDURE — 87070 CULTURE OTHR SPECIMN AEROBIC: CPT

## 2024-06-24 PROCEDURE — 80048 BASIC METABOLIC PNL TOTAL CA: CPT

## 2024-06-24 RX ORDER — KETOROLAC TROMETHAMINE 15 MG/ML
15 INJECTION, SOLUTION INTRAMUSCULAR; INTRAVENOUS ONCE
Status: COMPLETED | OUTPATIENT
Start: 2024-06-24 | End: 2024-06-24

## 2024-06-24 RX ORDER — LIDOCAINE HYDROCHLORIDE 20 MG/ML
INJECTION, SOLUTION INFILTRATION; PERINEURAL PRN
Status: COMPLETED | OUTPATIENT
Start: 2024-06-24 | End: 2024-06-24

## 2024-06-24 RX ORDER — LORAZEPAM 0.5 MG/1
0.5 TABLET ORAL EVERY 8 HOURS PRN
Status: DISCONTINUED | OUTPATIENT
Start: 2024-06-24 | End: 2024-06-26

## 2024-06-24 RX ORDER — MORPHINE SULFATE 20 MG/ML
5 SOLUTION ORAL EVERY 6 HOURS PRN
Status: DISCONTINUED | OUTPATIENT
Start: 2024-06-24 | End: 2024-06-26

## 2024-06-24 RX ADMIN — PANTOPRAZOLE SODIUM 40 MG: 40 TABLET, DELAYED RELEASE ORAL at 05:33

## 2024-06-24 RX ADMIN — METOPROLOL SUCCINATE 100 MG: 100 TABLET, EXTENDED RELEASE ORAL at 09:11

## 2024-06-24 RX ADMIN — KETOROLAC TROMETHAMINE 15 MG: 15 INJECTION, SOLUTION INTRAMUSCULAR; INTRAVENOUS at 01:55

## 2024-06-24 RX ADMIN — INSULIN GLARGINE 11 UNITS: 100 INJECTION, SOLUTION SUBCUTANEOUS at 21:26

## 2024-06-24 RX ADMIN — ATORVASTATIN CALCIUM 40 MG: 40 TABLET, FILM COATED ORAL at 08:51

## 2024-06-24 RX ADMIN — BUDESONIDE AND FORMOTEROL FUMARATE DIHYDRATE 2 PUFF: 160; 4.5 AEROSOL RESPIRATORY (INHALATION) at 20:38

## 2024-06-24 RX ADMIN — LORAZEPAM 0.5 MG: 0.5 TABLET ORAL at 17:55

## 2024-06-24 RX ADMIN — ACETAMINOPHEN 325MG 650 MG: 325 TABLET ORAL at 08:51

## 2024-06-24 RX ADMIN — INSULIN LISPRO 4 UNITS: 100 INJECTION, SOLUTION INTRAVENOUS; SUBCUTANEOUS at 15:58

## 2024-06-24 RX ADMIN — ENOXAPARIN SODIUM 40 MG: 100 INJECTION SUBCUTANEOUS at 17:55

## 2024-06-24 RX ADMIN — BUDESONIDE AND FORMOTEROL FUMARATE DIHYDRATE 2 PUFF: 160; 4.5 AEROSOL RESPIRATORY (INHALATION) at 07:07

## 2024-06-24 RX ADMIN — IPRATROPIUM BROMIDE AND ALBUTEROL SULFATE 1 DOSE: 2.5; .5 SOLUTION RESPIRATORY (INHALATION) at 20:38

## 2024-06-24 RX ADMIN — Medication 10 ML: at 08:58

## 2024-06-24 RX ADMIN — LIDOCAINE HYDROCHLORIDE 8 ML: 20 INJECTION, SOLUTION INFILTRATION; PERINEURAL at 10:25

## 2024-06-24 RX ADMIN — DILTIAZEM HYDROCHLORIDE 120 MG: 120 CAPSULE, COATED, EXTENDED RELEASE ORAL at 09:11

## 2024-06-24 RX ADMIN — MORPHINE SULFATE 5 MG: 10 SOLUTION ORAL at 15:57

## 2024-06-24 RX ADMIN — Medication 10 ML: at 21:26

## 2024-06-24 RX ADMIN — TIOTROPIUM BROMIDE INHALATION SPRAY 2 PUFF: 3.12 SPRAY, METERED RESPIRATORY (INHALATION) at 07:07

## 2024-06-24 RX ADMIN — IPRATROPIUM BROMIDE AND ALBUTEROL SULFATE 1 DOSE: 2.5; .5 SOLUTION RESPIRATORY (INHALATION) at 07:07

## 2024-06-24 ASSESSMENT — PAIN DESCRIPTION - DESCRIPTORS
DESCRIPTORS: ACHING;DISCOMFORT
DESCRIPTORS: ACHING

## 2024-06-24 ASSESSMENT — PAIN DESCRIPTION - LOCATION
LOCATION: BACK
LOCATION: BACK
LOCATION: ABDOMEN

## 2024-06-24 ASSESSMENT — PAIN SCALES - GENERAL
PAINLEVEL_OUTOF10: 0
PAINLEVEL_OUTOF10: 9
PAINLEVEL_OUTOF10: 8
PAINLEVEL_OUTOF10: 9
PAINLEVEL_OUTOF10: 0
PAINLEVEL_OUTOF10: 0

## 2024-06-24 ASSESSMENT — PAIN - FUNCTIONAL ASSESSMENT: PAIN_FUNCTIONAL_ASSESSMENT: ACTIVITIES ARE NOT PREVENTED

## 2024-06-24 NOTE — PROGRESS NOTES
INPATIENT PROGRESS NOTES    PATIENT NAME: Dayami Christensen  MRN: [id number]  SERVICE DATE:  June 24, 2024   SERVICE TIME:  4:57 PM      PRIMARY SERVICE: Pulmonary Disease    CHIEF COMPLAIN: Shortness of breath      INTERVAL HPI: Patient seen and examined at bedside, Interval Notes, orders reviewed. Nursing notes noted  She underwent for thoracentesis and about 500 cc of pleural fluid removed.  Chest x-ray postthoracentesis shows improvement but patient .amcontinue to have some complaint of shortness of breath requiring 4 L O2 via nasal cannula.  She is on 3 L O2 at home.  Current O2 saturation 100%.         OBJECTIVE   I/O:24HR INTAKE/OUTPUT:    Intake/Output Summary (Last 24 hours) at 6/24/2024 1657  Last data filed at 6/24/2024 1200  Gross per 24 hour   Intake 240 ml   Output --   Net 240 ml     06/23 0701 - 06/24 0700  In: -   Out: 900 [Urine:900]  Body mass index is 32.32 kg/m².    PHYSICAL EXAM:  Vitals:  /62   Pulse 90   Temp 97.9 °F (36.6 °C) (Oral)   Resp 20   Ht 1.499 m (4' 11\")   Wt 72.6 kg (160 lb)   SpO2 100%   BMI 32.32 kg/m²     General: Alert, awake .comfortable in bed, No distress.  Head: Atraumatic , Normocephalic   Eyes: PERRL. No sclera icterus. No conjunctival injection. No discharge   ENT: No nasal  discharge. Pharynx clear.  Neck:  Trachea midline. No thyromegaly, no JVD, No cervical adenopathy.  Chest : Bilaterally symmetrical ,Normal effort,  No accessory muscle use  Lung : Diminished breath sound on left side.  No Rales. No wheezing. No rhonchi.   Heart:: Normal rate. Regular rhythm. No mumur ,  Rub or gallop  ABD: Non-tender. Non-distended. No masses. No organmegaly. Normal bowel sounds. No hernia.  Ext : No Pitting both leg , No Cyanosis No clubbing  Neuro: no focal weakness    Labs:  Recent Labs     06/22/24  0600 06/23/24  0532 06/24/24  0525   WBC 10.5 11.3* 9.9   HGB 10.9* 10.9* 10.8*    321 287     Recent Labs     06/22/24  0600 06/23/24  0532 06/24/24  0525    135

## 2024-06-24 NOTE — PROGRESS NOTES
Physical Therapy Med Surg Initial Assessment  Facility/Department: [address] TELEMETRY  Room: [place] [address]       NAME: Dayami Christensen  : 1934 (89 y.o.)  MRN: [id number]  CODE STATUS: Full Code    Date of Service: 2024    Patient Diagnosis(es): Collapse of left lung [J98.11]  Pleural effusion on left [J90]  Malignant neoplasm of left lung, unspecified part of lung (HCC) [C34.92]   Chief Complaint   Patient presents with    Shortness of Breath     SOB, collapsed lung, lung ca     Patient Active Problem List    Diagnosis Date Noted    Squamous cell carcinoma of left lung (HCC) 2024    Pleural effusion, left 2024    Collapse of left lung 2024    Malignant neoplasm of overlapping sites of left lung (HCC) 2024    Secondary hypercoagulable state (HCC) 2024    Acute respiratory failure with hypoxia (HCC) 2024    Lung mass 05/10/2024    Dizzy 2018    History of cardiac radiofrequency ablation (RFA) 2018    Dyslipidemia 2017    Gastroesophageal reflux disease without esophagitis 2017    Primary osteoarthritis involving multiple joints 2017    Abnormal gait 2017    Risk for falls 2017    Chronic midline low back pain without sciatica 2017    Osteopenia 2017    Essential hypertension 2017    New onset atrial fibrillation (HCC) 2017    Type 2 diabetes mellitus without complication, without long-term current use of insulin (HCC) 2017    Class 2 severe obesity due to excess calories with serious comorbidity and body mass index (BMI) of 36.0 to 36.9 in adult (HCC) 2017        Past Medical History:   Diagnosis Date    Arthritis     Chronic kidney disease     Diabetes mellitus (HCC)     borderline    Gastroesophageal reflux disease without esophagitis 2017    History of blood transfusion     hemorrhage after an     Hyperlipidemia     Hypertension     Osteoporosis     Urinary frequency      Past Surgical  History:   Procedure Laterality Date    BRONCHOSCOPY N/A 05/15/2024    NAVIGATIONAL  BRONCHOSCOPY WITH ENDOBRONCHIAL ULTRASOUND  TBNA TRANSBRONCHIAL BIOPIES performed by Benedict Cuenca MD at [facility] [place]    CATARACT REMOVAL      COLONOSCOPY      EYE SURGERY      THORACENTESIS Left 06/24/2024    550 ml removed by     TUBAL LIGATION         Chart Reviewed: Yes  Family / Caregiver Present: No  Diagnosis: Collapse of left lung  General Comment  Comments: Pt resting in bed - agreeable to PT evaluation    Restrictions:  Restrictions/Precautions: Fall Risk     SUBJECTIVE:   Subjective: \"I\"m so tired.\"    Pain  Pain: Denies pre and post session pain.    Prior Level of Function:  Social/Functional History  Lives With: Daughter (recent move; dtr works)  Type of Home: House  Home Layout: One level (lives in basement level)  Home Access: Stairs to enter with rails  Entrance Stairs - Number of Steps: 5  Bathroom Shower/Tub: Tub/Shower unit  Bathroom Equipment: Grab bars in shower  Home Equipment: Rollator  Has the patient had two or more falls in the past year or any fall with injury in the past year?: No  ADL Assistance: Independent  Homemaking Assistance:  (up until 1 month ago, pt handled all cooking and cleaning tasks indep)  Ambulation Assistance: Independent (no AD)  Transfer Assistance: Independent  Active : Yes    OBJECTIVE:   Vision  Vision: Within Functional Limits  Hearing: Within functional limits    Cognition:  Overall Orientation Status: Within Functional Limits  Follows Commands: Within Functional Limits    Observation/Palpation  Observation: No acute distress noted. Pt pleasant and motivated. 4LO2 via NC.    ROM:  AROM: Within functional limits  PROM: Within functional limits    Strength:  Strength: Generally decreased, functional    Neuro:  Balance  Sitting - Static: Good  Sitting - Dynamic: Good;Fair  Standing - Static: Fair  Standing - Dynamic: Fair                      Tone: Normal    Sensation:

## 2024-06-24 NOTE — CARE COORDINATION
SPOKE WITH CHERRY WITH [facility] . REFERRAL GIVEN. WILL NEED ORDERS. PATIENT IS FOR THORACENTESIS TODAY.  WILL FOLLOW.  CALL BACK FROM CHERRY, PATIENT CURRENT WITH SN/PT. WILL ONLY NEED JAI ORDERS. JAI ORDERS COMPLETED.

## 2024-06-24 NOTE — PROGRESS NOTES
Dr Lowe read post left thora CXR and states it looks good. Pt placed into  transport back to inpt room. Pt 94% on 4 liters.     1101 Report called to Marilyn RN 0700. 550 mls removed during left thoracentesis. Pleural fluid sent to lab. Mynor read post thora CXR, it's good. Pt on way back up to room on 4L n/c.

## 2024-06-24 NOTE — CONSULTS
shortness of breath with exertion and abnormal chest x-ray. Recent diagnosis of non-small cell left lung cancer, receiving radiation therapy. On admit chest x-ray revealed complete opacity of left hemithorax.     PLAN:   --Percutaneous US Guided left Thoracentesis with local anesthetic. Procedure with risks including infection, bleeding, pneumothorax, damage to major surrounding vessels and/or organs, pain at site discussed with patient. Patient wishes to proceed. Medications and lab work reviewed. Chart reviewed of pertinent information.     Thank you for allowing us to participate in the care of this patient:   Contact Interventional Radiology Clinic with and questions or concerns.

## 2024-06-24 NOTE — DISCHARGE INSTRUCTIONS
Ultrasound Guided Thoracentesis Discharge Instructions      ACTIVITY:   - Rest today, no heavy lifting, straining, or exercise for at least 24 hours.   - Getting enough sleep will help you recover.    - Expect to be sore for up to 1 to 2 days.      DIET:          - Resume usual diet.       MEDICATIONS:           - Resume your home medications, except blood thinners or aspirin products.  Please    hold your aspirin or blood thinners for at least 24 hours.    - You may use Tylenol, Ibuprofen, or previously prescribed pain medication to alleviate    pain or discomfort.    INSTRUCTIONS OR COMMENTS RELATIVE TO PROCEDURE:  - May remove your dressing within 24 hours.  Wash gently with soap and water only.   - Keep area clean and dry.  You may cover with gauze if needed.  - It is not unusual to cough up a small amount of blood-tinged sputum.  - If your doctor has not notified you in one week regarding the results of your biopsy,    please call their office.     OVER THE NEXT 48 HOURS, IF YOU DEVELOP ANY SIGNS OF INFECTION, CONTACT PCP OR PERFORMING RADIOLOGIST, DR. Lowe AT [phone]:  - Redness, drainage, and/or swelling at the procedure site.   - Chills, and/or fever above 100 degrees Fahrenheit.    IF YOU DEVELOP ANY OF THE FOLLOWING SYMPTOMS, CONTACT PCP OR PERFORMING RADIOLOGIST, DR. Lowe -[phone]:  - Shortness of breath that is new or getting worse.  - Chest pain that is new or worse especially when you take a deep breath.  - Dizziness, weakness, or fatigue.   - Extreme pain that increases after leaving the hospital.  - Active bleeding from the procedure site.     IF UNABLE TO CONTACT ANY OF THE ABOVE PHYSICIANS AND YOU FEEL YOU HAVE A MAJOR PROBLEM, PLEASE GO TO THE EMERGENCY ROOM FOR EVALUATION/TREATMENT.     Have Pleurx drainged three times per week and as needed for SOB.  Record drainage amount and bring information to follow up with Dr. Torre.  May shower

## 2024-06-24 NOTE — PROGRESS NOTES
[facility] OCCUPATIONAL THERAPY EVALUATION - ACUTE     NAME: Dayami Christensen  : 1934 (89 y.o.)  MRN: [id number]  CODE STATUS: Full Code  Room: 95/95-01    Date of Service: 2024    Patient Diagnosis(es): Collapse of left lung [J98.11]  Pleural effusion on left [J90]  Malignant neoplasm of left lung, unspecified part of lung (HCC) [C34.92]   Patient Active Problem List    Diagnosis Date Noted    Squamous cell carcinoma of left lung (HCC) 2024    Pleural effusion, left 2024    Collapse of left lung 2024    Malignant neoplasm of overlapping sites of left lung (HCC) 2024    Secondary hypercoagulable state (HCC) 2024    Acute respiratory failure with hypoxia (HCC) 2024    Lung mass 05/10/2024    Dizzy 2018    History of cardiac radiofrequency ablation (RFA) 2018    Dyslipidemia 2017    Gastroesophageal reflux disease without esophagitis 2017    Primary osteoarthritis involving multiple joints 2017    Abnormal gait 2017    Risk for falls 2017    Chronic midline low back pain without sciatica 2017    Osteopenia 2017    Essential hypertension 2017    New onset atrial fibrillation (HCC) 2017    Type 2 diabetes mellitus without complication, without long-term current use of insulin (HCC) 2017    Class 2 severe obesity due to excess calories with serious comorbidity and body mass index (BMI) of 36.0 to 36.9 in adult (HCC) 2017        Past Medical History:   Diagnosis Date    Arthritis     Chronic kidney disease     Diabetes mellitus (HCC)     borderline    Gastroesophageal reflux disease without esophagitis 2017    History of blood transfusion     hemorrhage after an     Hyperlipidemia     Hypertension     Osteoporosis     Urinary frequency      Past Surgical History:   Procedure Laterality Date    BRONCHOSCOPY N/A 05/15/2024    NAVIGATIONAL  BRONCHOSCOPY WITH ENDOBRONCHIAL ULTRASOUND  TBNA  Method: Verbal  Barriers to Learning: None  Education Outcome: Verbalized understanding    OT Follow Up:   OT D/C RECOMMENDATIONS  REQUIRES OT FOLLOW-UP: Yes       Assessment/Discharge Disposition:  Assessment: Pt is an 89 year old woman from home who presents to [facility] with the above deficits which impact her ability to perform ADL and IADL tasks d/t L lung collapse. Pt demonstrates decreased balance and endurance and would benefit from continued OT to maximize independence and safety with ADL tasks.  Performance deficits / Impairments: Decreased functional mobility , Decreased ADL status, Decreased strength, Decreased endurance, Decreased balance, Decreased high-level IADLs  Prognosis: Good  Discharge Recommendations: Continue to assess pending progress  Decision Making: Medium Complexity     History: Pt's medical history is moderately complex  Exam: Pt has 6 performance deficits  Assistance / Modification: Pt requires mod A    AMPAC (Six Click) Self care Score   How much help is needed for putting on and taking off regular lower body clothing?: A Lot  How much help is needed for bathing (which includes washing, rinsing, drying)?: A Little  How much help is needed for toileting (which includes using toilet, bedpan, or urinal)?: A Little  How much help is needed for putting on and taking off regular upper body clothing?: A Little  How much help is needed for taking care of personal grooming?: A Little  How much help for eating meals?: None  AM-[facility] Inpatient Daily Activity Raw Score: 18  AM-PAC Inpatient ADL T-Scale Score : 38.66  ADL Inpatient CMS 0-100% Score: 46.65    Therapy key for assistance levels -   Independent/Mod I = Pt. is able to perform task with no assistance but may require a device   Stand by assistance = Pt. does not perform task at an independent level but does not need physical assistance, requires verbal cues  Minimal, Moderate, Maximal Assistance = Pt. requires physical assistance (25%, 50%, 75%

## 2024-06-24 NOTE — PLAN OF CARE
See OT evaluation for all goals and OT POC. Electronically signed by Paradise Jackson OTR/L on 6/24/2024 at 3:05 PM

## 2024-06-24 NOTE — CONSULTS
ondansetron (ZOFRAN-ODT) disintegrating tablet 4 mg  4 mg Oral Q8H PRN Devyn Us R, DO        Or    ondansetron (ZOFRAN) injection 4 mg  4 mg IntraVENous Q6H PRN Abeba, Neal R, DO   4 mg at 06/22/24 1535    polyethylene glycol (GLYCOLAX) packet 17 g  17 g Oral Daily PRN Abeba, Neal R, DO        acetaminophen (TYLENOL) tablet 650 mg  650 mg Oral Q6H PRN Abeba, Devyn R, DO   650 mg at 06/24/24 0851    Or    acetaminophen (TYLENOL) suppository 650 mg  650 mg Rectal Q6H PRN Abeba, Neal R, DO        glucose chewable tablet 16 g  4 tablet Oral PRN Abeba, Devyn R, DO        dextrose bolus 10% 125 mL  125 mL IntraVENous PRN AbebaDevyn R, DO        Or    dextrose bolus 10% 250 mL  250 mL IntraVENous PRN Abeba, Neal R, DO        glucagon injection 1 mg  1 mg SubCUTAneous PRN Abeba, Neal R, DO        dextrose 10 % infusion   IntraVENous Continuous PRN Abeba, Neal R, DO        insulin glargine (LANTUS) injection vial 11 Units  0.15 Units/kg SubCUTAneous Nightly Abeba, Neal R, DO   11 Units at 06/23/24 2015    insulin lispro (HUMALOG,ADMELOG) injection vial 0-8 Units  0-8 Units SubCUTAneous TID WC Devyn Us R, DO   4 Units at 06/23/24 0805    insulin lispro (HUMALOG,ADMELOG) injection vial 0-4 Units  0-4 Units SubCUTAneous Nightly Abeba, Neal R, DO        dilTIAZem (CARDIZEM CD) extended release capsule 120 mg  120 mg Oral Daily Abeba, Neal R, DO   120 mg at 06/24/24 0911    atorvastatin (LIPITOR) tablet 40 mg  40 mg Oral Daily Abeba, Neal R, DO   40 mg at 06/24/24 0851    metoprolol succinate (TOPROL XL) extended release tablet 100 mg  100 mg Oral Daily Abeba, Neal R, DO   100 mg at 06/24/24 0911    pantoprazole (PROTONIX) tablet 40 mg  40 mg Oral QAM AC Devyn Us R, DO   40 mg at 06/24/24 0533    budesonide-formoterol (SYMBICORT) 160-4.5 MCG/ACT inhaler 2 puff  2 puff Inhalation BID RT Devyn Us, DO   2 puff at 06/24/24 0707    tiotropium (SPIRIVA RESPIMAT) 2.5  6/24/2024  EXAMINATION: ONE XRAY VIEW OF THE CHEST 6/24/2024 10:45 am COMPARISON: 06/21/2024 HISTORY: ORDERING SYSTEM PROVIDED HISTORY: post left thoracentesis TECHNOLOGIST PROVIDED HISTORY: Reason for exam:->post left thoracentesis What reading provider will be dictating this exam?->CRC FINDINGS: Nonspecific interstitial prominence, chronic changes versus component of edema or atypical infection in the acute setting.  Trace right pleural effusion or thickening.  ASVD, cardiomegaly.  Decreased left pleural effusion, now moderate.  Interval partial aeration of the left upper and mid lung zone.  Lytic lesion and pathologic fracture of the left 1st rib.     1. Decreased left pleural effusion, now moderate. Interval partial aeration of the left upper and mid lung zone. No pneumothorax. 2. Nonspecific interstitial prominence, chronic changes versus component of edema or atypical infection in the acute setting. 3. Lytic lesion and pathologic fracture of the left 1st rib.          Assessment and plan:  Palliative care encounter  Neoplasm related pain  Shortness of breath at rest    Hospice is not currently consistent with goals of care. Patient would like to try to finish radiation therapy and then decide on chemo/immunotherapy if this is an option for her. She is very SOB and having RUQ abdominal pain. Will trial morphine 5 mg po q 6 hours prn SOB/pain. Messaged Dr. Tomas regarding RUQ abdominal pain.     -Advance Care Planning  Discussed goals of care with patient. Patient has made the decision to be FULL CODE.     HPOA in place: Daughter, Fanny Ivy, is patient's first agent.       -Goals of Care Discussion:  Disease process and goals of treatment were discussed in basic terms. Dayami's goal is to optimize available comfort care measures and continue with aggressive treatment/medications. Patient is hopeful to go home with family at discharge. We discussed the palliative care philosophy in light of those goals. We

## 2024-06-24 NOTE — OR NURSING
NO SEDATION      Ultrasound Guided Thoracentesis  VSS.     Pt tp Specials room 6 via cart from inpt room. Pt alert and oriented. Patient assisted to sitting position on cart side leaning over tray table.  Posterior lung fields scanned by performing Radiologist.     1019 Procedure explained, consent signed. Time out completed for US guided left thoracentesis.  Site marked by Dr. Lowe, then procedure site prepped with chloraprep.    After 3 minute dry time, draped with sterile drape and towels.    1025 Left Posterior chest site then numbed with lidocaine 2% by Dr Lowe, [id number] Centesis 5F catheter placed into pleural space using US guidance.  Fluid draining appears pale yellow.      Sample of fluid obtained and placed into specimen containers to be sent for testing as ordered.    Catheter attached to EQO machine to remove fluid.      1036 Total 550 ml removed.  Catheter removed.  Bandaid applied.  VSS. Pt tolerated well.  Verbal and tactile reassurance given throughout.     Patient taken for CXR and specimen fluid taken to lab. Report called to Marilyn on 1WT, pt to return to inpt room.

## 2024-06-24 NOTE — PROGRESS NOTES
06/24/24 1000   RT Protocol   History Pulmonary Disease 1   Respiratory pattern 0   Breath sounds 2   Cough 0   Indications for Bronchodilator Therapy On home bronchodilators   Bronchodilator Assessment Score 3

## 2024-06-24 NOTE — FLOWSHEET NOTE
0845- Patient in bed. Patient cleaned up with new pure wick placed. Patient is calm and cooperative. She is on 2L NC SpO2 100. Afib on monitor.     0900- Patient educated on the plan for a thoracentesis today    1048- Received report from Azul DAVEY about patients thoracentesis     1115- Patient arrived back to unit. Vital signs are stable. /74, HR 95 And SpO2 100 on NC 4L. Patient right side puncture site dressing is clean dry and intact. No bleeding noticed. Lung sounds continue to be diminished    1200- Patient said she was having SOB. Respirations at 40.  SpO2 97% NC 4L. HOB elevated for patient.  in to see patient    1420- Patient complains of worsening SOB. SpO2 97% on 4L NC.  on unit made aware. Educated patient     1541- Patient complaining of abdominal pain 9/10.  notified     1545- Caridad DAVEY from palliative care in with patient to discuss plan of care.     1557- Moprhine 5mg PO given for pain     1755- Ativan 0.5 mg PO given for anxiety PRN.    Electronically signed by Elvira Clarke RN on 6/24/2024 at 6:27 PM

## 2024-06-25 ENCOUNTER — ANESTHESIA EVENT (OUTPATIENT)
Dept: OPERATING ROOM | Age: 89
End: 2024-06-25
Payer: MEDICARE

## 2024-06-25 ENCOUNTER — HOSPITAL ENCOUNTER (OUTPATIENT)
Dept: RADIATION ONCOLOGY | Age: 89
Discharge: HOME | End: 2024-06-25
Payer: MEDICARE

## 2024-06-25 ENCOUNTER — APPOINTMENT (OUTPATIENT)
Dept: ULTRASOUND IMAGING | Age: 89
DRG: 181 | End: 2024-06-25
Payer: MEDICARE

## 2024-06-25 ENCOUNTER — APPOINTMENT (OUTPATIENT)
Dept: GENERAL RADIOLOGY | Age: 89
DRG: 181 | End: 2024-06-25
Payer: MEDICARE

## 2024-06-25 ENCOUNTER — ANESTHESIA (OUTPATIENT)
Dept: OPERATING ROOM | Age: 89
End: 2024-06-25
Payer: MEDICARE

## 2024-06-25 LAB
GLUCOSE BLD-MCNC: 150 MG/DL (ref 70–99)
GLUCOSE BLD-MCNC: 154 MG/DL (ref 70–99)
GLUCOSE BLD-MCNC: 191 MG/DL (ref 70–99)
PERFORMED ON: ABNORMAL

## 2024-06-25 PROCEDURE — 2580000003 HC RX 258: Performed by: NURSE ANESTHETIST, CERTIFIED REGISTERED

## 2024-06-25 PROCEDURE — C1729 CATH, DRAINAGE: HCPCS | Performed by: THORACIC SURGERY (CARDIOTHORACIC VASCULAR SURGERY)

## 2024-06-25 PROCEDURE — 6370000000 HC RX 637 (ALT 250 FOR IP): Performed by: INTERNAL MEDICINE

## 2024-06-25 PROCEDURE — 88341 IMHCHEM/IMCYTCHM EA ADD ANTB: CPT

## 2024-06-25 PROCEDURE — 2580000003 HC RX 258: Performed by: INTERNAL MEDICINE

## 2024-06-25 PROCEDURE — 99233 SBSQ HOSP IP/OBS HIGH 50: CPT | Performed by: INTERNAL MEDICINE

## 2024-06-25 PROCEDURE — 3700000000 HC ANESTHESIA ATTENDED CARE: Performed by: THORACIC SURGERY (CARDIOTHORACIC VASCULAR SURGERY)

## 2024-06-25 PROCEDURE — 3600000002 HC SURGERY LEVEL 2 BASE: Performed by: THORACIC SURGERY (CARDIOTHORACIC VASCULAR SURGERY)

## 2024-06-25 PROCEDURE — 76705 ECHO EXAM OF ABDOMEN: CPT

## 2024-06-25 PROCEDURE — A4217 STERILE WATER/SALINE, 500 ML: HCPCS | Performed by: THORACIC SURGERY (CARDIOTHORACIC VASCULAR SURGERY)

## 2024-06-25 PROCEDURE — 71045 X-RAY EXAM CHEST 1 VIEW: CPT

## 2024-06-25 PROCEDURE — 7100000000 HC PACU RECOVERY - FIRST 15 MIN: Performed by: THORACIC SURGERY (CARDIOTHORACIC VASCULAR SURGERY)

## 2024-06-25 PROCEDURE — 2580000003 HC RX 258: Performed by: THORACIC SURGERY (CARDIOTHORACIC VASCULAR SURGERY)

## 2024-06-25 PROCEDURE — 94761 N-INVAS EAR/PLS OXIMETRY MLT: CPT

## 2024-06-25 PROCEDURE — 2700000000 HC OXYGEN THERAPY PER DAY

## 2024-06-25 PROCEDURE — 88305 TISSUE EXAM BY PATHOLOGIST: CPT

## 2024-06-25 PROCEDURE — 88342 IMHCHEM/IMCYTCHM 1ST ANTB: CPT

## 2024-06-25 PROCEDURE — 94640 AIRWAY INHALATION TREATMENT: CPT

## 2024-06-25 PROCEDURE — 0W9B30Z DRAINAGE OF LEFT PLEURAL CAVITY WITH DRAINAGE DEVICE, PERCUTANEOUS APPROACH: ICD-10-PCS | Performed by: THORACIC SURGERY (CARDIOTHORACIC VASCULAR SURGERY)

## 2024-06-25 PROCEDURE — 3600000012 HC SURGERY LEVEL 2 ADDTL 15MIN: Performed by: THORACIC SURGERY (CARDIOTHORACIC VASCULAR SURGERY)

## 2024-06-25 PROCEDURE — 2709999900 HC NON-CHARGEABLE SUPPLY: Performed by: THORACIC SURGERY (CARDIOTHORACIC VASCULAR SURGERY)

## 2024-06-25 PROCEDURE — 7100000001 HC PACU RECOVERY - ADDTL 15 MIN: Performed by: THORACIC SURGERY (CARDIOTHORACIC VASCULAR SURGERY)

## 2024-06-25 PROCEDURE — 3700000001 HC ADD 15 MINUTES (ANESTHESIA): Performed by: THORACIC SURGERY (CARDIOTHORACIC VASCULAR SURGERY)

## 2024-06-25 PROCEDURE — 2060000000 HC ICU INTERMEDIATE R&B

## 2024-06-25 PROCEDURE — 99222 1ST HOSP IP/OBS MODERATE 55: CPT | Performed by: THORACIC SURGERY (CARDIOTHORACIC VASCULAR SURGERY)

## 2024-06-25 PROCEDURE — 99232 SBSQ HOSP IP/OBS MODERATE 35: CPT | Performed by: NURSE PRACTITIONER

## 2024-06-25 PROCEDURE — 88112 CYTOPATH CELL ENHANCE TECH: CPT

## 2024-06-25 PROCEDURE — 6370000000 HC RX 637 (ALT 250 FOR IP): Performed by: NURSE PRACTITIONER

## 2024-06-25 PROCEDURE — 6360000002 HC RX W HCPCS: Performed by: NURSE ANESTHETIST, CERTIFIED REGISTERED

## 2024-06-25 PROCEDURE — 2500000003 HC RX 250 WO HCPCS: Performed by: NURSE ANESTHETIST, CERTIFIED REGISTERED

## 2024-06-25 PROCEDURE — 6360000002 HC RX W HCPCS: Performed by: THORACIC SURGERY (CARDIOTHORACIC VASCULAR SURGERY)

## 2024-06-25 RX ORDER — ACETAMINOPHEN 325 MG/1
650 TABLET ORAL
Status: DISCONTINUED | OUTPATIENT
Start: 2024-06-25 | End: 2024-06-25 | Stop reason: HOSPADM

## 2024-06-25 RX ORDER — SODIUM CHLORIDE 0.9 % (FLUSH) 0.9 %
5-40 SYRINGE (ML) INJECTION PRN
Status: DISCONTINUED | OUTPATIENT
Start: 2024-06-25 | End: 2024-06-25 | Stop reason: HOSPADM

## 2024-06-25 RX ORDER — SODIUM CHLORIDE 9 MG/ML
INJECTION, SOLUTION INTRAVENOUS CONTINUOUS PRN
Status: DISCONTINUED | OUTPATIENT
Start: 2024-06-25 | End: 2024-06-25 | Stop reason: SDUPTHER

## 2024-06-25 RX ORDER — ONDANSETRON 2 MG/ML
4 INJECTION INTRAMUSCULAR; INTRAVENOUS
Status: DISCONTINUED | OUTPATIENT
Start: 2024-06-25 | End: 2024-06-25 | Stop reason: HOSPADM

## 2024-06-25 RX ORDER — LIDOCAINE HYDROCHLORIDE 20 MG/ML
INJECTION, SOLUTION INFILTRATION; PERINEURAL PRN
Status: DISCONTINUED | OUTPATIENT
Start: 2024-06-25 | End: 2024-06-25 | Stop reason: SDUPTHER

## 2024-06-25 RX ORDER — PROPOFOL 10 MG/ML
INJECTION, EMULSION INTRAVENOUS PRN
Status: DISCONTINUED | OUTPATIENT
Start: 2024-06-25 | End: 2024-06-25 | Stop reason: SDUPTHER

## 2024-06-25 RX ORDER — NALOXONE HYDROCHLORIDE 0.4 MG/ML
INJECTION, SOLUTION INTRAMUSCULAR; INTRAVENOUS; SUBCUTANEOUS PRN
Status: DISCONTINUED | OUTPATIENT
Start: 2024-06-25 | End: 2024-06-25 | Stop reason: HOSPADM

## 2024-06-25 RX ORDER — MAGNESIUM HYDROXIDE 1200 MG/15ML
LIQUID ORAL CONTINUOUS PRN
Status: COMPLETED | OUTPATIENT
Start: 2024-06-25 | End: 2024-06-25

## 2024-06-25 RX ORDER — SODIUM CHLORIDE 9 MG/ML
INJECTION, SOLUTION INTRAVENOUS PRN
Status: DISCONTINUED | OUTPATIENT
Start: 2024-06-25 | End: 2024-06-25 | Stop reason: HOSPADM

## 2024-06-25 RX ORDER — FENTANYL CITRATE 50 UG/ML
INJECTION, SOLUTION INTRAMUSCULAR; INTRAVENOUS PRN
Status: DISCONTINUED | OUTPATIENT
Start: 2024-06-25 | End: 2024-06-25 | Stop reason: SDUPTHER

## 2024-06-25 RX ORDER — ENOXAPARIN SODIUM 100 MG/ML
40 INJECTION SUBCUTANEOUS EVERY 24 HOURS
Status: DISCONTINUED | OUTPATIENT
Start: 2024-06-26 | End: 2024-07-02 | Stop reason: HOSPADM

## 2024-06-25 RX ORDER — SODIUM CHLORIDE 0.9 % (FLUSH) 0.9 %
5-40 SYRINGE (ML) INJECTION EVERY 12 HOURS SCHEDULED
Status: DISCONTINUED | OUTPATIENT
Start: 2024-06-25 | End: 2024-06-25 | Stop reason: HOSPADM

## 2024-06-25 RX ADMIN — IPRATROPIUM BROMIDE AND ALBUTEROL SULFATE 1 DOSE: 2.5; .5 SOLUTION RESPIRATORY (INHALATION) at 11:48

## 2024-06-25 RX ADMIN — PANTOPRAZOLE SODIUM 40 MG: 40 TABLET, DELAYED RELEASE ORAL at 06:12

## 2024-06-25 RX ADMIN — DILTIAZEM HYDROCHLORIDE 120 MG: 120 CAPSULE, COATED, EXTENDED RELEASE ORAL at 09:02

## 2024-06-25 RX ADMIN — IPRATROPIUM BROMIDE AND ALBUTEROL SULFATE 1 DOSE: 2.5; .5 SOLUTION RESPIRATORY (INHALATION) at 19:31

## 2024-06-25 RX ADMIN — CEFAZOLIN 2000 MG: 2 INJECTION, POWDER, FOR SOLUTION INTRAMUSCULAR; INTRAVENOUS at 16:03

## 2024-06-25 RX ADMIN — METOPROLOL SUCCINATE 100 MG: 100 TABLET, EXTENDED RELEASE ORAL at 09:02

## 2024-06-25 RX ADMIN — LIDOCAINE HYDROCHLORIDE 60 MG: 20 INJECTION, SOLUTION INFILTRATION; PERINEURAL at 16:04

## 2024-06-25 RX ADMIN — BUDESONIDE AND FORMOTEROL FUMARATE DIHYDRATE 2 PUFF: 160; 4.5 AEROSOL RESPIRATORY (INHALATION) at 19:31

## 2024-06-25 RX ADMIN — SODIUM CHLORIDE: 9 INJECTION, SOLUTION INTRAVENOUS at 15:58

## 2024-06-25 RX ADMIN — FENTANYL CITRATE 25 MCG: 50 INJECTION, SOLUTION INTRAMUSCULAR; INTRAVENOUS at 16:25

## 2024-06-25 RX ADMIN — Medication 10 ML: at 22:13

## 2024-06-25 RX ADMIN — TIOTROPIUM BROMIDE INHALATION SPRAY 2 PUFF: 3.12 SPRAY, METERED RESPIRATORY (INHALATION) at 11:48

## 2024-06-25 RX ADMIN — ATORVASTATIN CALCIUM 40 MG: 40 TABLET, FILM COATED ORAL at 09:02

## 2024-06-25 RX ADMIN — BUDESONIDE AND FORMOTEROL FUMARATE DIHYDRATE 2 PUFF: 160; 4.5 AEROSOL RESPIRATORY (INHALATION) at 11:44

## 2024-06-25 RX ADMIN — PROPOFOL 180 MG: 10 INJECTION, EMULSION INTRAVENOUS at 16:04

## 2024-06-25 RX ADMIN — INSULIN GLARGINE 11 UNITS: 100 INJECTION, SOLUTION SUBCUTANEOUS at 22:08

## 2024-06-25 RX ADMIN — MORPHINE SULFATE 5 MG: 10 SOLUTION ORAL at 18:02

## 2024-06-25 RX ADMIN — LORAZEPAM 0.5 MG: 0.5 TABLET ORAL at 09:04

## 2024-06-25 ASSESSMENT — PAIN DESCRIPTION - ORIENTATION: ORIENTATION: LEFT

## 2024-06-25 ASSESSMENT — PAIN DESCRIPTION - DESCRIPTORS: DESCRIPTORS: ACHING;DISCOMFORT;SHARP

## 2024-06-25 ASSESSMENT — PAIN SCALES - GENERAL
PAINLEVEL_OUTOF10: 0
PAINLEVEL_OUTOF10: 9

## 2024-06-25 ASSESSMENT — ENCOUNTER SYMPTOMS: SHORTNESS OF BREATH: 1

## 2024-06-25 ASSESSMENT — PAIN DESCRIPTION - LOCATION: LOCATION: CHEST

## 2024-06-25 NOTE — CONSULTS
Consultation H&P    Date of Consultation:  2024    PCP:  Shashi Gan MD    Cardiologist:     Chief Complaint:     History of Present Illness:     Dayami Christensen is a 89 y.o. female who recently had 500 cc of fluid drained off from her left hemithorax.  She recently was diagnosed with squamous cell lung cancer.  Within 24 hours of fluid completely recurred and there is complete opacification of her left hemithorax.  Consultation was placed for definitive drainage procedure.     Past Medical History:  Past Medical History:   Diagnosis Date    Arthritis     Chronic kidney disease     Diabetes mellitus (HCC)     borderline    Gastroesophageal reflux disease without esophagitis 2017    History of blood transfusion     hemorrhage after an     Hyperlipidemia     Hypertension     Osteoporosis     Urinary frequency        Past Surgical History:  Past Surgical History:   Procedure Laterality Date    BRONCHOSCOPY N/A 05/15/2024    NAVIGATIONAL  BRONCHOSCOPY WITH ENDOBRONCHIAL ULTRASOUND  TBNA TRANSBRONCHIAL BIOPIES performed by Benedict Cuenca MD at [facility]    CATARACT REMOVAL      COLONOSCOPY      EYE SURGERY      THORACENTESIS Left 2024    550 ml removed by     TUBAL LIGATION         Home Medications:   Prior to Admission medications    Medication Sig Start Date End Date Taking? Authorizing Provider   Budeson-Glycopyrrol-Formoterol (BREZTRI AEROSPHERE) 160-9-4.8 MCG/ACT AERO Inhale 2 puffs into the lungs in the morning and at bedtime 24   Benedict Cuenca MD   acetaminophen (TYLENOL) 325 MG tablet Take 1 tablet by mouth every 6 hours as needed for Pain Takes 2 tabs every 6 hrs as needed    ProviderChon MD   albuterol sulfate HFA (VENTOLIN HFA) 108 (90 Base) MCG/ACT inhaler Inhale 2 puffs into the lungs every 6 hours as needed for Wheezing    ProviderChon MD   albuterol (ACCUNEB) 0.63 MG/3ML nebulizer solution Take 3 mLs by nebulization every 6 hours as  a mild pericardial effusion towards the bases. There is no evidence for mediastinal mass or adenopathy in the left side of mediastinum anterior to the left main artery which abuts against the mediastinal margin Visualized upper abdominal structures not fully covered this study.  There is a nodular appearance of both adrenal glands with larger nodule in the left adrenal gland.  This can represent metastatic disease. Moderate spondylosis with diffuse osteophytes are seen in the thoracic spine. There is an aggressive lytic lesion in the posterior aspect of the left 1st rib as described previously on the PET CT scan examination.     1.  Findings of advanced malignancy in the left chest/left side of the mediastinum as described on previous studies. 2.  Development of a large size left-sided pleural effusions since the PET CT scan study of June 5 causing severe compression near in vibha left 3.  Severe endobronchial obstructive process of the bronchial system. Patient's potential to benefit from evaluation after drainage of left-sided effusion.  Please evaluate clinically. 4.  No indication for acute pulmonary emboli.     XR CHEST (2 VW)    Result Date: 6/21/2024  EXAMINATION: TWO XRAY VIEWS OF THE CHEST 6/21/2024 12:08 pm COMPARISON: PET-CT 06/05/2024. HISTORY: ORDERING SYSTEM PROVIDED HISTORY: SOB (shortness of breath) TECHNOLOGIST PROVIDED HISTORY: What reading provider will be dictating this exam?->CRC FINDINGS: Near complete opacification of the left hemithorax, appears overall worsened compared to the prior PET-CT, given difference in technique.  Findings are nonspecific, but most likely secondary to worsening known lung neoplasm with associated collapse of the majority of the left lung with possible pleural effusion.  Consider correlation with follow-up CT of the chest to better assess.  Right lung grossly clear.  No right pleural effusion. Cardiomediastinal silhouette mostly obscured by the left lung findings.

## 2024-06-25 NOTE — ANESTHESIA POSTPROCEDURE EVALUATION
Department of Anesthesiology  Postprocedure Note    Patient: Dayami Christensen  MRN: [id number]  YOB: 1934  Date of evaluation: 6/25/2024    Procedure Summary       Date: 06/25/24 Room / Location: [address]    Anesthesia Start: 1558 Anesthesia Stop: 1632    Procedure: INSERTION LEFT PLEURX CATHETER (Left: Chest) Diagnosis:       Malignant neoplasm of left lung, unspecified part of lung (HCC)      (Malignant neoplasm of left lung, unspecified part of lung (HCC) [C34.92])    Surgeons: Kolby Torre MD Responsible Provider: Hilary Rivera MD    Anesthesia Type: MAC ASA Status: 3 - Emergent            Anesthesia Type: No value filed.    Angela Phase I: Angela Score: 10    Angela Phase II:      Anesthesia Post Evaluation    Patient location during evaluation: PACU  Patient participation: complete - patient participated  Level of consciousness: awake  Pain score: 0  Airway patency: patent  Nausea & Vomiting: no nausea and no vomiting  Cardiovascular status: hemodynamically stable  Respiratory status: acceptable  Hydration status: euvolemic  Pain management: adequate        No notable events documented.

## 2024-06-25 NOTE — PROGRESS NOTES
Palliative Care Progress Note  Patient: Dayami Christensen  Gender: female  YOB: 1934  Unit/Bed: W195/W195-01  CodeStatus: Full Code  Inpatient Treatment Team: Treatment Team: Attending Provider: Melanie Tomas MD; Consulting Physician: Cherry Xiong DO; Consulting Physician: Bert Coreas MD; Consulting Physician: Ion De La Rosa MD; Registered Nurse: Elvira Clarke, RN; : Yolis Baker RN; Patient Care Tech: Kari Atkinson; Registered Nurse: Asmita Hardwick, RN; : Amisha Carmen, RN; Registered Nurse: Riya Santamaria, RN; Utilization Reviewer: Janette Rowe RN  Admit Date:  6/21/2024    Chief Complaint: SOB    History of Presenting Illness:      Dayami Christensen is a 89 y.o. female on hospital day 4 with a history of lung cancer ,arthritis, diabetes type 2, GERD, HTN,  & hyperlipidemia.    Patient presented to the ER from [facility] due to worsening SOB and abnormal chest x ray. She was admitted for collapse of left lung in the setting of lung cancer.     Palliative care consulted for goals of care. Had thoracentesis today with 550 mLs removed. On 4 L NC. Currently receiving radiation therapy as an outpatient. Patient states she is not doing well. Complaining of SOB and RUQ abdominal pain (has been going on several days). Rating pain at a 9/10. Describes as sharp. Reports the shortness of breath is worse that the pain, however.     6/25/24:  Patient is alert and oriented to self only. States her lungs are doing \"good.\" Unable to verbalize what is going on with her cancer or current lung condition. Spoke with daughter, Fanny, who states this is her baseline to be intermittently confused. Patient reports pain is better but still rating this at a 9/10. Was called by nurse yesterday evening due to anxiety. Ativan prn started. Patient had complete recurrence of fluid to left lung within 24 hours. Plan is for a PleurX catheter placement.     Review of Systems:      Patient had last office visit on 12/21/20.  Patient had last refill of imitrex on 11/18/20 with #27 and 0 refills.     Prescription is set to fax if approved.

## 2024-06-25 NOTE — PROGRESS NOTES
Spoke with Dr. Garrido. Recommendation is to stop treatment and continue with comfort measures. Agree with PleurX for comfort. Called daughter, Fanny. She would like to speak with her brother. Dr. Garrido will also give her a call to discuss case with her. She advised that 10 minutes would be a good time for her to talk to him.

## 2024-06-25 NOTE — PROGRESS NOTES
[facility]  Occupational Therapy        NAME:  Dayami Christensen  ROOM: W195/W195-01  :  1934  DATE: 2024    Attempted to see Dayami Christensen at 1:47 PM on this date for:   []  Initial Evaluation   [x]  Treatment       Patient was unable to be seen due to:   [] Off unit for testing/procedure    [] Patient refused, stating \"    [] Therapy on hold due to     [] Nursing deferred due to    [x] Other:  Pt laying supine in bed fast asleep. Multiple verbal cues provided to arouse the pt. Pt remained sleeping. Will attempt again when able.    Electronically signed by SOUTH Brown on 24 at 1:51 PM EDT

## 2024-06-25 NOTE — OP NOTE
Operative Note      Patient: Dayami Christensen  YOB: 1934  MRN: [id number]    Date of Procedure: 6/25/2024    Pre-Op Diagnosis Codes:     * Malignant neoplasm of left lung, unspecified part of lung (HCC) [C34.92], pleural effusion    Post-Op Diagnosis: Same       Procedure(s):  INSERTION LEFT PLEURX CATHETER    Surgeon(s):  Kolby Torre MD    Assistant:   * No surgical staff found *    Anesthesia: Monitor Anesthesia Care    Estimated Blood Loss (mL): Minimal    Complications: None    Specimens:   ID Type Source Tests Collected by Time Destination   A : left pleural effusion Body Fluid Lung CYTOLOGY, NON-GYN Kolby Torre MD 6/25/2024 1613        Implants:  * No implants in log *      Drains:   Chest Tube Left Pleural 1 (Active)       Findings:  Infection Present At Time Of Surgery (PATOS) (choose all levels that have infection present):  No infection present  Other Findings: Serous effusion    Detailed Description of Procedure:   Patient was admitted with shortness of breath and found to have a massive left-sided pleural effusion.  Thoracentesis was performed yesterday for about 500 cc but today the fluid completely recurred with opacification of the left hemithorax.  Definitive drain was requested.    Once patient was given some gentle IV sedation her left chest was prepped and draped in sterile fashion.  Approximately 10 cc 1% lidocaine was used to infiltrate a tract of skin and subcutaneous tissue along her lower lateral chest wall.  Small incisions were made at either end of this tract.  Through the posterior incision a finder needle was gently guided through the intercostal space into the serous effusion.  A guidewire was then passed.  The Pleurx catheter was tunneled from anterior to posterior.  Serial dilations were then performed over the guidewire until the peel-away sheath was left in place.  The catheter was easily guided through the peel-away sheath which was then removed.  Immediately began

## 2024-06-25 NOTE — PROGRESS NOTES
INPATIENT PROGRESS NOTES    PATIENT NAME: Dayami Christensen  MRN: [id number]  SERVICE DATE:  June 25, 2024   SERVICE TIME:  1:11 PM      PRIMARY SERVICE: Pulmonary Disease    CHIEF COMPLAIN: Shortness of breath      INTERVAL HPI: Patient seen and examined at bedside, Interval Notes, orders reviewed. Nursing notes noted  She has complain of shortness of breath requiring 4 L O2 via nasal cannula.  O2 saturation is 99%.  She had chest x-ray done repeat shows complete opacification on the left hemithorax and rapidly accumulated left pleural effusion after thoracentesis done yesterday.  Due to symptomatic and recurrent effusion recommends Pleurx catheter.  Dr. Torre was consulted .       OBJECTIVE   I/O:24HR INTAKE/OUTPUT:    Intake/Output Summary (Last 24 hours) at 6/25/2024 1311  Last data filed at 6/25/2024 0909  Gross per 24 hour   Intake 180 ml   Output --   Net 180 ml     06/24 0701 - 06/25 0700  In: 300 [P.O.:300]  Out: -   Body mass index is 32.32 kg/m².    PHYSICAL EXAM:  Vitals:  BP (!) 133/58   Pulse 96   Temp 98.7 °F (37.1 °C) (Oral)   Resp 14   Ht 1.499 m (4' 11\")   Wt 72.6 kg (160 lb)   SpO2 99%   BMI 32.32 kg/m²     General: Alert, awake .comfortable in bed, No distress.  Head: Atraumatic , Normocephalic   Eyes: PERRL. No sclera icterus. No conjunctival injection. No discharge   ENT: No nasal  discharge. Pharynx clear.  Neck:  Trachea midline. No thyromegaly, no JVD, No cervical adenopathy.  Chest : Bilaterally symmetrical ,Normal effort,  No accessory muscle use  Lung : Diminished breath sound on left side.  No Rales. No wheezing. No rhonchi.   Heart:: Normal rate. Regular rhythm. No mumur ,  Rub or gallop  ABD: Non-tender. Non-distended. No masses. No organmegaly. Normal bowel sounds. No hernia.  Ext : No Pitting both leg , No Cyanosis No clubbing  Neuro: no focal weakness    Labs:  Recent Labs     06/23/24  0532 06/24/24  0525   WBC 11.3* 9.9   HGB 10.9* 10.8*    287     Recent Labs      hilar lymph nodes is hypermetabolic, with a maximal SUV of 12.8.  There is soft tissue density within the left upper lobe bronchus suggesting intrinsic or extrinsic obstruction of the bronchus.  More inferiorly located hypermetabolic lymph nodes are mildly enlarged and have a maximal SUV of 10.2. ABDOMEN/PELVIS: The liver, spleen, pancreas and adrenals reveal no evidence of focal hypermetabolism.  Linear increased metabolic activity in the distal stomach and proximal duodenum is likely physiologic.  No hypermetabolic abdominal or pelvic lymph nodes are identified. BONES/SOFT TISSUE: There is no evidence of osseous hypermetabolic foci. INCIDENTAL CT FINDINGS: There is a new left pleural effusion.  Small hiatal hernia is present.  The gallbladder is distended.  There is colonic diverticulosis, without evidence of diverticulitis.     There is a hypermetabolic 2.5 cm left apical mass which extends through the chest wall with destruction of the left 1st rib. Cavitating lingular mass is hypermetabolic and consistent with neoplasm. There are hypermetabolic aortopulmonary window and left hilar lymph node masses with apparent intrinsic or extrinsic obstruction of the left upper lobe bronchus with left upper lobe collapse. Small to moderate-sized left pleural effusion is non hypermetabolic.     CTA CHEST W WO CONTRAST    Result Date: 6/1/2024  EXAMINATION: CTA OF THE CHEST WITH AND WITHOUT CONTRAST 6/1/2024 3:23 am TECHNIQUE: CTA of the chest was performed before and after the administration of intravenous contrast.  Multiplanar reformatted images are provided for review.  MIP images are provided for review. Automated exposure control, iterative reconstruction, and/or weight based adjustment of the mA/kV was utilized to reduce the radiation dose to as low as reasonably achievable. COMPARISON: None. 05/11/2024 HISTORY: ORDERING SYSTEM PROVIDED HISTORY: L side chest pain L arm pain weakness, evaluate aorta TECHNOLOGIST PROVIDED

## 2024-06-25 NOTE — ANESTHESIA PRE PROCEDURE
Normal systolic function.  ·  Aortic Valve: Moderate regurgitation.  ·  Mitral Valve: Mild regurgitation.  ·  Tricuspid Valve: Normal RVSP. The estimated RVSP is 27 mmHg.  ·  Left Atrium: Left atrium is mildly dilated.  ·  Pericardium: No pericardial effusion.  ·  Image quality is adequate. Procedure performed with the patient in a supine position.       Neuro/Psych:   Negative Neuro/Psych ROS              GI/Hepatic/Renal:   (+) GERD:          Endo/Other:    (+) DiabetesType II DM, : arthritis: OA..          Pt had no PAT visit       Abdominal:   (+) obese          Vascular: negative vascular ROS.         Other Findings:       Anesthesia Plan      MAC     ASA 3 - emergent     (MAC)  Induction: intravenous.    MIPS: Postoperative opioids intended and Prophylactic antiemetics administered.  Anesthetic plan and risks discussed with patient.    Use of blood products discussed with patient whom consented to blood products.    Plan discussed with CRNA.    Attending anesthesiologist reviewed and agrees with Preprocedure content            Hilary Rivera MD   6/25/2024

## 2024-06-25 NOTE — PLAN OF CARE
Problem: Discharge Planning  Goal: Discharge to home or other facility with appropriate resources  Outcome: Progressing  Flowsheets (Taken 6/24/2024 2030)  Discharge to home or other facility with appropriate resources:   Identify barriers to discharge with patient and caregiver   Arrange for needed discharge resources and transportation as appropriate   Identify discharge learning needs (meds, wound care, etc)   Arrange for interpreters to assist at discharge as needed   Refer to discharge planning if patient needs post-hospital services based on physician order or complex needs related to functional status, cognitive ability or social support system     Problem: Pain  Goal: Verbalizes/displays adequate comfort level or baseline comfort level  Outcome: Progressing     Problem: Skin/Tissue Integrity  Goal: Absence of new skin breakdown  Description: 1.  Monitor for areas of redness and/or skin breakdown  2.  Assess vascular access sites hourly  3.  Every 4-6 hours minimum:  Change oxygen saturation probe site  4.  Every 4-6 hours:  If on nasal continuous positive airway pressure, respiratory therapy assess nares and determine need for appliance change or resting period.  Outcome: Progressing     Problem: Safety - Adult  Goal: Free from fall injury  Outcome: Progressing     Problem: ABCDS Injury Assessment  Goal: Absence of physical injury  Outcome: Progressing     Problem: Chronic Conditions and Co-morbidities  Goal: Patient's chronic conditions and co-morbidity symptoms are monitored and maintained or improved  Outcome: Progressing  Flowsheets (Taken 6/24/2024 2030)  Care Plan - Patient's Chronic Conditions and Co-Morbidity Symptoms are Monitored and Maintained or Improved:   Monitor and assess patient's chronic conditions and comorbid symptoms for stability, deterioration, or improvement   Update acute care plan with appropriate goals if chronic or comorbid symptoms are exacerbated and prevent overall  improvement and discharge   Collaborate with multidisciplinary team to address chronic and comorbid conditions and prevent exacerbation or deterioration     Problem: Physical Therapy - Adult  Goal: By Discharge: Performs mobility at highest level of function for planned discharge setting.  See evaluation for individualized goals.  6/24/2024 1424 by Hyun Nicole, PT  Outcome: Progressing     Problem: Occupational Therapy - Adult  Goal: By Discharge: Performs self-care activities at highest level of function for planned discharge setting.  See evaluation for individualized goals.  6/24/2024 1505 by Paradise Jackson, OTR/L  Outcome: Progressing

## 2024-06-25 NOTE — PLAN OF CARE
Problem: Discharge Planning  Goal: Discharge to home or other facility with appropriate resources  6/25/2024 0955 by Riya Santamaria, RN  Outcome: Progressing  6/24/2024 2353 by Asmita Hardwick RN  Outcome: Progressing  Flowsheets (Taken 6/24/2024 2030)  Discharge to home or other facility with appropriate resources:   Identify barriers to discharge with patient and caregiver   Arrange for needed discharge resources and transportation as appropriate   Identify discharge learning needs (meds, wound care, etc)   Arrange for interpreters to assist at discharge as needed   Refer to discharge planning if patient needs post-hospital services based on physician order or complex needs related to functional status, cognitive ability or social support system     Problem: Pain  Goal: Verbalizes/displays adequate comfort level or baseline comfort level  6/25/2024 0955 by Riya Santamaria RN  Outcome: Progressing  6/24/2024 2353 by Asmita Hardwick, RN  Outcome: Progressing     Problem: Skin/Tissue Integrity  Goal: Absence of new skin breakdown  Description: 1.  Monitor for areas of redness and/or skin breakdown  2.  Assess vascular access sites hourly  3.  Every 4-6 hours minimum:  Change oxygen saturation probe site  4.  Every 4-6 hours:  If on nasal continuous positive airway pressure, respiratory therapy assess nares and determine need for appliance change or resting period.  6/25/2024 0955 by Riya Santamaria, RN  Outcome: Progressing  6/24/2024 2353 by Asmita Hardwick, RN  Outcome: Progressing

## 2024-06-25 NOTE — PROGRESS NOTES
Message sent to  \"patient complaining she \"can't breath\" o2 100% she is grunting intermittently with RR 32. Gave ativan\"     0927: xray at bedside     1030: pulmonology and primary made aware of xray findings.

## 2024-06-25 NOTE — PROGRESS NOTES
Vascular Medicine and Interventional Radiology:    PROGRESS NOTE:       Dayami Christensen  : 1934  MR #: [id number]       PCP:  Shashi Gan MD     Attending Physician: Melanie Tomas MD     Date of Admission: 2024 12:45 PM     Chief Complaint:   Chief Complaint   Patient presents with    Shortness of Breath     SOB, collapsed lung, lung ca      SUBJECTIVE:   Seen and examined on medical/telemetry unit.  Patient is s/p left US guided thoracentesis with Dr. Lowe 2024 with 550mL drained.  Per nursing, patient remained short of breath, tachypneic, worse with minimal exertion, on 4 L O2.  She received Ativan with some relief of symptoms.  Patient currently denies shortness of breath.  However, chest x-ray this morning with reaccumulation of fluid in the left lung with complete opacification.    Past Medical History:   has a past medical history of Arthritis, Chronic kidney disease, Diabetes mellitus (HCC), Gastroesophageal reflux disease without esophagitis, History of blood transfusion, Hyperlipidemia, Hypertension, Osteoporosis, and Urinary frequency.    Past SurgicalHistory:   has a past surgical history that includes Tubal ligation; Colonoscopy; eye surgery; Cataract removal; bronchoscopy (N/A, 05/15/2024); and thoracentesis (Left, 2024).     Allergies:Asa [aspirin]    Home Medications:   Prior to Admission medications    Medication Sig Start Date End Date Taking? Authorizing Provider   Budeson-Glycopyrrol-Formoterol (BREZTRI AEROSPHERE) 160-9-4.8 MCG/ACT AERO Inhale 2 puffs into the lungs in the morning and at bedtime 24   Benedict Cuenca MD   acetaminophen (TYLENOL) 325 MG tablet Take 1 tablet by mouth every 6 hours as needed for Pain Takes 2 tabs every 6 hrs as needed    Chon Starks MD   albuterol sulfate HFA (VENTOLIN HFA) 108 (90 Base) MCG/ACT inhaler Inhale 2 puffs into the lungs every 6 hours as needed for Wheezing    ProviderChon MD   albuterol (ACCUNEB) 0.63  into pleural effusion and thoracentesis  was performed. The patient tolerated the procedure well.     FINDINGS:  A total of 550 cc was removed.     IMPRESSION:  Successful ultrasound guided thoracentesis.    ONE XRAY VIEW OF THE CHEST     6/24/2024 10:45 am     COMPARISON:  06/21/2024     HISTORY:  ORDERING SYSTEM PROVIDED HISTORY: post left thoracentesis  TECHNOLOGIST PROVIDED HISTORY:  Reason for exam:->post left thoracentesis  What reading provider will be dictating this exam?->CRC     FINDINGS:  Nonspecific interstitial prominence, chronic changes versus component of  edema or atypical infection in the acute setting.  Trace right pleural  effusion or thickening.  ASVD, cardiomegaly.  Decreased left pleural  effusion, now moderate.  Interval partial aeration of the left upper and mid  lung zone.  Lytic lesion and pathologic fracture of the left 1st rib.     IMPRESSION:  1. Decreased left pleural effusion, now moderate. Interval partial aeration  of the left upper and mid lung zone. No pneumothorax.  2. Nonspecific interstitial prominence, chronic changes versus component of  edema or atypical infection in the acute setting.  3. Lytic lesion and pathologic fracture of the left 1st rib.    Xray Result (most recent):  XR CHEST PORTABLE 06/25/2024    Narrative  EXAMINATION:  ONE XRAY VIEW OF THE CHEST    6/25/2024 9:19 am    COMPARISON:  June 24th    HISTORY:  ORDERING SYSTEM PROVIDED HISTORY: Shortness of breath  TECHNOLOGIST PROVIDED HISTORY:  Reason for exam:->Shortness of breath  What reading provider will be dictating this exam?->CRC    FINDINGS:  There is interval increase in fluid in the left lung, with complete  opacification.  There is no tracheal deviation.  There is no extrapleural  air.  The right lung is clear.  The cardiac silhouette is enlarged.    Impression  Re-accumulation of fluid in the left lung, with complete opacification.        ASSESSMENT:  89-year-old female w/history of lung cancer was sent

## 2024-06-25 NOTE — PROGRESS NOTES
06/24/24 2000   RT Protocol   History Pulmonary Disease 1   Respiratory pattern 0   Breath sounds 2   Cough 0   Indications for Bronchodilator Therapy On home bronchodilators   Bronchodilator Assessment Score 3

## 2024-06-26 ENCOUNTER — APPOINTMENT (OUTPATIENT)
Dept: GENERAL RADIOLOGY | Age: 89
DRG: 181 | End: 2024-06-26
Payer: MEDICARE

## 2024-06-26 ENCOUNTER — APPOINTMENT (OUTPATIENT)
Dept: RADIATION ONCOLOGY | Age: 89
End: 2024-06-26
Payer: MEDICARE

## 2024-06-26 DIAGNOSIS — J90 PLEURAL EFFUSION, LEFT: Primary | ICD-10-CM

## 2024-06-26 PROBLEM — R06.02 SOB (SHORTNESS OF BREATH): Status: ACTIVE | Noted: 2024-06-26

## 2024-06-26 PROBLEM — Z71.89 ENCOUNTER FOR HOSPICE CARE DISCUSSION: Status: ACTIVE | Noted: 2024-06-26

## 2024-06-26 LAB
AFB STAIN: NORMAL
GLUCOSE BLD-MCNC: 120 MG/DL (ref 70–99)
GLUCOSE BLD-MCNC: 156 MG/DL (ref 70–99)
GLUCOSE BLD-MCNC: 170 MG/DL (ref 70–99)
PATH CONSULT FLUID: NORMAL
PERFORMED ON: ABNORMAL
PRELIMINARY: NORMAL

## 2024-06-26 PROCEDURE — 99232 SBSQ HOSP IP/OBS MODERATE 35: CPT | Performed by: INTERNAL MEDICINE

## 2024-06-26 PROCEDURE — 94668 MNPJ CHEST WALL SBSQ: CPT

## 2024-06-26 PROCEDURE — 2700000000 HC OXYGEN THERAPY PER DAY

## 2024-06-26 PROCEDURE — 99232 SBSQ HOSP IP/OBS MODERATE 35: CPT | Performed by: NURSE PRACTITIONER

## 2024-06-26 PROCEDURE — 6370000000 HC RX 637 (ALT 250 FOR IP): Performed by: NURSE PRACTITIONER

## 2024-06-26 PROCEDURE — 2580000003 HC RX 258: Performed by: INTERNAL MEDICINE

## 2024-06-26 PROCEDURE — 97535 SELF CARE MNGMENT TRAINING: CPT

## 2024-06-26 PROCEDURE — 94640 AIRWAY INHALATION TREATMENT: CPT

## 2024-06-26 PROCEDURE — 6370000000 HC RX 637 (ALT 250 FOR IP): Performed by: INTERNAL MEDICINE

## 2024-06-26 PROCEDURE — 94761 N-INVAS EAR/PLS OXIMETRY MLT: CPT

## 2024-06-26 PROCEDURE — 6360000002 HC RX W HCPCS: Performed by: STUDENT IN AN ORGANIZED HEALTH CARE EDUCATION/TRAINING PROGRAM

## 2024-06-26 PROCEDURE — 71045 X-RAY EXAM CHEST 1 VIEW: CPT

## 2024-06-26 PROCEDURE — 2060000000 HC ICU INTERMEDIATE R&B

## 2024-06-26 PROCEDURE — 94760 N-INVAS EAR/PLS OXIMETRY 1: CPT

## 2024-06-26 RX ORDER — LORAZEPAM 0.5 MG/1
0.5 TABLET ORAL EVERY 4 HOURS PRN
Status: DISCONTINUED | OUTPATIENT
Start: 2024-06-26 | End: 2024-07-02 | Stop reason: HOSPADM

## 2024-06-26 RX ORDER — MORPHINE SULFATE 20 MG/ML
5 SOLUTION ORAL ONCE
Status: COMPLETED | OUTPATIENT
Start: 2024-06-26 | End: 2024-06-26

## 2024-06-26 RX ORDER — MORPHINE SULFATE 20 MG/ML
5 SOLUTION ORAL
Status: DISCONTINUED | OUTPATIENT
Start: 2024-06-26 | End: 2024-07-02 | Stop reason: HOSPADM

## 2024-06-26 RX ORDER — MORPHINE SULFATE 20 MG/ML
5 SOLUTION ORAL EVERY 4 HOURS PRN
Status: DISCONTINUED | OUTPATIENT
Start: 2024-06-26 | End: 2024-06-26

## 2024-06-26 RX ADMIN — ENOXAPARIN SODIUM 40 MG: 100 INJECTION SUBCUTANEOUS at 08:16

## 2024-06-26 RX ADMIN — MORPHINE SULFATE 5 MG: 10 SOLUTION ORAL at 15:36

## 2024-06-26 RX ADMIN — METOPROLOL SUCCINATE 100 MG: 100 TABLET, EXTENDED RELEASE ORAL at 08:16

## 2024-06-26 RX ADMIN — PANTOPRAZOLE SODIUM 40 MG: 40 TABLET, DELAYED RELEASE ORAL at 05:12

## 2024-06-26 RX ADMIN — Medication 10 ML: at 21:05

## 2024-06-26 RX ADMIN — MORPHINE SULFATE 5 MG: 10 SOLUTION ORAL at 17:24

## 2024-06-26 RX ADMIN — DILTIAZEM HYDROCHLORIDE 120 MG: 120 CAPSULE, COATED, EXTENDED RELEASE ORAL at 08:16

## 2024-06-26 RX ADMIN — IPRATROPIUM BROMIDE AND ALBUTEROL SULFATE 1 DOSE: 2.5; .5 SOLUTION RESPIRATORY (INHALATION) at 07:32

## 2024-06-26 RX ADMIN — IPRATROPIUM BROMIDE AND ALBUTEROL SULFATE 1 DOSE: 2.5; .5 SOLUTION RESPIRATORY (INHALATION) at 19:58

## 2024-06-26 RX ADMIN — BUDESONIDE AND FORMOTEROL FUMARATE DIHYDRATE 2 PUFF: 160; 4.5 AEROSOL RESPIRATORY (INHALATION) at 19:58

## 2024-06-26 RX ADMIN — MORPHINE SULFATE 5 MG: 10 SOLUTION ORAL at 05:19

## 2024-06-26 RX ADMIN — ATORVASTATIN CALCIUM 40 MG: 40 TABLET, FILM COATED ORAL at 08:16

## 2024-06-26 RX ADMIN — TIOTROPIUM BROMIDE INHALATION SPRAY 2 PUFF: 3.12 SPRAY, METERED RESPIRATORY (INHALATION) at 07:32

## 2024-06-26 RX ADMIN — BUDESONIDE AND FORMOTEROL FUMARATE DIHYDRATE 2 PUFF: 160; 4.5 AEROSOL RESPIRATORY (INHALATION) at 07:32

## 2024-06-26 RX ADMIN — MORPHINE SULFATE 5 MG: 10 SOLUTION ORAL at 08:40

## 2024-06-26 ASSESSMENT — PAIN SCALES - GENERAL
PAINLEVEL_OUTOF10: 4
PAINLEVEL_OUTOF10: 7
PAINLEVEL_OUTOF10: 8

## 2024-06-26 ASSESSMENT — PAIN DESCRIPTION - DESCRIPTORS
DESCRIPTORS: ACHING;DISCOMFORT;SHARP
DESCRIPTORS: ACHING;DISCOMFORT;SHARP
DESCRIPTORS: ACHING;DISCOMFORT;PRESSURE

## 2024-06-26 ASSESSMENT — PAIN DESCRIPTION - ORIENTATION
ORIENTATION: LEFT

## 2024-06-26 ASSESSMENT — PAIN DESCRIPTION - LOCATION
LOCATION: CHEST

## 2024-06-26 NOTE — CARE COORDINATION
Dr. Tomas spoke with daughter, Fanny, and she is agreeable to meeting with hospice. [place] of choice offered and she chose [facility]. Referral given to Janette at [facility] and she will schedule a meeting time with daughter.     Update received from Janette that hospice meeting is scheduled for tomorrow at 1700 due to daughter's work schedule.

## 2024-06-26 NOTE — PROGRESS NOTES
extrinsic obstruction of the bronchus.  More inferiorly located hypermetabolic lymph nodes are mildly enlarged and have a maximal SUV of 10.2. ABDOMEN/PELVIS: The liver, spleen, pancreas and adrenals reveal no evidence of focal hypermetabolism.  Linear increased metabolic activity in the distal stomach and proximal duodenum is likely physiologic.  No hypermetabolic abdominal or pelvic lymph nodes are identified. BONES/SOFT TISSUE: There is no evidence of osseous hypermetabolic foci. INCIDENTAL CT FINDINGS: There is a new left pleural effusion.  Small hiatal hernia is present.  The gallbladder is distended.  There is colonic diverticulosis, without evidence of diverticulitis.     There is a hypermetabolic 2.5 cm left apical mass which extends through the chest wall with destruction of the left 1st rib. Cavitating lingular mass is hypermetabolic and consistent with neoplasm. There are hypermetabolic aortopulmonary window and left hilar lymph node masses with apparent intrinsic or extrinsic obstruction of the left upper lobe bronchus with left upper lobe collapse. Small to moderate-sized left pleural effusion is non hypermetabolic.     CTA CHEST W WO CONTRAST    Result Date: 6/1/2024  EXAMINATION: CTA OF THE CHEST WITH AND WITHOUT CONTRAST 6/1/2024 3:23 am TECHNIQUE: CTA of the chest was performed before and after the administration of intravenous contrast.  Multiplanar reformatted images are provided for review.  MIP images are provided for review. Automated exposure control, iterative reconstruction, and/or weight based adjustment of the mA/kV was utilized to reduce the radiation dose to as low as reasonably achievable. COMPARISON: None. 05/11/2024 HISTORY: ORDERING SYSTEM PROVIDED HISTORY: L side chest pain L arm pain weakness, evaluate aorta TECHNOLOGIST PROVIDED HISTORY: Reason for exam:->L side chest pain L arm pain weakness Reason for exam:->evaluate aorta Additional Contrast?->None What reading provider will be

## 2024-06-26 NOTE — PROGRESS NOTES
Pleurx draining without air leak.  When ready for discharge, nursing can cap Pleurx and place under fresh dressing.

## 2024-06-26 NOTE — PROGRESS NOTES
Palliative Care Progress Note  Patient: Dayami Christensen  Gender: female  YOB: 1934  Unit/Bed: W195/W195-01  CodeStatus: Full Code  Inpatient Treatment Team: Treatment Team: Attending Provider: Melanie Tomas MD; Consulting Physician: Cherry Xiong DO; Consulting Physician: Bert Coreas MD; Consulting Physician: Ion De La Rosa MD; : Yolis Baker RN; Consulting Physician: Kolby Torre MD; Surgeon: Kolby Torre MD; Patient Care Tech: Carito Pate; Registered Nurse: Elba Coyle, RN; Registered Nurse: Riya Santamaria RN; Utilization Reviewer: Josefina Alvarez RN  Admit Date:  6/21/2024    Chief Complaint: SOB    History of Presenting Illness:      Dayami Christensen is a 89 y.o. female on hospital day 5 with a history of lung cancer ,arthritis, diabetes type 2, GERD, HTN,  & hyperlipidemia.    Patient presented to the ER from [facility] due to worsening SOB and abnormal chest x ray. She was admitted for collapse of left lung in the setting of lung cancer.     Palliative care consulted for goals of care. Had thoracentesis today with 550 mLs removed. On 4 L NC. Currently receiving radiation therapy as an outpatient. Patient states she is not doing well. Complaining of SOB and RUQ abdominal pain (has been going on several days). Rating pain at a 9/10. Describes as sharp. Reports the shortness of breath is worse that the pain, however.     6/25/24:  Patient is alert and oriented to self only. States her lungs are doing \"good.\" Unable to verbalize what is going on with her cancer or current lung condition. Spoke with daughter, Fanny, who states this is her baseline to be intermittently confused. Patient reports pain is better but still rating this at a 9/10. Was called by nurse yesterday evening due to anxiety. Ativan prn started. Patient had complete recurrence of fluid to left lung within 24 hours. Plan is for a PleurX catheter placement.     6/26/24    Patient sitting up in  ULTRASOUND  TBNA TRANSBRONCHIAL BIOPIES performed by Benedict Cuenca [place] [facility] [place]    CATARACT REMOVAL      COLONOSCOPY      EYE SURGERY      THORACENTESIS Left 2024    550 ml removed by     TUBAL LIGATION         Social Hx:  Social History     Socioeconomic History    Marital status:      Spouse name: None    Number of children: None    Years of education: None    Highest education level: None   Tobacco Use    Smoking status: Former     Current packs/day: 0.00     Types: Cigarettes     Quit date: 1987     Years since quittin.9    Smokeless tobacco: Never   Vaping Use    Vaping Use: Never used   Substance and Sexual Activity    Alcohol use: No    Drug use: No     Social Determinants of Health     Food Insecurity: No Food Insecurity (2024)    Hunger Vital Sign     Worried About Running Out of Food in the Last Year: Never true     Ran Out of Food in the Last Year: Never true   Transportation Needs: No Transportation Needs (2024)    PRAPARE - Transportation     Lack of Transportation (Medical): No     Lack of Transportation (Non-Medical): No    Social Connections ([facility])   Housing Stability: Low Risk  (2024)    Housing Stability Vital Sign     Unable to Pay for Housing in the Last Year: No     Number of Places Lived in the Last Year: 1     Unstable Housing in the Last Year: No       Family Hx:  No family history on file.    LABS: Reviewed     CBC:  Lab Results   Component Value Date/Time    WBC 9.9 2024 05:25 AM    RBC 4.09 2024 05:25 AM    HGB 10.8 2024 05:25 AM    HCT 35.1 2024 05:25 AM    MCV 85.8 2024 05:25 AM    MCH 26.4 2024 05:25 AM    MCHC 30.8 2024 05:25 AM    RDW 15.3 2024 05:25 AM     2024 05:25 AM     CBC with Differential:   Lab Results   Component Value Date/Time    WBC 9.9 2024 05:25 AM    RBC 4.09 2024 05:25 AM    HGB 10.8 2024 05:25 AM    HCT 35.1 2024 05:25 AM      gradual onset

## 2024-06-26 NOTE — PROGRESS NOTES
Hospitalist Progress Note      PCP: Shashi Gan MD    Date of Admission: 6/21/2024    Chief Complaint:    Chief Complaint   Patient presents with    Shortness of Breath     SOB, collapsed lung, lung ca     Subjective:  No acute events overnight. Pt resting comfortably in bed. She is still feeling short of breath.     Medications:  Reviewed    Infusion Medications    sodium chloride      dextrose       Scheduled Medications    enoxaparin  40 mg SubCUTAneous Q24H    ipratropium 0.5 mg-albuterol 2.5 mg  1 Dose Inhalation BID RT    sodium chloride flush  5-40 mL IntraVENous 2 times per day    insulin glargine  0.15 Units/kg SubCUTAneous Nightly    insulin lispro  0-8 Units SubCUTAneous TID WC    insulin lispro  0-4 Units SubCUTAneous Nightly    dilTIAZem  120 mg Oral Daily    atorvastatin  40 mg Oral Daily    metoprolol succinate  100 mg Oral Daily    pantoprazole  40 mg Oral QAM AC    budesonide-formoterol  2 puff Inhalation BID RT    tiotropium  2 puff Inhalation Daily RT     PRN Meds: morphine 20MG/ML, LORazepam, sodium chloride flush, sodium chloride, potassium chloride **OR** potassium alternative oral replacement **OR** potassium chloride, magnesium sulfate, ondansetron **OR** ondansetron, polyethylene glycol, acetaminophen **OR** acetaminophen, glucose, dextrose bolus **OR** dextrose bolus, glucagon (rDNA), dextrose      Intake/Output Summary (Last 24 hours) at 6/26/2024 0613  Last data filed at 6/26/2024 0145  Gross per 24 hour   Intake 220 ml   Output 1740 ml   Net -1520 ml       Exam:    BP (!) 126/49  Pulse 96  Temp 97.9 °F (36.6 °C) (Oral)  Resp 18  Ht 1.499 m (4' 11\")  Wt 72.6 kg (160 lb)  SpO2 100%  BMI 32.32 kg/m²   Physical Exam  Cardiovascular:      Rate and Rhythm: Normal rate and regular rhythm.   Pulmonary:      Effort: Pulmonary effort is normal. No respiratory distress.      Comments: NC in place. Decreased breath sounds on the left.  Abdominal:      Palpations: Abdomen is soft.       today     Type 2 diabetes with hyperglycemia  Hypokalemia  Obesity  Hypertension  Paroxysmal atrial fibrillation on Eliquis prior to hospitalization.  Will treat with prophylactic dose Lovenox for now and hold Eliquis in anticipation of procedures    Additional work up or/and treatment plan may be added today or then after based on clinical progression. I am managing a portion of pt care. Some medical issues are handled by other specialists. Additional work up and treatment should be done in out pt setting by pt PCP and other out pt providers.     In addition to examining and evaluating pt, I spent additional time explaining care, normal and abnormal findings, and treatment plan. All of pt questions were answered. Counseling, diet and education were  provided. Case will be discussed with nursing staff when appropriate. Family will be updated if and when appropriate.      Diet: ADULT DIET; Regular    Code Status: Full Code    Electronically signed by Melanie Tomas MD on 6/26/2024 at 6:13 AM

## 2024-06-26 NOTE — PROGRESS NOTES
I got a call from nurse stating patients daughter is at bedside and is ready for comfort measures only. I was unable to get a hold of daughter but in the meantime Dr. Tomas spoke to daughter at bedside. Patient is now a DNRCC. I will change morphine and ativan dosages for comfort measures. I reached out to hospice to see if patients daughter can have meeting sooner than 5 pm tomorrow. They are off call at this time. Will reach out in the morning.

## 2024-06-26 NOTE — PROGRESS NOTES
Hospitalist Progress Note      PCP: Shashi Gan MD    Date of Admission: 6/21/2024    Chief Complaint:    Chief Complaint   Patient presents with    Shortness of Breath     SOB, collapsed lung, lung ca     Subjective:  No acute events overnight. Pt resting comfortably in bed. Feeling less short of breath. Reports some pain where the pleurx catheter is but states the pain medicine is helping.    Medications:  Reviewed    Infusion Medications    sodium chloride      dextrose       Scheduled Medications    enoxaparin  40 mg SubCUTAneous Q24H    ipratropium 0.5 mg-albuterol 2.5 mg  1 Dose Inhalation BID RT    sodium chloride flush  5-40 mL IntraVENous 2 times per day    insulin glargine  0.15 Units/kg SubCUTAneous Nightly    insulin lispro  0-8 Units SubCUTAneous TID WC    insulin lispro  0-4 Units SubCUTAneous Nightly    dilTIAZem  120 mg Oral Daily    atorvastatin  40 mg Oral Daily    metoprolol succinate  100 mg Oral Daily    pantoprazole  40 mg Oral QAM AC    budesonide-formoterol  2 puff Inhalation BID RT    tiotropium  2 puff Inhalation Daily RT     PRN Meds: morphine 20MG/ML, LORazepam, sodium chloride flush, sodium chloride, potassium chloride **OR** potassium alternative oral replacement **OR** potassium chloride, magnesium sulfate, ondansetron **OR** ondansetron, polyethylene glycol, acetaminophen **OR** acetaminophen, glucose, dextrose bolus **OR** dextrose bolus, glucagon (rDNA), dextrose      Intake/Output Summary (Last 24 hours) at 6/26/2024 1327  Last data filed at 6/26/2024 1156  Gross per 24 hour   Intake 180 ml   Output 830 ml   Net -650 ml       Exam:    BP (!) 126/49  Pulse 96  Temp 97.9 °F (36.6 °C) (Oral)  Resp 18  Ht 1.499 m (4' 11\")  Wt 72.6 kg (160 lb)  SpO2 100%  BMI 32.32 kg/m²   Physical Exam  Cardiovascular:      Rate and Rhythm: Normal rate and regular rhythm.   Pulmonary:      Effort: Pulmonary effort is normal. No respiratory distress.      Comments: NC in place. Decreased

## 2024-06-26 NOTE — PROGRESS NOTES
Physical Therapy Med Surg Daily Treatment Note  Facility/Department: [address] TELEMETRY  Room: [place] [address]       NAME: Dayami Christensen  : 1934 (89 y.o.)  MRN: [id number]  CODE STATUS: Full Code    Date of Service: 2024    Patient Diagnosis(es): Collapse of left lung [J98.11]  Pleural effusion on left [J90]  Malignant neoplasm of left lung, unspecified part of lung (HCC) [C34.92]   Chief Complaint   Patient presents with    Shortness of Breath     SOB, collapsed lung, lung ca     Patient Active Problem List    Diagnosis Date Noted    SOB (shortness of breath) 2024    Encounter for hospice care discussion 2024    Palliative care encounter 2024    Goals of care, counseling/discussion 2024    Advanced care planning/counseling discussion 2024    Malignant neoplasm of left lung (HCC) 2024    Neoplasm related pain 2024    Shortness of breath at rest 2024    Squamous cell carcinoma of left lung (HCC) 2024    Pleural effusion, left 2024    Collapse of left lung 2024    Malignant neoplasm of overlapping sites of left lung (HCC) 2024    Secondary hypercoagulable state (HCC) 2024    Acute respiratory failure with hypoxia (HCC) 2024    Lung mass 05/10/2024    Dizzy 2018    History of cardiac radiofrequency ablation (RFA) 2018    Dyslipidemia 2017    Gastroesophageal reflux disease without esophagitis 2017    Primary osteoarthritis involving multiple joints 2017    Abnormal gait 2017    Risk for falls 2017    Chronic midline low back pain without sciatica 2017    Osteopenia 2017    Essential hypertension 2017    New onset atrial fibrillation (HCC) 2017    Type 2 diabetes mellitus without complication, without long-term current use of insulin (HCC) 2017    Class 2 severe obesity due to excess calories with serious comorbidity and body mass index (BMI) of 36.0 to 36.9 in

## 2024-06-26 NOTE — PROGRESS NOTES
Patient complains of 8/10 left sided pain. Spoke to Caridad Navarro CNP with Palliative medicine. Order to change frequency of morphine to every four hours with a one time dose for now.

## 2024-06-26 NOTE — PROGRESS NOTES
[facility]  Occupational Therapy        NAME:  Dayami Christensen  ROOM: W195/W195-01  :  1934  DATE: 2024    Attempted to see Dayami Christensen at 1114 on this date for:   []  Initial Evaluation   [x]  Treatment       Patient was unable to be seen due to:   [] Off unit for testing/procedure    [] Patient refused, stating \"    [] Therapy on hold due to     [] Nursing deferred due to    [x] Other: Pt receiving care from nursing at this time.     RN in room with pt.     Electronically signed by SOUTH Reich on 24 at 11:40 AM EDT

## 2024-06-26 NOTE — PROGRESS NOTES
Spoke with daughter Fanny and son Rosalino. Patient continues to have pain despite pain medications being given. Son rosalino agreeable with making his mother comfortable and he will fly in from [place] tomorrow morning. Daughter Fanny on her way to the hospital. Father Bill from [facility] at bedside visiting with the patient and will wait for daughter to arrive to provide support.

## 2024-06-26 NOTE — HOME CARE
2028: Patient is current with [facility]. Active skilled services include SN and PT. Patient will require resumption of care orders at discharge. Please feel free to reach out with any questions or concerns, [phone]. Thank you.       Homecare and Hospice Lead RN  Quynh Yu BSN, RN

## 2024-06-26 NOTE — PLAN OF CARE
Problem: Discharge Planning  Goal: Discharge to home or other facility with appropriate resources  6/25/2024 2330 by Asmita Hardwick RN  Outcome: Progressing  Flowsheets (Taken 6/25/2024 2254)  Discharge to home or other facility with appropriate resources:   Identify barriers to discharge with patient and caregiver   Arrange for needed discharge resources and transportation as appropriate   Identify discharge learning needs (meds, wound care, etc)   Refer to discharge planning if patient needs post-hospital services based on physician order or complex needs related to functional status, cognitive ability or social support system   Arrange for interpreters to assist at discharge as needed  6/25/2024 0955 by Riya Santamaria RN  Outcome: Progressing     Problem: Pain  Goal: Verbalizes/displays adequate comfort level or baseline comfort level  6/25/2024 2330 by Asmita Hardwick RN  Outcome: Progressing  6/25/2024 0955 by Riya Santamaria, RN  Outcome: Progressing     Problem: Skin/Tissue Integrity  Goal: Absence of new skin breakdown  Description: 1.  Monitor for areas of redness and/or skin breakdown  2.  Assess vascular access sites hourly  3.  Every 4-6 hours minimum:  Change oxygen saturation probe site  4.  Every 4-6 hours:  If on nasal continuous positive airway pressure, respiratory therapy assess nares and determine need for appliance change or resting period.  6/25/2024 2330 by Asmita Hardwick RN  Outcome: Progressing  6/25/2024 0955 by Riya Santamaria RN  Outcome: Progressing     Problem: Safety - Adult  Goal: Free from fall injury  Outcome: Progressing     Problem: ABCDS Injury Assessment  Goal: Absence of physical injury  Outcome: Progressing     Problem: Chronic Conditions and Co-morbidities  Goal: Patient's chronic conditions and co-morbidity symptoms are monitored and maintained or improved  Outcome: Progressing  Flowsheets (Taken 6/25/2024 2254)  Care Plan - Patient's Chronic Conditions and Co-Morbidity  Symptoms are Monitored and Maintained or Improved:   Monitor and assess patient's chronic conditions and comorbid symptoms for stability, deterioration, or improvement   Collaborate with multidisciplinary team to address chronic and comorbid conditions and prevent exacerbation or deterioration   Update acute care plan with appropriate goals if chronic or comorbid symptoms are exacerbated and prevent overall improvement and discharge

## 2024-06-26 NOTE — PROGRESS NOTES
Hospitalist Progress Note      PCP: Shashi Gan MD    Date of Admission: 6/21/2024    Chief Complaint:    Chief Complaint   Patient presents with    Shortness of Breath     SOB, collapsed lung, lung ca     Subjective:  No acute events overnight. Pt resting comfortably in bed. She is still feeling short of breath. Reports RUQ pain as well.    Medications:  Reviewed    Infusion Medications    sodium chloride      dextrose       Scheduled Medications    enoxaparin  40 mg SubCUTAneous Q24H    ipratropium 0.5 mg-albuterol 2.5 mg  1 Dose Inhalation BID RT    sodium chloride flush  5-40 mL IntraVENous 2 times per day    insulin glargine  0.15 Units/kg SubCUTAneous Nightly    insulin lispro  0-8 Units SubCUTAneous TID WC    insulin lispro  0-4 Units SubCUTAneous Nightly    dilTIAZem  120 mg Oral Daily    atorvastatin  40 mg Oral Daily    metoprolol succinate  100 mg Oral Daily    pantoprazole  40 mg Oral QAM AC    budesonide-formoterol  2 puff Inhalation BID RT    tiotropium  2 puff Inhalation Daily RT     PRN Meds: morphine 20MG/ML, LORazepam, sodium chloride flush, sodium chloride, potassium chloride **OR** potassium alternative oral replacement **OR** potassium chloride, magnesium sulfate, ondansetron **OR** ondansetron, polyethylene glycol, acetaminophen **OR** acetaminophen, glucose, dextrose bolus **OR** dextrose bolus, glucagon (rDNA), dextrose      Intake/Output Summary (Last 24 hours) at 6/26/2024 0601  Last data filed at 6/26/2024 0145  Gross per 24 hour   Intake 220 ml   Output 1740 ml   Net -1520 ml     Exam:    BP (!) 126/49  Pulse 96  Temp 97.9 °F (36.6 °C) (Oral)  Resp 18  Ht 1.499 m (4' 11\")  Wt 72.6 kg (160 lb)  SpO2 100%  BMI 32.32 kg/m²   Physical Exam  Cardiovascular:      Rate and Rhythm: Normal rate and regular rhythm.   Pulmonary:      Effort: Pulmonary effort is normal. No respiratory distress.      Comments: NC in place. Decreased breath sounds on the left.  Abdominal:      Palpations:  dose Lovenox for now and hold Eliquis in anticipation of procedures    Additional work up or/and treatment plan may be added today or then after based on clinical progression. I am managing a portion of pt care. Some medical issues are handled by other specialists. Additional work up and treatment should be done in out pt setting by pt PCP and other out pt providers.     In addition to examining and evaluating pt, I spent additional time explaining care, normal and abnormal findings, and treatment plan. All of pt questions were answered. Counseling, diet and education were  provided. Case will be discussed with nursing staff when appropriate. Family will be updated if and when appropriate.      Diet: ADULT DIET; Regular    Code Status: Full Code    Electronically signed by Melanie Tomas MD on 6/26/2024 at 6:01 AM

## 2024-06-26 NOTE — CONSULTS
[facility] is scheduled to meet with pt's daughter/POMARIE Escobedo on 6/27 at 1700 due to her work schedule.

## 2024-06-27 ENCOUNTER — APPOINTMENT (OUTPATIENT)
Dept: RADIATION ONCOLOGY | Age: 89
End: 2024-06-27
Payer: MEDICARE

## 2024-06-27 LAB
ANION GAP SERPL CALCULATED.3IONS-SCNC: 11 MEQ/L (ref 9–15)
BASOPHILS # BLD: 0 K/UL (ref 0–0.2)
BASOPHILS NFR BLD: 0.3 %
BUN SERPL-MCNC: 15 MG/DL (ref 8–23)
CALCIUM SERPL-MCNC: 8.7 MG/DL (ref 8.5–9.9)
CHLORIDE SERPL-SCNC: 91 MEQ/L (ref 95–107)
CO2 SERPL-SCNC: 28 MEQ/L (ref 20–31)
CREAT SERPL-MCNC: 0.52 MG/DL (ref 0.5–0.9)
EOSINOPHIL # BLD: 0.1 K/UL (ref 0–0.7)
EOSINOPHIL NFR BLD: 1 %
ERYTHROCYTE [DISTWIDTH] IN BLOOD BY AUTOMATED COUNT: 15.2 % (ref 11.5–14.5)
GLUCOSE BLD-MCNC: 174 MG/DL (ref 70–99)
GLUCOSE SERPL-MCNC: 184 MG/DL (ref 70–99)
HCT VFR BLD AUTO: 37.1 % (ref 37–47)
HGB BLD-MCNC: 11.6 G/DL (ref 12–16)
LYMPHOCYTES # BLD: 0.7 K/UL (ref 1–4.8)
LYMPHOCYTES NFR BLD: 7.1 %
MCH RBC QN AUTO: 26.2 PG (ref 27–31.3)
MCHC RBC AUTO-ENTMCNC: 31.3 % (ref 33–37)
MCV RBC AUTO: 83.7 FL (ref 79.4–94.8)
MONOCYTES # BLD: 0.4 K/UL (ref 0.2–0.8)
MONOCYTES NFR BLD: 4.1 %
NEUTROPHILS # BLD: 8.8 K/UL (ref 1.4–6.5)
NEUTS SEG NFR BLD: 86.7 %
PERFORMED ON: ABNORMAL
PLATELET # BLD AUTO: 321 K/UL (ref 130–400)
POTASSIUM SERPL-SCNC: 4.9 MEQ/L (ref 3.4–4.9)
RBC # BLD AUTO: 4.43 M/UL (ref 4.2–5.4)
SODIUM SERPL-SCNC: 130 MEQ/L (ref 135–144)
WBC # BLD AUTO: 10.2 K/UL (ref 4.8–10.8)

## 2024-06-27 PROCEDURE — 80048 BASIC METABOLIC PNL TOTAL CA: CPT

## 2024-06-27 PROCEDURE — 99232 SBSQ HOSP IP/OBS MODERATE 35: CPT | Performed by: INTERNAL MEDICINE

## 2024-06-27 PROCEDURE — 6370000000 HC RX 637 (ALT 250 FOR IP): Performed by: NURSE PRACTITIONER

## 2024-06-27 PROCEDURE — 2700000000 HC OXYGEN THERAPY PER DAY

## 2024-06-27 PROCEDURE — 2060000000 HC ICU INTERMEDIATE R&B

## 2024-06-27 PROCEDURE — 94640 AIRWAY INHALATION TREATMENT: CPT

## 2024-06-27 PROCEDURE — 6370000000 HC RX 637 (ALT 250 FOR IP): Performed by: INTERNAL MEDICINE

## 2024-06-27 PROCEDURE — 94761 N-INVAS EAR/PLS OXIMETRY MLT: CPT

## 2024-06-27 PROCEDURE — 85025 COMPLETE CBC W/AUTO DIFF WBC: CPT

## 2024-06-27 PROCEDURE — 2580000003 HC RX 258: Performed by: INTERNAL MEDICINE

## 2024-06-27 PROCEDURE — 36415 COLL VENOUS BLD VENIPUNCTURE: CPT

## 2024-06-27 RX ADMIN — IPRATROPIUM BROMIDE AND ALBUTEROL SULFATE 1 DOSE: 2.5; .5 SOLUTION RESPIRATORY (INHALATION) at 07:08

## 2024-06-27 RX ADMIN — Medication 5 ML: at 21:00

## 2024-06-27 RX ADMIN — IPRATROPIUM BROMIDE AND ALBUTEROL SULFATE 1 DOSE: 2.5; .5 SOLUTION RESPIRATORY (INHALATION) at 20:29

## 2024-06-27 RX ADMIN — INSULIN GLARGINE 11 UNITS: 100 INJECTION, SOLUTION SUBCUTANEOUS at 21:52

## 2024-06-27 RX ADMIN — Medication 5 MG: at 09:56

## 2024-06-27 RX ADMIN — BUDESONIDE AND FORMOTEROL FUMARATE DIHYDRATE 2 PUFF: 160; 4.5 AEROSOL RESPIRATORY (INHALATION) at 20:29

## 2024-06-27 RX ADMIN — BUDESONIDE AND FORMOTEROL FUMARATE DIHYDRATE 2 PUFF: 160; 4.5 AEROSOL RESPIRATORY (INHALATION) at 07:08

## 2024-06-27 RX ADMIN — Medication 5 MG: at 15:23

## 2024-06-27 RX ADMIN — Medication 5 MG: at 13:25

## 2024-06-27 RX ADMIN — Medication 10 ML: at 21:52

## 2024-06-27 ASSESSMENT — PAIN SCALES - GENERAL
PAINLEVEL_OUTOF10: 0
PAINLEVEL_OUTOF10: 7
PAINLEVEL_OUTOF10: 10

## 2024-06-27 ASSESSMENT — PAIN DESCRIPTION - ORIENTATION
ORIENTATION: LEFT
ORIENTATION: LEFT;RIGHT

## 2024-06-27 ASSESSMENT — PAIN DESCRIPTION - DESCRIPTORS
DESCRIPTORS: ACHING;DISCOMFORT
DESCRIPTORS: ACHING;DISCOMFORT

## 2024-06-27 ASSESSMENT — PAIN DESCRIPTION - LOCATION
LOCATION: CHEST
LOCATION: CHEST;LEG

## 2024-06-27 NOTE — PROGRESS NOTES
INPATIENT PROGRESS NOTES    PATIENT NAME: Dayami Christensen  MRN: [id number]  SERVICE DATE:  June 27, 2024   SERVICE TIME:  12:56 PM      PRIMARY SERVICE: Pulmonary Disease    CHIEF COMPLAIN: Shortness of breath      INTERVAL HPI: Patient seen and examined at bedside, Interval Notes, orders reviewed. Nursing notes noted  She has Pleurx catheter placement.  Pleural fluid was drained.  Pleurx catheter is continuously draining.  As per RN family decided to keep her comfortable and there will be hospice consult.    He is on 4 L O2 via nasal cannula O2 saturation 100%.     She reports some pain at Pleurx catheter site.  No fever or chills.  No nausea vomiting diarrhea.      OBJECTIVE   I/O:24HR INTAKE/OUTPUT:    Intake/Output Summary (Last 24 hours) at 6/27/2024 1256  Last data filed at 6/27/2024 0738  Gross per 24 hour   Intake 0 ml   Output 420 ml   Net -420 ml     06/26 0701 - 06/27 0700  In: 80 [P.O.:80]  Out: 230   Body mass index is 32.32 kg/m².    PHYSICAL EXAM:  Vitals:  BP (!) 120/44   Pulse (!) 103   Temp 97.5 °F (36.4 °C) (Oral)   Resp 15   Ht 1.499 m (4' 11\")   Wt 72.6 kg (160 lb)   SpO2 100%   BMI 32.32 kg/m²     General: Alert, awake .comfortable in bed, No distress.  Head: Atraumatic , Normocephalic   Eyes: PERRL. No sclera icterus. No conjunctival injection. No discharge   ENT: No nasal  discharge. Pharynx clear.  Neck:  Trachea midline. No thyromegaly, no JVD, No cervical adenopathy.  Chest : Bilaterally symmetrical ,Normal effort,  No accessory muscle use  Lung : Diminished breath sound on left side.  No Rales. No wheezing. No rhonchi.   Heart:: Normal rate. Regular rhythm. No mumur ,  Rub or gallop  ABD: Non-tender. Non-distended. No masses. No organmegaly. Normal bowel sounds. No hernia.  Ext : No Pitting both leg , No Cyanosis No clubbing.  Neuro: no focal weakness    Labs:  Recent Labs     06/27/24  0511   WBC 10.2   HGB 11.6*        Recent Labs     06/27/24  0511   *   K 4.9   CL 91*  SYSTEM PROVIDED HISTORY: lung opacified TECHNOLOGIST PROVIDED HISTORY: Reason for exam:->lung opacified Additional Contrast?->1 What reading provider will be dictating this exam?->CRC FINDINGS: There are findings for advanced malignancy in the left lung/mediastinum as described previous examination.  See report PET CT scan study recently performed in June 5. There is no progressive left-sided pleural effusion being of large volume which causes near full compression collapse of the left lung only a small area of lung parenchyma remains aerated which appears related with the apicoposterior segment of the left upper.  There is consolidation of the left upper lobe and of the entire left lower lobe.  There is evidence for endobronchial obstruction retained secretions aspirated material in the bronchial system of the left lung and extending from the distal left main bronchus into the left upper lobe and left lower bronchial system.  Patient has a potential benefit from bronchoscopy evaluation to the obstructive process present but more likely after drainage of the left-sided pleural effusion. There discrete areas of atelectasis in the right upper lobe and in the posteroinferior aspect of the right lower lobe.  There is no endobronchial obstruction process in the right lung bronchial system. There are motion artifacts which causes misregistration of the images. There is no indication acute pulmonary embolus in the main PA, right and main PAs, in the lobar, segmental and subsegmental branches account some areas more difficult to be evaluated due the motion artifacts The contrast density was target to evaluate the pulmonary artery circulation and not the thoracic aorta.  There is no aneurysm formation or dissection of the thoracic aorta. Diameter for the ascending aorta 3 cm.  Diameter of main PA 2.5 cm.  Cardiac area has normal size.  RV inner diameter: 4.8 cm.  LV inner diameter: 3.2 cm There is an increase in RV/LV ratio

## 2024-06-27 NOTE — CARE COORDINATION
PER REY, [facility] MEETING STILL PLANNED FOR 5PM TODAY BUT WILL CALL DTR TO SEE IF TIME CAN BE MOVED UP.

## 2024-06-27 NOTE — PROGRESS NOTES
06/26/24 2100   RT Protocol   History Pulmonary Disease 1   Respiratory pattern 0   Breath sounds 2   Cough 0   Indications for Bronchodilator Therapy Decreased or absent breath sounds;On home bronchodilators   Bronchodilator Assessment Score 3

## 2024-06-27 NOTE — PROGRESS NOTES
[facility]  Occupational Therapy        NAME:  Dayami Christensen  ROOM: W195/W195-01  :  1934  DATE: 2024    Attempted to see Dayami Christensen at 0908 on this date for:   []  Initial Evaluation   [x]  Treatment       Patient was unable to be seen due to:   [] Off unit for testing/procedure    [] Patient refused, stating \"    [] Therapy on hold due to     [x] Nursing deferred due to pt having Hospice Meeting today and potentially going hospice.    [] Other:      Discussed with TAMICA Ritter    Electronically signed by SOUTH Reich on 24 at 9:23 AM EDT

## 2024-06-27 NOTE — CONSULTS
Referral meeting with [facility] with daughter, Fanny. Patient is appropriate for services, but disposition post dc not yet established. Daughter feels she cannot care for her mother at home, and patient not appropriate for inpatient hospice services at this time. Daughter will begin conversation with social work tomorrow regarding placement. Consents have been signed to start patient on hospice one dc'd. Bedside RN, Rich, tana. Please call [facility] for questions [phone].

## 2024-06-27 NOTE — PLAN OF CARE
Problem: Pain  Goal: Verbalizes/displays adequate comfort level or baseline comfort level  Outcome: Not Progressing     Problem: Discharge Planning  Goal: Discharge to home or other facility with appropriate resources  Outcome: Progressing     Problem: Pain  Goal: Verbalizes/displays adequate comfort level or baseline comfort level  Outcome: Not Progressing

## 2024-06-27 NOTE — PROGRESS NOTES
Physical Therapy Missed Treatment   Facility/Department: [facility] MED SURG W195/W195-01    NAME: Dayami Christensen    : 1934 (89 y.o.)  MRN: [id number]    Account: [id number]  Gender: female    Chart reviewed, attempted PT at 11:11. Patient unavailable 2° to:    [x] Hold per nsg request, pt going to hospice per TAMICA Ritter.       Will attempt PT treatment again at earliest convenience.      Electronically signed by RHIANNON MCBRIDE PTA on 24 at 11:25 AM EDT

## 2024-06-27 NOTE — PROGRESS NOTES
Lytic lesion and pathologic fracture of the left 1st rib.         CTA CHEST W WO CONTRAST   Final Result   1.  Findings of advanced malignancy in the left chest/left side of the   mediastinum as described on previous studies.      2.  Development of a large size left-sided pleural effusions since the PET CT   scan study of June 5 causing severe compression near in vibha left      3.  Severe endobronchial obstructive process of the bronchial system.   Patient's potential to benefit from evaluation after drainage of left-sided   effusion.  Please evaluate clinically.      4.  No indication for acute pulmonary emboli.           Assessment/Plan:    Active Hospital Problems    Diagnosis Date Noted    SOB (shortness of breath) [R06.02] 06/26/2024    Encounter for hospice care discussion [Z71.89] 06/26/2024    Palliative care encounter [Z51.5] 06/24/2024    Goals of care, counseling/discussion [Z71.89] 06/24/2024    Advanced care planning/counseling discussion [Z71.89] 06/24/2024    Malignant neoplasm of left lung (HCC) [C34.92] 06/24/2024    Neoplasm related pain [G89.3] 06/24/2024    Shortness of breath at rest [R06.02] 06/24/2024    Squamous cell carcinoma of left lung (HCC) [C34.92] 06/22/2024    Pleural effusion, left [J90] 06/22/2024    Collapse of left lung [J98.11] 06/21/2024     Squamous cell lung cancer with metastases complicated by complete obstruction of left bronchus with chronic hypoxic respiratory failure (POA)  -Management per pulmonology who has consulted with oncology and interventional radiology  -S/p thoracentesis on 6/24 with 550 mL taken off  -Pt again more short of breath with repeat CXR showing left lung again completely opaque less than 24 hours after thora  -S/p pleurx catheter placement on 6/26  -on home O2  -Palliative care following for GOC discussions, prognosis is poor, radiation oncology recommending no further treatment with comfort care approach  -Discussed with daughter and she is open to  meeting with hospice, hospice consult placed, family meeting planned for today at 1700 to accommodate daughter's work schedule  - Additional discussion with daughter and pt at bedside yesterday evening and decision was made to focus on comfort and so pt's code status was changed to DNR-CC     Type 2 diabetes with hyperglycemia  Hypokalemia  Obesity  Hypertension  Paroxysmal atrial fibrillation on Eliquis prior to hospitalization.  Will treat with prophylactic dose Lovenox for now and hold Eliquis for procedures    Additional work up or/and treatment plan may be added today or then after based on clinical progression. I am managing a portion of pt care. Some medical issues are handled by other specialists. Additional work up and treatment should be done in out pt setting by pt PCP and other out pt providers.     In addition to examining and evaluating pt, I spent additional time explaining care, normal and abnormal findings, and treatment plan. All of pt questions were answered. Counseling, diet and education were  provided. Case will be discussed with nursing staff when appropriate. Family will be updated if and when appropriate.      Diet: ADULT DIET; Regular    Code Status: DNR-CC    Electronically signed by Melanie Tomas MD on 6/27/2024 at 4:12 PM

## 2024-06-27 NOTE — PROGRESS NOTES
Patient plan for hospice.  Pleurx can be capped prior to transfer to hospice.  If patient is rapidly declining that can be left to continuous drainage.

## 2024-06-27 NOTE — PROGRESS NOTES
06/27/24 0700   RT Protocol   History Pulmonary Disease 2   Respiratory pattern 0   Breath sounds 2   Cough 0   Indications for Bronchodilator Therapy Decreased or absent breath sounds   Bronchodilator Assessment Score 4

## 2024-06-27 NOTE — PROGRESS NOTES
Patient assessed. Patient grunting in her sleep. When patient asked if she has pain she said no and went back to sleep.

## 2024-06-28 ENCOUNTER — APPOINTMENT (OUTPATIENT)
Dept: RADIATION ONCOLOGY | Age: 89
End: 2024-06-28
Payer: MEDICARE

## 2024-06-28 LAB
GLUCOSE BLD-MCNC: 148 MG/DL (ref 70–99)
GLUCOSE BLD-MCNC: 162 MG/DL (ref 70–99)
GLUCOSE BLD-MCNC: 237 MG/DL (ref 70–99)
PERFORMED ON: ABNORMAL

## 2024-06-28 PROCEDURE — 6370000000 HC RX 637 (ALT 250 FOR IP): Performed by: INTERNAL MEDICINE

## 2024-06-28 PROCEDURE — 94640 AIRWAY INHALATION TREATMENT: CPT

## 2024-06-28 PROCEDURE — 2700000000 HC OXYGEN THERAPY PER DAY

## 2024-06-28 PROCEDURE — 94761 N-INVAS EAR/PLS OXIMETRY MLT: CPT

## 2024-06-28 PROCEDURE — 2580000003 HC RX 258: Performed by: INTERNAL MEDICINE

## 2024-06-28 PROCEDURE — 6360000002 HC RX W HCPCS: Performed by: STUDENT IN AN ORGANIZED HEALTH CARE EDUCATION/TRAINING PROGRAM

## 2024-06-28 PROCEDURE — 2060000000 HC ICU INTERMEDIATE R&B

## 2024-06-28 PROCEDURE — 99232 SBSQ HOSP IP/OBS MODERATE 35: CPT | Performed by: INTERNAL MEDICINE

## 2024-06-28 RX ADMIN — Medication 10 ML: at 09:00

## 2024-06-28 RX ADMIN — TIOTROPIUM BROMIDE INHALATION SPRAY 2 PUFF: 3.12 SPRAY, METERED RESPIRATORY (INHALATION) at 07:24

## 2024-06-28 RX ADMIN — BUDESONIDE AND FORMOTEROL FUMARATE DIHYDRATE 2 PUFF: 160; 4.5 AEROSOL RESPIRATORY (INHALATION) at 07:24

## 2024-06-28 RX ADMIN — PANTOPRAZOLE SODIUM 40 MG: 40 TABLET, DELAYED RELEASE ORAL at 06:28

## 2024-06-28 RX ADMIN — IPRATROPIUM BROMIDE AND ALBUTEROL SULFATE 1 DOSE: 2.5; .5 SOLUTION RESPIRATORY (INHALATION) at 07:24

## 2024-06-28 RX ADMIN — IPRATROPIUM BROMIDE AND ALBUTEROL SULFATE 1 DOSE: 2.5; .5 SOLUTION RESPIRATORY (INHALATION) at 16:49

## 2024-06-28 RX ADMIN — ATORVASTATIN CALCIUM 40 MG: 40 TABLET, FILM COATED ORAL at 09:00

## 2024-06-28 RX ADMIN — INSULIN GLARGINE 11 UNITS: 100 INJECTION, SOLUTION SUBCUTANEOUS at 20:33

## 2024-06-28 RX ADMIN — ENOXAPARIN SODIUM 40 MG: 100 INJECTION SUBCUTANEOUS at 08:50

## 2024-06-28 RX ADMIN — DILTIAZEM HYDROCHLORIDE 120 MG: 120 CAPSULE, COATED, EXTENDED RELEASE ORAL at 09:00

## 2024-06-28 RX ADMIN — INSULIN LISPRO 2 UNITS: 100 INJECTION, SOLUTION INTRAVENOUS; SUBCUTANEOUS at 17:29

## 2024-06-28 RX ADMIN — METOPROLOL SUCCINATE 100 MG: 100 TABLET, EXTENDED RELEASE ORAL at 09:00

## 2024-06-28 RX ADMIN — BUDESONIDE AND FORMOTEROL FUMARATE DIHYDRATE 2 PUFF: 160; 4.5 AEROSOL RESPIRATORY (INHALATION) at 16:49

## 2024-06-28 ASSESSMENT — PAIN SCALES - GENERAL
PAINLEVEL_OUTOF10: 0

## 2024-06-28 NOTE — CONSULTS
Per request of CC Gloria MORATAYA, writer followed up with pt's daughter Fanny about discharge plan. Fanny states that her brother is flying in this afternoon from [place] and they will discuss options. She states that she has not had conversation about LTCF options with hospital staff and states that she believes her mother would qualify for Medicaid as she has no real assets. Per Fanny, pt and family can not afford room and board at LTCF without it. Writer did explain room and board fee at  and again Fanny states that is not an option. Gloria MORATAYA was updated with request for consult for financial liaison to assist family in Medicaid application.

## 2024-06-28 NOTE — PROGRESS NOTES
INPATIENT PROGRESS NOTES    PATIENT NAME: Dayami Christensen  MRN: [id number]  SERVICE DATE:  June 28, 2024   SERVICE TIME:  2:21 PM      PRIMARY SERVICE: Pulmonary Disease    CHIEF COMPLAIN: Shortness of breath      INTERVAL HPI: Patient seen and examined at bedside, Interval Notes, orders reviewed. Nursing notes noted  She has Pleurx catheter placement.  Pleural fluid was drained.  Pleurx catheter is continuously draining.  As per RN family decided to keep her comfortable and there will be hospice consult.    He is on 4 L O2 via nasal cannula O2 saturation 100%.     She reports some pain at Pleurx catheter site.  No fever or chills.  No nausea vomiting diarrhea.      OBJECTIVE   I/O:24HR INTAKE/OUTPUT:    Intake/Output Summary (Last 24 hours) at 6/28/2024 1421  Last data filed at 6/28/2024 0724  Gross per 24 hour   Intake 300 ml   Output 710 ml   Net -410 ml     06/27 0701 - 06/28 0700  In: 240 [P.O.:240]  Out: 1140   Body mass index is 32.32 kg/m².    PHYSICAL EXAM:  Vitals:  /74   Pulse (!) 103   Temp 97.8 °F (36.6 °C) (Oral)   Resp 20   Ht 1.499 m (4' 11\")   Wt 72.6 kg (160 lb)   SpO2 99%   BMI 32.32 kg/m²     General: Alert, awake .comfortable in bed, No distress.  Head: Atraumatic , Normocephalic   Eyes: PERRL. No sclera icterus. No conjunctival injection. No discharge   ENT: No nasal  discharge. Pharynx clear.  Neck:  Trachea midline. No thyromegaly, no JVD, No cervical adenopathy.  Chest : Bilaterally symmetrical ,Normal effort,  No accessory muscle use  Lung : Diminished breath sound on left side.  No Rales. No wheezing. No rhonchi.   Heart:: Normal rate. Regular rhythm. No mumur ,  Rub or gallop  ABD: Non-tender. Non-distended. No masses. No organmegaly. Normal bowel sounds. No hernia.  Ext : No Pitting both leg , No Cyanosis No clubbing.  Neuro: no focal weakness    Labs:  Recent Labs     06/27/24  0511   WBC 10.2   HGB 11.6*        Recent Labs     06/27/24  0511   *   K 4.9   CL 91*  Reason for exam:->evaluate aorta Additional Contrast?->None What reading provider will be dictating this exam?->CRC FINDINGS: Aorta: No evidence of thoracic aortic aneurysm or dissection.  No acute abnormality of the aorta. Mediastinum: There is left mediastinal/perihilar lymphadenopathy.  A node/karin conglomerate along the left main pulmonary artery measures 4.0 x 2.6 cm, increased compared to the prior examination.  The heart and pericardium demonstrate no acute abnormality. Lungs/Pleura: Redemonstration of rounded lingular mass now with central cavitation, measuring 4.0 by 3.4 cm, previously 4.6 x 4.0 cm.  No new pulmonary nodule or mass is identified. There is a small left pleural effusion.  No pneumothorax. Upper Abdomen: Limited images of the upper abdomen demonstrate no acute abnormality. Soft Tissues/Bones: No acute bone or soft tissue abnormality.     1. No aortic aneurysm or dissection. 2. Slight decrease in size of the lingular mass, now with central cavitation. 3. Increasing left mediastinal/perihilar lymphadenopathy. 4. Small left pleural effusion.       IMPRESSION AND SUGGESTION:  Squamous cell lung cancer with metastasis  left lung atelectasis and recurrent recurrent large pleural effusion  Hypoxia on 3 L O2 baseline  Shortness of breath secondary to above.      Currently on 4 L O2 via nasal cannula O2 saturation 100%.  Discussed with RN.  Patient is hospice appropriate.  Patient and family elect skilled nursing facility for now  NOTE: This report was transcribed using voice recognition software. Every effort was made to ensure accuracy; however, inadvertent computerized transcription errors may be present.      Electronically signed by Ayush Levy MD, FCCP on 6/28/2024 at 2:21 PM

## 2024-06-28 NOTE — PROGRESS NOTES
Physical Therapy Missed Treatment   Facility/Department: [facility] MED SURG W195/W195-01    NAME: Dayami Christensen    : 1934 (89 y.o.)  MRN: [id number]    Account: [id number]  Gender: female    Chart reviewed, attempted PT at 1311. Patient unavailable 2° to:    [x] Pt declined treatment at this time despite initially being agreeable when therapist arrived. Encouraged pt participation in bed level activities and sitting EOB, pt continues to shake her head 'no'.       Will attempt PT treatment again at earliest convenience.      Electronically signed by RHIANNON MCBRIDE PTA on 24 at 1:36 PM EDT

## 2024-06-28 NOTE — PROGRESS NOTES
Pt called out, and feeling SOB, POX at 3 L btwn 83-90 %. Respiratory notified for PRN breathing tx. O2 turned up to 6 L.

## 2024-06-28 NOTE — PROGRESS NOTES
Hospitalist Progress Note      PCP: Shashi Gan MD    Date of Admission: 6/21/2024    Chief Complaint:    Chief Complaint   Patient presents with    Shortness of Breath     SOB, collapsed lung, lung ca     Subjective:  No acute events overnight. Pt resting comfortably in bed. No acute complaints at this time.    Medications:  Reviewed    Infusion Medications    sodium chloride      dextrose       Scheduled Medications    enoxaparin  40 mg SubCUTAneous Q24H    ipratropium 0.5 mg-albuterol 2.5 mg  1 Dose Inhalation BID RT    sodium chloride flush  5-40 mL IntraVENous 2 times per day    insulin glargine  0.15 Units/kg SubCUTAneous Nightly    insulin lispro  0-8 Units SubCUTAneous TID WC    insulin lispro  0-4 Units SubCUTAneous Nightly    dilTIAZem  120 mg Oral Daily    atorvastatin  40 mg Oral Daily    metoprolol succinate  100 mg Oral Daily    pantoprazole  40 mg Oral QAM AC    budesonide-formoterol  2 puff Inhalation BID RT    tiotropium  2 puff Inhalation Daily RT     PRN Meds: LORazepam, morphine 20MG/ML, sodium chloride flush, sodium chloride, potassium chloride **OR** potassium alternative oral replacement **OR** potassium chloride, magnesium sulfate, ondansetron **OR** ondansetron, polyethylene glycol, acetaminophen **OR** acetaminophen, glucose, dextrose bolus **OR** dextrose bolus, glucagon (rDNA), dextrose      Intake/Output Summary (Last 24 hours) at 6/28/2024 1338  Last data filed at 6/28/2024 0724  Gross per 24 hour   Intake 300 ml   Output 710 ml   Net -410 ml       Exam:    BP (!) 126/49  Pulse 96  Temp 97.9 °F (36.6 °C) (Oral)  Resp 18  Ht 1.499 m (4' 11\")  Wt 72.6 kg (160 lb)  SpO2 100%  BMI 32.32 kg/m²   Physical Exam  Cardiovascular:      Rate and Rhythm: Normal rate and regular rhythm.   Pulmonary:      Effort: Pulmonary effort is normal. No respiratory distress.      Comments: NC in place. Decreased breath sounds on the left.  Abdominal:      Palpations: Abdomen is soft.   Neurological:

## 2024-06-28 NOTE — CARE COORDINATION
Patient now showing she has Medicaid secondary. LSW called patient's daughter, Fanny, to discuss discharge plan. She said her brother is flying in from [facility] and they will make the final decision on hospice at home or in a NH. Daughter stated if they decide to place patient in a NH [facility] choice. Referral faxed to [facility] liaison, to review with daughter's permission.     Informed by George that [facility] is able to accept patient. Price updated and will discuss it with her brother laron.

## 2024-06-28 NOTE — PLAN OF CARE
Problem: Discharge Planning  Goal: Discharge to home or other facility with appropriate resources  Outcome: Progressing  Flowsheets (Taken 6/27/2024 2035)  Discharge to home or other facility with appropriate resources:   Identify barriers to discharge with patient and caregiver   Arrange for needed discharge resources and transportation as appropriate   Identify discharge learning needs (meds, wound care, etc)   Refer to discharge planning if patient needs post-hospital services based on physician order or complex needs related to functional status, cognitive ability or social support system     Problem: Pain  Goal: Verbalizes/displays adequate comfort level or baseline comfort level  Outcome: Progressing     Problem: Skin/Tissue Integrity  Goal: Absence of new skin breakdown  Description: 1.  Monitor for areas of redness and/or skin breakdown  2.  Assess vascular access sites hourly  3.  Every 4-6 hours minimum:  Change oxygen saturation probe site  4.  Every 4-6 hours:  If on nasal continuous positive airway pressure, respiratory therapy assess nares and determine need for appliance change or resting period.  Outcome: Progressing     Problem: Safety - Adult  Goal: Free from fall injury  Outcome: Progressing     Problem: ABCDS Injury Assessment  Goal: Absence of physical injury  Outcome: Progressing     Problem: Chronic Conditions and Co-morbidities  Goal: Patient's chronic conditions and co-morbidity symptoms are monitored and maintained or improved  Outcome: Progressing  Flowsheets (Taken 6/27/2024 2035)  Care Plan - Patient's Chronic Conditions and Co-Morbidity Symptoms are Monitored and Maintained or Improved:   Monitor and assess patient's chronic conditions and comorbid symptoms for stability, deterioration, or improvement   Collaborate with multidisciplinary team to address chronic and comorbid conditions and prevent exacerbation or deterioration   Update acute care plan with appropriate goals if chronic

## 2024-06-28 NOTE — CARE COORDINATION
SPOKE WITH [facility] STILL WANTING HOSPICE BUT CANNOT AFFORD ROOM AND BOARD.  WILL NEED MEDICAID DEISY COMPLETED, JAZMÍN NOTIFIED.  ONCE COMPLETED PT WILL NEED PENDING MEDICAID [place] UPDATED.      1030  JAZMÍN TO CALL NESSA ALVARADO FOR MEDICAID DEISY.    1050 PER JAZMÍN, GIORGIR IS UNABLE TO MEET TODAY FOR MEDICAID DEISY, PLAN TO FOLLOW UP ON MONDAY.  [facility] UPDATED.

## 2024-06-28 NOTE — PROGRESS NOTES
Pt is in bed resting comfortably. She is A/O/ x 3-4, Flat affect. She takes her po medications whole w/ water. Last BM on 06/22/2024, no c/o constipation, diarrhea, or abd pain. Pure Wick is in place. Bilat lung sounds are clr/dim, no c/o SOB or difficulties breathing. POX on 3 L at 99%, Chest tube to left chest in place, minimal serous drainage frm tube. DSG is clean, dry, and intact. No c/o pain at this time. Bilat.Pneumatic compression on LE on at this time. Call light is w/in reach.

## 2024-06-28 NOTE — PROGRESS NOTES
06/27/24 2000   RT Protocol   History Pulmonary Disease 2   Respiratory pattern 0   Breath sounds 2   Cough 0   Indications for Bronchodilator Therapy Decreased or absent breath sounds   Bronchodilator Assessment Score 4

## 2024-06-28 NOTE — PROGRESS NOTES
Pt provided with full bed bath at this time. New sheet, linen, and gown provided. Pt pulled up in bed. Safety measures in place. No further concerns or requests at this time.

## 2024-06-28 NOTE — ONCOLOGY
Hematology/Oncology  Attending Progress Note        CHIEF COMPLAINT/HPI:  No pain.  Some SOB.  Still with left Pleurx.    REVIEW OF SYSTEMS:    Unremarkable except for symptoms mentioned in HPI.    Current Inpatient Medications:    Current Facility-Administered Medications: LORazepam (ATIVAN) tablet 0.5 mg, 0.5 mg, Oral, Q4H PRN  morphine 20MG/ML concentrated solution 5 mg, 5 mg, Oral, Q2H PRN  enoxaparin (LOVENOX) injection 40 mg, 40 mg, SubCUTAneous, Q24H  ipratropium 0.5 mg-albuterol 2.5 mg (DUONEB) nebulizer solution 1 Dose, 1 Dose, Inhalation, BID RT  sodium chloride flush 0.9 % injection 5-40 mL, 5-40 mL, IntraVENous, 2 times per day  sodium chloride flush 0.9 % injection 5-40 mL, 5-40 mL, IntraVENous, PRN  0.9 % sodium chloride infusion, , IntraVENous, PRN  potassium chloride (KLOR-CON M) extended release tablet 40 mEq, 40 mEq, Oral, PRN **OR** potassium bicarb-citric acid (EFFER-K) effervescent tablet 40 mEq, 40 mEq, Oral, PRN **OR** potassium chloride 10 mEq/100 mL IVPB (Peripheral Line), 10 mEq, IntraVENous, PRN  magnesium sulfate 2000 mg in 50 mL IVPB premix, 2,000 mg, IntraVENous, PRN  ondansetron (ZOFRAN-ODT) disintegrating tablet 4 mg, 4 mg, Oral, Q8H PRN **OR** ondansetron (ZOFRAN) injection 4 mg, 4 mg, IntraVENous, Q6H PRN  polyethylene glycol (GLYCOLAX) packet 17 g, 17 g, Oral, Daily PRN  acetaminophen (TYLENOL) tablet 650 mg, 650 mg, Oral, Q6H PRN **OR** acetaminophen (TYLENOL) suppository 650 mg, 650 mg, Rectal, Q6H PRN  glucose chewable tablet 16 g, 4 tablet, Oral, PRN  dextrose bolus 10% 125 mL, 125 mL, IntraVENous, PRN **OR** dextrose bolus 10% 250 mL, 250 mL, IntraVENous, PRN  glucagon injection 1 mg, 1 mg, SubCUTAneous, PRN  dextrose 10 % infusion, , IntraVENous, Continuous PRN  insulin glargine (LANTUS) injection vial 11 Units, 0.15 Units/kg, SubCUTAneous, Nightly  insulin lispro (HUMALOG,ADMELOG) injection vial 0-8 Units, 0-8 Units, SubCUTAneous, TID WC  insulin lispro (HUMALOG,ADMELOG)  tissue density within the left upper lobe bronchus suggesting intrinsic or extrinsic obstruction of the bronchus.  More inferiorly located hypermetabolic lymph nodes are mildly enlarged and have a maximal SUV of 10.2. ABDOMEN/PELVIS: The liver, spleen, pancreas and adrenals reveal no evidence of focal hypermetabolism.  Linear increased metabolic activity in the distal stomach and proximal duodenum is likely physiologic.  No hypermetabolic abdominal or pelvic lymph nodes are identified. BONES/SOFT TISSUE: There is no evidence of osseous hypermetabolic foci. INCIDENTAL CT FINDINGS: There is a new left pleural effusion.  Small hiatal hernia is present.  The gallbladder is distended.  There is colonic diverticulosis, without evidence of diverticulitis.     There is a hypermetabolic 2.5 cm left apical mass which extends through the chest wall with destruction of the left 1st rib. Cavitating lingular mass is hypermetabolic and consistent with neoplasm. There are hypermetabolic aortopulmonary window and left hilar lymph node masses with apparent intrinsic or extrinsic obstruction of the left upper lobe bronchus with left upper lobe collapse. Small to moderate-sized left pleural effusion is non hypermetabolic.     CTA CHEST W WO CONTRAST    Result Date: 6/1/2024  EXAMINATION: CTA OF THE CHEST WITH AND WITHOUT CONTRAST 6/1/2024 3:23 am TECHNIQUE: CTA of the chest was performed before and after the administration of intravenous contrast.  Multiplanar reformatted images are provided for review.  MIP images are provided for review. Automated exposure control, iterative reconstruction, and/or weight based adjustment of the mA/kV was utilized to reduce the radiation dose to as low as reasonably achievable. COMPARISON: None. 05/11/2024 HISTORY: ORDERING SYSTEM PROVIDED HISTORY: L side chest pain L arm pain weakness, evaluate aorta TECHNOLOGIST PROVIDED HISTORY: Reason for exam:->L side chest pain L arm pain weakness Reason for

## 2024-06-29 LAB
BACTERIA FLD AEROBE CULT: NORMAL
GLUCOSE BLD-MCNC: 136 MG/DL (ref 70–99)
GLUCOSE BLD-MCNC: 141 MG/DL (ref 70–99)
GLUCOSE BLD-MCNC: 197 MG/DL (ref 70–99)
GLUCOSE BLD-MCNC: 222 MG/DL (ref 70–99)
PERFORMED ON: ABNORMAL

## 2024-06-29 PROCEDURE — 6370000000 HC RX 637 (ALT 250 FOR IP): Performed by: INTERNAL MEDICINE

## 2024-06-29 PROCEDURE — 94640 AIRWAY INHALATION TREATMENT: CPT

## 2024-06-29 PROCEDURE — 94668 MNPJ CHEST WALL SBSQ: CPT

## 2024-06-29 PROCEDURE — 6370000000 HC RX 637 (ALT 250 FOR IP): Performed by: NURSE PRACTITIONER

## 2024-06-29 PROCEDURE — 2700000000 HC OXYGEN THERAPY PER DAY

## 2024-06-29 PROCEDURE — 2060000000 HC ICU INTERMEDIATE R&B

## 2024-06-29 PROCEDURE — 94761 N-INVAS EAR/PLS OXIMETRY MLT: CPT

## 2024-06-29 PROCEDURE — 6360000002 HC RX W HCPCS: Performed by: STUDENT IN AN ORGANIZED HEALTH CARE EDUCATION/TRAINING PROGRAM

## 2024-06-29 RX ADMIN — BUDESONIDE AND FORMOTEROL FUMARATE DIHYDRATE 2 PUFF: 160; 4.5 AEROSOL RESPIRATORY (INHALATION) at 20:05

## 2024-06-29 RX ADMIN — ATORVASTATIN CALCIUM 40 MG: 40 TABLET, FILM COATED ORAL at 09:09

## 2024-06-29 RX ADMIN — Medication 5 MG: at 15:19

## 2024-06-29 RX ADMIN — Medication 5 MG: at 09:10

## 2024-06-29 RX ADMIN — IPRATROPIUM BROMIDE AND ALBUTEROL SULFATE 1 DOSE: 2.5; .5 SOLUTION RESPIRATORY (INHALATION) at 20:05

## 2024-06-29 RX ADMIN — DILTIAZEM HYDROCHLORIDE 120 MG: 120 CAPSULE, COATED, EXTENDED RELEASE ORAL at 09:51

## 2024-06-29 RX ADMIN — METOPROLOL SUCCINATE 100 MG: 100 TABLET, EXTENDED RELEASE ORAL at 09:09

## 2024-06-29 RX ADMIN — IPRATROPIUM BROMIDE AND ALBUTEROL SULFATE 1 DOSE: 2.5; .5 SOLUTION RESPIRATORY (INHALATION) at 07:22

## 2024-06-29 RX ADMIN — Medication 5 MG: at 02:43

## 2024-06-29 RX ADMIN — INSULIN GLARGINE 11 UNITS: 100 INJECTION, SOLUTION SUBCUTANEOUS at 21:18

## 2024-06-29 RX ADMIN — PANTOPRAZOLE SODIUM 40 MG: 40 TABLET, DELAYED RELEASE ORAL at 07:46

## 2024-06-29 RX ADMIN — TIOTROPIUM BROMIDE INHALATION SPRAY 2 PUFF: 3.12 SPRAY, METERED RESPIRATORY (INHALATION) at 07:22

## 2024-06-29 RX ADMIN — BUDESONIDE AND FORMOTEROL FUMARATE DIHYDRATE 2 PUFF: 160; 4.5 AEROSOL RESPIRATORY (INHALATION) at 07:22

## 2024-06-29 RX ADMIN — Medication 5 MG: at 21:18

## 2024-06-29 RX ADMIN — ENOXAPARIN SODIUM 40 MG: 100 INJECTION SUBCUTANEOUS at 09:09

## 2024-06-29 ASSESSMENT — PAIN DESCRIPTION - LOCATION
LOCATION: BACK;SHOULDER
LOCATION: BUTTOCKS
LOCATION: BUTTOCKS

## 2024-06-29 ASSESSMENT — PAIN SCALES - GENERAL
PAINLEVEL_OUTOF10: 10
PAINLEVEL_OUTOF10: 0

## 2024-06-29 ASSESSMENT — PAIN DESCRIPTION - DESCRIPTORS
DESCRIPTORS: ACHING
DESCRIPTORS: SHARP
DESCRIPTORS: STABBING;THROBBING

## 2024-06-29 ASSESSMENT — PAIN DESCRIPTION - ORIENTATION
ORIENTATION: MID
ORIENTATION: LEFT
ORIENTATION: MID

## 2024-06-29 NOTE — PROGRESS NOTES
06/29/24 1216   RT Protocol   History Pulmonary Disease 2   Respiratory pattern 0   Breath sounds 2   Cough 0   Indications for Bronchodilator Therapy On home bronchodilators   Bronchodilator Assessment Score 4

## 2024-06-29 NOTE — CARE COORDINATION
Met with pt son and dtr, grandchildren and Dr. Tomas to discuss goals of care and DC planning. Pt family would like to have discussion with hem/onc and radiation onc. Dr. Carey before discharge. At this time, they are planning for home w/family with hospice care pending hem/onc conversation. Will continue to follow once family has all medical questions answered.

## 2024-06-29 NOTE — PROGRESS NOTES
Hospitalist Progress Note      PCP: Shashi Gan MD    Date of Admission: 6/21/2024    Chief Complaint:    Chief Complaint   Patient presents with    Shortness of Breath     SOB, collapsed lung, lung ca     Subjective:  No acute events overnight. Pt resting comfortably in bed. No acute complaints at this time.    Medications:  Reviewed    Infusion Medications    sodium chloride      dextrose       Scheduled Medications    enoxaparin  40 mg SubCUTAneous Q24H    ipratropium 0.5 mg-albuterol 2.5 mg  1 Dose Inhalation BID RT    sodium chloride flush  5-40 mL IntraVENous 2 times per day    insulin glargine  0.15 Units/kg SubCUTAneous Nightly    insulin lispro  0-8 Units SubCUTAneous TID WC    insulin lispro  0-4 Units SubCUTAneous Nightly    dilTIAZem  120 mg Oral Daily    atorvastatin  40 mg Oral Daily    metoprolol succinate  100 mg Oral Daily    pantoprazole  40 mg Oral QAM AC    budesonide-formoterol  2 puff Inhalation BID RT    tiotropium  2 puff Inhalation Daily RT     PRN Meds: LORazepam, morphine 20MG/ML, sodium chloride flush, sodium chloride, potassium chloride **OR** potassium alternative oral replacement **OR** potassium chloride, magnesium sulfate, ondansetron **OR** ondansetron, polyethylene glycol, acetaminophen **OR** acetaminophen, glucose, dextrose bolus **OR** dextrose bolus, glucagon (rDNA), dextrose      Intake/Output Summary (Last 24 hours) at 6/29/2024 1347  Last data filed at 6/29/2024 0627  Gross per 24 hour   Intake 0 ml   Output 500 ml   Net -500 ml       Exam:    BP (!) 126/49  Pulse 96  Temp 97.9 °F (36.6 °C) (Oral)  Resp 18  Ht 1.499 m (4' 11\")  Wt 72.6 kg (160 lb)  SpO2 100%  BMI 32.32 kg/m²   Physical Exam  Cardiovascular:      Rate and Rhythm: Normal rate and regular rhythm.   Pulmonary:      Effort: Pulmonary effort is normal. No respiratory distress.      Comments: NC in place. Decreased breath sounds on the left.  Abdominal:      Palpations: Abdomen is soft.   Neurological:       Mental Status: She is alert.   Psychiatric:         Mood and Affect: Mood normal.         Behavior: Behavior normal.       Labs:   Recent Labs     06/27/24  0511   WBC 10.2   HGB 11.6*   HCT 37.1          Recent Labs     06/27/24  0511   *   K 4.9   CL 91*   CO2 28   BUN 15   CREATININE 0.52   CALCIUM 8.7       No results for input(s): \"AST\", \"ALT\", \"BILIDIR\", \"BILITOT\", \"ALKPHOS\" in the last 72 hours.    No results for input(s): \"INR\" in the last 72 hours.  No results for input(s): \"CKTOTAL\", \"TROPONINI\" in the last 72 hours.    Urinalysis:      Lab Results   Component Value Date/Time    NITRU Negative 05/10/2024 11:15 PM    BLOODU Negative 05/10/2024 11:15 PM    GLUCOSEU >=1000 05/10/2024 11:15 PM     Radiology:  XR CHEST (SINGLE VIEW FRONTAL)   Final Result   Medial left upper chest tube in place. Continued large left pleural effusion.         XR CHEST (SINGLE VIEW FRONTAL)   Final Result   1.  After placement of left-sided chest tube, tip towards the upper inner   aspect of the apical region of the right chest cavity.      2.  Mild diminished the large right-sided pleural effusion with regain   aeration for some areas in the apical region of the right lung.      3.  Most likely there is a small right apical pneumothorax of less than 10%.         XR CHEST PORTABLE   Final Result   Re-accumulation of fluid in the left lung, with complete opacification.         US GALLBLADDER RUQ   Final Result   Minimal gallbladder sludge, without sonographic evidence of acute   cholecystitis.         IR GUIDED THORACENTESIS PLEURAL   Final Result   Successful ultrasound guided thoracentesis.         XR CHEST (SINGLE VIEW FRONTAL)   Final Result   1. Decreased left pleural effusion, now moderate. Interval partial aeration   of the left upper and mid lung zone. No pneumothorax.   2. Nonspecific interstitial prominence, chronic changes versus component of   edema or atypical infection in the acute setting.   3.

## 2024-06-29 NOTE — PROGRESS NOTES
Physical Therapy Missed Treatment   Facility/Department: [facility] MED SURG W195/W195-01    NAME: Dayami Christensen    : 1934 (89 y.o.)  MRN: [id number]    Account: [id number]  Gender: female      Attempted PT tx at 11:45 am. Pt initially agreeing to tx however, upon set up and preparation for sitting EOB, pt declining stating \"I can't do this. The legs are too sensitive. I don't feel like doing this.\" Therapist offering to reattempt at later time though pt requesting to rest today.  Nursing staff notified.    Will follow and attempt PT tx tomorrow (3024)       Dilia Vegas PTA, 24 at 11:57 AM

## 2024-06-30 ENCOUNTER — APPOINTMENT (OUTPATIENT)
Dept: RADIATION ONCOLOGY | Age: 89
End: 2024-06-30
Payer: MEDICARE

## 2024-06-30 LAB
GLUCOSE BLD-MCNC: 148 MG/DL (ref 70–99)
GLUCOSE BLD-MCNC: 154 MG/DL (ref 70–99)
GLUCOSE BLD-MCNC: 264 MG/DL (ref 70–99)
GLUCOSE BLD-MCNC: 282 MG/DL (ref 70–99)
PERFORMED ON: ABNORMAL

## 2024-06-30 PROCEDURE — 6370000000 HC RX 637 (ALT 250 FOR IP): Performed by: INTERNAL MEDICINE

## 2024-06-30 PROCEDURE — 94761 N-INVAS EAR/PLS OXIMETRY MLT: CPT

## 2024-06-30 PROCEDURE — 94640 AIRWAY INHALATION TREATMENT: CPT

## 2024-06-30 PROCEDURE — 97535 SELF CARE MNGMENT TRAINING: CPT

## 2024-06-30 PROCEDURE — 2060000000 HC ICU INTERMEDIATE R&B

## 2024-06-30 PROCEDURE — 2700000000 HC OXYGEN THERAPY PER DAY

## 2024-06-30 PROCEDURE — 6360000002 HC RX W HCPCS: Performed by: STUDENT IN AN ORGANIZED HEALTH CARE EDUCATION/TRAINING PROGRAM

## 2024-06-30 PROCEDURE — 6370000000 HC RX 637 (ALT 250 FOR IP): Performed by: NURSE PRACTITIONER

## 2024-06-30 RX ADMIN — Medication 5 MG: at 20:51

## 2024-06-30 RX ADMIN — DILTIAZEM HYDROCHLORIDE 120 MG: 120 CAPSULE, COATED, EXTENDED RELEASE ORAL at 09:56

## 2024-06-30 RX ADMIN — ENOXAPARIN SODIUM 40 MG: 100 INJECTION SUBCUTANEOUS at 09:56

## 2024-06-30 RX ADMIN — BUDESONIDE AND FORMOTEROL FUMARATE DIHYDRATE 2 PUFF: 160; 4.5 AEROSOL RESPIRATORY (INHALATION) at 08:04

## 2024-06-30 RX ADMIN — METOPROLOL SUCCINATE 100 MG: 100 TABLET, EXTENDED RELEASE ORAL at 09:56

## 2024-06-30 RX ADMIN — TIOTROPIUM BROMIDE INHALATION SPRAY 2 PUFF: 3.12 SPRAY, METERED RESPIRATORY (INHALATION) at 08:04

## 2024-06-30 RX ADMIN — ATORVASTATIN CALCIUM 40 MG: 40 TABLET, FILM COATED ORAL at 09:56

## 2024-06-30 RX ADMIN — INSULIN GLARGINE 11 UNITS: 100 INJECTION, SOLUTION SUBCUTANEOUS at 20:52

## 2024-06-30 RX ADMIN — IPRATROPIUM BROMIDE AND ALBUTEROL SULFATE 1 DOSE: 2.5; .5 SOLUTION RESPIRATORY (INHALATION) at 19:57

## 2024-06-30 RX ADMIN — IPRATROPIUM BROMIDE AND ALBUTEROL SULFATE 1 DOSE: 2.5; .5 SOLUTION RESPIRATORY (INHALATION) at 08:04

## 2024-06-30 RX ADMIN — INSULIN LISPRO 4 UNITS: 100 INJECTION, SOLUTION INTRAVENOUS; SUBCUTANEOUS at 12:34

## 2024-06-30 RX ADMIN — PANTOPRAZOLE SODIUM 40 MG: 40 TABLET, DELAYED RELEASE ORAL at 05:47

## 2024-06-30 RX ADMIN — BUDESONIDE AND FORMOTEROL FUMARATE DIHYDRATE 2 PUFF: 160; 4.5 AEROSOL RESPIRATORY (INHALATION) at 19:57

## 2024-06-30 ASSESSMENT — PAIN SCALES - GENERAL
PAINLEVEL_OUTOF10: 0
PAINLEVEL_OUTOF10: 0

## 2024-06-30 NOTE — PROGRESS NOTES
I saw Jeny today. Was in ER 2 weeks ago after 2 1/2 hr episode of palpitations. Has had more frequently since then. I requested zio patch, 14 days since can go max of 7 days between episodes. What do you recommend for follow up? Her brother saw peds cardiology for PSVT a few years ago. I was debating between adult and peds EP referral. Any thoughts? Hope you're well. [facility] Med Surg Daily Treatment Note  Facility/Department: [address] TELEMETRY  Room: [place] [address]       NAME: Dayami Christensen  : 1934 (89 y.o.)  MRN: [id number]  CODE STATUS: DNR-CC    Date of Service: 2024    Patient Diagnosis(es): Collapse of left lung [J98.11]  Pleural effusion on left [J90]  Malignant neoplasm of left lung, unspecified part of lung (HCC) [C34.92]   Chief Complaint   Patient presents with    Shortness of Breath     SOB, collapsed lung, lung ca     Patient Active Problem List    Diagnosis Date Noted    SOB (shortness of breath) 2024    Encounter for hospice care discussion 2024    Palliative care encounter 2024    Goals of care, counseling/discussion 2024    Advanced care planning/counseling discussion 2024    Malignant neoplasm of left lung (HCC) 2024    Neoplasm related pain 2024    Shortness of breath at rest 2024    Squamous cell carcinoma of left lung (HCC) 2024    Pleural effusion, left 2024    Collapse of left lung 2024    Malignant neoplasm of overlapping sites of left lung (HCC) 2024    Secondary hypercoagulable state (HCC) 2024    Acute respiratory failure with hypoxia (HCC) 2024    Lung mass 05/10/2024    Dizzy 2018    History of cardiac radiofrequency ablation (RFA) 2018    Dyslipidemia 2017    Gastroesophageal reflux disease without esophagitis 2017    Primary osteoarthritis involving multiple joints 2017    Abnormal gait 2017    Risk for falls 2017    Chronic midline low back pain without sciatica 2017    Osteopenia 2017    Essential hypertension 2017    New onset atrial fibrillation (HCC) 2017    Type 2 diabetes mellitus without complication, without long-term current use of insulin (HCC) 2017    Class 2 severe obesity due to excess calories with serious comorbidity and body mass index (BMI) of 36.0 to 36.9 in

## 2024-06-30 NOTE — PROGRESS NOTES
06/30/24 0900   RT Protocol   History Pulmonary Disease 2   Respiratory pattern 0   Breath sounds 2   Cough 0   Indications for Bronchodilator Therapy On home bronchodilators;Decreased or absent breath sounds   Bronchodilator Assessment Score 4

## 2024-06-30 NOTE — PLAN OF CARE
Problem: Discharge Planning  Goal: Discharge to home or other facility with appropriate resources  6/29/2024 2315 by Asmita Hardwick RN  Outcome: Progressing  Flowsheets (Taken 6/29/2024 2030)  Discharge to home or other facility with appropriate resources:   Identify barriers to discharge with patient and caregiver   Arrange for needed discharge resources and transportation as appropriate   Identify discharge learning needs (meds, wound care, etc)   Refer to discharge planning if patient needs post-hospital services based on physician order or complex needs related to functional status, cognitive ability or social support system  6/29/2024 1455 by Anny Smyth RN  Outcome: Progressing     Problem: Pain  Goal: Verbalizes/displays adequate comfort level or baseline comfort level  6/29/2024 2315 by Asmita Hardwick RN  Outcome: Progressing  6/29/2024 1455 by Anny Smyth RN  Outcome: Progressing     Problem: Skin/Tissue Integrity  Goal: Absence of new skin breakdown  Description: 1.  Monitor for areas of redness and/or skin breakdown  2.  Assess vascular access sites hourly  3.  Every 4-6 hours minimum:  Change oxygen saturation probe site  4.  Every 4-6 hours:  If on nasal continuous positive airway pressure, respiratory therapy assess nares and determine need for appliance change or resting period.  6/29/2024 2315 by Asmita Hardwick RN  Outcome: Progressing  6/29/2024 1455 by Anny Smyth RN  Outcome: Progressing     Problem: Safety - Adult  Goal: Free from fall injury  6/29/2024 2315 by Asmita Hardwick RN  Outcome: Progressing  6/29/2024 1455 by Anny Smyth RN  Outcome: Progressing     Problem: ABCDS Injury Assessment  Goal: Absence of physical injury  6/29/2024 2315 by Asmita Hardwick RN  Outcome: Progressing  6/29/2024 1455 by Anny Smyth RN  Outcome: Progressing     Problem: Chronic Conditions and Co-morbidities  Goal: Patient's chronic conditions and co-morbidity symptoms are monitored

## 2024-06-30 NOTE — PROGRESS NOTES
06/29/24 2005   RT Protocol   History Pulmonary Disease 2   Respiratory pattern 0   Breath sounds 2   Cough 0   Indications for Bronchodilator Therapy On home bronchodilators   Bronchodilator Assessment Score 4        [Dyspnea on exertion] : dyspnea during exertion [Palpitations] : palpitations [Negative] : Heme/Lymph

## 2024-07-01 PROBLEM — K59.00 CONSTIPATION: Status: ACTIVE | Noted: 2024-07-01

## 2024-07-01 LAB
GLUCOSE BLD-MCNC: 171 MG/DL (ref 70–99)
GLUCOSE BLD-MCNC: 180 MG/DL (ref 70–99)
GLUCOSE BLD-MCNC: 212 MG/DL (ref 70–99)
GLUCOSE BLD-MCNC: 220 MG/DL (ref 70–99)
PERFORMED ON: ABNORMAL

## 2024-07-01 PROCEDURE — 6370000000 HC RX 637 (ALT 250 FOR IP): Performed by: INTERNAL MEDICINE

## 2024-07-01 PROCEDURE — 6360000002 HC RX W HCPCS: Performed by: STUDENT IN AN ORGANIZED HEALTH CARE EDUCATION/TRAINING PROGRAM

## 2024-07-01 PROCEDURE — 97535 SELF CARE MNGMENT TRAINING: CPT

## 2024-07-01 PROCEDURE — 2700000000 HC OXYGEN THERAPY PER DAY

## 2024-07-01 PROCEDURE — 2060000000 HC ICU INTERMEDIATE R&B

## 2024-07-01 PROCEDURE — 94761 N-INVAS EAR/PLS OXIMETRY MLT: CPT

## 2024-07-01 PROCEDURE — 94760 N-INVAS EAR/PLS OXIMETRY 1: CPT

## 2024-07-01 PROCEDURE — 99232 SBSQ HOSP IP/OBS MODERATE 35: CPT | Performed by: NURSE PRACTITIONER

## 2024-07-01 PROCEDURE — 6370000000 HC RX 637 (ALT 250 FOR IP): Performed by: NURSE PRACTITIONER

## 2024-07-01 PROCEDURE — 94640 AIRWAY INHALATION TREATMENT: CPT

## 2024-07-01 RX ORDER — SENNA AND DOCUSATE SODIUM 50; 8.6 MG/1; MG/1
2 TABLET, FILM COATED ORAL 2 TIMES DAILY
Status: DISCONTINUED | OUTPATIENT
Start: 2024-07-01 | End: 2024-07-02 | Stop reason: HOSPADM

## 2024-07-01 RX ORDER — POLYETHYLENE GLYCOL 3350 17 G/17G
17 POWDER, FOR SOLUTION ORAL DAILY
Status: DISCONTINUED | OUTPATIENT
Start: 2024-07-01 | End: 2024-07-02 | Stop reason: HOSPADM

## 2024-07-01 RX ADMIN — IPRATROPIUM BROMIDE AND ALBUTEROL SULFATE 1 DOSE: 2.5; .5 SOLUTION RESPIRATORY (INHALATION) at 20:03

## 2024-07-01 RX ADMIN — PANTOPRAZOLE SODIUM 40 MG: 40 TABLET, DELAYED RELEASE ORAL at 06:36

## 2024-07-01 RX ADMIN — SENNOSIDES AND DOCUSATE SODIUM 2 TABLET: 50; 8.6 TABLET ORAL at 15:49

## 2024-07-01 RX ADMIN — TIOTROPIUM BROMIDE INHALATION SPRAY 2 PUFF: 3.12 SPRAY, METERED RESPIRATORY (INHALATION) at 07:58

## 2024-07-01 RX ADMIN — ATORVASTATIN CALCIUM 40 MG: 40 TABLET, FILM COATED ORAL at 10:41

## 2024-07-01 RX ADMIN — BUDESONIDE AND FORMOTEROL FUMARATE DIHYDRATE 2 PUFF: 160; 4.5 AEROSOL RESPIRATORY (INHALATION) at 20:03

## 2024-07-01 RX ADMIN — ENOXAPARIN SODIUM 40 MG: 100 INJECTION SUBCUTANEOUS at 15:49

## 2024-07-01 RX ADMIN — DILTIAZEM HYDROCHLORIDE 120 MG: 120 CAPSULE, COATED, EXTENDED RELEASE ORAL at 10:38

## 2024-07-01 RX ADMIN — METOPROLOL SUCCINATE 100 MG: 100 TABLET, EXTENDED RELEASE ORAL at 15:49

## 2024-07-01 RX ADMIN — BUDESONIDE AND FORMOTEROL FUMARATE DIHYDRATE 2 PUFF: 160; 4.5 AEROSOL RESPIRATORY (INHALATION) at 07:58

## 2024-07-01 RX ADMIN — IPRATROPIUM BROMIDE AND ALBUTEROL SULFATE 1 DOSE: 2.5; .5 SOLUTION RESPIRATORY (INHALATION) at 07:58

## 2024-07-01 RX ADMIN — POLYETHYLENE GLYCOL 3350 17 G: 17 POWDER, FOR SOLUTION ORAL at 15:49

## 2024-07-01 RX ADMIN — INSULIN GLARGINE 11 UNITS: 100 INJECTION, SOLUTION SUBCUTANEOUS at 22:51

## 2024-07-01 RX ADMIN — SENNOSIDES AND DOCUSATE SODIUM 2 TABLET: 50; 8.6 TABLET ORAL at 22:51

## 2024-07-01 ASSESSMENT — ENCOUNTER SYMPTOMS: SHORTNESS OF BREATH: 0

## 2024-07-01 NOTE — PROGRESS NOTES
Palliative Care Progress Note  Patient: Dayami Christensen  Gender: female  YOB: 1934  Unit/Bed: W195/W195-01  CodeStatus: DNR-CC  Inpatient Treatment Team: Treatment Team: Attending Provider: Deon Bain MD; Consulting Physician: Cherry Xiong DO; Patient Care Tech: Bj, Shanna; Patient Care Tech: Swetha Velez; Registered Nurse: Yari Lucas, RN; Registered Nurse: Patsy Lozano RN; : Yolis Baker RN; Utilization Reviewer: Josefina Alvarez RN  Admit Date:  6/21/2024    Chief Complaint: SOB    History of Presenting Illness:      Dayami Christensen is a 89 y.o. female on hospital day 10 with a history of lung cancer ,arthritis, diabetes type 2, GERD, HTN,  & hyperlipidemia.    Patient presented to the ER from [facility] due to worsening SOB and abnormal chest x ray. She was admitted for collapse of left lung in the setting of lung cancer.     Palliative care consulted for goals of care. Had thoracentesis today with 550 mLs removed. On 4 L NC. Currently receiving radiation therapy as an outpatient. Patient states she is not doing well. Complaining of SOB and RUQ abdominal pain (has been going on several days). Rating pain at a 9/10. Describes as sharp. Reports the shortness of breath is worse that the pain, however.     6/25/24:  Patient is alert and oriented to self only. States her lungs are doing \"good.\" Unable to verbalize what is going on with her cancer or current lung condition. Spoke with daughter, Fanny, who states this is her baseline to be intermittently confused. Patient reports pain is better but still rating this at a 9/10. Was called by nurse yesterday evening due to anxiety. Ativan prn started. Patient had complete recurrence of fluid to left lung within 24 hours. Plan is for a PleurX catheter placement.     6/26/24    Patient sitting up in bed. She is alert and oriented x 2. She denies any pain at this time. She has been receiving 5 mg morphine orally  thoracentesis What reading provider will be dictating this exam?->CRC FINDINGS: Nonspecific interstitial prominence, chronic changes versus component of edema or atypical infection in the acute setting.  Trace right pleural effusion or thickening.  ASVD, cardiomegaly.  Decreased left pleural effusion, now moderate.  Interval partial aeration of the left upper and mid lung zone.  Lytic lesion and pathologic fracture of the left 1st rib.     1. Decreased left pleural effusion, now moderate. Interval partial aeration of the left upper and mid lung zone. No pneumothorax. 2. Nonspecific interstitial prominence, chronic changes versus component of edema or atypical infection in the acute setting. 3. Lytic lesion and pathologic fracture of the left 1st rib.          Assessment and plan:  Palliative care encounter  Neoplasm related pain  Shortness of breath at rest    Hospice is not currently consistent with goals of care. Patient would like to try to finish radiation therapy and then decide on chemo/immunotherapy if this is an option for her. She is very SOB and having RUQ abdominal pain. Will trial morphine 5 mg po q 6 hours prn SOB/pain. Messaged Dr. Tomas regarding RUQ abdominal pain.     -Advance Care Planning  Discussed goals of care with patient. Patient has made the decision to be FULL CODE.     HPOA in place: Daughter, Fanny Ivy, is patient's first agent.       -Goals of Care Discussion:  Disease process and goals of treatment were discussed in basic terms. Dayami's goal is to optimize available comfort care measures and continue with aggressive treatment/medications. Patient is hopeful to go home with family at discharge. We discussed the palliative care philosophy in light of those goals. We discussed all care options contingent on treatment response and QOL. Much active listening, presence, and emotional support were given.     Care discussed with nurse and daughterFanny. Will continue to follow. Patient

## 2024-07-01 NOTE — PROGRESS NOTES
\"CREATININE\", \"CALCIUM\", \"PHOS\" in the last 72 hours.    Invalid input(s): \"MAGNES\"  No results for input(s): \"AST\", \"ALT\", \"BILIDIR\", \"BILITOT\", \"ALKPHOS\" in the last 72 hours.  No results for input(s): \"INR\" in the last 72 hours.  No results for input(s): \"CKTOTAL\", \"TROPONINI\" in the last 72 hours.    Urinalysis:      Lab Results   Component Value Date/Time    NITRU Negative 05/10/2024 11:15 PM    BLOODU Negative 05/10/2024 11:15 PM    GLUCOSEU >=1000 05/10/2024 11:15 PM       Radiology:  XR CHEST (SINGLE VIEW FRONTAL)   Final Result   Medial left upper chest tube in place. Continued large left pleural effusion.         XR CHEST (SINGLE VIEW FRONTAL)   Final Result   1.  After placement of left-sided chest tube, tip towards the upper inner   aspect of the apical region of the right chest cavity.      2.  Mild diminished the large right-sided pleural effusion with regain   aeration for some areas in the apical region of the right lung.      3.  Most likely there is a small right apical pneumothorax of less than 10%.         XR CHEST PORTABLE   Final Result   Re-accumulation of fluid in the left lung, with complete opacification.         US GALLBLADDER RUQ   Final Result   Minimal gallbladder sludge, without sonographic evidence of acute   cholecystitis.         IR GUIDED THORACENTESIS PLEURAL   Final Result   Successful ultrasound guided thoracentesis.         XR CHEST (SINGLE VIEW FRONTAL)   Final Result   1. Decreased left pleural effusion, now moderate. Interval partial aeration   of the left upper and mid lung zone. No pneumothorax.   2. Nonspecific interstitial prominence, chronic changes versus component of   edema or atypical infection in the acute setting.   3. Lytic lesion and pathologic fracture of the left 1st rib.         CTA CHEST W WO CONTRAST   Final Result   1.  Findings of advanced malignancy in the left chest/left side of the   mediastinum as described on previous studies.      2.  Development of

## 2024-07-01 NOTE — PROGRESS NOTES
Activity Limitations Requiring Skilled Therapeutic Intervention: Decreased functional mobility ;Decreased ADL status;Decreased safe awareness;Decreased strength;Increased pain;Decreased coordination;Decreased balance;Decreased endurance;Decreased ROM  Assessment: Patient continues to require encouragement for participation. She c/o intermittent dizziness and right knee pain limiting her ability to transfer and ambulate. Provided single step instruction for sequencing with all task. Patient requires significant increase in time to follow through. She declined to attempt ambulation or sit up inc hair. /40 mmHG following activity.     Discharge Recommendations:  Continue to assess pending progress, Therapy recommended at discharge      Goals  Long Term Goals  Long Term Goal 1: Pt to complete bed mobility with SBA  Long Term Goal 2: Pt to complete transfers with SBA  Long Term Goal 3: Pt to ambulate 50ft with LRD and SBA  Long Term Goal 4: Pt to manage 5 steps with HR and indep    PLAN    General Plan: 1 time a day 3-6 times a week  Safety Devices  Type of Devices: All fall risk precautions in place, Call light within reach, Left in bed, Bed alarm in place     AMPAC (6 CLICK) BASIC MOBILITY  AM-PAC Inpatient Mobility Raw Score : 15     Therapy Time   Individual   Time In 1055   Time Out 1120   Minutes 25     Bed mob/ Tr 25      Claudia Gomez PTA, 07/01/24 at 12:28 PM         Definitions for assistance levels  Independent = pt does not require any physical supervision or assistance from another person for activity completion. Device may be needed.  Stand by assistance = pt requires verbal cues or instructions from another person, close to but not touching, to perform the activity  Minimal assistance= pt performs 75% or more of the activity; assistance is required to complete the activity  Moderate assistance= pt performs 50% of the activity; assistance is required to complete the activity  Maximal assistance =  pt performs 25% of the activity; assistance is required to complete the activity  Dependent = pt requires total physical assistance to accomplish the task

## 2024-07-01 NOTE — PROGRESS NOTES
Pts son wanting to speak with oncologist and radiation oncologist. [facility] called and message left with  for Doctors to call son. Son may want second opinion for mother.   Pts son spoke with Dr. Farah. Pts son states pt will go home on hospice.

## 2024-07-01 NOTE — CARE COORDINATION
FAMILY AWAITING CALL FROM ONCOLOGY AND RADIATION ONCOLOGY. PER RN UPDATED, SON MAY WANT SECOND OPINION. WILL FOLLOW.

## 2024-07-01 NOTE — PROGRESS NOTES
Hospitalist Progress Note      PCP: Shashi Gan MD    Date of Admission: 6/21/2024    Chief Complaint:    Chief Complaint   Patient presents with    Shortness of Breath     SOB, collapsed lung, lung ca     Subjective:  No acute events overnight. Pt resting comfortably in bed. No acute complaints at this time.    Medications:  Reviewed    Infusion Medications    sodium chloride      dextrose       Scheduled Medications    enoxaparin  40 mg SubCUTAneous Q24H    ipratropium 0.5 mg-albuterol 2.5 mg  1 Dose Inhalation BID RT    sodium chloride flush  5-40 mL IntraVENous 2 times per day    insulin glargine  0.15 Units/kg SubCUTAneous Nightly    insulin lispro  0-8 Units SubCUTAneous TID WC    insulin lispro  0-4 Units SubCUTAneous Nightly    dilTIAZem  120 mg Oral Daily    atorvastatin  40 mg Oral Daily    metoprolol succinate  100 mg Oral Daily    pantoprazole  40 mg Oral QAM AC    budesonide-formoterol  2 puff Inhalation BID RT    tiotropium  2 puff Inhalation Daily RT     PRN Meds: LORazepam, morphine 20MG/ML, sodium chloride flush, sodium chloride, potassium chloride **OR** potassium alternative oral replacement **OR** potassium chloride, magnesium sulfate, ondansetron **OR** ondansetron, polyethylene glycol, acetaminophen **OR** acetaminophen, glucose, dextrose bolus **OR** dextrose bolus, glucagon (rDNA), dextrose      Intake/Output Summary (Last 24 hours) at 6/30/2024 2000  Last data filed at 6/30/2024 1821  Gross per 24 hour   Intake 640 ml   Output 375 ml   Net 265 ml       Exam:    BP (!) 126/49  Pulse 96  Temp 97.9 °F (36.6 °C) (Oral)  Resp 18  Ht 1.499 m (4' 11\")  Wt 72.6 kg (160 lb)  SpO2 100%  BMI 32.32 kg/m²   Physical Exam  Cardiovascular:      Rate and Rhythm: Normal rate and regular rhythm.   Pulmonary:      Effort: Pulmonary effort is normal. No respiratory distress.      Comments: NC in place. Decreased breath sounds on the left.  Abdominal:      Palpations: Abdomen is soft.   Neurological:

## 2024-07-02 ENCOUNTER — APPOINTMENT (OUTPATIENT)
Dept: PRIMARY CARE | Facility: CLINIC | Age: 89
End: 2024-07-02
Payer: MEDICARE

## 2024-07-02 VITALS
BODY MASS INDEX: 32.25 KG/M2 | DIASTOLIC BLOOD PRESSURE: 58 MMHG | RESPIRATION RATE: 18 BRPM | OXYGEN SATURATION: 98 % | HEIGHT: 59 IN | WEIGHT: 160 LBS | SYSTOLIC BLOOD PRESSURE: 97 MMHG | HEART RATE: 96 BPM | TEMPERATURE: 97.3 F

## 2024-07-02 LAB
GLUCOSE BLD-MCNC: 167 MG/DL (ref 70–99)
GLUCOSE BLD-MCNC: 195 MG/DL (ref 70–99)
GLUCOSE BLD-MCNC: 222 MG/DL (ref 70–99)
PERFORMED ON: ABNORMAL

## 2024-07-02 PROCEDURE — 6370000000 HC RX 637 (ALT 250 FOR IP): Performed by: NURSE PRACTITIONER

## 2024-07-02 PROCEDURE — 94761 N-INVAS EAR/PLS OXIMETRY MLT: CPT

## 2024-07-02 PROCEDURE — 6370000000 HC RX 637 (ALT 250 FOR IP): Performed by: INTERNAL MEDICINE

## 2024-07-02 PROCEDURE — 94640 AIRWAY INHALATION TREATMENT: CPT

## 2024-07-02 PROCEDURE — 6360000002 HC RX W HCPCS: Performed by: STUDENT IN AN ORGANIZED HEALTH CARE EDUCATION/TRAINING PROGRAM

## 2024-07-02 PROCEDURE — 2700000000 HC OXYGEN THERAPY PER DAY

## 2024-07-02 RX ORDER — DILTIAZEM HYDROCHLORIDE 120 MG/1
120 CAPSULE, COATED, EXTENDED RELEASE ORAL DAILY
Qty: 30 CAPSULE | Refills: 3 | Status: ON HOLD | OUTPATIENT
Start: 2024-07-02

## 2024-07-02 RX ORDER — ERGOCALCIFEROL 1.25 MG/1
50000 CAPSULE ORAL WEEKLY
Qty: 5 CAPSULE | Refills: 0 | Status: ON HOLD | OUTPATIENT
Start: 2024-07-02

## 2024-07-02 RX ORDER — BUDESONIDE, GLYCOPYRROLATE, AND FORMOTEROL FUMARATE 160; 9; 4.8 UG/1; UG/1; UG/1
2 AEROSOL, METERED RESPIRATORY (INHALATION) 2 TIMES DAILY
Qty: 1 EACH | Refills: 0 | Status: SHIPPED | OUTPATIENT
Start: 2024-07-02 | End: 2024-07-12

## 2024-07-02 RX ORDER — PIOGLITAZONEHYDROCHLORIDE 15 MG/1
15 TABLET ORAL DAILY
Qty: 30 TABLET | Refills: 3 | Status: ON HOLD | OUTPATIENT
Start: 2024-07-02 | End: 2024-07-11 | Stop reason: HOSPADM

## 2024-07-02 RX ORDER — ALBUTEROL SULFATE 0.63 MG/3ML
1 SOLUTION RESPIRATORY (INHALATION) EVERY 6 HOURS PRN
Qty: 270 ML | Refills: 3 | Status: ON HOLD | OUTPATIENT
Start: 2024-07-02

## 2024-07-02 RX ORDER — OMEPRAZOLE 20 MG/1
20 CAPSULE, DELAYED RELEASE ORAL DAILY
Qty: 90 CAPSULE | Refills: 3 | Status: ON HOLD | OUTPATIENT
Start: 2024-07-02

## 2024-07-02 RX ORDER — ALBUTEROL SULFATE 90 UG/1
2 AEROSOL, METERED RESPIRATORY (INHALATION) EVERY 6 HOURS PRN
Qty: 18 G | Refills: 3 | Status: ON HOLD | OUTPATIENT
Start: 2024-07-02

## 2024-07-02 RX ORDER — METOPROLOL SUCCINATE 100 MG/1
100 TABLET, EXTENDED RELEASE ORAL DAILY
Qty: 30 TABLET | Refills: 3 | Status: ON HOLD | OUTPATIENT
Start: 2024-07-02 | End: 2024-07-11 | Stop reason: HOSPADM

## 2024-07-02 RX ORDER — ATORVASTATIN CALCIUM 40 MG/1
40 TABLET, FILM COATED ORAL DAILY
Qty: 30 TABLET | Refills: 0 | Status: ON HOLD | OUTPATIENT
Start: 2024-07-02

## 2024-07-02 RX ORDER — MIRABEGRON 50 MG/1
50 TABLET, EXTENDED RELEASE ORAL DAILY
Qty: 30 TABLET | Refills: 0 | Status: ON HOLD | OUTPATIENT
Start: 2024-07-02

## 2024-07-02 RX ADMIN — Medication 5 MG: at 06:08

## 2024-07-02 RX ADMIN — SENNOSIDES AND DOCUSATE SODIUM 2 TABLET: 50; 8.6 TABLET ORAL at 09:10

## 2024-07-02 RX ADMIN — BUDESONIDE AND FORMOTEROL FUMARATE DIHYDRATE 2 PUFF: 160; 4.5 AEROSOL RESPIRATORY (INHALATION) at 07:43

## 2024-07-02 RX ADMIN — ENOXAPARIN SODIUM 40 MG: 100 INJECTION SUBCUTANEOUS at 09:10

## 2024-07-02 RX ADMIN — TIOTROPIUM BROMIDE INHALATION SPRAY 2 PUFF: 3.12 SPRAY, METERED RESPIRATORY (INHALATION) at 07:43

## 2024-07-02 RX ADMIN — PANTOPRAZOLE SODIUM 40 MG: 40 TABLET, DELAYED RELEASE ORAL at 06:08

## 2024-07-02 RX ADMIN — IPRATROPIUM BROMIDE AND ALBUTEROL SULFATE 1 DOSE: 2.5; .5 SOLUTION RESPIRATORY (INHALATION) at 07:43

## 2024-07-02 RX ADMIN — DILTIAZEM HYDROCHLORIDE 120 MG: 120 CAPSULE, COATED, EXTENDED RELEASE ORAL at 09:10

## 2024-07-02 RX ADMIN — METOPROLOL SUCCINATE 100 MG: 100 TABLET, EXTENDED RELEASE ORAL at 09:10

## 2024-07-02 RX ADMIN — POLYETHYLENE GLYCOL 3350 17 G: 17 POWDER, FOR SOLUTION ORAL at 09:10

## 2024-07-02 RX ADMIN — ONDANSETRON 4 MG: 4 TABLET, ORALLY DISINTEGRATING ORAL at 17:22

## 2024-07-02 NOTE — PROGRESS NOTES
Hospitalist Progress Note      PCP: Shashi Gan MD    Date of Admission: 6/21/2024    Chief Complaint:  no acute events, afebrile, stable HD, on 2 liters of O2    Medications:  Reviewed    Infusion Medications    sodium chloride      dextrose       Scheduled Medications    polyethylene glycol  17 g Oral Daily    sennosides-docusate sodium  2 tablet Oral BID    enoxaparin  40 mg SubCUTAneous Q24H    ipratropium 0.5 mg-albuterol 2.5 mg  1 Dose Inhalation BID RT    sodium chloride flush  5-40 mL IntraVENous 2 times per day    insulin glargine  0.15 Units/kg SubCUTAneous Nightly    insulin lispro  0-8 Units SubCUTAneous TID WC    insulin lispro  0-4 Units SubCUTAneous Nightly    dilTIAZem  120 mg Oral Daily    atorvastatin  40 mg Oral Daily    metoprolol succinate  100 mg Oral Daily    pantoprazole  40 mg Oral QAM AC    budesonide-formoterol  2 puff Inhalation BID RT    tiotropium  2 puff Inhalation Daily RT     PRN Meds: LORazepam, morphine 20MG/ML, sodium chloride flush, sodium chloride, potassium chloride **OR** potassium alternative oral replacement **OR** potassium chloride, magnesium sulfate, ondansetron **OR** ondansetron, acetaminophen **OR** acetaminophen, glucose, dextrose bolus **OR** dextrose bolus, glucagon (rDNA), dextrose      Intake/Output Summary (Last 24 hours) at 7/2/2024 1020  Last data filed at 7/2/2024 0453  Gross per 24 hour   Intake 530 ml   Output 200 ml   Net 330 ml         Exam:    BP (!) 97/58   Pulse 96   Temp 97.3 °F (36.3 °C) (Oral)   Resp 18   Ht 1.499 m (4' 11\")   Wt 72.6 kg (160 lb)   SpO2 98%   BMI 32.32 kg/m²     General appearance: appears stated age and cooperative.  Respiratory:  diminished bilaterally .  Cardiovascular: Regular rate and rhythm, S1/S2 .  Abdomen: Soft,active bowel sounds.  Musculoskeletal: No edema bilaterally.      Labs:   No results for input(s): \"WBC\", \"HGB\", \"HCT\", \"PLT\" in the last 72 hours.  No results for input(s): \"NA\", \"K\", \"CL\", \"CO2\", \"BUN\",  a large size left-sided pleural effusions since the PET CT   scan study of June 5 causing severe compression near in vibha left      3.  Severe endobronchial obstructive process of the bronchial system.   Patient's potential to benefit from evaluation after drainage of left-sided   effusion.  Please evaluate clinically.      4.  No indication for acute pulmonary emboli.                 Assessment/Plan:    88 y/o female with history of HTN, PAF, DM2, obesity, recently diagnosed SCC of left lung who presented with:     Squamous cell lung cancer with metastases complicated by complete obstruction of left bronchus with chronic hypoxic respiratory failure (POA)  -S/p thoracentesis on 6/24 with 550 mL taken off  -repeat CXR showed left lung again completely opaque less than 24 hours after thoracentesis  -S/p pleurx catheter placement on 6/25  -On home O2  -followed by pulmonology, oncology and palliative care, prognosis is poor, radiation oncology recommended no further treatment with comfort care approach  -Family signed with hospice contract and plan was home with hospice, but now patient is more awake and alert and she declined hospice   -Symptom management per palliative care     Type 2 diabetes with hyperglycemia  Obesity  Hypertension  Paroxysmal atrial fibrillation - on prophylactic dose Lovenox, held Eliquis for procedures          Diet: ADULT DIET; Regular    Code Status: DNR-CC      Disposition - home with [facility] today          Electronically signed by BARBIE HONEYCUTT MD on 7/2/2024 at 10:20 AM

## 2024-07-02 NOTE — DISCHARGE INSTR - COC
Continuity of Care Form    Patient Name: Dayami Christensen   :  1934  MRN:  [id number]    Admit date:  2024  Discharge date:  24    Code Status Order: DNR-CC   Advance Directives:   Advance Care Flowsheet Documentation       Date/Time Healthcare Directive Type of Healthcare Directive Copy in Chart Healthcare Agent Appointed Healthcare Agent's Name Healthcare Agent's Phone Number    24 1516 No, patient does not have an advance directive for healthcare treatment -- -- -- -- --            Admitting Physician:  Devyn Us DO  PCP: Shashi Gan MD    Discharging Nurse:  Discharging Hospital Unit/Room#: W195/W195-01  Discharging Unit Phone Number: [phone]    Emergency Contact:   Extended Emergency Contact Information  Primary Emergency Contact: Fanny Ivy   [facility]  Home Phone: [phone]  Relation: Child  Secondary Emergency Contact: Inna Shelton  [employer] Phone: [phone]  Relation: Child  Preferred language: English   needed? No    Past Surgical History:  Past Surgical History:   Procedure Laterality Date    BRONCHOSCOPY N/A 05/15/2024    NAVIGATIONAL  BRONCHOSCOPY WITH ENDOBRONCHIAL ULTRASOUND  TBNA TRANSBRONCHIAL BIOPIES performed by Benedict Cuenca MD at [facility]    CATARACT REMOVAL      COLONOSCOPY      EYE SURGERY      PLEURAL CATH INSERTION Left 2024    INSERTION LEFT PLEURX CATHETER performed by Kolby Torre MD at [facility]    THORACENTESIS Left 2024    550 ml removed by     TUBAL LIGATION         Immunization History:   Immunization History   Administered Date(s) Administered    COVID-19, MODERNA Bivalent, (age 12y+), IM, 50 mcg/0.5 mL 10/04/2022    Influenza, High Dose (Fluzone 65 yrs and older) 2017, 2018    Pneumococcal, PCV-13, PREVNAR 13, (age 6w+), IM, 0.5mL 2017    Pneumococcal, PPSV23, PNEUMOVAX 23, (age 2y+), SC/IM, 0.5mL 2018    TDaP, ADACEL (age 10y-64y), BOOSTRIX (age 10y+), IM, 0.5mL 2016    Name:  Address:  Dialysis Schedule:  Phone:  Fax:    / signature: {Esignature:[id number]}    PHYSICIAN SECTION    Prognosis: {Prognosis:9077900658}    Condition at Discharge: { Patient Condition:214103908}    Rehab Potential (if transferring to Rehab): {Prognosis:4371295308}    Recommended Labs or Other Treatments After Discharge: ***    Physician Certification: I certify the above information and transfer of Dayami Christensen  is necessary for the continuing treatment of the diagnosis listed and that she requires {Admit to Appropriate Level of Care:90595} for {GREATER/LESS:214667683} 30 days.     Update Admission H&P: {CHP DME Changes in HandP:776159782}    PHYSICIAN SIGNATURE:  {Esignature:864224232}

## 2024-07-02 NOTE — PROGRESS NOTES
[facility]  Occupational Therapy        NAME:  Dayami Christensen  ROOM: W195/W195-01  :  1934  DATE: 2024    Attempted to see Dayami Christensen at 954 on this date for:   []  Initial Evaluation   [x]  Treatment       Patient was unable to be seen due to:   [] Off unit for testing/procedure    [x] Patient refused, stating \"No I don't want to do that right now\"   [] Therapy on hold due to     [] Nursing deferred due to    [] Other:      Spoke with case mgmt regarding discharge plan - stating hospice is not confirmed yet but that pt will not be going to a SNF.       Electronically signed by Arianna Oliveros OT on 24 at 9:57 AM EDT

## 2024-07-02 NOTE — CARE COORDINATION
Spoke with Janette from [facility] to update her that family plans to bring patient home with hospice. She stated hospice will need to re-meet with patient since she is now alert and oriented to have her sign consents. They will then discuss any DME needed with family.

## 2024-07-02 NOTE — CARE COORDINATION
Team nrsg rounds done this am. Gloria from [facility] here and pt  declines hospice at present. Pt has current [facility] order and instructions for Pleurex .Son Jose Alejandro called during rounds and thsat was prior to hospice meeting with patient. Per [facility] if PT/OT needed can add to order or nurse can assess when she is seen and order from [facility].

## 2024-07-02 NOTE — ADT AUTH CERT
CALL FROM ONCOLOGY AND RADIATION ONCOLOGY. PER RN UPDATED, SON MAY WANT SECOND OPINION. WILL FOLLOW          6/29/2024   Last Updated by Josefina Alvarez RN on 7/1/2024 1459     Review Status Review Type Associated Date Created By   In Primary -- 7/1/2024 Josefina Alvarez RN      Criteria Review   DATE: 6/29/2024     TYPE OF BED: Tele            RELEVANT BASELINES: (lab values, vitals, o2 amount/delivery, etc.)  From home, hx lung cancer , o2 2l nc      PERTINENT UPDATES:  Remains on o2 3l nc   Map 64   Chest tube remains  Nebs as charted below      VITALS:  97.5 (36.4)       18        91        109/49 MAP 64   95 % on 3l nc     Deterioration index 50.42     Tele a fib     BMI 32.32     Gross per 24 hour  Intake  0 ml  Output  500 ml  Net  -500 ml     Chest tube 450 cc removed         ABNL/PERTINENT LABS/RADIOLOGY/DIAGNOSTIC STUDIES:  Gluc 222, 141, 197, 136, 148, 237     PHYSICAL EXAM:  Pulmonary:      Effort: Pulmonary effort is normal. No respiratory distress.      Comments: NC in place. Decreased breath sounds on the left.     MD CONSULTS/ASSESSMENT AND PLAN:  Medical impression  Squamous cell lung cancer with metastases complicated by complete obstruction of left bronchus with chronic hypoxic respiratory failure (POA)  -Management per pulmonology who has consulted with oncology and interventional radiology  -S/p thoracentesis on 6/24 with 550 mL taken off  -Pt again more short of breath with repeat CXR showing left lung again completely opaque less than 24 hours after thora  -S/p pleurx catheter placement on 6/26  -On home O2  -Palliative care following for GOC discussions, prognosis is poor, radiation oncology recommending no further treatment with comfort care approach  -Pt discharging with hospice dispo of home vs SNF undecided. SW following to help with discharge planning.  -Symptom management per palliative care     Type 2 diabetes with hyperglycemia  Hypokalemia  Obesity  Hypertension  Paroxysmal atrial  fibrillation on Eliquis prior to hospitalization.  Will treat with prophylactic dose Lovenox for now and hold Eliquis for procedures     MEDICATIONS:  Scheduled Meds:             enoxaparin       40 mg SubCUTAneous          Q24H             ipratropium 0.5 mg-albuterol 2.5 mg   1 Dose            Inhalation        BID RT             dilTIAZem         120 mg           Oral     Daily             metoprolol succinate    100 mg           Oral     Daily             budesonide-formoterol             2 puff  Inhalation        BID RT             tiotropium         2 puff  Inhalation        Daily RT  PRN Morphine 5 mg po x4      ORDERS:  Initiate oxygen therapy if the patient has 1) SpO2 is less than 90%, 2) Cyanosis, Chest Pain, Dyspnea, or Altered level of consciousness AND SpO2 checked and it is less than 90% or unobtainable, or 3) patient on Home oxygen.   To initiate oxygen therapy: nurse or RT enters Nasal cannula oxygen order using Per Protocol without Cosign order mode (if indication Home Oxygen then change L/min to same amount at home and change Wean to Room Air to No).   Notify provider if initiate oxygen therapy unless already on Home Oxygen.     Chest Vest  Daily   Acapella Daily   Up with assistance   Telemetry monitoring  Inpatient consult to Oncology   Inpatient consult to IR   ADULT DIET; Regular; 4 carb choices (60 gm/meal); No Added Salt (3-4 gm)      PT/OT/SLP/CM/RN ASSESSMENT OR NOTES:  PT 6/29   Pt initially agreeing to tx however, upon set up and preparation for sitting EOB, pt declining stating \"I can't do this. The legs are too sensitive. I don't feel like doing this.\" Therapist offering to reattempt at later time though pt requesting to rest today.  Nursing staff notified.       CM 6/29  Met with pt son and dtr, grandchildren and Dr. Tomas to discuss goals of care and DC planning. Pt family would like to have discussion with hem/onc and radiation onc. Dr. Carey before discharge. At this time, they are

## 2024-07-02 NOTE — CARE COORDINATION
I called dtr Fanny to make sure she is aware pt declined hospice and plan is home today with [facility] . Pt has O2 at home from prior. Dtr wants to make sure her scripts are sent to [facility] on [address]. I will let nurse know.

## 2024-07-02 NOTE — DISCHARGE SUMMARY
Hospital Medicine Discharge Summary    Dayami Christensen  :  1934  MRN:  [id number]    Admit date:  2024  Discharge date:  2024    Admitting Physician:  Devyn Us DO  Primary Care Physician:  Shashi Gan MD      Discharge Diagnoses:    Principal Problem:    Collapse of left lung  Active Problems:    Squamous cell carcinoma of left lung (HCC)    Pleural effusion, left    Palliative care encounter    Goals of care, counseling/discussion    Advanced care planning/counseling discussion    Malignant neoplasm of left lung (HCC)    Neoplasm related pain    Shortness of breath at rest    SOB (shortness of breath)    Encounter for hospice care discussion    Constipation  Resolved Problems:    * No resolved hospital problems. *      Hospital Course:   Dayami Christensen is a 89 y.o. female that was admitted and treated at [facility] for the following medical issues:     Squamous cell lung cancer with metastases complicated by complete obstruction of left bronchus with chronic hypoxic respiratory failure (POA)  -S/p thoracentesis on  with 550 mL taken off  -repeat CXR showed left lung again completely opaque less than 24 hours after thoracentesis  -S/p pleurx catheter placement on   -On home O2  -followed by pulmonology, oncology and palliative care, prognosis is poor, radiation oncology recommended no further treatment with comfort care approach  -Family signed with hospice contract and plan was home with hospice, but now patient is more awake and alert and she declined hospice   -Symptom management per palliative care       Code Status: DNR-CC      Disposition - home with [facility]      Patient was seen by the following consultants while admitted to [facility]:   Consults:  IP CONSULT TO PULMONOLOGY  IP CONSULT TO ONCOLOGY  IP CONSULT TO INTERVENTIONAL RADIOLOGY  IP CONSULT TO PALLIATIVE CARE  IP CONSULT TO THORACIC SURGERY  IP CONSULT TO HOSPICE    Significant  tolerated, fall precautions.     Total time taken for discharging this patient: 40 minutes. Greater than 70% of time was spent focused exclusively on this patient. Time was taken to review chart, discuss plans with consultants, reconciling medications, discussing plan answering questions with patient.     Signed:  BARBIE HONEYCUTT MD  7/2/2024, 10:42 AM  ----------------------------------------------------------------------------------------------------------------------    Dayami Christensen,     Please return to ER or call 911 if you develop any significant signs or symptoms.     I may not have addressed all of your medical illnesses or the abnormal blood work or imaging therefore please ask your PCP Shashi Gan MD and other out patient specialists and providers  to obtain [facility] record entirely to follow up on all of the abnormal labs, imaging and findings that I have and have not addressed during your hospitalization.      Discharging you from the hospital does not mean that your medical care ends here and now. You may still need additional work up, investigation, monitoring, and treatment to be handled from this point on by outside providers including your PCP, Shashi Gan MD , Specialists and other healthcare providers.      Please review your list of discharge medications prior to resuming medications you might still have at home, as the medications you need to be taking, dosages or how often you must take them may have changed. For medication questions, contact your retail pharmacy and your PCP, Shashi Gan MD .     ** I STRONGLY RECOMMEND that you follow up with Shashi Gan MD within 3 to 5 days for a post hospitalization evaluation. This specific office visit is covered by your insurance, and is not the same as your annual doctor visit/ check up. This office visit is important, as it may prevent need for repeat and/or future hospitalizations.**    Your medical team at [facility] appreciates the

## 2024-07-02 NOTE — CARE COORDINATION
Sent message to  to ask  if pleurex orders on his [facility] order is same as when he wrote it since [facility] is asking for us to reach out to him and check. Dr. Torre responded that same schedule.

## 2024-07-03 NOTE — PROGRESS NOTES
Physical Therapy  Facility/Department: [facility] MED SURG W195/W195-01  Physical Therapy Discharge      NAME: Dayami Christensen    : 1934 (89 y.o.)  MRN: [id number]    Account: [id number]  Gender: female      Patient has been discharged from acute care hospital. DC patient from current PT program.      Electronically signed by Hyun Nicole PT on 7/3/24 at 2:01 PM EDT

## 2024-07-06 LAB — PRELIMINARY: NORMAL

## 2024-07-08 ENCOUNTER — APPOINTMENT (OUTPATIENT)
Dept: GENERAL RADIOLOGY | Age: 89
End: 2024-07-08
Payer: MEDICARE

## 2024-07-08 ENCOUNTER — APPOINTMENT (OUTPATIENT)
Dept: CT IMAGING | Age: 89
End: 2024-07-08
Payer: MEDICARE

## 2024-07-08 ENCOUNTER — HOSPITAL ENCOUNTER (OUTPATIENT)
Age: 89
Setting detail: OBSERVATION
Discharge: NURSING, REHAB OR LONG-TERM CARE | End: 2024-07-11
Attending: INTERNAL MEDICINE | Admitting: INTERNAL MEDICINE
Payer: MEDICARE

## 2024-07-08 DIAGNOSIS — C34.12 MALIGNANT NEOPLASM OF UPPER LOBE OF LEFT LUNG (HCC): ICD-10-CM

## 2024-07-08 DIAGNOSIS — R53.1 GENERALIZED WEAKNESS: Primary | ICD-10-CM

## 2024-07-08 DIAGNOSIS — I95.1 ORTHOSTATIC HYPOTENSION: ICD-10-CM

## 2024-07-08 PROBLEM — Z85.118 HX OF CANCER OF LUNG: Status: ACTIVE | Noted: 2024-07-08

## 2024-07-08 LAB
ALBUMIN SERPL-MCNC: 2.6 G/DL (ref 3.5–4.6)
ALP SERPL-CCNC: 153 U/L (ref 40–130)
ALT SERPL-CCNC: 14 U/L (ref 0–33)
ANION GAP SERPL CALCULATED.3IONS-SCNC: 17 MEQ/L (ref 9–15)
AST SERPL-CCNC: 16 U/L (ref 0–35)
B PARAP IS1001 DNA NPH QL NAA+NON-PROBE: NOT DETECTED
B PERT.PT PRMT NPH QL NAA+NON-PROBE: NOT DETECTED
BASOPHILS # BLD: 0 K/UL (ref 0–0.2)
BASOPHILS NFR BLD: 0.3 %
BILIRUB SERPL-MCNC: 0.4 MG/DL (ref 0.2–0.7)
BILIRUB UR QL STRIP: NEGATIVE
BNP BLD-MCNC: 2110 PG/ML
BUN SERPL-MCNC: 17 MG/DL (ref 8–23)
C PNEUM DNA NPH QL NAA+NON-PROBE: NOT DETECTED
CALCIUM SERPL-MCNC: 8.6 MG/DL (ref 8.5–9.9)
CHLORIDE SERPL-SCNC: 94 MEQ/L (ref 95–107)
CLARITY UR: CLEAR
CO2 SERPL-SCNC: 23 MEQ/L (ref 20–31)
COLOR UR: YELLOW
CREAT SERPL-MCNC: 0.7 MG/DL (ref 0.5–0.9)
EOSINOPHIL # BLD: 0.1 K/UL (ref 0–0.7)
EOSINOPHIL NFR BLD: 0.9 %
ERYTHROCYTE [DISTWIDTH] IN BLOOD BY AUTOMATED COUNT: 16.6 % (ref 11.5–14.5)
FLUAV RNA NPH QL NAA+NON-PROBE: NOT DETECTED
FLUBV RNA NPH QL NAA+NON-PROBE: NOT DETECTED
GLOBULIN SER CALC-MCNC: 3.5 G/DL (ref 2.3–3.5)
GLUCOSE SERPL-MCNC: 150 MG/DL (ref 70–99)
GLUCOSE UR STRIP-MCNC: NEGATIVE MG/DL
HADV DNA NPH QL NAA+NON-PROBE: NOT DETECTED
HCOV 229E RNA NPH QL NAA+NON-PROBE: NOT DETECTED
HCOV HKU1 RNA NPH QL NAA+NON-PROBE: NOT DETECTED
HCOV NL63 RNA NPH QL NAA+NON-PROBE: NOT DETECTED
HCOV OC43 RNA NPH QL NAA+NON-PROBE: NOT DETECTED
HCT VFR BLD AUTO: 43.4 % (ref 37–47)
HGB BLD-MCNC: 13.1 G/DL (ref 12–16)
HGB UR QL STRIP: NEGATIVE
HMPV RNA NPH QL NAA+NON-PROBE: NOT DETECTED
HPIV1 RNA NPH QL NAA+NON-PROBE: NOT DETECTED
HPIV2 RNA NPH QL NAA+NON-PROBE: NOT DETECTED
HPIV3 RNA NPH QL NAA+NON-PROBE: NOT DETECTED
HPIV4 RNA NPH QL NAA+NON-PROBE: NOT DETECTED
KETONES UR STRIP-MCNC: ABNORMAL MG/DL
LACTIC ACID, SEPSIS: 1.8 MMOL/L (ref 0.5–1.9)
LACTIC ACID, SEPSIS: 2.5 MMOL/L (ref 0.5–1.9)
LEUKOCYTE ESTERASE UR QL STRIP: NEGATIVE
LYMPHOCYTES # BLD: 1.4 K/UL (ref 1–4.8)
LYMPHOCYTES NFR BLD: 12.4 %
M PNEUMO DNA NPH QL NAA+NON-PROBE: NOT DETECTED
MAGNESIUM SERPL-MCNC: 2.2 MG/DL (ref 1.7–2.4)
MCH RBC QN AUTO: 26.6 PG (ref 27–31.3)
MCHC RBC AUTO-ENTMCNC: 30.2 % (ref 33–37)
MCV RBC AUTO: 88 FL (ref 79.4–94.8)
MONOCYTES # BLD: 0.5 K/UL (ref 0.2–0.8)
MONOCYTES NFR BLD: 4.5 %
NEUTROPHILS # BLD: 9.1 K/UL (ref 1.4–6.5)
NEUTS SEG NFR BLD: 81 %
NITRITE UR QL STRIP: NEGATIVE
PH UR STRIP: 5.5 [PH] (ref 5–9)
PLATELET # BLD AUTO: 287 K/UL (ref 130–400)
POTASSIUM SERPL-SCNC: 4.2 MEQ/L (ref 3.4–4.9)
PROCALCITONIN SERPL IA-MCNC: 0.12 NG/ML (ref 0–0.15)
PROT SERPL-MCNC: 6.1 G/DL (ref 6.3–8)
PROT UR STRIP-MCNC: NEGATIVE MG/DL
RBC # BLD AUTO: 4.93 M/UL (ref 4.2–5.4)
RSV RNA NPH QL NAA+NON-PROBE: NOT DETECTED
RV+EV RNA NPH QL NAA+NON-PROBE: NOT DETECTED
SARS-COV-2 RNA NPH QL NAA+NON-PROBE: NOT DETECTED
SODIUM SERPL-SCNC: 134 MEQ/L (ref 135–144)
SP GR UR STRIP: 1.01 (ref 1–1.03)
TROPONIN, HIGH SENSITIVITY: 13 NG/L (ref 0–19)
TROPONIN, HIGH SENSITIVITY: 13 NG/L (ref 0–19)
TROPONIN, HIGH SENSITIVITY: 14 NG/L (ref 0–19)
UROBILINOGEN UR STRIP-ACNC: 0.2 E.U./DL
WBC # BLD AUTO: 11.2 K/UL (ref 4.8–10.8)

## 2024-07-08 PROCEDURE — 80053 COMPREHEN METABOLIC PANEL: CPT

## 2024-07-08 PROCEDURE — 93005 ELECTROCARDIOGRAM TRACING: CPT | Performed by: PHYSICIAN ASSISTANT

## 2024-07-08 PROCEDURE — G0378 HOSPITAL OBSERVATION PER HR: HCPCS

## 2024-07-08 PROCEDURE — 96374 THER/PROPH/DIAG INJ IV PUSH: CPT

## 2024-07-08 PROCEDURE — 81003 URINALYSIS AUTO W/O SCOPE: CPT

## 2024-07-08 PROCEDURE — 97163 PT EVAL HIGH COMPLEX 45 MIN: CPT

## 2024-07-08 PROCEDURE — 84145 PROCALCITONIN (PCT): CPT

## 2024-07-08 PROCEDURE — 2580000003 HC RX 258: Performed by: REGISTERED NURSE

## 2024-07-08 PROCEDURE — 96361 HYDRATE IV INFUSION ADD-ON: CPT

## 2024-07-08 PROCEDURE — 83605 ASSAY OF LACTIC ACID: CPT

## 2024-07-08 PROCEDURE — 99285 EMERGENCY DEPT VISIT HI MDM: CPT

## 2024-07-08 PROCEDURE — 2580000003 HC RX 258: Performed by: PHYSICIAN ASSISTANT

## 2024-07-08 PROCEDURE — 71045 X-RAY EXAM CHEST 1 VIEW: CPT

## 2024-07-08 PROCEDURE — 97166 OT EVAL MOD COMPLEX 45 MIN: CPT

## 2024-07-08 PROCEDURE — 36415 COLL VENOUS BLD VENIPUNCTURE: CPT

## 2024-07-08 PROCEDURE — 0202U NFCT DS 22 TRGT SARS-COV-2: CPT

## 2024-07-08 PROCEDURE — 6370000000 HC RX 637 (ALT 250 FOR IP): Performed by: INTERNAL MEDICINE

## 2024-07-08 PROCEDURE — 6370000000 HC RX 637 (ALT 250 FOR IP): Performed by: REGISTERED NURSE

## 2024-07-08 PROCEDURE — 83735 ASSAY OF MAGNESIUM: CPT

## 2024-07-08 PROCEDURE — 83880 ASSAY OF NATRIURETIC PEPTIDE: CPT

## 2024-07-08 PROCEDURE — 6360000002 HC RX W HCPCS: Performed by: PHYSICIAN ASSISTANT

## 2024-07-08 PROCEDURE — 85025 COMPLETE CBC W/AUTO DIFF WBC: CPT

## 2024-07-08 PROCEDURE — 84484 ASSAY OF TROPONIN QUANT: CPT

## 2024-07-08 PROCEDURE — 70450 CT HEAD/BRAIN W/O DYE: CPT

## 2024-07-08 RX ORDER — POTASSIUM CHLORIDE 7.45 MG/ML
10 INJECTION INTRAVENOUS PRN
Status: DISCONTINUED | OUTPATIENT
Start: 2024-07-08 | End: 2024-07-11 | Stop reason: HOSPADM

## 2024-07-08 RX ORDER — ACETAMINOPHEN 325 MG/1
650 TABLET ORAL EVERY 6 HOURS PRN
Status: DISCONTINUED | OUTPATIENT
Start: 2024-07-08 | End: 2024-07-11 | Stop reason: HOSPADM

## 2024-07-08 RX ORDER — SODIUM CHLORIDE 9 MG/ML
INJECTION, SOLUTION INTRAVENOUS PRN
Status: DISCONTINUED | OUTPATIENT
Start: 2024-07-08 | End: 2024-07-11 | Stop reason: HOSPADM

## 2024-07-08 RX ORDER — ACETAMINOPHEN 650 MG/1
650 SUPPOSITORY RECTAL EVERY 6 HOURS PRN
Status: DISCONTINUED | OUTPATIENT
Start: 2024-07-08 | End: 2024-07-11 | Stop reason: HOSPADM

## 2024-07-08 RX ORDER — POLYETHYLENE GLYCOL 3350 17 G/17G
17 POWDER, FOR SOLUTION ORAL DAILY PRN
Status: DISCONTINUED | OUTPATIENT
Start: 2024-07-08 | End: 2024-07-11 | Stop reason: HOSPADM

## 2024-07-08 RX ORDER — SODIUM CHLORIDE 0.9 % (FLUSH) 0.9 %
5-40 SYRINGE (ML) INJECTION EVERY 12 HOURS SCHEDULED
Status: DISCONTINUED | OUTPATIENT
Start: 2024-07-08 | End: 2024-07-11 | Stop reason: HOSPADM

## 2024-07-08 RX ORDER — MAGNESIUM SULFATE IN WATER 40 MG/ML
2000 INJECTION, SOLUTION INTRAVENOUS PRN
Status: DISCONTINUED | OUTPATIENT
Start: 2024-07-08 | End: 2024-07-11 | Stop reason: HOSPADM

## 2024-07-08 RX ORDER — ENOXAPARIN SODIUM 100 MG/ML
40 INJECTION SUBCUTANEOUS DAILY
Status: DISCONTINUED | OUTPATIENT
Start: 2024-07-08 | End: 2024-07-08

## 2024-07-08 RX ORDER — PANTOPRAZOLE SODIUM 40 MG/1
40 TABLET, DELAYED RELEASE ORAL
Status: DISCONTINUED | OUTPATIENT
Start: 2024-07-09 | End: 2024-07-11 | Stop reason: HOSPADM

## 2024-07-08 RX ORDER — 0.9 % SODIUM CHLORIDE 0.9 %
500 INTRAVENOUS SOLUTION INTRAVENOUS ONCE
Status: COMPLETED | OUTPATIENT
Start: 2024-07-08 | End: 2024-07-08

## 2024-07-08 RX ORDER — TROSPIUM CHLORIDE 20 MG/1
20 TABLET, FILM COATED ORAL NIGHTLY
Status: DISCONTINUED | OUTPATIENT
Start: 2024-07-08 | End: 2024-07-11 | Stop reason: HOSPADM

## 2024-07-08 RX ORDER — SODIUM CHLORIDE 9 MG/ML
INJECTION, SOLUTION INTRAVENOUS CONTINUOUS
Status: DISCONTINUED | OUTPATIENT
Start: 2024-07-08 | End: 2024-07-10

## 2024-07-08 RX ORDER — ONDANSETRON 2 MG/ML
4 INJECTION INTRAMUSCULAR; INTRAVENOUS EVERY 6 HOURS PRN
Status: DISCONTINUED | OUTPATIENT
Start: 2024-07-08 | End: 2024-07-11 | Stop reason: HOSPADM

## 2024-07-08 RX ORDER — MECOBALAMIN 5000 MCG
5 TABLET,DISINTEGRATING ORAL NIGHTLY
Status: DISCONTINUED | OUTPATIENT
Start: 2024-07-08 | End: 2024-07-11 | Stop reason: HOSPADM

## 2024-07-08 RX ORDER — MIDODRINE HYDROCHLORIDE 2.5 MG/1
2.5 TABLET ORAL
Status: DISCONTINUED | OUTPATIENT
Start: 2024-07-08 | End: 2024-07-11 | Stop reason: HOSPADM

## 2024-07-08 RX ORDER — ALBUTEROL SULFATE 90 UG/1
2 AEROSOL, METERED RESPIRATORY (INHALATION) EVERY 6 HOURS PRN
Status: DISCONTINUED | OUTPATIENT
Start: 2024-07-08 | End: 2024-07-11 | Stop reason: HOSPADM

## 2024-07-08 RX ORDER — ONDANSETRON 2 MG/ML
4 INJECTION INTRAMUSCULAR; INTRAVENOUS ONCE
Status: COMPLETED | OUTPATIENT
Start: 2024-07-08 | End: 2024-07-08

## 2024-07-08 RX ORDER — ATORVASTATIN CALCIUM 40 MG/1
40 TABLET, FILM COATED ORAL DAILY
Status: DISCONTINUED | OUTPATIENT
Start: 2024-07-08 | End: 2024-07-11 | Stop reason: HOSPADM

## 2024-07-08 RX ORDER — SODIUM CHLORIDE 0.9 % (FLUSH) 0.9 %
5-40 SYRINGE (ML) INJECTION PRN
Status: DISCONTINUED | OUTPATIENT
Start: 2024-07-08 | End: 2024-07-11 | Stop reason: HOSPADM

## 2024-07-08 RX ORDER — POTASSIUM CHLORIDE 20 MEQ/1
40 TABLET, EXTENDED RELEASE ORAL PRN
Status: DISCONTINUED | OUTPATIENT
Start: 2024-07-08 | End: 2024-07-11 | Stop reason: HOSPADM

## 2024-07-08 RX ORDER — BUDESONIDE AND FORMOTEROL FUMARATE DIHYDRATE 160; 4.5 UG/1; UG/1
2 AEROSOL RESPIRATORY (INHALATION)
Status: DISCONTINUED | OUTPATIENT
Start: 2024-07-08 | End: 2024-07-11 | Stop reason: HOSPADM

## 2024-07-08 RX ORDER — ONDANSETRON 4 MG/1
4 TABLET, ORALLY DISINTEGRATING ORAL EVERY 8 HOURS PRN
Status: DISCONTINUED | OUTPATIENT
Start: 2024-07-08 | End: 2024-07-11 | Stop reason: HOSPADM

## 2024-07-08 RX ADMIN — ATORVASTATIN CALCIUM 40 MG: 40 TABLET, FILM COATED ORAL at 21:27

## 2024-07-08 RX ADMIN — SODIUM CHLORIDE, PRESERVATIVE FREE 10 ML: 5 INJECTION INTRAVENOUS at 21:27

## 2024-07-08 RX ADMIN — ACETAMINOPHEN 325MG 650 MG: 325 TABLET ORAL at 21:25

## 2024-07-08 RX ADMIN — Medication 5 MG: at 21:25

## 2024-07-08 RX ADMIN — MIDODRINE HYDROCHLORIDE 2.5 MG: 2.5 TABLET ORAL at 18:40

## 2024-07-08 RX ADMIN — SODIUM CHLORIDE 500 ML: 9 INJECTION, SOLUTION INTRAVENOUS at 12:46

## 2024-07-08 RX ADMIN — ONDANSETRON 4 MG: 2 INJECTION INTRAMUSCULAR; INTRAVENOUS at 12:46

## 2024-07-08 RX ADMIN — APIXABAN 5 MG: 5 TABLET, FILM COATED ORAL at 21:25

## 2024-07-08 RX ADMIN — TROSPIUM CHLORIDE 20 MG: 20 TABLET, FILM COATED ORAL at 21:25

## 2024-07-08 RX ADMIN — SODIUM CHLORIDE: 9 INJECTION, SOLUTION INTRAVENOUS at 18:40

## 2024-07-08 ASSESSMENT — ENCOUNTER SYMPTOMS
DIARRHEA: 0
RHINORRHEA: 0
VOMITING: 0
ABDOMINAL PAIN: 0
SORE THROAT: 0
NAUSEA: 0
COUGH: 1
BACK PAIN: 0
PHOTOPHOBIA: 0
SHORTNESS OF BREATH: 0
EYE PAIN: 0

## 2024-07-08 ASSESSMENT — PAIN - FUNCTIONAL ASSESSMENT: PAIN_FUNCTIONAL_ASSESSMENT: NONE - DENIES PAIN

## 2024-07-08 NOTE — ED PROVIDER NOTES
[facility] ED  eMERGENCY dEPARTMENTeNCOUnter      Pt Name: Dayami Christensen  MRN: [id number]  Birthdate 11/5/1934  Date ofevaluation: 7/8/2024  Provider: Tita Gutierrez PA-C    CHIEF COMPLAINT       Chief Complaint   Patient presents with    Fatigue     General weakness for 4 days  Pt is from home   Pt wears 4 liters of oxygen          HISTORY OF PRESENT ILLNESS   (Location/Symptom, Timing/Onset,Context/Setting, Quality, Duration, Modifying Factors, Severity)  Note limiting factors.   Dayami Christensen is a 89 y.o. female who presents to the emergency department fatigue and weakness for the last few days.  Patient describes today she cannot even lift a spoon she is so weak.  She is not having any pain.  She does have a history of squamous cell carcinoma of the left lung.  She is undergoing radiation.  She is on 4 L per pulmonary note on 621. No cp or worsening sob.     HPI    NursingNotes were reviewed.    REVIEW OF SYSTEMS    (2-9 systems for level 4, 10 or more for level 5)     Review of Systems   Constitutional:  Positive for activity change, appetite change and fatigue. Negative for chills, diaphoresis and fever.   HENT:  Negative for congestion, rhinorrhea and sore throat.    Eyes:  Negative for photophobia and pain.   Respiratory:  Positive for cough (chronic). Negative for shortness of breath.    Cardiovascular:  Negative for chest pain and palpitations.   Gastrointestinal:  Negative for abdominal pain, diarrhea, nausea and vomiting.   Genitourinary:  Negative for dysuria and flank pain.   Musculoskeletal:  Negative for back pain.   Skin:  Negative for rash.   Neurological:  Positive for weakness. Negative for dizziness, light-headedness and headaches.   Psychiatric/Behavioral: Negative.     All other systems reviewed and are negative.      Except as noted above the remainder of the review of systems was reviewed and negative.       PAST MEDICAL HISTORY     Past Medical History:   Diagnosis Date    Arthritis

## 2024-07-08 NOTE — H&P
HISTORY AND PHYSICAL             Date: 2024        Patient Name: Dayami Christensen     YOB: 1934      Age:  89 y.o.    Chief Complaint     Chief Complaint   Patient presents with    Fatigue     General weakness for 4 days  Pt is from home   Pt wears 4 liters of oxygen         History Obtained From   patient, electronic medical record    History of Present Illness   Patient is a 89-year-old female with past medical history of squamous cell carcinoma left lung; she is undergoing radiation; on 4 L via nasal cannula, chronic kidney disease, diabetes, GERD, HTN, hyperlipidemia, osteoporosis who presents to ED with increased shortness of breath and weakness with associated cough. SpO2 98% CXR reveals improved left pleural effusion with increasing aeration to the upper left lung; underlying pneumonia cannot be excluded.  Patient reports that she is so weak that she could not even lift a spoon.  Denies any pain at the moment.  Patient is alert and oriented x 3 at this discussed CODE STATUS with patient patient wants to be a full code at this time.    Past Medical History     Past Medical History:   Diagnosis Date    Arthritis     Chronic kidney disease     Diabetes mellitus (HCC)     borderline    Gastroesophageal reflux disease without esophagitis 2017    History of blood transfusion     hemorrhage after an     Hyperlipidemia     Hypertension     Osteoporosis     Urinary frequency         Past Surgical History     Past Surgical History:   Procedure Laterality Date    BRONCHOSCOPY N/A 05/15/2024    NAVIGATIONAL  BRONCHOSCOPY WITH ENDOBRONCHIAL ULTRASOUND  TBNA TRANSBRONCHIAL BIOPIES performed by Benedict Cuenca MD at [facility] OR    CATARACT REMOVAL      COLONOSCOPY      EYE SURGERY      PLEURAL CATH INSERTION Left 2024    INSERTION LEFT PLEURX CATHETER performed by Kolby Torre MD at [facility] OR    THORACENTESIS Left 2024    550 ml removed by     TUBAL LIGATION          Medications Prior  Absolute 0.0 0.0 - 0.2 K/uL   CMP    Collection Time: 07/08/24 11:15 AM   Result Value Ref Range    Sodium 134 (L) 135 - 144 mEq/L    Potassium 4.2 3.4 - 4.9 mEq/L    Chloride 94 (L) 95 - 107 mEq/L    CO2 23 20 - 31 mEq/L    Anion Gap 17 (H) 9 - 15 mEq/L    Glucose 150 (H) 70 - 99 mg/dL    BUN 17 8 - 23 mg/dL    Creatinine 0.70 0.50 - 0.90 mg/dL    Est, Glom Filt Rate 82.3 >60    Calcium 8.6 8.5 - 9.9 mg/dL    Total Protein 6.1 (L) 6.3 - 8.0 g/dL    Albumin 2.6 (L) 3.5 - 4.6 g/dL    Total Bilirubin 0.4 0.2 - 0.7 mg/dL    Alkaline Phosphatase 153 (H) 40 - 130 U/L    ALT 14 0 - 33 U/L    AST 16 0 - 35 U/L    Globulin 3.5 2.3 - 3.5 g/dL   Urinalysis    Collection Time: 07/08/24 11:15 AM   Result Value Ref Range    Color, UA Yellow Straw/Yellow    Clarity, UA Clear Clear    Glucose, Ur Negative Negative mg/dL    Bilirubin, Urine Negative Negative    Ketones, Urine TRACE (A) Negative mg/dL    Specific Gravity, UA 1.012 1.005 - 1.030    Blood, Urine Negative Negative    pH, Urine 5.5 5.0 - 9.0    Protein, UA Negative Negative mg/dL    Urobilinogen, Urine 0.2 <2.0 E.U./dL    Nitrite, Urine Negative Negative    Leukocyte Esterase, Urine Negative Negative   Brain Natriuretic Peptide    Collection Time: 07/08/24 11:15 AM   Result Value Ref Range    Pro-BNP 2,110 pg/mL   Procalcitonin    Collection Time: 07/08/24 11:15 AM   Result Value Ref Range    Procalcitonin 0.12 0.00 - 0.15 ng/mL   Lactate, Sepsis    Collection Time: 07/08/24 11:15 AM   Result Value Ref Range    Lactic Acid, Sepsis 1.8 0.5 - 1.9 mmol/L   Magnesium    Collection Time: 07/08/24 11:15 AM   Result Value Ref Range    Magnesium 2.2 1.7 - 2.4 mg/dL   Troponin Now and Q 1 Hour x 3    Collection Time: 07/08/24 11:15 AM   Result Value Ref Range    Troponin, High Sensitivity 14 0 - 19 ng/L   Respiratory Panel, Molecular, with COVID-19 (Restricted: peds pts or suitable admitted adults)    Collection Time: 07/08/24 11:34 AM    Specimen: Nasopharyngeal   Result Value

## 2024-07-08 NOTE — CARE COORDINATION
This nurse spoke with the patient regarding her code status. When I asked her if she would want chest compressions, a breathing tube or put on a ventilator she stated \"yes.\" This question was asked 3 times with her repeating each time \"yes.\" This nurse attempted to reach her daughter Price but I received a recording stating that she was not taking calls. This nurse tried to reach her daughter marisol but received no answer. A perfect serve message was sent to Dr Baker.

## 2024-07-08 NOTE — ED TRIAGE NOTES
Pt came to to the ed via [facility] from home with c/o general weakness   Pt wears 4 liters of oxygen at all time   Pt is A&Ox4  Pt is afebrile

## 2024-07-08 NOTE — FLOWSHEET NOTE
Attempted to call two daughters Price and marisol  both phones  are not accepting calls at this time    need to review home meds

## 2024-07-08 NOTE — CARE COORDINATION
This nurse spoke with the patient's son Jose Alejandro who states that he is in [place]. He is involved with her care and is aware of her condition. This nurse spoke with Tita SCOTT who has ordered pt/ot and nursing secretary Thelma states that she will page the therapists. This nurse discussed with Jose Alejandro about the evaluation process in that it would be determined whether she would be strong enough to tolerate the intensity of an acute rehab facility vs a nursing home if she is not able to tolerate that intensity. He states that if the patient is appropriate for inpatient rehab that he would choose [facility] rehab first then  rehab. This nurse will follow up with pt/ot and also with Mr Shelton regarding her needs. Mr Shelton provided his email: [email or web address] to fax lists of snf facilities if this is needed.

## 2024-07-08 NOTE — PROGRESS NOTES
Physical Therapy Med Surg Initial Assessment  Facility/Department: [facility] ED  Room: 15/15       NAME: Dayami Christensen  : 1934 (89 y.o.)  MRN: [id number]  CODE STATUS: Prior    Date of Service: 2024    Patient Diagnosis(es): Hx of cancer of lung [Z85.118]   Chief Complaint   Patient presents with    Fatigue     General weakness for 4 days  Pt is from home   Pt wears 4 liters of oxygen      Patient Active Problem List    Diagnosis Date Noted    Hx of cancer of lung 2024    Constipation 2024    SOB (shortness of breath) 2024    Encounter for hospice care discussion 2024    Palliative care encounter 2024    Goals of care, counseling/discussion 2024    Advanced care planning/counseling discussion 2024    Malignant neoplasm of left lung (HCC) 2024    Neoplasm related pain 2024    Shortness of breath at rest 2024    Squamous cell carcinoma of left lung (HCC) 2024    Pleural effusion, left 2024    Collapse of left lung 2024    Malignant neoplasm of overlapping sites of left lung (HCC) 2024    Secondary hypercoagulable state (HCC) 2024    Acute respiratory failure with hypoxia (HCC) 2024    Lung mass 05/10/2024    Dizzy 2018    History of cardiac radiofrequency ablation (RFA) 2018    Dyslipidemia 2017    Gastroesophageal reflux disease without esophagitis 2017    Primary osteoarthritis involving multiple joints 2017    Abnormal gait 2017    Risk for falls 2017    Chronic midline low back pain without sciatica 2017    Osteopenia 2017    Essential hypertension 2017    New onset atrial fibrillation (HCC) 2017    Type 2 diabetes mellitus without complication, without long-term current use of insulin (HCC) 2017    Class 2 severe obesity due to excess calories with serious comorbidity and body mass index (BMI) of 36.0 to 36.9 in adult (HCC)  pending progress    Assessment: Pt is 89 y.o. female to hospital due to weakness at home.  Pt exhibits decreased strength, cognition, balance and activity tolerance and c/o dizziness.  Pt also with low BP-  PA made aware.  Pt requires assistance of two people for bed mobility and sitting edge of bed.  Pt is home alone and needs to reach indep level for safe d/c home.  Requires PT Follow-Up: Yes       PLAN OF CARE:  Physical Therapy Plan  General Plan: 1 time a day 3-6 times a week  Current Treatment Recommendations: Strengthening, ROM, Balance training, Functional mobility training, Transfer training, Endurance training, Gait training, Stair training, Neuromuscular re-education, Home exercise program, Safety education & training, Patient/Caregiver education & training, Equipment evaluation, education, & procurement, Positioning, Therapeutic activities    Safety Devices  Type of Devices: All fall risk precautions in place, Call light within reach, Left in bed, Nurse notified    Goals:  Long Term Goals  Long Term Goal 1: Pt will demonstrate bed mobility mod A  Long Term Goal 2: Pt will demonstrate transfers mod A with safest AD  Long Term Goal 3: Pt will demonstrate amb >/= 10ft mod A with safest AD    AMPAC (6 CLICK) BASIC MOBILITY  AM-PAC Inpatient Mobility Raw Score : 6     Therapy Time:   Individual   Time In 1458   Time Out 1514   Minutes 16       Eval X 16 min    Lizette Holman PT, 07/08/24 at 4:28 PM         Definitions for assistance levels  Independent = pt does not require any physical supervision or assistance from another person for activity completion. Device may be needed.  Stand by assistance = pt requires verbal cues or instructions from another person, close to but not touching, to perform the activity  Minimal assistance= pt performs 75% or more of the activity; assistance is required to complete the activity  Moderate assistance= pt performs 50% of the activity; assistance is required to complete the

## 2024-07-08 NOTE — ACP (ADVANCE CARE PLANNING)
Advance Care Planning     Advance Care Planning Activator (Inpatient)  Conversation Note      Date of ACP Conversation: 7/8/2024     Conversation Conducted with: Patient with Decision Making Capacity    ACP Activator: Aggie Chauhan, RN        Health Care Decision Maker:     Current Designated Health Care Decision Maker:     Primary Decision Maker: Fanny Ivy - Child - [phone]    Secondary Decision Maker: Inna Shelton - Child - [phone]    Supplemental (Other) Decision Maker: Jose Alejandro Shelton - Child - [phone]

## 2024-07-08 NOTE — PROGRESS NOTES
[facility] OCCUPATIONAL THERAPY EVALUATION - ACUTE     NAME: Dayami Christensen  : 1934 (89 y.o.)  MRN: [id number]  CODE STATUS: Prior  Room: 15/15    Date of Service: 2024    Patient Diagnosis(es): No admission diagnoses are documented for this encounter.   Patient Active Problem List    Diagnosis Date Noted    Constipation 2024    SOB (shortness of breath) 2024    Encounter for hospice care discussion 2024    Palliative care encounter 2024    Goals of care, counseling/discussion 2024    Advanced care planning/counseling discussion 2024    Malignant neoplasm of left lung (HCC) 2024    Neoplasm related pain 2024    Shortness of breath at rest 2024    Squamous cell carcinoma of left lung (HCC) 2024    Pleural effusion, left 2024    Collapse of left lung 2024    Malignant neoplasm of overlapping sites of left lung (HCC) 2024    Secondary hypercoagulable state (HCC) 2024    Acute respiratory failure with hypoxia (HCC) 2024    Lung mass 05/10/2024    Dizzy 2018    History of cardiac radiofrequency ablation (RFA) 2018    Dyslipidemia 2017    Gastroesophageal reflux disease without esophagitis 2017    Primary osteoarthritis involving multiple joints 2017    Abnormal gait 2017    Risk for falls 2017    Chronic midline low back pain without sciatica 2017    Osteopenia 2017    Essential hypertension 2017    New onset atrial fibrillation (HCC) 2017    Type 2 diabetes mellitus without complication, without long-term current use of insulin (HCC) 2017    Class 2 severe obesity due to excess calories with serious comorbidity and body mass index (BMI) of 36.0 to 36.9 in adult (HCC) 2017        Past Medical History:   Diagnosis Date    Arthritis     Chronic kidney disease     Diabetes mellitus (HCC)     borderline    Gastroesophageal reflux disease without

## 2024-07-09 PROBLEM — M79.605 PAIN IN BOTH LOWER EXTREMITIES: Status: ACTIVE | Noted: 2024-07-09

## 2024-07-09 PROBLEM — M79.604 PAIN IN BOTH LOWER EXTREMITIES: Status: ACTIVE | Noted: 2024-07-09

## 2024-07-09 PROBLEM — R53.1 GENERALIZED WEAKNESS: Status: ACTIVE | Noted: 2024-07-09

## 2024-07-09 PROBLEM — G62.9 NEUROPATHY: Status: ACTIVE | Noted: 2024-07-09

## 2024-07-09 PROCEDURE — 6370000000 HC RX 637 (ALT 250 FOR IP): Performed by: REGISTERED NURSE

## 2024-07-09 PROCEDURE — 6370000000 HC RX 637 (ALT 250 FOR IP): Performed by: INTERNAL MEDICINE

## 2024-07-09 PROCEDURE — 2700000000 HC OXYGEN THERAPY PER DAY

## 2024-07-09 PROCEDURE — 6370000000 HC RX 637 (ALT 250 FOR IP): Performed by: NURSE PRACTITIONER

## 2024-07-09 PROCEDURE — 94761 N-INVAS EAR/PLS OXIMETRY MLT: CPT

## 2024-07-09 PROCEDURE — 94640 AIRWAY INHALATION TREATMENT: CPT

## 2024-07-09 PROCEDURE — 96361 HYDRATE IV INFUSION ADD-ON: CPT

## 2024-07-09 PROCEDURE — 96376 TX/PRO/DX INJ SAME DRUG ADON: CPT

## 2024-07-09 PROCEDURE — 2580000003 HC RX 258: Performed by: REGISTERED NURSE

## 2024-07-09 PROCEDURE — 99223 1ST HOSP IP/OBS HIGH 75: CPT | Performed by: NURSE PRACTITIONER

## 2024-07-09 PROCEDURE — G0378 HOSPITAL OBSERVATION PER HR: HCPCS

## 2024-07-09 PROCEDURE — 90791 PSYCH DIAGNOSTIC EVALUATION: CPT | Performed by: REGISTERED NURSE

## 2024-07-09 PROCEDURE — 6360000002 HC RX W HCPCS: Performed by: REGISTERED NURSE

## 2024-07-09 RX ORDER — GABAPENTIN 100 MG/1
100 CAPSULE ORAL ONCE
Status: COMPLETED | OUTPATIENT
Start: 2024-07-09 | End: 2024-07-09

## 2024-07-09 RX ORDER — DILTIAZEM HYDROCHLORIDE 120 MG/1
120 CAPSULE, COATED, EXTENDED RELEASE ORAL DAILY
Status: DISCONTINUED | OUTPATIENT
Start: 2024-07-09 | End: 2024-07-11 | Stop reason: HOSPADM

## 2024-07-09 RX ORDER — ALBUTEROL SULFATE 0.63 MG/3ML
1 SOLUTION RESPIRATORY (INHALATION) EVERY 6 HOURS PRN
Status: DISCONTINUED | OUTPATIENT
Start: 2024-07-09 | End: 2024-07-11 | Stop reason: HOSPADM

## 2024-07-09 RX ADMIN — GABAPENTIN 100 MG: 100 CAPSULE ORAL at 00:17

## 2024-07-09 RX ADMIN — Medication 5 MG: at 20:52

## 2024-07-09 RX ADMIN — ALBUTEROL SULFATE 2 PUFF: 90 AEROSOL, METERED RESPIRATORY (INHALATION) at 03:31

## 2024-07-09 RX ADMIN — ONDANSETRON 4 MG: 2 INJECTION INTRAMUSCULAR; INTRAVENOUS at 21:24

## 2024-07-09 RX ADMIN — MIDODRINE HYDROCHLORIDE 2.5 MG: 2.5 TABLET ORAL at 15:25

## 2024-07-09 RX ADMIN — TIOTROPIUM BROMIDE INHALATION SPRAY 2 PUFF: 3.12 SPRAY, METERED RESPIRATORY (INHALATION) at 07:22

## 2024-07-09 RX ADMIN — BUDESONIDE AND FORMOTEROL FUMARATE DIHYDRATE 2 PUFF: 160; 4.5 AEROSOL RESPIRATORY (INHALATION) at 20:19

## 2024-07-09 RX ADMIN — SODIUM CHLORIDE: 9 INJECTION, SOLUTION INTRAVENOUS at 10:38

## 2024-07-09 RX ADMIN — BUDESONIDE AND FORMOTEROL FUMARATE DIHYDRATE 2 PUFF: 160; 4.5 AEROSOL RESPIRATORY (INHALATION) at 07:22

## 2024-07-09 RX ADMIN — DILTIAZEM HYDROCHLORIDE 120 MG: 120 CAPSULE, COATED, EXTENDED RELEASE ORAL at 10:11

## 2024-07-09 RX ADMIN — TROSPIUM CHLORIDE 20 MG: 20 TABLET, FILM COATED ORAL at 20:52

## 2024-07-09 RX ADMIN — MIDODRINE HYDROCHLORIDE 2.5 MG: 2.5 TABLET ORAL at 18:37

## 2024-07-09 RX ADMIN — SODIUM CHLORIDE, PRESERVATIVE FREE 10 ML: 5 INJECTION INTRAVENOUS at 20:52

## 2024-07-09 RX ADMIN — SODIUM CHLORIDE, PRESERVATIVE FREE 10 ML: 5 INJECTION INTRAVENOUS at 10:12

## 2024-07-09 RX ADMIN — ACETAMINOPHEN 325MG 650 MG: 325 TABLET ORAL at 10:37

## 2024-07-09 RX ADMIN — ATORVASTATIN CALCIUM 40 MG: 40 TABLET, FILM COATED ORAL at 10:11

## 2024-07-09 RX ADMIN — APIXABAN 5 MG: 5 TABLET, FILM COATED ORAL at 20:52

## 2024-07-09 RX ADMIN — MIDODRINE HYDROCHLORIDE 2.5 MG: 2.5 TABLET ORAL at 10:11

## 2024-07-09 RX ADMIN — APIXABAN 5 MG: 5 TABLET, FILM COATED ORAL at 10:11

## 2024-07-09 ASSESSMENT — PAIN DESCRIPTION - LOCATION: LOCATION: FOOT;RIB CAGE

## 2024-07-09 ASSESSMENT — ENCOUNTER SYMPTOMS
NAUSEA: 0
BACK PAIN: 1
COUGH: 0
SHORTNESS OF BREATH: 1
DIARRHEA: 0
VOMITING: 0
SHORTNESS OF BREATH: 0

## 2024-07-09 ASSESSMENT — PAIN DESCRIPTION - ORIENTATION: ORIENTATION: RIGHT;LEFT

## 2024-07-09 ASSESSMENT — PAIN DESCRIPTION - DESCRIPTORS: DESCRIPTORS: ACHING

## 2024-07-09 ASSESSMENT — PAIN SCALES - GENERAL
PAINLEVEL_OUTOF10: 5
PAINLEVEL_OUTOF10: 0

## 2024-07-09 NOTE — PROGRESS NOTES
Progress Note  Date:2024       Room:73/W173-01  Patient Name:Dayami Christensen     YOB: 1934     Age:89 y.o.        Subjective    Subjective:  Symptoms:  She reports malaise and weakness.  No shortness of breath, cough, chest pain, headache, chest pressure, anorexia, diarrhea or anxiety.    Diet:  No nausea or vomiting.       Review of Systems   Respiratory:  Negative for cough and shortness of breath.    Cardiovascular:  Negative for chest pain.   Gastrointestinal:  Negative for anorexia, diarrhea, nausea and vomiting.   Neurological:  Positive for weakness.     Objective         Vitals Last 24 Hours:  TEMPERATURE:  Temp  Av.4 °F (36.3 °C)  Min: 97.1 °F (36.2 °C)  Max: 97.7 °F (36.5 °C)  RESPIRATIONS RANGE: Resp  Av.5  Min: 16  Max: 27  PULSE OXIMETRY RANGE: SpO2  Av.8 %  Min: 97 %  Max: 100 %  PULSE RANGE: Pulse  Av.2  Min: 77  Max: 100  BLOOD PRESSURE RANGE: Systolic (24hrs), Av , Min:88 , Max:119   ; Diastolic (24hrs), Av, Min:44, Max:76    I/O (24Hr):  No intake or output data in the 24 hours ending 24 1254  Objective:  General Appearance:  Comfortable, well-appearing and in no acute distress.    Vital signs: (most recent): Blood pressure (!) 119/54, pulse 97, temperature 97.5 °F (36.4 °C), temperature source Oral, resp. rate 16, height 1.499 m (4' 11\"), weight 87.9 kg (193 lb 12.6 oz), SpO2 100 %.    HEENT: Normal HEENT exam.    Lungs:  There are decreased breath sounds.    Heart: S1 normal and S2 normal.    Abdomen: Abdomen is soft.  Bowel sounds are normal.   There is no epigastric area tenderness.     Pulses: Distal pulses are intact.    Neurological: Patient is alert.    Pupils:  Pupils are equal, round, and reactive to light.    Skin:  Warm.      Labs/Imaging/Diagnostics    Labs:  CBC:  Recent Labs     24  1115   WBC 11.2*   RBC 4.93   HGB 13.1   HCT 43.4   MCV 88.0   RDW 16.6*        CHEMISTRIES:  Recent Labs     24  1115   *   K  abnormality.  The patient is again status post bilateral globe lens replacement surgery.  Bilateral scleral calcific plaques are present. SINUSES: The visualized paranasal sinuses demonstrate no acute abnormality. There is mild opacification of both mastoid air cells, indicating mastoiditis. SOFT TISSUES/SKULL:  No acute abnormality of the visualized skull or soft tissues.     1. No acute intracranial abnormality is identified. 2. Mild, diffuse, cerebral atrophy again noted, when compared to the MRI performed 06/11/2024. 3. Microvascular white matter ischemic changes. 4. Lacunar infarcts involving the left basal ganglia are probably old. 5. Empty sella again seen. 6. Bilateral mastoiditis.     XR CHEST PORTABLE    Result Date: 7/8/2024  EXAMINATION: ONE XRAY VIEW OF THE CHEST 7/8/2024 11:26 am COMPARISON: June 06/20/2024 HISTORY: ORDERING SYSTEM PROVIDED HISTORY: sob TECHNOLOGIST PROVIDED HISTORY: Reason for exam:->sob What reading provider will be dictating this exam?->CRC FINDINGS: There is improving diffuse hazy density in the left lung with increasing aeration to the upper lung.  The right lung is clear.  There is no pneumothorax.  Left-sided chest tube, no change.     Improving left pleural effusion with increasing aeration to the upper lung. Underlying pneumonia cannot be excluded.     Assessment//Plan           Hospital Problems             Last Modified POA    * (Principal) Hx of cancer of lung 7/8/2024 Yes    Generalized weakness 7/9/2024 Yes    Neuropathy 7/9/2024 Yes    Pain in both lower extremities 7/9/2024 Yes   Weakness  Lung CA  Orthostatic hypotension  Assessment & Plan  7/9: Continue with midodrine.  Blood pressure is better.  Continue IV fluids.  Drain Pleurx catheter every other day.  Spoke with nursing.  Needs placement.  Follow-up palliative.  Follow-up psych for capacity.  Spoke with case management, prognosis is poor. TCT 55 min  Electronically signed by José Antonio Baker MD on 7/9/24 at 12:54 PM  EDT

## 2024-07-09 NOTE — PLAN OF CARE
Problem: Discharge Planning  Goal: Discharge to home or other facility with appropriate resources  Outcome: Progressing  Flowsheets  Taken 7/8/2024 2200 by Tapan Whittington, RN  Discharge to home or other facility with appropriate resources: Identify barriers to discharge with patient and caregiver  Taken 7/8/2024 1601 by Priyanka Zuleta RN  Discharge to home or other facility with appropriate resources: Identify barriers to discharge with patient and caregiver     Problem: Safety - Adult  Goal: Free from fall injury  Outcome: Progressing

## 2024-07-09 NOTE — CARE COORDINATION
Case Management Assessment  Initial Evaluation    Date/Time of Evaluation: 7/8/2024 9:48 PM  Assessment Completed by: Aggie Chauhan RN    If patient is discharged prior to next notation, then this note serves as note for discharge by case management.    Patient Name: Dayami Christensen                   YOB: 1934  Diagnosis: Orthostatic hypotension [I95.1]  Generalized weakness [R53.1]  Hx of cancer of lung [Z85.118]  Malignant neoplasm of upper lobe of left lung (HCC) [C34.12]                   Date / Time: 7/8/2024 10:55 AM    Patient Admission Status: Observation   Readmission Risk (Low < 19, Mod (19-27), High > 27): Readmission Risk Score: 18.6    Current PCP: Shashi Gan MD  PCP verified by ? Yes    Chart Reviewed: Yes      History Provided by: Patient, Child/Family  Patient Orientation: Alert and Oriented, Person, Place, Situation, Self    Patient Cognition: Alert    Hospitalization in the last 30 days (Readmission):  Yes    If yes, Readmission Assessment in  Navigator will be completed.    Advance Directives:      Code Status: Full Code   Patient's Primary Decision Maker is: Legal Next of Kin (daughters Price and Dawna and son Jose Alejandro.)    Primary Decision Maker: BijuFanny - Child - [phone]    Secondary Decision Maker: CatInna - Child - [phone]    Supplemental (Other) Decision Maker: Jose Alejandro Shelton - Child - [phone]    Discharge Planning:    Patient lives with: (P) Children Type of Home: (P) House  Primary Care Giver: Other (Comment) (pt and family.)  Patient Support Systems include: Children, Family Members   Current Financial resources: (P) Medicaid, Medicare  Current community resources: (P) ECF/Home Care ([facility], is to start with [facility].)  Current services prior to admission: (P) Durable Medical Equipment, Home Care, Other (Comment) (is to start with [facility], was seen while in the hospital.)            Current DME: (P) Oxygen Therapy  (Comment), Walker, Other (Comment) (pleurex catheter with supplies, daughter was not sure of the provider. O2 @ 2l.)            Type of Home Care services:  Hospice    ADLS  Prior functional level: Assistance with the following:, Bathing, Dressing, Toileting, Cooking, Housework, Shopping, Mobility  Current functional level: Assistance with the following:, Bathing, Dressing, Toileting, Cooking, Housework, Shopping, Mobility    PT AM-PAC: 6 /24  OT AM-PAC: 14 /24    Family can provide assistance at DC: (P) Yes  Would you like Case Management to discuss the discharge plan with any other family members/significant others, and if so, who? (P) Yes (daughters Price and Dawna, son Jose Alejandro.)  Plans to Return to Present Housing: (P) Other (see comment) (likely needs rehab)  Other Identified Issues/Barriers to RETURNING to current housing: needs rehab per family  Potential Assistance needed at discharge: (P) Other (Comment) (inpatient rehab.)            Potential DME:    Patient expects to discharge to: (P) Other (comment) (likely inpatient rehab if accepted.)  Plan for transportation at discharge:      Financial    Payor: [employer] MEDICARE / Plan: [email or web address] ESSENTIAL/PLUS / Product Type: *No Product type* /     Does insurance require precert for SNF: Yes    Potential assistance Purchasing Medications: (P) No  Meds-to-Beds request:        [id number] - [place], [place] - [address] [phone] -  [phone] [address] [address]  Phone: [phone] Fax: [phone]      Notes:    Factors facilitating achievement of predicted outcomes: Family support, Cooperative, and Pleasant    Barriers to discharge: medical clearance, likely inpatient rehab.    Additional Case Management Notes: the patient lives with her daughter and son in law. She has O2 @ 2l, a pleurex cath with drainage supplies: daughter did not know the providers. She has a rolling walker and a rollator. PT/OT came to assess  her but the patient was + orthostatic BP and unable to tolerate an exam in ER. She was given lists of rehab service providers. She has [facility] SN services, her daughter states that getting a HHA and PT was discussed with the nurse during her visit. Her daughter states that she needs a shower chair and grab bars. Her daughter applied for [employer] services on 6/18/24 but has not heard back from anyone. She is not on dialysis.     The Plan for Transition of Care is related to the following treatment goals of Orthostatic hypotension [I95.1]  Generalized weakness [R53.1]  Hx of cancer of lung [Z85.118]  Malignant neoplasm of upper lobe of left lung (HCC) [C34.12]    IF APPLICABLE: The Patient and/or patient representative Dayami and her family were provided with a choice of provider and agrees with the discharge plan. Freedom of choice list with basic dialogue that supports the patient's individualized plan of care/goals and shares the quality data associated with the providers was provided to:     Patient Representative Name:       The Patient and/or Patient Representative Agree with the Discharge Plan?      Aggie Chauhan RN  Case Management Department

## 2024-07-09 NOTE — CONSULTS
Palliative Care Consult Note  Patient: Dayami Christensen  Gender: female  YOB: 1934  Unit/Bed: W173/W173-01  CodeStatus: Full Code  Inpatient Treatment Team: Treatment Team: Attending Provider: José Antonio Baker MD; Registered Nurse: Tapan Whittington, RN; : Amisha Carmen RN; Patient Care Tech: Shanna Lorenzo; Registered Nurse: Debbie Cortes, RN; Registered Nurse: Ivy Flores, RN; Patient Care Tech: Swetha Velez; Utilization Reviewer: Josefina Alvarez RN; Consulting Physician: Karen Mccall MD  Admit Date:  7/8/2024    Chief Complaint: Fatigue    History of Presenting Illness:      Dayami Christensen is a 89 y.o. female on hospital day 0 with a history of squamous cell carcinoma left lung, CKD, T2DM, GERD, HTN, HLD, osteoporosis.    Patient was brought to the emergency room with increased shortness of breath and weakness. Patient was admitted in the setting of hx of lung cancer and pleural effusions.    Palliative care was consulted by provider Serenity Viramontes CNP for goals of care and end-stage disease.     Patient alert and oriented x2. Oncology had been involved last admission and was recommending hospice. Patient had been altered and family signed paperwork with hospice  but was unsure of placement. At time of discharge patient was more alert and declining hospice. She was then sent home with [facility].  Patient states she has been bedbound since last admission. However daughter states she was up walking at her house. Patient reports she has neuropathy and pain in her legs and she cannot stand up. Patient stating the only thing she wants at this time is for the pain in her legs to be improved. She also has complaints of sob on 4 L nc and left sided pain. Patient reports she has not been eating and has had weight loss.     Most recent notable labs: Sodium 134, chloride 94, anion gap 17, lactic acid 2.5, albumin 2.6, alkaline phosphatase 153, total protein 6.1, WBC 11.2      Review of  LEFT PLEURX CATHETER performed by Kolby Torre MD at [facility] [place]    THORACENTESIS Left 2024    550 ml removed by     TUBAL LIGATION         Social Hx:  Social History     Socioeconomic History    Marital status:      Spouse name: None    Number of children: None    Years of education: None    Highest education level: None   Tobacco Use    Smoking status: Former     Current packs/day: 0.00     Types: Cigarettes     Quit date: 1987     Years since quittin.9    Smokeless tobacco: Never   Vaping Use    Vaping Use: Never used   Substance and Sexual Activity    Alcohol use: No    Drug use: No     Social Determinants of Health     Food Insecurity: No Food Insecurity (2024)    Hunger Vital Sign     Worried About Running Out of Food in the Last Year: Never true     Ran Out of Food in the Last Year: Never true   Transportation Needs: No Transportation Needs (2024)    PRAPARE - Transportation     Lack of Transportation (Medical): No     Lack of Transportation (Non-Medical): No    Social Connections ([facility])   Housing Stability: Low Risk  (2024)    Housing Stability Vital Sign     Unable to Pay for Housing in the Last Year: No     Number of Places Lived in the Last Year: 1     Unstable Housing in the Last Year: No       Family Hx:  No family history on file.    LABS: Reviewed     CBC:  Lab Results   Component Value Date/Time    WBC 11.2 2024 11:15 AM    RBC 4.93 2024 11:15 AM    HGB 13.1 2024 11:15 AM    HCT 43.4 2024 11:15 AM    MCV 88.0 2024 11:15 AM    MCH 26.6 2024 11:15 AM    MCHC 30.2 2024 11:15 AM    RDW 16.6 2024 11:15 AM     2024 11:15 AM     CBC with Differential:   Lab Results   Component Value Date/Time    WBC 11.2 2024 11:15 AM    RBC 4.93 2024 11:15 AM    HGB 13.1 2024 11:15 AM    HCT 43.4 2024 11:15 AM     2024 11:15 AM    MCV 88.0 2024 11:15 AM    [facility] 26.6 2024  (182 lb)   03/14/18 79.8 kg (176 lb)          FUNCTIONAL ADL´S:     Independent: [  ] Eating, [   ] Dressing, [   ] Transferring, [   ] Toileting, [   ] Bathing, [   ] continence  Dependent   : [  ] Eating, [  x ] Dressing, [  x ] Transferring, [  x ] Toileting, [ x  ] Bathing, [ x  ] Continence  W. assistant : [ x ] Eating, [   ] Dressing, [   ] Transferring, [   ] Toileting, [   ] Bathing, [   ] Continence    Radiology: Reviewed      CT HEAD WO CONTRAST    Result Date: 7/8/2024  EXAMINATION: CT OF THE HEAD WITHOUT CONTRAST  7/8/2024 12:37 pm TECHNIQUE: CT of the head was performed without the administration of intravenous contrast. Automated exposure control, iterative reconstruction, and/or weight based adjustment of the mA/kV was utilized to reduce the radiation dose to as low as reasonably achievable. COMPARISON: Correlation is made with MRI of the brain performed 06/11/2024. HISTORY: ORDERING SYSTEM PROVIDED HISTORY: weakness ca with mets, cva/bleed/trauma TECHNOLOGIST PROVIDED HISTORY: Has a \"code stroke\" or \"stroke alert\" been called?->No Reason for exam:->weakness ca with mets Decision Support Exception - unselect if not a suspected or confirmed emergency medical condition->Emergency Medical Condition (MA) What reading provider will be dictating this exam?->CRC FINDINGS: BRAIN/VENTRICLES: There is no acute intracranial hemorrhage, mass effect or midline shift.  No abnormal extra-axial fluid collection.  The gray-white differentiation is maintained without evidence of an acute infarct.  There is no evidence of hydrocephalus. Mild, diffuse, cerebral atrophy is again identified.  Lacunar infarcts are noted involving the left basal ganglia. They are probably old.  Ill-defined low attenuation is noted within the periventricular and subcortical white matter bilaterally, indicating microvascular white matter ischemic changes, as was noted on the prior study. Calcifications are seen involving the cavernous carotid  DNRCC and hospice. Patient states \"fine\" she would be agreeable. I called patients RUSS Escobedo who stated \"No patient is to got to rehab for physical therapy and is to follow up with Dr. Farah for treatment.\" She did not want to discuss hospice or comfort measures. Awaiting psych consult for capacity. Will continue FULL CODE at this time.   Palliative care will continue to follow.     Hospice: Patient is hospice eligible in the setting of lung cancer with recurrent pleural effusions with left pleurx, sob at rest on 4 l nc. Patient now stating she would be agreeable to hospice. However awaiting psych consult for capacity.     Advance Care Planning: Discussed goals of care with patient. Explained in extensive detail nuances between full code, DNR CCA and DNR CC. Patient's daughter/ POA has made the decision to be FULL CODE.  Patient has previously elected [facility] as HCPOA.    Goals of Care Discussion: Disease process and goals of treatment were discussed in basic terms. Dayami's goal is to optimize available comfort care measures and all aggressive treatment. We discussed the palliative care philosophy in light of those goals. We discussed all care options contingent on treatment response and QOL. Much active listening, presence, and emotional support were given.    5. Neuropathy  6. Leg pain  Continue Tylenol 650 q6h prn.   Discussed adding something stronger and the risks. Patients daughter at this time declining. Will continue to monitor.     I have discussed/reviewed the patient's case with nurse and .    -Patient is currently being treated for multiple medical conditions by other providers  Hx lung cancer with recurrent pleural effusions  CKD  T2DM  A-fib  HTN  HLD  GERD  Osteoporosis    MDM: High    Electronically signed by YVONNE Thapa CNP on 7/9/2024 at 11:00 AM    Collaborating physician: Dr. Gregorio     Please note this report is partially produced by using speech recognition

## 2024-07-09 NOTE — PROGRESS NOTES
Physical Therapy Missed Treatment   Facility/Department: [facility] MED SURG W173/W173-01    NAME: Dayami Christensen    : 1934 (89 y.o.)  MRN: [id number]    Account: [id number]  Gender: female    Chart reviewed, attempted PT at 13:54. Patient unavailable 2° to:      [x] Pt initially agreeable to work with therapy. Once pt started to sit EOB she stated \"That's it I can't do anymore.\" Pt noted he stomach hurt and felt dizzy. /46 supine.     Will attempt PT treatment again at earliest convenience.      Electronically signed by COLBY BUNN PTA on 24 at 2:02 PM EDT

## 2024-07-09 NOTE — HOME CARE
1017-Patient is current with [facility] with start of care date 06.10.24. Active skilled services include SN and PT. Patient will require resumption of care orders at discharge if admission status changes to inpatient. Please feel free to reach out with any questions or concerns. Contact number is [phone]. Thank you.       Homecare and Hospice Lead RN  Quynh Yu BSN, RN

## 2024-07-09 NOTE — PROGRESS NOTES
07/09/24 0400   RT Protocol   History Pulmonary Disease 0   Respiratory pattern 0   Breath sounds 0   Cough 0   Indications for Bronchodilator Therapy Mucolytic ordered   Bronchodilator Assessment Score 0

## 2024-07-09 NOTE — CARE COORDINATION
AWAITING PSYCH FOR CAPACITY TO DETERMINE DC PLAN.  PT IS CURRENT WITH [facility].

## 2024-07-09 NOTE — PLAN OF CARE
Problem: ABCDS Injury Assessment  Goal: Absence of physical injury  Outcome: Progressing     Problem: Safety - Adult  Goal: Free from fall injury  7/9/2024 1013 by Ivy Flores, RN  Outcome: Progressing

## 2024-07-10 LAB
EKG ATRIAL RATE: 87 BPM
EKG Q-T INTERVAL: 362 MS
EKG QRS DURATION: 78 MS
EKG QTC CALCULATION (BAZETT): 440 MS
EKG R AXIS: -28 DEGREES
EKG T AXIS: 107 DEGREES
EKG VENTRICULAR RATE: 89 BPM
GLUCOSE BLD-MCNC: 136 MG/DL (ref 70–99)
PERFORMED ON: ABNORMAL

## 2024-07-10 PROCEDURE — 6370000000 HC RX 637 (ALT 250 FOR IP): Performed by: INTERNAL MEDICINE

## 2024-07-10 PROCEDURE — 6370000000 HC RX 637 (ALT 250 FOR IP): Performed by: REGISTERED NURSE

## 2024-07-10 PROCEDURE — 2700000000 HC OXYGEN THERAPY PER DAY

## 2024-07-10 PROCEDURE — G0378 HOSPITAL OBSERVATION PER HR: HCPCS

## 2024-07-10 PROCEDURE — 97535 SELF CARE MNGMENT TRAINING: CPT

## 2024-07-10 PROCEDURE — 96376 TX/PRO/DX INJ SAME DRUG ADON: CPT

## 2024-07-10 PROCEDURE — 92610 EVALUATE SWALLOWING FUNCTION: CPT

## 2024-07-10 PROCEDURE — 6360000002 HC RX W HCPCS: Performed by: REGISTERED NURSE

## 2024-07-10 PROCEDURE — 2580000003 HC RX 258: Performed by: REGISTERED NURSE

## 2024-07-10 PROCEDURE — 94640 AIRWAY INHALATION TREATMENT: CPT

## 2024-07-10 PROCEDURE — 94761 N-INVAS EAR/PLS OXIMETRY MLT: CPT

## 2024-07-10 PROCEDURE — 99232 SBSQ HOSP IP/OBS MODERATE 35: CPT

## 2024-07-10 PROCEDURE — 96361 HYDRATE IV INFUSION ADD-ON: CPT

## 2024-07-10 RX ADMIN — SODIUM CHLORIDE, PRESERVATIVE FREE 10 ML: 5 INJECTION INTRAVENOUS at 08:15

## 2024-07-10 RX ADMIN — SODIUM CHLORIDE, PRESERVATIVE FREE 10 ML: 5 INJECTION INTRAVENOUS at 20:37

## 2024-07-10 RX ADMIN — APIXABAN 5 MG: 5 TABLET, FILM COATED ORAL at 10:10

## 2024-07-10 RX ADMIN — MIDODRINE HYDROCHLORIDE 2.5 MG: 2.5 TABLET ORAL at 17:11

## 2024-07-10 RX ADMIN — ONDANSETRON 4 MG: 2 INJECTION INTRAMUSCULAR; INTRAVENOUS at 08:15

## 2024-07-10 RX ADMIN — PANTOPRAZOLE SODIUM 40 MG: 40 TABLET, DELAYED RELEASE ORAL at 06:07

## 2024-07-10 RX ADMIN — MIDODRINE HYDROCHLORIDE 2.5 MG: 2.5 TABLET ORAL at 10:10

## 2024-07-10 RX ADMIN — TIOTROPIUM BROMIDE INHALATION SPRAY 2 PUFF: 3.12 SPRAY, METERED RESPIRATORY (INHALATION) at 07:54

## 2024-07-10 RX ADMIN — BUDESONIDE AND FORMOTEROL FUMARATE DIHYDRATE 2 PUFF: 160; 4.5 AEROSOL RESPIRATORY (INHALATION) at 07:53

## 2024-07-10 RX ADMIN — Medication 5 MG: at 21:54

## 2024-07-10 RX ADMIN — APIXABAN 5 MG: 5 TABLET, FILM COATED ORAL at 20:37

## 2024-07-10 RX ADMIN — BUDESONIDE AND FORMOTEROL FUMARATE DIHYDRATE 2 PUFF: 160; 4.5 AEROSOL RESPIRATORY (INHALATION) at 19:05

## 2024-07-10 RX ADMIN — DILTIAZEM HYDROCHLORIDE 120 MG: 120 CAPSULE, COATED, EXTENDED RELEASE ORAL at 10:10

## 2024-07-10 RX ADMIN — SODIUM CHLORIDE: 9 INJECTION, SOLUTION INTRAVENOUS at 01:24

## 2024-07-10 RX ADMIN — ATORVASTATIN CALCIUM 40 MG: 40 TABLET, FILM COATED ORAL at 10:10

## 2024-07-10 RX ADMIN — TROSPIUM CHLORIDE 20 MG: 20 TABLET, FILM COATED ORAL at 20:37

## 2024-07-10 RX ADMIN — MIDODRINE HYDROCHLORIDE 2.5 MG: 2.5 TABLET ORAL at 12:05

## 2024-07-10 ASSESSMENT — ENCOUNTER SYMPTOMS
DIARRHEA: 0
SHORTNESS OF BREATH: 0
NAUSEA: 0
COUGH: 0
VOMITING: 0
BACK PAIN: 1
SHORTNESS OF BREATH: 1

## 2024-07-10 ASSESSMENT — PAIN DESCRIPTION - ORIENTATION: ORIENTATION: LEFT

## 2024-07-10 ASSESSMENT — PAIN SCALES - GENERAL: PAINLEVEL_OUTOF10: 5

## 2024-07-10 ASSESSMENT — PAIN DESCRIPTION - LOCATION: LOCATION: RIB CAGE

## 2024-07-10 NOTE — PLAN OF CARE
Problem: Discharge Planning  Goal: Discharge to home or other facility with appropriate resources  7/10/2024 0833 by Debbie Cortes RN  Outcome: Progressing  Flowsheets (Taken 7/10/2024 0822)  Discharge to home or other facility with appropriate resources: Identify barriers to discharge with patient and caregiver  7/9/2024 2207 by Tapan Whittington RN  Outcome: Progressing  Flowsheets  Taken 7/9/2024 2000 by Tapan Whittington RN  Discharge to home or other facility with appropriate resources: Identify barriers to discharge with patient and caregiver  Taken 7/9/2024 1015 by Ivy Flores RN  Discharge to home or other facility with appropriate resources: Identify barriers to discharge with patient and caregiver     Problem: ABCDS Injury Assessment  Goal: Absence of physical injury  Outcome: Progressing     Problem: Skin/Tissue Integrity  Goal: Absence of new skin breakdown  Description: 1.  Monitor for areas of redness and/or skin breakdown  2.  Assess vascular access sites hourly  3.  Every 4-6 hours minimum:  Change oxygen saturation probe site  4.  Every 4-6 hours:  If on nasal continuous positive airway pressure, respiratory therapy assess nares and determine need for appliance change or resting period.  Outcome: Progressing     Problem: Safety - Adult  Goal: Free from fall injury  7/10/2024 0833 by Debbie Cortes RN  Outcome: Progressing  7/9/2024 2207 by Tapan Whittington RN  Outcome: Progressing     Problem: Chronic Conditions and Co-morbidities  Goal: Patient's chronic conditions and co-morbidity symptoms are monitored and maintained or improved  Outcome: Progressing  Flowsheets  Taken 7/10/2024 0822 by Debbie Crotes RN  Care Plan - Patient's Chronic Conditions and Co-Morbidity Symptoms are Monitored and Maintained or Improved: Monitor and assess patient's chronic conditions and comorbid symptoms for stability, deterioration, or improvement  Taken 7/9/2024 2000 by Tapan Whittington

## 2024-07-10 NOTE — PROGRESS NOTES
[facility] MED SURG TREATMENT NOTE     Date: 7/10/2024  Patient Name: Dayami Christensen        MRN: [id number]  Account: [id number]   : 1934  (89 y.o.)  Room: [place] [address]    Chart Review:    Restrictions  Restrictions/Precautions  Restrictions/Precautions: Fall Risk     Safety:  Safety Devices  Type of Devices: All fall risk precautions in place;Call light within reach;Left in bed;Bed alarm in place    Patient's birthday verified: Yes    Subjective:    Subjective: \"I'm right handed and would rather get out of the bed over here (R) but everyone has me get out of bed over here (L)\"       Pain at start of treatment: No    Pain at end of treatment: No        Objective:    ADL Status:  ADL  Feeding: Dependent/Total  Feeding Skilled Clinical Factors: total A for feeding ice chips per pt request - states she is too weak to feed herself  Grooming Skilled Clinical Factors: declines donning dentures  LE Dressing: Dependent/Total  LE Dressing Skilled Clinical Factors: pt states she dons her own socks at home however states she is too weak to try on this date, requests total A        Therapy key for assistance levels -   Independent/Mod I = Pt. is able to perform task with no assistance but may require a device   Stand by assistance = Pt. does not perform task at an independent level but does not need physical assistance, requires verbal cues  Minimal, Moderate, Maximal Assistance = Pt. requires physical assistance (25%, 50%, 75% assist from helper) for task but is able to actively participate in task   Dependent = Pt. requires total assistance with task and is not able to actively participate with task completion    Orientation Status:  Orientation  Overall Orientation Status: Impaired  Orientation Level: Disoriented to time;Oriented to person;Oriented to place;Oriented to situation    Observation:  Observation/Palpation  Observation: Pt in no acute distress during evaluation. motivated to attempt

## 2024-07-10 NOTE — PLAN OF CARE
Problem: Discharge Planning  Goal: Discharge to home or other facility with appropriate resources  Outcome: Progressing  Flowsheets  Taken 7/9/2024 2000 by Tapan Whittington RN  Discharge to home or other facility with appropriate resources: Identify barriers to discharge with patient and caregiver  Taken 7/9/2024 1015 by Ivy Flores, RN  Discharge to home or other facility with appropriate resources: Identify barriers to discharge with patient and caregiver     Problem: ABCDS Injury Assessment  Goal: Absence of physical injury  7/9/2024 1013 by Ivy Flores, RN  Outcome: Progressing     Problem: Safety - Adult  Goal: Free from fall injury  7/9/2024 2207 by Tapan Whittington RN  Outcome: Progressing  7/9/2024 1013 by Ivy Flores RN  Outcome: Progressing     Problem: Pain  Goal: Verbalizes/displays adequate comfort level or baseline comfort level  Outcome: Progressing

## 2024-07-10 NOTE — PLAN OF CARE
Nutrition Problem #1: Inadequate oral intake  Intervention: Food and/or Nutrient Delivery: Start Oral Nutrition Supplement, Modify Current Diet  Nutritional

## 2024-07-10 NOTE — PROGRESS NOTES
Progress Note  Date:7/10/2024       Room:W173/W173-01  Patient Name:Dayami Christensen     YOB: 1934     Age:89 y.o.        Subjective    Subjective:  Symptoms:  She reports malaise and weakness.  No shortness of breath, cough, chest pain, headache, chest pressure, anorexia, diarrhea or anxiety.    Diet:  No nausea or vomiting.       Review of Systems   Respiratory:  Negative for cough and shortness of breath.    Cardiovascular:  Negative for chest pain.   Gastrointestinal:  Negative for anorexia, diarrhea, nausea and vomiting.   Neurological:  Positive for weakness.     Objective         Vitals Last 24 Hours:  TEMPERATURE:  Temp  Av.7 °F (36.5 °C)  Min: 97.6 °F (36.4 °C)  Max: 97.9 °F (36.6 °C)  RESPIRATIONS RANGE: Resp  Av  Min: 18  Max: 18  PULSE OXIMETRY RANGE: SpO2  Av.8 %  Min: 92 %  Max: 100 %  PULSE RANGE: Pulse  Av.1  Min: 64  Max: 105  BLOOD PRESSURE RANGE: Systolic (24hrs), Av , Min:104 , Max:123   ; Diastolic (24hrs), Av, Min:46, Max:57    I/O (24Hr):    Intake/Output Summary (Last 24 hours) at 7/10/2024 1145  Last data filed at 2024 2159  Gross per 24 hour   Intake --   Output 500 ml   Net -500 ml     Objective:  General Appearance:  Comfortable, well-appearing and in no acute distress.    Vital signs: (most recent): Blood pressure (!) 112/48, pulse 64, temperature 97.7 °F (36.5 °C), temperature source Oral, resp. rate 18, height 1.499 m (4' 11\"), weight 87.9 kg (193 lb 12.6 oz), SpO2 100 %.    HEENT: Normal HEENT exam.    Lungs:  There are decreased breath sounds.    Heart: S1 normal and S2 normal.    Abdomen: Abdomen is soft.  Bowel sounds are normal.   There is no epigastric area tenderness.     Pulses: Distal pulses are intact.    Neurological: Patient is alert.    Pupils:  Pupils are equal, round, and reactive to light.    Skin:  Warm.      Labs/Imaging/Diagnostics    Labs:  CBC:  Recent Labs     24  1115   WBC 11.2*   RBC 4.93   HGB 13.1   HCT 43.4    MCV 88.0   RDW 16.6*          CHEMISTRIES:  Recent Labs     07/08/24  1115   *   K 4.2   CL 94*   CO2 23   BUN 17   CREATININE 0.70   GLUCOSE 150*   MG 2.2       PT/INR:No results for input(s): \"PROTIME\", \"INR\" in the last 72 hours.  APTT:No results for input(s): \"APTT\" in the last 72 hours.  LIVER PROFILE:  Recent Labs     07/08/24  1115   AST 16   ALT 14   BILITOT 0.4   ALKPHOS 153*         Imaging Last 24 Hours:  CT HEAD WO CONTRAST    Result Date: 7/8/2024  EXAMINATION: CT OF THE HEAD WITHOUT CONTRAST  7/8/2024 12:37 pm TECHNIQUE: CT of the head was performed without the administration of intravenous contrast. Automated exposure control, iterative reconstruction, and/or weight based adjustment of the mA/kV was utilized to reduce the radiation dose to as low as reasonably achievable. COMPARISON: Correlation is made with MRI of the brain performed 06/11/2024. HISTORY: ORDERING SYSTEM PROVIDED HISTORY: weakness ca with mets, cva/bleed/trauma TECHNOLOGIST PROVIDED HISTORY: Has a \"code stroke\" or \"stroke alert\" been called?->No Reason for exam:->weakness ca with mets Decision Support Exception - unselect if not a suspected or confirmed emergency medical condition->Emergency Medical Condition (MA) What reading provider will be dictating this exam?->CRC FINDINGS: BRAIN/VENTRICLES: There is no acute intracranial hemorrhage, mass effect or midline shift.  No abnormal extra-axial fluid collection.  The gray-white differentiation is maintained without evidence of an acute infarct.  There is no evidence of hydrocephalus. Mild, diffuse, cerebral atrophy is again identified.  Lacunar infarcts are noted involving the left basal ganglia. They are probably old.  Ill-defined low attenuation is noted within the periventricular and subcortical white matter bilaterally, indicating microvascular white matter ischemic changes, as was noted on the prior study. Calcifications are seen involving the cavernous carotid

## 2024-07-10 NOTE — PROGRESS NOTES
[facility]   Facility/Department: [address] TELEMETRY  Speech Language Pathology  Clinical Bedside Swallow Evaluation    NAME:Dayami Christensen  : 1934 (89 y.o.)   [x]   confirmed    MRN: [id number]  ROOM: [place] [address]  ADMISSION DATE: 2024  PATIENT DIAGNOSIS(ES): Orthostatic hypotension [I95.1]  Generalized weakness [R53.1]  Hx of cancer of lung [Z85.118]  Malignant neoplasm of upper lobe of left lung (HCC) [C34.12]  Chief Complaint   Patient presents with    Fatigue     General weakness for 4 days  Pt is from home   Pt wears 4 liters of oxygen      Patient Active Problem List    Diagnosis Date Noted    Generalized weakness 2024    Neuropathy 2024    Pain in both lower extremities 2024    Hx of cancer of lung 2024    Constipation 2024    SOB (shortness of breath) 2024    Encounter for hospice care discussion 2024    Palliative care encounter 2024    Goals of care, counseling/discussion 2024    Advanced care planning/counseling discussion 2024    Malignant neoplasm of left lung (HCC) 2024    Neoplasm related pain 2024    Shortness of breath at rest 2024    Squamous cell carcinoma of left lung (HCC) 2024    Pleural effusion, left 2024    Collapse of left lung 2024    Malignant neoplasm of overlapping sites of left lung (HCC) 2024    Secondary hypercoagulable state (HCC) 2024    Acute respiratory failure with hypoxia (HCC) 2024    Lung mass 05/10/2024    Dizzy 2018    History of cardiac radiofrequency ablation (RFA) 2018    Dyslipidemia 2017    Gastroesophageal reflux disease without esophagitis 2017    Primary osteoarthritis involving multiple joints 2017    Abnormal gait 2017    Risk for falls 2017    Chronic midline low back pain without sciatica 2017    Osteopenia 2017    Essential hypertension 2017    New onset atrial fibrillation  Diagnosis: Swallow function appears WFL  Oral Phase - Comment: Oral phrase presents WFL as characterized by adequate bolus acceptance, bolus formation, and appropriate A-P propulsion.  Pt admitted to the hospital with increased generalized weakness and demonstrated decreased lip seal.  Pt's observed adequate lingual and buccal strength. Pt required max assist when feeding with PO trials.  Pharyngeal Phase Comment: Pharyngeal phase suspected to be WFL as characterized by no overt s/s aspirations, suspected adequate hyolaryngeal elevation, and SpO2 remained at 100 throughout PO trials.  Dysphagia Outcome Severity Scale: Level 6: Within functional limits/Modified independence     Recommendations  Requires SLP Intervention: Yes  Therapeutic Interventions: Diet tolerance monitoring;Patient/Family education  Duration of Treatment: 1-2 weeks  Frequency of Treatment: 1-2x week    Prognosis  Speech Therapy Prognosis  Prognosis: Fair  Prognosis Considerations: Age;Co-Morbidities;Medical Diagnosis    Education  Patient Education: SLP role, results, and POC.  Patient Education Response: Verbalizes understanding;Needs reinforcement  Individuals consulted  Consulted and agree with results and recommendations: Patient;RN  RN Name: Robyn    Treatment/Goals  Short-term Goals  Timeframe for Short-term Goals: 1-2 weeks  Goal 1: Patient will tolerate a regular diet with thin liquids with adequate mastication, timely A-P, with no evidence of pocketing or s/s of aspiration during a therapeutic meal monitor to assess diet tolerance and need for further diet modification prior to D/C.  Long-term Goals  Timeframe for Long-term Goals: 1-2 weeks  Goal 1: Patient will tolerate least restrictive diet with no adverse outcomes.    Safety Devices  Safety Devices  Safety Devices in place: Yes  Type of devices: Bed alarm in place;Call light within reach;Nurse notified  Restraints Initially in Place: No    Pain Assessment  Patient c/o pain: Location  and pain level: abdominal area    Pain Re-assessment  Patient c/o pain: Described as same as above    Dysphagia Outcomes Severity Scale  Dysphagia Outcome Severity Scale: Level 6: Within functional limits/Modified independence    Therapy Time  SLP Individual Minutes  Time In: 1523  Time Out: 1543  Minutes: 20              Signature: Electronically signed by LEXIS MARK, MEHRDAD on 7/10/2024 at 4:30 PM

## 2024-07-10 NOTE — PROGRESS NOTES
Patient awake in bed visiting with her friend Carolina at bedside. She tells this RN that Carolina is from her [place] and she gave this RN permission to speak with Carolina regarding medical information.     Patient complaining of discomfort while swallowing. BSE already completed by speech therapy. Patient reports the discomfort to be more in her upper chest/ medial upper quad. Mouth swabs used to moisten her mouth. Encouragement of PO liquids provided. Patient declines further mouth swabs and fluid.     Electronically signed by Debbie Cortes RN on 7/10/2024 at 5:50 PM

## 2024-07-10 NOTE — CONSULTS
[facility] Department of Psychiatry  Behavioral Health Consult    REASON FOR CONSULT: Capacity Evaluation     CONSULTING PHYSICIAN: Dr. Baker    History obtained from: patient     HISTORY OF PRESENT ILLNESS:      The patient is a 89 y.o. female with no significant mental health history consulted to psychiatry to assess capacity.Patient currently alert and oriented x 2 however has an awareness.  Physical symptoms that led to her admission.  Patient was able to articulate that she has been feeling poorly dizziness feeling drowsy reports mild depression however states it has been stable.  Patient reports history of decreased energy that she attributes to feeling unwell.  Patient reports currently engaging in recommended medical treatment as she was able to identify that this will help her feel well.    Per chart review patient was previously referred to hospice however after consideration and mild improvement in health declined.  After further consideration has agreed to hospice plan.    The patient is not currently receiving care for the above psychiatric illness.      Psychiatric Review of Systems       Depression: denies     Jael or Hypomania:  no     Panic Attacks:  no     Phobias:  no     Obsessions and Compulsions:  no     PTSD : denies      Hallucinations:  no     Delusions:  no      Substance Abuse History:  ETOH: denies   Marijuana: denies  Opiates: denies  Other Drugs: denies      Past Psychiatric History:    No previous mental health history or treatment.    Past Medical History:        Diagnosis Date    Arthritis     Chronic kidney disease     Diabetes mellitus (HCC)     borderline    Gastroesophageal reflux disease without esophagitis 2017    History of blood transfusion     hemorrhage after an     Hyperlipidemia     Hypertension     Neuropathy 2024    Osteoporosis     Urinary frequency        Past Surgical History:        Procedure Laterality Date    BRONCHOSCOPY N/A  necessary    Capacity Evaluation: Patient has been determined to have capacity to make medical decisions    Medications:  NA  Discussed with the treating physician/ team about the patient and treatment plan  Reviewed the chart    Discussed with the patient risk, benefit, alternative and common side effects for the  proposed medication treatment. Patient is consenting to the treatment.    Thanks for the consult. Please call me if needed.    Electronically signed by YVONNE Copeland CNP on 7/9/2024 at 9:15 PM

## 2024-07-10 NOTE — DISCHARGE INSTR - COC
Continuity of Care Form    Patient Name: Dayami Christensen   :  1934  MRN:  [id number]    Admit date:  2024  Discharge date:  24    Code Status Order: Full Code   Advance Directives:     Admitting Physician:  José Antonio Baker MD  PCP: Shashi Gan MD    Discharging Nurse: Latonia DAVEY  Discharging Hospital Unit/Room#: W173/W173-01  Discharging Unit Phone Number: [phone]    Emergency Contact:   Extended Emergency Contact Information  Primary Emergency Contact: Fanny Ivy   [facility]  Home Phone: [phone]  Relation: Child  Secondary Emergency Contact: Inna Shelton  Mobile Phone: [phone]  Relation: Child  Preferred language: English   needed? No    Past Surgical History:  Past Surgical History:   Procedure Laterality Date    BRONCHOSCOPY N/A 05/15/2024    NAVIGATIONAL  BRONCHOSCOPY WITH ENDOBRONCHIAL ULTRASOUND  TBNA TRANSBRONCHIAL BIOPIES performed by Benedict Cuenca MD at [facility] OR    CATARACT REMOVAL      COLONOSCOPY      EYE SURGERY      PLEURAL CATH INSERTION Left 2024    INSERTION LEFT PLEURX CATHETER performed by Kolby Torre MD at [facility] OR    THORACENTESIS Left 2024    550 ml removed by     TUBAL LIGATION         Immunization History:   Immunization History   Administered Date(s) Administered    COVID-19, MODERNA Bivalent, (age 12y+), IM, 50 mcg/0.5 mL 10/04/2022    Influenza, High Dose (Fluzone 65 yrs and older) 2017, 2018    Pneumococcal, PCV-13, PREVNAR 13, (age 6w+), IM, 0.5mL 2017    Pneumococcal, PPSV23, PNEUMOVAX 23, (age 2y+), SC/IM, 0.5mL 2018    TDaP, ADACEL (age 10y-64y), BOOSTRIX (age 10y+), IM, 0.5mL 2016       Active Problems:  Patient Active Problem List   Diagnosis Code    Essential hypertension I10    New onset atrial fibrillation (HCC) I48.91    Type 2 diabetes mellitus without complication, without long-term current use of insulin (HCC) E11.9    Class 2 severe obesity due to excess calories with  serious comorbidity and body mass index (BMI) of 36.0 to 36.9 in adult ([facility]) E66.01, Z68.36    Gastroesophageal reflux disease without esophagitis K21.9    Primary osteoarthritis involving multiple joints M15.9    Abnormal gait R26.9    Risk for falls Z91.81    Chronic midline low back pain without sciatica M54.50, G89.29    Osteopenia M85.80    Dyslipidemia E78.5    Dizzy R42    History of cardiac radiofrequency ablation (RFA) Z98.890    Acute respiratory failure with hypoxia ([facility]) J96.01    Lung mass R91.8    Secondary hypercoagulable state (HCC) D68.69    Malignant neoplasm of overlapping sites of left lung (HCC) C34.82    Collapse of left lung J98.11    Squamous cell carcinoma of left lung (HCC) C34.92    Pleural effusion, left J90    Palliative care encounter Z51.5    Goals of care, counseling/discussion Z71.89    Advanced care planning/counseling discussion Z71.89    Malignant neoplasm of left lung (HCC) C34.92    Neoplasm related pain G89.3    Shortness of breath at rest R06.02    SOB (shortness of breath) R06.02    Encounter for hospice care discussion Z71.89    Constipation K59.00    Hx of cancer of lung Z85.118    Generalized weakness R53.1    Neuropathy G62.9    Pain in both lower extremities M79.604, M79.605       Isolation/Infection:   Isolation            No Isolation          Patient Infection Status       None to display                     Nurse Assessment:  Last Vital Signs: /68   Pulse 97   Temp 97.2 °F (36.2 °C) (Oral)   Resp 18   Ht 1.499 m (4' 11\")   Wt 87.9 kg (193 lb 12.6 oz)   SpO2 99%   BMI 39.14 kg/m²     Last documented pain score (0-10 scale): Pain Level: 5  Last Weight:   Wt Readings from Last 1 Encounters:   07/09/24 87.9 kg (193 lb 12.6 oz)     Mental Status:  oriented and alert    IV Access:  - None    Nursing Mobility/ADLs:  Walking   Assisted  Transfer  Assisted  Bathing  Assisted  Dressing  Assisted  Toileting  Assisted  Feeding  Assisted  Med Admin  Dependent  Med  walker  Other Treatments: wound, pleurX    Patient's personal belongings (please select all that are sent with patient):  None    RN SIGNATURE:  Electronically signed by Latonia Medina RN on 7/11/24 at 12:36 PM EDT    CASE MANAGEMENT/SOCIAL WORK SECTION    Inpatient Status Date: 7/8/24    Readmission Risk Assessment Score:  Readmission Risk              Risk of Unplanned Readmission:  0           Discharging to Facility/ Agency   Name: [facility]  Address: [address] [place] [address]   Phone: [phone]   Fax:    Dialysis Facility (if applicable)   Name:  Address:  Dialysis Schedule:  Phone:  Fax:    / signature: Electronically signed by CHANNING Souza on 7/10/24 at 3:39 PM EDT    PHYSICIAN SECTION    Prognosis: {Prognosis:7575099853}    Condition at Discharge: { Patient Condition:970365062}    Rehab Potential (if transferring to Rehab): {Prognosis:6401600524}    Recommended Labs or Other Treatments After Discharge: drain the pleurex every other day, recommend Hospice as outpt  Prognosis is poor.     Physician Certification: I certify the above information and transfer of Dayami Christensen  is necessary for the continuing treatment of the diagnosis listed and that she requires {Admit to Appropriate Level of Care:73890} for {GREATER/LESS:545609625} 30 days.     Update Admission H&P: {CHP DME Changes in HandP:122245865}    PHYSICIAN SIGNATURE:  Electronically signed by José Antonio Baker MD on 7/11/24 at 11:37 AM EDT

## 2024-07-10 NOTE — PROGRESS NOTES
Comprehensive Nutrition Assessment    Type and Reason for Visit:  Initial, Positive Nutrition Screen, Wound    Nutrition Recommendations/Plan:   Chopped foods and assist feed per pt request  Follow up after results of BSE  Add No Concentrated sweets to diet  High calorie high protein oral supplement @ B & D, frozen supplement @ D  Monitor glucose and adjust diet/supplements as needed     Malnutrition Assessment:  Malnutrition Status:  Insufficient data (07/10/24 1531)    Context:  Chronic Illness     Findings of the 6 clinical characteristics of malnutrition:  Energy Intake:  Mild decrease in energy intake (Comment)  Weight Loss:  No significant weight loss     Body Fat Loss:  No significant body fat loss     Muscle Mass Loss:  No significant muscle mass loss    Fluid Accumulation:  No significant fluid accumulation     Strength:  Not Performed    Nutrition Assessment:    Pt at risk for malnutrition related to metastatic disease, weight increased with edema, difficult to obtain reliable diet hx, pt does request chopped foods and states she is too weak to feed herself, noted has order for BSE    Nutrition Related Findings:    \"history of squamous cell carcinoma left lung, CKD, T2DM, GERD, HTN, HLD,  brought to the emergency room with increased shortness of breath and weakness. Patient was admitted in the setting of hx of lung cancer and pleural effusions.\" currenly on raditaion therapy,  poor prognosis per notes, per H&P \"  Patient reports she has not been eating and has had weight loss. \", Current labs noted ( gluc < 160), Meds reviewed, No GI complaints, feels weak, mild BLE edema present Wound Type: Pressure Injury, Stage II (coccyx)       Current Nutrition Intake & Therapies:    Average Meal Intake: 1-25%  Average Supplements Intake: None Ordered  ADULT ORAL NUTRITION SUPPLEMENT; Lunch; Frozen Oral Supplement  ADULT ORAL NUTRITION SUPPLEMENT; Dinner; Standard High Calorie/High Protein Oral Supplement  ADULT  ORAL NUTRITION SUPPLEMENT; Breakfast; Standard High Calorie/High Protein Oral Supplement  ADULT DIET; Regular; CHOP ALLL FOODS    Anthropometric Measures:  Height: 149.9 cm (4' 11\")  Ideal Body Weight (IBW): 95 lbs (43 kg)    Admission Body Weight: 87.1 kg (192 lb) (* edema)  Current Body Weight: 87.5 kg (193 lb) (* edema), 203.2 % IBW. Weight Source: Bed Scale  Current BMI (kg/m2): 39  Usual Body Weight: 74.4 kg (164 lb) (( 5/2024), 163# ( 12/2022))  % Weight Change (Calculated): 17.7                    BMI Categories: Obese Class 1 (BMI 30.0-34.9) (based on UBW)    Estimated Daily Nutrient Needs:  Energy Requirements Based On: Kcal/kg  Weight Used for Energy Requirements: Usual  Energy (kcal/day): 5508-1853 kcals 2 15-18 kcal/kg usual wt  Weight Used for Protein Requirements: Ideal  Protein (g/day): 56-60 g proteni @ 1.3-1.4 g/kg IBW  Method Used for Fluid Requirements: 1 ml/kcal  Fluid (ml/day): 3669-4323    Nutrition Diagnosis:   Inadequate oral intake related to other (comment) (weakness) as evidenced by intake 0-25%      Nutrition Interventions:   Food and/or Nutrient Delivery: Start Oral Nutrition Supplement, Modify Current Diet  Nutrition Education/Counseling: Education not indicated  Coordination of Nutrition Care: Continue to monitor while inpatient, Speech Therapy       Goals:     Goals: PO intake 50% or greater, by next RD assessment       Nutrition Monitoring and Evaluation:   Behavioral-Environmental Outcomes: None Identified  Food/Nutrient Intake Outcomes: Diet Advancement/Tolerance, Food and Nutrient Intake, Supplement Intake  Physical Signs/Symptoms Outcomes: Weight, Meal Time Behavior, Other (Comment) (goals of care)    Discharge Planning:    Too soon to determine     ANNEMARIE MODI, RD, LD

## 2024-07-10 NOTE — PROGRESS NOTES
Palliative Care Progress Note  Patient: Dayami Christensen  Gender: female  YOB: 1934  Unit/Bed: W173/W173-01  CodeStatus: Full Code  Inpatient Treatment Team: Treatment Team: Attending Provider: José Antonio Baker MD; Registered Nurse: Tapan Whittington, RN; Utilization Reviewer: Josefina Alvarez RN; Patient Care Tech: Shanna Lorenzo; Registered Nurse: Debbie Cortes, RN; Registered Nurse: Ivy Flores, TAMICA; : Yolis Baker RN; Patient Care Tech: Mannkingaar, Swetha  Admit Date:  7/8/2024    Chief Complaint: Fatigue    History of Presenting Illness:      Dayami Christensen is a 89 y.o. female on hospital day 0 with a history of squamous cell carcinoma left lung, CKD, T2DM, GERD, HTN, HLD, osteoporosis.    Patient was brought to the emergency room with increased shortness of breath and weakness. Patient was admitted in the setting of hx of lung cancer and pleural effusions.    Palliative care was consulted by provider Serenity Viramontes CNP for goals of care and end-stage disease.     Patient alert and oriented x2. Oncology had been involved last admission and was recommending hospice. Patient had been altered and family signed paperwork with hospice  but was unsure of placement. At time of discharge patient was more alert and declining hospice. She was then sent [facility] with [facility].  Patient states she has been bedbound since last admission. However daughter states she was up walking at her house. Patient reports she has neuropathy and pain in her legs and she cannot stand up. Patient stating the only thing she wants at this time is for the pain in her legs to be improved. She also has complaints of sob on 4 L nc and left sided pain. Patient reports she has not been eating and has had weight loss.     Most recent notable labs: Sodium 134, chloride 94, anion gap 17, lactic acid 2.5, albumin 2.6, alkaline phosphatase 153, total protein 6.1, WBC 11.2    7/10/2024:  Patient was previously seen and evaluated by psychiatry    Transportation Needs: No Transportation Needs (7/8/2024)    PRAPARE - Transportation     Lack of Transportation (Medical): No     Lack of Transportation (Non-Medical): No    Social Connections ([facility] HRSN)   Housing Stability: Low Risk  (7/8/2024)    Housing Stability Vital Sign     Unable to Pay for Housing in the Last Year: No     Number of Places Lived in the Last Year: 1     Unstable Housing in the Last Year: No       Family Hx:  No family history on file.    LABS: Reviewed     CBC:  Lab Results   Component Value Date/Time    WBC 11.2 07/08/2024 11:15 AM    RBC 4.93 07/08/2024 11:15 AM    HGB 13.1 07/08/2024 11:15 AM    HCT 43.4 07/08/2024 11:15 AM    MCV 88.0 07/08/2024 11:15 AM    MCH 26.6 07/08/2024 11:15 AM    MCHC 30.2 07/08/2024 11:15 AM    RDW 16.6 07/08/2024 11:15 AM     07/08/2024 11:15 AM     CBC with Differential:   Lab Results   Component Value Date/Time    WBC 11.2 07/08/2024 11:15 AM    RBC 4.93 07/08/2024 11:15 AM    HGB 13.1 07/08/2024 11:15 AM    HCT 43.4 07/08/2024 11:15 AM     07/08/2024 11:15 AM    MCV 88.0 07/08/2024 11:15 AM    MCH 26.6 07/08/2024 11:15 AM    MCHC 30.2 07/08/2024 11:15 AM    RDW 16.6 07/08/2024 11:15 AM    LYMPHOPCT 12.4 07/08/2024 11:15 AM    MONOPCT 4.5 07/08/2024 11:15 AM    EOSPCT 0.9 07/08/2024 11:15 AM    BASOPCT 0.3 07/08/2024 11:15 AM    MONOSABS 0.5 07/08/2024 11:15 AM    LYMPHSABS 1.4 07/08/2024 11:15 AM    EOSABS 0.1 07/08/2024 11:15 AM    BASOSABS 0.0 07/08/2024 11:15 AM     CMP:    Lab Results   Component Value Date/Time     07/08/2024 11:15 AM    K 4.2 07/08/2024 11:15 AM    K 4.9 06/27/2024 05:11 AM    CL 94 07/08/2024 11:15 AM    CO2 23 07/08/2024 11:15 AM    BUN 17 07/08/2024 11:15 AM    CREATININE 0.70 07/08/2024 11:15 AM    GFRAA >60.0 09/24/2018 09:28 AM    LABGLOM 82.3 07/08/2024 11:15 AM    GLUCOSE 150 07/08/2024 11:15 AM    CALCIUM 8.6 07/08/2024 11:15 AM    BILITOT 0.4 07/08/2024 11:15 AM    ALKPHOS 153 07/08/2024 11:15 AM    AST  7/8/2024  EXAMINATION: ONE XRAY VIEW OF THE CHEST 7/8/2024 11:26 am COMPARISON: June 06/20/2024 HISTORY: ORDERING SYSTEM PROVIDED HISTORY: sob TECHNOLOGIST PROVIDED HISTORY: Reason for exam:->sob What reading provider will be dictating this exam?->CRC FINDINGS: There is improving diffuse hazy density in the left lung with increasing aeration to the upper lung.  The right lung is clear.  There is no pneumothorax.  Left-sided chest tube, no change.     Improving left pleural effusion with increasing aeration to the upper lung. Underlying pneumonia cannot be excluded.          Assessment and plan:  Palliative care encounter: Explained the role of palliative care in treating patient. Discussed symptom management related to chronic disease/condition. Provided emotional support and active listening. Patient understands and is agreeable to current plan.  Continue with current plan of care.  I discussed goals of care and code status with patient at bedside. We went over how oncology recommended hospice. Patient stating she wants comfort measures and for pain to be better controlled. I discussed DNRCC and hospice. Patient states \"fine\" she would be agreeable. I called patients RUSS Escobedo who stated \"No patient is to got to rehab for physical therapy and is to follow up with Dr. Farah for treatment.\" She did not want to discuss hospice or comfort measures. Awaiting psych consult for capacity. Will continue FULL CODE at this time.   Palliative care will continue to follow.     Hospice: Patient is hospice eligible in the setting of lung cancer with recurrent pleural effusions with left pleurx, sob at rest on 4 l nc. Patient now stating she would be agreeable to hospice. However awaiting psych consult for capacity.     Advance Care Planning: Discussed goals of care with patient. Explained in extensive detail nuances between full code, DNR CCA and DNR CC. Patient's daughter/ POA has made the decision to be FULL   For any questions or concerns feel free to contact me for clarification.    Thanks for the opportunity you have allowed us to provide palliative care to Dayami Christensen. We will be in touch as care progresses. Please feel free to reach out to us should you have any questions or requests.

## 2024-07-10 NOTE — CARE COORDINATION
Spoke with Tiburcio from [facility] who said patient and family have declined hospice and would like rehab. LSW spoke with patient's daughter, Fanny, who confirmed this and said her preference would be [facility] rehab. LSW explained that patient may not be able to tolerate an acute rehab currently and may be more appropriate for SNF. She was agreeable to SNF and chose [facility]. Referral faxed to Digna (Wing liaison) to review.     Informed by Digna that [facility] is able to accept and will start pre cert today.

## 2024-07-10 NOTE — PROGRESS NOTES
Physical Therapy Med Surg Daily Treatment Note  Facility/Department: [address] TELEMETRY  Room: [place] [address]       NAME: Dayami Christensen  : 1934 (89 y.o.)  MRN: [id number]  CODE STATUS: Full Code    Date of Service: 7/10/2024    Patient Diagnosis(es): Orthostatic hypotension [I95.1]  Generalized weakness [R53.1]  Hx of cancer of lung [Z85.118]  Malignant neoplasm of upper lobe of left lung (HCC) [C34.12]   Chief Complaint   Patient presents with    Fatigue     General weakness for 4 days  Pt is from home   Pt wears 4 liters of oxygen      Patient Active Problem List    Diagnosis Date Noted    Generalized weakness 2024    Neuropathy 2024    Pain in both lower extremities 2024    Hx of cancer of lung 2024    Constipation 2024    SOB (shortness of breath) 2024    Encounter for hospice care discussion 2024    Palliative care encounter 2024    Goals of care, counseling/discussion 2024    Advanced care planning/counseling discussion 2024    Malignant neoplasm of left lung (HCC) 2024    Neoplasm related pain 2024    Shortness of breath at rest 2024    Squamous cell carcinoma of left lung (HCC) 2024    Pleural effusion, left 2024    Collapse of left lung 2024    Malignant neoplasm of overlapping sites of left lung (HCC) 2024    Secondary hypercoagulable state (HCC) 2024    Acute respiratory failure with hypoxia (HCC) 2024    Lung mass 05/10/2024    Dizzy 2018    History of cardiac radiofrequency ablation (RFA) 2018    Dyslipidemia 2017    Gastroesophageal reflux disease without esophagitis 2017    Primary osteoarthritis involving multiple joints 2017    Abnormal gait 2017    Risk for falls 2017    Chronic midline low back pain without sciatica 2017    Osteopenia 2017    Essential hypertension 2017    New onset atrial fibrillation (HCC) 2017

## 2024-07-11 ENCOUNTER — HOSPITAL ENCOUNTER (EMERGENCY)
Age: 89
Discharge: HOME | DRG: 181 | End: 2024-07-12
Attending: STUDENT IN AN ORGANIZED HEALTH CARE EDUCATION/TRAINING PROGRAM
Payer: MEDICARE

## 2024-07-11 ENCOUNTER — APPOINTMENT (OUTPATIENT)
Dept: GENERAL RADIOLOGY | Age: 89
DRG: 181 | End: 2024-07-11
Payer: MEDICARE

## 2024-07-11 VITALS
OXYGEN SATURATION: 100 % | RESPIRATION RATE: 18 BRPM | WEIGHT: 193.78 LBS | HEART RATE: 111 BPM | DIASTOLIC BLOOD PRESSURE: 55 MMHG | BODY MASS INDEX: 39.07 KG/M2 | HEIGHT: 59 IN | TEMPERATURE: 97.6 F | SYSTOLIC BLOOD PRESSURE: 137 MMHG

## 2024-07-11 DIAGNOSIS — J90 PLEURAL EFFUSION ON LEFT: Primary | ICD-10-CM

## 2024-07-11 DIAGNOSIS — R06.02 SHORTNESS OF BREATH: ICD-10-CM

## 2024-07-11 LAB
ALBUMIN SERPL-MCNC: 2.8 G/DL (ref 3.5–4.6)
ALP SERPL-CCNC: 153 U/L (ref 40–130)
ALT SERPL-CCNC: 12 U/L (ref 0–33)
ANION GAP SERPL CALCULATED.3IONS-SCNC: 14 MEQ/L (ref 9–15)
AST SERPL-CCNC: 17 U/L (ref 0–35)
BASOPHILS # BLD: 0 K/UL (ref 0–0.2)
BASOPHILS NFR BLD: 0.2 %
BILIRUB SERPL-MCNC: 0.8 MG/DL (ref 0.2–0.7)
BUN SERPL-MCNC: 9 MG/DL (ref 8–23)
CALCIUM SERPL-MCNC: 8.2 MG/DL (ref 8.5–9.9)
CHLORIDE SERPL-SCNC: 95 MEQ/L (ref 95–107)
CO2 SERPL-SCNC: 27 MEQ/L (ref 20–31)
CREAT SERPL-MCNC: 0.45 MG/DL (ref 0.5–0.9)
EOSINOPHIL # BLD: 0.1 K/UL (ref 0–0.7)
EOSINOPHIL NFR BLD: 0.8 %
ERYTHROCYTE [DISTWIDTH] IN BLOOD BY AUTOMATED COUNT: 16.9 % (ref 11.5–14.5)
GLOBULIN SER CALC-MCNC: 3.7 G/DL (ref 2.3–3.5)
GLUCOSE SERPL-MCNC: 261 MG/DL (ref 70–99)
HCT VFR BLD AUTO: 45.4 % (ref 37–47)
HGB BLD-MCNC: 14 G/DL (ref 12–16)
LYMPHOCYTES # BLD: 1.1 K/UL (ref 1–4.8)
LYMPHOCYTES NFR BLD: 11.7 %
MCH RBC QN AUTO: 26.6 PG (ref 27–31.3)
MCHC RBC AUTO-ENTMCNC: 30.8 % (ref 33–37)
MCV RBC AUTO: 86.3 FL (ref 79.4–94.8)
MONOCYTES # BLD: 0.3 K/UL (ref 0.2–0.8)
MONOCYTES NFR BLD: 3.2 %
NEUTROPHILS # BLD: 8.1 K/UL (ref 1.4–6.5)
NEUTS SEG NFR BLD: 83.4 %
PLATELET # BLD AUTO: 320 K/UL (ref 130–400)
POTASSIUM SERPL-SCNC: 3.9 MEQ/L (ref 3.4–4.9)
PROT SERPL-MCNC: 6.5 G/DL (ref 6.3–8)
RBC # BLD AUTO: 5.26 M/UL (ref 4.2–5.4)
SODIUM SERPL-SCNC: 136 MEQ/L (ref 135–144)
WBC # BLD AUTO: 9.7 K/UL (ref 4.8–10.8)

## 2024-07-11 PROCEDURE — 36415 COLL VENOUS BLD VENIPUNCTURE: CPT

## 2024-07-11 PROCEDURE — 2700000000 HC OXYGEN THERAPY PER DAY

## 2024-07-11 PROCEDURE — 2580000003 HC RX 258: Performed by: REGISTERED NURSE

## 2024-07-11 PROCEDURE — 6370000000 HC RX 637 (ALT 250 FOR IP): Performed by: REGISTERED NURSE

## 2024-07-11 PROCEDURE — 96376 TX/PRO/DX INJ SAME DRUG ADON: CPT

## 2024-07-11 PROCEDURE — 80053 COMPREHEN METABOLIC PANEL: CPT

## 2024-07-11 PROCEDURE — 94761 N-INVAS EAR/PLS OXIMETRY MLT: CPT

## 2024-07-11 PROCEDURE — 6370000000 HC RX 637 (ALT 250 FOR IP): Performed by: INTERNAL MEDICINE

## 2024-07-11 PROCEDURE — 96361 HYDRATE IV INFUSION ADD-ON: CPT

## 2024-07-11 PROCEDURE — G0378 HOSPITAL OBSERVATION PER HR: HCPCS

## 2024-07-11 PROCEDURE — 97535 SELF CARE MNGMENT TRAINING: CPT

## 2024-07-11 PROCEDURE — 94640 AIRWAY INHALATION TREATMENT: CPT

## 2024-07-11 PROCEDURE — 71045 X-RAY EXAM CHEST 1 VIEW: CPT

## 2024-07-11 PROCEDURE — 93005 ELECTROCARDIOGRAM TRACING: CPT | Performed by: STUDENT IN AN ORGANIZED HEALTH CARE EDUCATION/TRAINING PROGRAM

## 2024-07-11 PROCEDURE — 85025 COMPLETE CBC W/AUTO DIFF WBC: CPT

## 2024-07-11 PROCEDURE — 6360000002 HC RX W HCPCS: Performed by: REGISTERED NURSE

## 2024-07-11 PROCEDURE — 99285 EMERGENCY DEPT VISIT HI MDM: CPT

## 2024-07-11 RX ORDER — MIDODRINE HYDROCHLORIDE 2.5 MG/1
2.5 TABLET ORAL
Qty: 90 TABLET | Refills: 3 | Status: ON HOLD | OUTPATIENT
Start: 2024-07-11

## 2024-07-11 RX ADMIN — DILTIAZEM HYDROCHLORIDE 120 MG: 120 CAPSULE, COATED, EXTENDED RELEASE ORAL at 09:06

## 2024-07-11 RX ADMIN — ONDANSETRON 4 MG: 2 INJECTION INTRAMUSCULAR; INTRAVENOUS at 12:10

## 2024-07-11 RX ADMIN — APIXABAN 5 MG: 5 TABLET, FILM COATED ORAL at 09:07

## 2024-07-11 RX ADMIN — ATORVASTATIN CALCIUM 40 MG: 40 TABLET, FILM COATED ORAL at 09:07

## 2024-07-11 RX ADMIN — BUDESONIDE AND FORMOTEROL FUMARATE DIHYDRATE 2 PUFF: 160; 4.5 AEROSOL RESPIRATORY (INHALATION) at 07:38

## 2024-07-11 RX ADMIN — TIOTROPIUM BROMIDE INHALATION SPRAY 2 PUFF: 3.12 SPRAY, METERED RESPIRATORY (INHALATION) at 07:38

## 2024-07-11 RX ADMIN — PANTOPRAZOLE SODIUM 40 MG: 40 TABLET, DELAYED RELEASE ORAL at 06:43

## 2024-07-11 RX ADMIN — SODIUM CHLORIDE, PRESERVATIVE FREE 10 ML: 5 INJECTION INTRAVENOUS at 09:07

## 2024-07-11 RX ADMIN — MIDODRINE HYDROCHLORIDE 2.5 MG: 2.5 TABLET ORAL at 09:07

## 2024-07-11 ASSESSMENT — PAIN - FUNCTIONAL ASSESSMENT: PAIN_FUNCTIONAL_ASSESSMENT: NONE - DENIES PAIN

## 2024-07-11 NOTE — CARE COORDINATION
Notified by Digna (Wing liaison) that patient's pre cert for [facility] was approved. [facility] for today at 1:00. Nurse, patient's daughter, and facility notified.

## 2024-07-11 NOTE — FLOWSHEET NOTE
1238: RN attempted to call report to [facility]. Phone call never answered, unable to leave voicemail.     1300: Duyen at [facility] given report. Instructed to drain pleurx drain per MD orders.    1524: Ambulance arrived to unit. Given report on patient. [facility] updated on patients arrival. Family called to update as well.Electronically signed by Latonia Medina RN on 7/11/2024 at 3:25 PM

## 2024-07-11 NOTE — PROGRESS NOTES
Physical Therapy Med Surg Daily Treatment Note  Facility/Department: [address] TELEMETRY  Room: [place] [address]       NAME: Dayami Christensen  : 1934 (89 y.o.)  MRN: [id number]  CODE STATUS: Full Code    Date of Service: 2024    Patient Diagnosis(es): Orthostatic hypotension [I95.1]  Generalized weakness [R53.1]  Hx of cancer of lung [Z85.118]  Malignant neoplasm of upper lobe of left lung (HCC) [C34.12]   Chief Complaint   Patient presents with    Fatigue     General weakness for 4 days  Pt is from home   Pt wears 4 liters of oxygen      Patient Active Problem List    Diagnosis Date Noted    Generalized weakness 2024    Neuropathy 2024    Pain in both lower extremities 2024    Hx of cancer of lung 2024    Constipation 2024    SOB (shortness of breath) 2024    Encounter for hospice care discussion 2024    Palliative care encounter 2024    Goals of care, counseling/discussion 2024    Advanced care planning/counseling discussion 2024    Malignant neoplasm of left lung (HCC) 2024    Neoplasm related pain 2024    Shortness of breath at rest 2024    Squamous cell carcinoma of left lung (HCC) 2024    Pleural effusion, left 2024    Collapse of left lung 2024    Malignant neoplasm of overlapping sites of left lung (HCC) 2024    Secondary hypercoagulable state (HCC) 2024    Acute respiratory failure with hypoxia (HCC) 2024    Lung mass 05/10/2024    Dizzy 2018    History of cardiac radiofrequency ablation (RFA) 2018    Dyslipidemia 2017    Gastroesophageal reflux disease without esophagitis 2017    Primary osteoarthritis involving multiple joints 2017    Abnormal gait 2017    Risk for falls 2017    Chronic midline low back pain without sciatica 2017    Osteopenia 2017    Essential hypertension 2017    New onset atrial fibrillation (HCC) 2017     Female touching, to perform the activity  Minimal assistance= pt performs 75% or more of the activity; assistance is required to complete the activity  Moderate assistance= pt performs 50% of the activity; assistance is required to complete the activity  Maximal assistance = pt performs 25% of the activity; assistance is required to complete the activity  Dependent = pt requires total physical assistance to accomplish the task

## 2024-07-11 NOTE — DISCHARGE SUMMARY
Discharge Summary    Date: 7/11/2024  Patient Name: Dayami Christensen    YOB: 1934     Age: 89 y.o.    Admit Date: 7/8/2024  Discharge Date: 7/11/2024  Discharge Condition: Fair    Admission Diagnosis  Orthostatic hypotension [I95.1];Generalized weakness [R53.1];Hx of cancer of lung [Z85.118];Malignant neoplasm of upper lobe of left lung (HCC) [C34.12]      Discharge Diagnosis  Principal Problem:    Hx of cancer of lung  Active Problems:    Generalized weakness    Neuropathy    Pain in both lower extremities  Resolved Problems:    * No resolved hospital problems. *      Hospital Stay  Narrative of Hospital Course:  Patient 89-year-old woman with past medical history of squamous cell lung cancer with mets comes in here for weakness and needing placement.  Patient is she was deciding about hospice versus rehab.  She decided rehab.  Patient full code.  Recommend patient needs palliative care versus hospice as outpatient.  Prognosis guarded.  Patient started on midodrine due to orthostasis.    Consultants:  IP CONSULT TO PALLIATIVE CARE    Surgeries/procedures Performed:      Treatments:            Discharge Plan/Disposition:  Home    Hospital/Incidental Findings Requiring Follow Up:    Patient Instructions:    Diet:    Activity:  For number of days (if applicable):      Other Instructions:    Provider Follow-Up:   No follow-ups on file.     Significant Diagnostic Studies:    Recent Labs:  Admission on 07/08/2024  WBC                                           Date: 07/08/2024  Value: 11.2 (H)    Ref range: 4.8 - 10.8 K/uL    Status: Final  RBC                                           Date: 07/08/2024  Value: 4.93        Ref range: 4.20 - 5.40 M/uL   Status: Final  Hemoglobin                                    Date: 07/08/2024  Value: 13.1        Ref range: 12.0 - 16.0 g/dL   Status: Final  Hematocrit                                    Date: 07/08/2024  Value: 43.4        Ref range: 37.0 - 47.0 %      Status:  bacterial infection  <0.24 ng/mL    Increased likelihood of bacterial infection >0.24 ng/mL  Antibiotics encouraged    With successful antibiotic therapy, PCT levels should decrease  rapidly. (Half-life of 24 to 36 hours.)    Procalcitonin values from samples collected within the first  6 hours of systemic infection may still be low.  Retesting may be indicated.  Values from day 1 and day 4 can be entered into the Change in  Procalcitonin Calculator to determine the patient's  Mortality Risk Prognosis  ([email or web address]-pct-[email or web address].)    In healthy neonates, plasma Procalcitonin (PCT) concentrations  increase gradually after birth, reaching peak values at about  24 hours of age then decrease to normal values below 0.5                          ng/mL  by 48-72 hours of age.    Lactic Acid, Sepsis                           Date: 07/08/2024  Value: 1.8         Ref range: 0.5 - 1.9 mmol/L   Status: Final  Lactic Acid, Sepsis                           Date: 07/08/2024  Value: 2.5 (H)     Ref range: 0.5 - 1.9 mmol/L   Status: Final  Adenovirus by PCR                             Date: 07/08/2024  Value: Not Detected                     Ref range: Not Detected       Status: Final  Bordetella parapertussis by PCR               Date: 07/08/2024  Value: Not Detected                     Ref range: Not Detected       Status: Final  Bordetella pertussis by PCR                   Date: 07/08/2024  Value: Not Detected                     Ref range: Not Detected       Status: Final  Chlamydophilia pneumoniae by PCR              Date: 07/08/2024  Value: Not Detected                     Ref range: Not Detected       Status: Final  Coronavirus 229E by PCR                       Date: 07/08/2024  Value: Not Detected                     Ref range: Not Detected       Status: Final  Coronavirus HKU1 by PCR                       Date: 07/08/2024  Value: Not Detected                     Ref range: Not Detected       Status: Final  Coronavirus  Final  ------------    Radiology last 7 days:  CT HEAD WO CONTRAST    Result Date: 7/8/2024  1. No acute intracranial abnormality is identified. 2. Mild, diffuse, cerebral atrophy again noted, when compared to the MRI performed 06/11/2024. 3. Microvascular white matter ischemic changes. 4. Lacunar infarcts involving the left basal ganglia are probably old. 5. Empty sella again seen. 6. Bilateral mastoiditis.     XR CHEST PORTABLE    Result Date: 7/8/2024  Improving left pleural effusion with increasing aeration to the upper lung. Underlying pneumonia cannot be excluded.        [unfilled]    Discharge Medications    Current Discharge Medication List    START taking these medications    midodrine (PROAMATINE) 2.5 MG tablet  Take 1 tablet by mouth 3 times daily (with meals)  Qty: 90 tablet Refills: 3          Current Discharge Medication List        Current Discharge Medication List    CONTINUE these medications which have NOT CHANGED    albuterol sulfate HFA (VENTOLIN HFA) 108 (90 Base) MCG/ACT inhaler  Inhale 2 puffs into the lungs every 6 hours as needed for Wheezing  Qty: 18 g Refills: 3    albuterol (ACCUNEB) 0.63 MG/3ML nebulizer solution  Take 3 mLs by nebulization every 6 hours as needed for Wheezing  Qty: 270 mL Refills: 3    apixaban (ELIQUIS) 5 MG TABS tablet  Take 1 tablet by mouth 2 times daily  Qty: 60 tablet Refills: 1    Budeson-Glycopyrrol-Formoterol (BREZTRI AEROSPHERE) 160-9-4.8 MCG/ACT AERO  Inhale 2 puffs into the lungs in the morning and at bedtime  Qty: 1 each Refills: 0    mirabegron (MYRBETRIQ) 50 MG TB24  Take 50 mg by mouth daily  Qty: 30 tablet Refills: 0    vitamin D (ERGOCALCIFEROL) 1.25 MG (88996 UT) CAPS capsule  Take 1 capsule by mouth once a week  Qty: 5 capsule Refills: 0    atorvastatin (LIPITOR) 40 MG tablet  Take 1 tablet by mouth daily  Qty: 30 tablet Refills: 0  Associated Diagnoses:Dyslipidemia; Type 2 diabetes mellitus without complication, without long-term current use of

## 2024-07-11 NOTE — DISCHARGE INSTR - DIET

## 2024-07-12 ENCOUNTER — OFFICE VISIT (OUTPATIENT)
Dept: GERIATRIC MEDICINE | Age: 89
End: 2024-07-12

## 2024-07-12 VITALS
TEMPERATURE: 97.7 F | OXYGEN SATURATION: 96 % | HEIGHT: 59 IN | RESPIRATION RATE: 27 BRPM | WEIGHT: 193.79 LBS | HEART RATE: 109 BPM | BODY MASS INDEX: 39.07 KG/M2 | DIASTOLIC BLOOD PRESSURE: 87 MMHG | SYSTOLIC BLOOD PRESSURE: 118 MMHG

## 2024-07-12 DIAGNOSIS — F41.9 ANXIETY: ICD-10-CM

## 2024-07-12 DIAGNOSIS — C34.92 MALIGNANT NEOPLASM OF LEFT LUNG, UNSPECIFIED PART OF LUNG (HCC): Primary | ICD-10-CM

## 2024-07-12 DIAGNOSIS — I95.9 HYPOTENSION, UNSPECIFIED HYPOTENSION TYPE: ICD-10-CM

## 2024-07-12 DIAGNOSIS — R63.0 POOR APPETITE: ICD-10-CM

## 2024-07-12 DIAGNOSIS — R04.2 HEMOPTYSIS: ICD-10-CM

## 2024-07-12 DIAGNOSIS — R11.0 NAUSEA: ICD-10-CM

## 2024-07-12 LAB
AFB STAIN: NORMAL
EKG ATRIAL RATE: 94 BPM
EKG Q-T INTERVAL: 344 MS
EKG QRS DURATION: 80 MS
EKG QTC CALCULATION (BAZETT): 450 MS
EKG R AXIS: -23 DEGREES
EKG T AXIS: 159 DEGREES
EKG VENTRICULAR RATE: 103 BPM
FINAL REPORT: NORMAL
PRELIMINARY: NORMAL

## 2024-07-12 PROCEDURE — 93010 ELECTROCARDIOGRAM REPORT: CPT | Performed by: INTERNAL MEDICINE

## 2024-07-12 RX ORDER — FLUTICASONE FUROATE, UMECLIDINIUM BROMIDE AND VILANTEROL TRIFENATATE 100; 62.5; 25 UG/1; UG/1; UG/1
1 POWDER RESPIRATORY (INHALATION) DAILY
Status: ON HOLD | COMMUNITY

## 2024-07-12 NOTE — ED PROVIDER NOTES
[facility] ED  Emergency Department Encounter  Emergency Medicine      Pt Name: Dayami Christensen  MRN:[id number]  Birthdate 11/5/1934  Date of evaluation: 7/12/24  PCP:  Shashi Gan MD    CHIEF COMPLAINT       Chief Complaint   Patient presents with    Shortness of Breath     SOB that has gotten worse today        HISTORY OF PRESENT ILLNESS  (Location/Symptom, Timing/Onset, Context/Setting, Quality, Duration, ModifyingFactors, Severity.)      Dayami Christensen is a 89 y.o. female with PMH of lung cancer presents with shortness of breath.  From the facility they said that she got more short of breath today.  Patient says she is been short of breath for about a month.  Her facility said that they drained her Pleurx catheter couple days ago but did not do so today.  Patient said she gets it every other day.  She denies a cough fevers chest pain no abdominal pain nausea vomiting.  Some of patient's history is obtained from chart review with her paperwork and shows that she is on supplemental oxygen as needed, EMS noted that she was mildly hypoxic and placed her on 2 L nasal cannula.  PAST MEDICAL / SURGICAL / SOCIAL /FAMILY HISTORY      has a past medical history of Arthritis, Chronic kidney disease, Diabetes mellitus (HCC), Gastroesophageal reflux disease without esophagitis, History of blood transfusion, Hyperlipidemia, Hypertension, Neuropathy, Osteoporosis, and Urinary frequency.  No other pertinent PMH on review with patient/guardian.     has a past surgical history that includes Tubal ligation; Colonoscopy; eye surgery; Cataract removal; bronchoscopy (N/A, 05/15/2024); thoracentesis (Left, 06/24/2024); and Pleural Cath Insertion (Left, 6/25/2024).  No other pertinent PSH on review with patient/guardian.  Social History     Socioeconomic History    Marital status:      Spouse name: Not on file    Number of children: Not on file    Years of education: Not on file    Highest education level: Not on file  Absolute 0.1 0.0 - 0.7 K/uL    Basophils Absolute 0.0 0.0 - 0.2 K/uL   CMP   Result Value Ref Range    Sodium 136 135 - 144 mEq/L    Potassium 3.9 3.4 - 4.9 mEq/L    Chloride 95 95 - 107 mEq/L    CO2 27 20 - 31 mEq/L    Anion Gap 14 9 - 15 mEq/L    Glucose 261 (H) 70 - 99 mg/dL    BUN 9 8 - 23 mg/dL    Creatinine 0.45 (L) 0.50 - 0.90 mg/dL    Est, Glom Filt Rate >90.0 >60    Calcium 8.2 (L) 8.5 - 9.9 mg/dL    Total Protein 6.5 6.3 - 8.0 g/dL    Albumin 2.8 (L) 3.5 - 4.6 g/dL    Total Bilirubin 0.8 (H) 0.2 - 0.7 mg/dL    Alkaline Phosphatase 153 (H) 40 - 130 U/L    ALT 12 0 - 33 U/L    AST 17 0 - 35 U/L    Globulin 3.7 (H) 2.3 - 3.5 g/dL         IMPRESSION/MDM/ED COURSE:  89 y.o. female presented with acute on chronic shortness of breath  Differential: Pleural effusion, not pneumothorax, not PE, not appearing as pneumonia, A-fib, mass  VS: Mildly tachycardic low 100s, the otherwise normal vital signs    Impression/Plan: Patient stable at this time not requiring oxygen and she remains at 94% or above on room air    ED Course as of 07/12/24 0046   Thu Jul 11, 2024   2350 Chest x-ray my interpretation: Large left pleural effusion, white on the left side, appears worse [SF]   Fri Jul 12, 2024   0001 EKG my interpretation: A-fib RVR, otherwise normal intervals, left axis deviation, T wave inversions in the lateral leads, no significant ST segment changes [SF]   0022 Pleurx catheter drainage system utilized and was able to drain 400 cc of slightly brown serous fluid no blood or clots from the left lung, patient tolerated this well felt improved afterwards vital signs remained stable she was not requiring oxygen    CBC unremarkable, CMP shows hyperglycemia, bilirubin minimally elevated [SF]      ED Course User Index  [SF] Jairo Manzo DO     Patient/Guardian requesting discharge. Patient/Guardian was given written and verbal instructions prior to discharge. Patient/Guardian understood and agreed. Patient/Guardian had no  further questions.       RADIOLOGY:  XR CHEST PORTABLE   Final Result   Mild increase in large left pleural effusion.               EKG  See MDM for my interpretation     All EKG's are interpreted by the Emergency Department Physician who either signs or Co-signs this chart in the absence of a cardiologist.      PROCEDURES:  None    CONSULTS:  None    Critical Care Time:  none    FINAL IMPRESSION      1. Pleural effusion on left    2. Shortness of breath          DISPOSITION / PLAN     DISPOSITION Decision To Discharge 07/12/2024 12:37:44 AM      PATIENT REFERREDTO:  No follow-up provider specified.    DISCHARGE MEDICATIONS:  New Prescriptions    No medications on file       Jairo Manzo DO  Emergency Medicine Physician  07/12/24 12:46 AM        (Please note that portions of this note were completed with a voice recognition program.Efforts were made to edit the dictations but occasionally words are mis-transcribed.)        Jairo Manzo DO  07/12/24 0046

## 2024-07-12 NOTE — PROGRESS NOTES
Physical Therapy  Facility/Department: [facility] MED SURG   Physical Therapy Discharge      NAME: Dayami Christensen    : 1934 (89 y.o.)  MRN: [id number]    Account: [id number]  Gender: female      Patient has been discharged from acute care hospital. DC patient from current PT program.      Electronically signed by Hyun Nicole PT on 24 at 12:55 PM EDT

## 2024-07-12 NOTE — ED NOTES
Plurex drain used and completed by Dr. Manzo, 400ml of red, clear fluid out.   Patient tolerated thie procedure with limited discomfort.  Patient requested O2 to be placed at this time as she wears it all the time, 2L NC applied at patient request.

## 2024-07-12 NOTE — DISCHARGE INSTRUCTIONS
Workup today was negative    We drained 400 cc of fluid from her Pleurx catheter, she did well with this procedure    Please continue draining as prescribed and scheduled    Return if she develops worsening shortness of breath, coughing up blood, chest pain, fevers, confusion, abdominal pain, nausea vomiting or other symptoms or concerns

## 2024-07-12 NOTE — ED NOTES
Radiology at the bedside at this time  Patient resting with eyes closed, respirations even and unlabored. Lights off, call light with in reach

## 2024-07-12 NOTE — FLOWSHEET NOTE
7/11/24 2146: Zenaida Moss called wondering if pt pleux drain was drained today. This nurse notified them that per the recent note, it shows the Day shift RN was instructed by the MD to drain it prior to pt leaving.

## 2024-07-12 NOTE — ED TRIAGE NOTES
Patient arrived via EMS due to becoming SOB at the nursing home. Per EMS she was not on O2 at the facility, placed on 2L via EMS and she was maintain 100%. Patient has a hx of A-fib, and has a present drain for plurex draining. Last draining was 2 days ago, facility states that they cannot do this at the facility.

## 2024-07-12 NOTE — ED NOTES
Patient sitting up in the bed at this time, lights off, call light with in reach, respirations even and unlabored

## 2024-07-12 NOTE — ED NOTES
Patient sitting up in the bed with the lights off, legs repositioned for comfort at the patients request. Call light with in reach, respirations even and unlabored.

## 2024-07-13 ENCOUNTER — APPOINTMENT (OUTPATIENT)
Dept: CT IMAGING | Age: 89
DRG: 181 | End: 2024-07-13
Payer: MEDICARE

## 2024-07-13 ENCOUNTER — HOSPITAL ENCOUNTER (INPATIENT)
Age: 89
LOS: 7 days | Discharge: NURSING, REHAB OR LONG-TERM CARE | DRG: 181 | End: 2024-07-20
Attending: EMERGENCY MEDICINE | Admitting: INTERNAL MEDICINE
Payer: MEDICARE

## 2024-07-13 DIAGNOSIS — E87.6 HYPOKALEMIA: Primary | ICD-10-CM

## 2024-07-13 DIAGNOSIS — E83.42 HYPOMAGNESEMIA: ICD-10-CM

## 2024-07-13 PROBLEM — R04.2 HEMOPTYSIS: Status: ACTIVE | Noted: 2024-07-13

## 2024-07-13 LAB
ALBUMIN SERPL-MCNC: 2.6 G/DL (ref 3.5–4.6)
ALP SERPL-CCNC: 128 U/L (ref 40–130)
ALT SERPL-CCNC: 11 U/L (ref 0–33)
ANION GAP SERPL CALCULATED.3IONS-SCNC: 11 MEQ/L (ref 9–15)
AST SERPL-CCNC: 12 U/L (ref 0–35)
BASOPHILS # BLD: 0 K/UL (ref 0–0.2)
BASOPHILS NFR BLD: 0.1 %
BILIRUB SERPL-MCNC: 0.5 MG/DL (ref 0.2–0.7)
BUN SERPL-MCNC: 8 MG/DL (ref 8–23)
CALCIUM SERPL-MCNC: 7.8 MG/DL (ref 8.5–9.9)
CHLORIDE SERPL-SCNC: 98 MEQ/L (ref 95–107)
CO2 SERPL-SCNC: 29 MEQ/L (ref 20–31)
CREAT SERPL-MCNC: 0.3 MG/DL (ref 0.5–0.9)
EOSINOPHIL # BLD: 0.1 K/UL (ref 0–0.7)
EOSINOPHIL NFR BLD: 0.7 %
ERYTHROCYTE [DISTWIDTH] IN BLOOD BY AUTOMATED COUNT: 17 % (ref 11.5–14.5)
GLOBULIN SER CALC-MCNC: 2.8 G/DL (ref 2.3–3.5)
GLUCOSE SERPL-MCNC: 138 MG/DL (ref 70–99)
HCT VFR BLD AUTO: 37.6 % (ref 37–47)
HGB BLD-MCNC: 11.7 G/DL (ref 12–16)
LACTATE BLDV-SCNC: 1.3 MMOL/L (ref 0.5–2.2)
LIPASE SERPL-CCNC: 18 U/L (ref 12–95)
LYMPHOCYTES # BLD: 1.4 K/UL (ref 1–4.8)
LYMPHOCYTES NFR BLD: 15.2 %
MAGNESIUM SERPL-MCNC: 1.6 MG/DL (ref 1.7–2.4)
MCH RBC QN AUTO: 26.4 PG (ref 27–31.3)
MCHC RBC AUTO-ENTMCNC: 31.1 % (ref 33–37)
MCV RBC AUTO: 84.9 FL (ref 79.4–94.8)
MONOCYTES # BLD: 0.4 K/UL (ref 0.2–0.8)
MONOCYTES NFR BLD: 3.9 %
NEUTROPHILS # BLD: 7.2 K/UL (ref 1.4–6.5)
NEUTS SEG NFR BLD: 79.3 %
PLATELET # BLD AUTO: 321 K/UL (ref 130–400)
POTASSIUM SERPL-SCNC: 3.2 MEQ/L (ref 3.4–4.9)
PROT SERPL-MCNC: 5.4 G/DL (ref 6.3–8)
RBC # BLD AUTO: 4.43 M/UL (ref 4.2–5.4)
REASON FOR REJECTION: NORMAL
REJECTED TEST: NORMAL
SODIUM SERPL-SCNC: 138 MEQ/L (ref 135–144)
TROPONIN, HIGH SENSITIVITY: 14 NG/L (ref 0–19)
WBC # BLD AUTO: 9 K/UL (ref 4.8–10.8)

## 2024-07-13 PROCEDURE — 80053 COMPREHEN METABOLIC PANEL: CPT

## 2024-07-13 PROCEDURE — 6360000004 HC RX CONTRAST MEDICATION: Performed by: EMERGENCY MEDICINE

## 2024-07-13 PROCEDURE — 2700000000 HC OXYGEN THERAPY PER DAY

## 2024-07-13 PROCEDURE — 94761 N-INVAS EAR/PLS OXIMETRY MLT: CPT

## 2024-07-13 PROCEDURE — 93005 ELECTROCARDIOGRAM TRACING: CPT | Performed by: EMERGENCY MEDICINE

## 2024-07-13 PROCEDURE — 83605 ASSAY OF LACTIC ACID: CPT

## 2024-07-13 PROCEDURE — 2500000003 HC RX 250 WO HCPCS: Performed by: EMERGENCY MEDICINE

## 2024-07-13 PROCEDURE — 84484 ASSAY OF TROPONIN QUANT: CPT

## 2024-07-13 PROCEDURE — 71275 CT ANGIOGRAPHY CHEST: CPT

## 2024-07-13 PROCEDURE — 83690 ASSAY OF LIPASE: CPT

## 2024-07-13 PROCEDURE — 74177 CT ABD & PELVIS W/CONTRAST: CPT

## 2024-07-13 PROCEDURE — 83735 ASSAY OF MAGNESIUM: CPT

## 2024-07-13 PROCEDURE — 1210000000 HC MED SURG R&B

## 2024-07-13 PROCEDURE — 86901 BLOOD TYPING SEROLOGIC RH(D): CPT

## 2024-07-13 PROCEDURE — 6360000002 HC RX W HCPCS: Performed by: EMERGENCY MEDICINE

## 2024-07-13 PROCEDURE — 99285 EMERGENCY DEPT VISIT HI MDM: CPT

## 2024-07-13 PROCEDURE — 36415 COLL VENOUS BLD VENIPUNCTURE: CPT

## 2024-07-13 PROCEDURE — 94640 AIRWAY INHALATION TREATMENT: CPT

## 2024-07-13 PROCEDURE — 86900 BLOOD TYPING SEROLOGIC ABO: CPT

## 2024-07-13 PROCEDURE — 6370000000 HC RX 637 (ALT 250 FOR IP): Performed by: EMERGENCY MEDICINE

## 2024-07-13 PROCEDURE — 85025 COMPLETE CBC W/AUTO DIFF WBC: CPT

## 2024-07-13 PROCEDURE — 96366 THER/PROPH/DIAG IV INF ADDON: CPT

## 2024-07-13 PROCEDURE — 96365 THER/PROPH/DIAG IV INF INIT: CPT

## 2024-07-13 PROCEDURE — 2580000003 HC RX 258: Performed by: INTERNAL MEDICINE

## 2024-07-13 PROCEDURE — 86850 RBC ANTIBODY SCREEN: CPT

## 2024-07-13 RX ORDER — POTASSIUM CHLORIDE 7.45 MG/ML
10 INJECTION INTRAVENOUS PRN
Status: DISCONTINUED | OUTPATIENT
Start: 2024-07-13 | End: 2024-07-19 | Stop reason: SDUPTHER

## 2024-07-13 RX ORDER — ONDANSETRON 4 MG/1
4 TABLET, FILM COATED ORAL EVERY 8 HOURS PRN
COMMUNITY

## 2024-07-13 RX ORDER — SODIUM CHLORIDE, SODIUM LACTATE, POTASSIUM CHLORIDE, CALCIUM CHLORIDE 600; 310; 30; 20 MG/100ML; MG/100ML; MG/100ML; MG/100ML
INJECTION, SOLUTION INTRAVENOUS CONTINUOUS
Status: DISCONTINUED | OUTPATIENT
Start: 2024-07-13 | End: 2024-07-14

## 2024-07-13 RX ORDER — ONDANSETRON 2 MG/ML
4 INJECTION INTRAMUSCULAR; INTRAVENOUS EVERY 6 HOURS PRN
Status: DISCONTINUED | OUTPATIENT
Start: 2024-07-13 | End: 2024-07-21 | Stop reason: HOSPADM

## 2024-07-13 RX ORDER — SODIUM CHLORIDE 9 MG/ML
INJECTION, SOLUTION INTRAVENOUS PRN
Status: DISCONTINUED | OUTPATIENT
Start: 2024-07-13 | End: 2024-07-21 | Stop reason: HOSPADM

## 2024-07-13 RX ORDER — TRANEXAMIC ACID 100 MG/ML
500 INJECTION, SOLUTION INTRAVENOUS 3 TIMES DAILY
Status: DISCONTINUED | OUTPATIENT
Start: 2024-07-13 | End: 2024-07-14

## 2024-07-13 RX ORDER — POTASSIUM CHLORIDE 20 MEQ/1
20 TABLET, EXTENDED RELEASE ORAL ONCE
Status: COMPLETED | OUTPATIENT
Start: 2024-07-13 | End: 2024-07-13

## 2024-07-13 RX ORDER — GUAIFENESIN/DEXTROMETHORPHAN 100-10MG/5
10 SYRUP ORAL EVERY 4 HOURS PRN
COMMUNITY

## 2024-07-13 RX ORDER — DILTIAZEM HYDROCHLORIDE 120 MG/1
120 CAPSULE, COATED, EXTENDED RELEASE ORAL DAILY
Status: DISCONTINUED | OUTPATIENT
Start: 2024-07-14 | End: 2024-07-21 | Stop reason: HOSPADM

## 2024-07-13 RX ORDER — MAGNESIUM SULFATE 1 G/100ML
1000 INJECTION INTRAVENOUS ONCE
Status: COMPLETED | OUTPATIENT
Start: 2024-07-13 | End: 2024-07-13

## 2024-07-13 RX ORDER — ACETAMINOPHEN 325 MG/1
650 TABLET ORAL EVERY 6 HOURS PRN
Status: DISCONTINUED | OUTPATIENT
Start: 2024-07-13 | End: 2024-07-21 | Stop reason: HOSPADM

## 2024-07-13 RX ORDER — MAGNESIUM SULFATE IN WATER 40 MG/ML
2000 INJECTION, SOLUTION INTRAVENOUS PRN
Status: DISCONTINUED | OUTPATIENT
Start: 2024-07-13 | End: 2024-07-19 | Stop reason: SDUPTHER

## 2024-07-13 RX ORDER — POLYETHYLENE GLYCOL 3350 17 G/17G
17 POWDER, FOR SOLUTION ORAL DAILY PRN
Status: DISCONTINUED | OUTPATIENT
Start: 2024-07-13 | End: 2024-07-21 | Stop reason: HOSPADM

## 2024-07-13 RX ORDER — ONDANSETRON 4 MG/1
4 TABLET, ORALLY DISINTEGRATING ORAL EVERY 8 HOURS PRN
Status: DISCONTINUED | OUTPATIENT
Start: 2024-07-13 | End: 2024-07-21 | Stop reason: HOSPADM

## 2024-07-13 RX ORDER — SODIUM CHLORIDE 0.9 % (FLUSH) 0.9 %
10 SYRINGE (ML) INJECTION PRN
Status: DISCONTINUED | OUTPATIENT
Start: 2024-07-13 | End: 2024-07-21 | Stop reason: HOSPADM

## 2024-07-13 RX ORDER — ACETAMINOPHEN 650 MG/1
650 SUPPOSITORY RECTAL EVERY 6 HOURS PRN
Status: DISCONTINUED | OUTPATIENT
Start: 2024-07-13 | End: 2024-07-21 | Stop reason: HOSPADM

## 2024-07-13 RX ORDER — POTASSIUM CHLORIDE 20 MEQ/1
40 TABLET, EXTENDED RELEASE ORAL PRN
Status: DISCONTINUED | OUTPATIENT
Start: 2024-07-13 | End: 2024-07-19 | Stop reason: SDUPTHER

## 2024-07-13 RX ORDER — HYDROXYZINE HYDROCHLORIDE 25 MG/1
25 TABLET, FILM COATED ORAL 3 TIMES DAILY PRN
COMMUNITY
Start: 2024-07-12

## 2024-07-13 RX ORDER — SODIUM CHLORIDE 0.9 % (FLUSH) 0.9 %
10 SYRINGE (ML) INJECTION EVERY 12 HOURS SCHEDULED
Status: DISCONTINUED | OUTPATIENT
Start: 2024-07-13 | End: 2024-07-21 | Stop reason: HOSPADM

## 2024-07-13 RX ADMIN — Medication 10 ML: at 21:17

## 2024-07-13 RX ADMIN — IOPAMIDOL 75 ML: 755 INJECTION, SOLUTION INTRAVENOUS at 15:46

## 2024-07-13 RX ADMIN — POTASSIUM CHLORIDE 20 MEQ: 1500 TABLET, EXTENDED RELEASE ORAL at 16:16

## 2024-07-13 RX ADMIN — TRANEXAMIC ACID 500 MG: 100 INJECTION, SOLUTION INTRAVENOUS at 20:17

## 2024-07-13 RX ADMIN — MAGNESIUM SULFATE HEPTAHYDRATE 1000 MG: 1 INJECTION, SOLUTION INTRAVENOUS at 16:16

## 2024-07-13 RX ADMIN — SODIUM CHLORIDE, POTASSIUM CHLORIDE, SODIUM LACTATE AND CALCIUM CHLORIDE: 600; 310; 30; 20 INJECTION, SOLUTION INTRAVENOUS at 21:17

## 2024-07-13 ASSESSMENT — PAIN DESCRIPTION - LOCATION: LOCATION: CHEST

## 2024-07-13 ASSESSMENT — PAIN - FUNCTIONAL ASSESSMENT
PAIN_FUNCTIONAL_ASSESSMENT: NONE - DENIES PAIN
PAIN_FUNCTIONAL_ASSESSMENT: 0-10
PAIN_FUNCTIONAL_ASSESSMENT: NONE - DENIES PAIN

## 2024-07-13 ASSESSMENT — PAIN SCALES - GENERAL: PAINLEVEL_OUTOF10: 5

## 2024-07-13 NOTE — H&P
Hospital Medicine  History and Physical    Patient:  Dayami Christensen  MRN: [id number]    CHIEF COMPLAINT:    Chief Complaint   Patient presents with    Hemoptysis     Pt from nh. Pt was drained from plurex 350 ml at noon. Cc pt is sob and coughing blood.       History Obtained From:  Patient, EMR  Primary Care Physician: Shashi Gan MD    HISTORY OF PRESENT ILLNESS:   The patient is a 89 y.o. female with PMH of squamous cell carcinoma of the left lung, chronic hypoxic respiratory failure, CKD, DMII, GERD, HTN, HLD, who presents with hemoptysis.    The patients family is not currently around; she seems surprised when told her lung cancer is advanced but also stated she wants everything done.  She has shortness of breath; hemoptysis and pain with swallowing water.  The former is chronic but the hemoptysis is more recent and odynophagia is new.      Past Medical History:      Diagnosis Date    Arthritis     Chronic kidney disease     Diabetes mellitus (HCC)     borderline    Gastroesophageal reflux disease without esophagitis 2017    History of blood transfusion     hemorrhage after an     Hyperlipidemia     Hypertension     Neuropathy 2024    Osteoporosis     Urinary frequency        Past Surgical History:      Procedure Laterality Date    BRONCHOSCOPY N/A 05/15/2024    NAVIGATIONAL  BRONCHOSCOPY WITH ENDOBRONCHIAL ULTRASOUND  TBNA TRANSBRONCHIAL BIOPIES performed by Benedict Cuenca MD at [facility] OR    CATARACT REMOVAL      COLONOSCOPY      EYE SURGERY      PLEURAL CATH INSERTION Left 2024    INSERTION LEFT PLEURX CATHETER performed by Kolby Torre MD at [facility] OR    THORACENTESIS Left 2024    550 ml removed by     TUBAL LIGATION         Medications Prior to Admission:    Prior to Admission medications    Medication Sig Start Date End Date Taking? Authorizing Provider   fluticasone-umeclidin-vilant (TRELEGY ELLIPTA) 100-62.5-25 MCG/ACT AEPB inhaler Inhale 1 puff into the lungs daily

## 2024-07-13 NOTE — CARE COORDINATION
The patient's daughter Fanny was notified of the patient's admission and room number. She states that she is coming in to see the patient. She states that after the patient is d/c she will return to [facility]. She states that the patient is not a [facility] palliative care patient.

## 2024-07-13 NOTE — ED PROVIDER NOTES
[facility] ED  eMERGENCY dEPARTMENT eNCOUnter      Pt Name: Dayami Christensen  MRN: [id number]  Birthdate 1934  Date of evaluation: 2024  Provider: Jimmy Dunn MD        HISTORY OF PRESENT ILLNESS    Dayami Christensen is a 89 y.o. female with history of lung cancer, COPD and multiple other comorbidities presenting to the ED with complaints of hemoptysis.  Has a Pleurx catheter placed on 2024.  H patient had 350 cc drained today at noon.  Was complaining of some shortness of breath and was reported to have hemoptysis as well.  Patient also reporting some hematemesis.  Denies having any complaints at this time or any concerns.  She denies any chest pain or shortness of breath.  Is on Eliquis        REVIEW OF SYSTEMS       Review of Systems   Unable to perform ROS: Dementia   Respiratory:          Hemoptysis       Except as noted above the remainder of the review of systems was reviewed and negative.       PAST MEDICAL HISTORY     Past Medical History:   Diagnosis Date    Arthritis     Chronic kidney disease     Diabetes mellitus (HCC)     borderline    Gastroesophageal reflux disease without esophagitis 2017    History of blood transfusion     hemorrhage after an     Hyperlipidemia     Hypertension     Neuropathy 2024    Osteoporosis     Urinary frequency          SURGICAL HISTORY       Past Surgical History:   Procedure Laterality Date    BRONCHOSCOPY N/A 05/15/2024    NAVIGATIONAL  BRONCHOSCOPY WITH ENDOBRONCHIAL ULTRASOUND  TBNA TRANSBRONCHIAL BIOPIES performed by Benedict Cuenca MD at [facility] OR    CATARACT REMOVAL      COLONOSCOPY      EYE SURGERY      PLEURAL CATH INSERTION Left 2024    INSERTION LEFT PLEURX CATHETER performed by Kolby Torre MD at [facility] OR    THORACENTESIS Left 2024    550 ml removed by     TUBAL LIGATION           CURRENT MEDICATIONS       Current Discharge Medication List        CONTINUE these medications which have NOT CHANGED    Details

## 2024-07-14 PROBLEM — R13.10 ODYNOPHAGIA: Status: ACTIVE | Noted: 2024-07-14

## 2024-07-14 PROBLEM — R13.19 ESOPHAGEAL DYSPHAGIA: Status: ACTIVE | Noted: 2024-07-14

## 2024-07-14 PROBLEM — Z85.118 HISTORY OF LUNG CANCER: Status: ACTIVE | Noted: 2024-07-14

## 2024-07-14 LAB
ABO + RH BLD: NORMAL
ANION GAP SERPL CALCULATED.3IONS-SCNC: 10 MEQ/L (ref 9–15)
BASOPHILS # BLD: 0 K/UL (ref 0–0.2)
BASOPHILS NFR BLD: 0.3 %
BLD GP AB SCN SERPL QL: NORMAL
BUN SERPL-MCNC: 6 MG/DL (ref 8–23)
CALCIUM SERPL-MCNC: 7.7 MG/DL (ref 8.5–9.9)
CHLORIDE SERPL-SCNC: 97 MEQ/L (ref 95–107)
CO2 SERPL-SCNC: 29 MEQ/L (ref 20–31)
CREAT SERPL-MCNC: 0.27 MG/DL (ref 0.5–0.9)
EOSINOPHIL # BLD: 0.1 K/UL (ref 0–0.7)
EOSINOPHIL NFR BLD: 0.9 %
ERYTHROCYTE [DISTWIDTH] IN BLOOD BY AUTOMATED COUNT: 17 % (ref 11.5–14.5)
GLUCOSE SERPL-MCNC: 125 MG/DL (ref 70–99)
HCT VFR BLD AUTO: 35.9 % (ref 37–47)
HCT VFR BLD AUTO: 37 % (ref 37–47)
HCT VFR BLD AUTO: 37.9 % (ref 37–47)
HGB BLD-MCNC: 11.2 G/DL (ref 12–16)
HGB BLD-MCNC: 11.4 G/DL (ref 12–16)
HGB BLD-MCNC: 11.6 G/DL (ref 12–16)
LYMPHOCYTES # BLD: 1 K/UL (ref 1–4.8)
LYMPHOCYTES NFR BLD: 13.3 %
MAGNESIUM SERPL-MCNC: 1.7 MG/DL (ref 1.7–2.4)
MCH RBC QN AUTO: 26.7 PG (ref 27–31.3)
MCHC RBC AUTO-ENTMCNC: 31.2 % (ref 33–37)
MCV RBC AUTO: 85.5 FL (ref 79.4–94.8)
MONOCYTES # BLD: 0.4 K/UL (ref 0.2–0.8)
MONOCYTES NFR BLD: 4.9 %
NEUTROPHILS # BLD: 6 K/UL (ref 1.4–6.5)
NEUTS SEG NFR BLD: 79.7 %
PHOSPHATE SERPL-MCNC: 2.4 MG/DL (ref 2.3–4.8)
PLATELET # BLD AUTO: 249 K/UL (ref 130–400)
POTASSIUM SERPL-SCNC: 3.8 MEQ/L (ref 3.4–4.9)
POTASSIUM SERPL-SCNC: 4 MEQ/L (ref 3.4–4.9)
RBC # BLD AUTO: 4.2 M/UL (ref 4.2–5.4)
SODIUM SERPL-SCNC: 136 MEQ/L (ref 135–144)
WBC # BLD AUTO: 7.5 K/UL (ref 4.8–10.8)

## 2024-07-14 PROCEDURE — 2500000003 HC RX 250 WO HCPCS: Performed by: INTERNAL MEDICINE

## 2024-07-14 PROCEDURE — 94640 AIRWAY INHALATION TREATMENT: CPT

## 2024-07-14 PROCEDURE — 6360000002 HC RX W HCPCS: Performed by: INTERNAL MEDICINE

## 2024-07-14 PROCEDURE — 2700000000 HC OXYGEN THERAPY PER DAY

## 2024-07-14 PROCEDURE — 6370000000 HC RX 637 (ALT 250 FOR IP)

## 2024-07-14 PROCEDURE — 85014 HEMATOCRIT: CPT

## 2024-07-14 PROCEDURE — 84100 ASSAY OF PHOSPHORUS: CPT

## 2024-07-14 PROCEDURE — 99223 1ST HOSP IP/OBS HIGH 75: CPT | Performed by: INTERNAL MEDICINE

## 2024-07-14 PROCEDURE — 84132 ASSAY OF SERUM POTASSIUM: CPT

## 2024-07-14 PROCEDURE — 36415 COLL VENOUS BLD VENIPUNCTURE: CPT

## 2024-07-14 PROCEDURE — 80048 BASIC METABOLIC PNL TOTAL CA: CPT

## 2024-07-14 PROCEDURE — 2580000003 HC RX 258: Performed by: INTERNAL MEDICINE

## 2024-07-14 PROCEDURE — 83735 ASSAY OF MAGNESIUM: CPT

## 2024-07-14 PROCEDURE — 1210000000 HC MED SURG R&B

## 2024-07-14 PROCEDURE — 6370000000 HC RX 637 (ALT 250 FOR IP): Performed by: INTERNAL MEDICINE

## 2024-07-14 PROCEDURE — 85018 HEMOGLOBIN: CPT

## 2024-07-14 PROCEDURE — 85025 COMPLETE CBC W/AUTO DIFF WBC: CPT

## 2024-07-14 PROCEDURE — 94761 N-INVAS EAR/PLS OXIMETRY MLT: CPT

## 2024-07-14 RX ORDER — POTASSIUM CHLORIDE 20 MEQ/1
40 TABLET, EXTENDED RELEASE ORAL PRN
Status: DISCONTINUED | OUTPATIENT
Start: 2024-07-14 | End: 2024-07-14

## 2024-07-14 RX ORDER — POTASSIUM CHLORIDE 7.45 MG/ML
10 INJECTION INTRAVENOUS PRN
Status: DISCONTINUED | OUTPATIENT
Start: 2024-07-14 | End: 2024-07-14

## 2024-07-14 RX ORDER — PANTOPRAZOLE SODIUM 40 MG/1
40 TABLET, DELAYED RELEASE ORAL
Status: DISCONTINUED | OUTPATIENT
Start: 2024-07-14 | End: 2024-07-16

## 2024-07-14 RX ORDER — MAGNESIUM SULFATE IN WATER 40 MG/ML
2000 INJECTION, SOLUTION INTRAVENOUS PRN
Status: DISCONTINUED | OUTPATIENT
Start: 2024-07-14 | End: 2024-07-14 | Stop reason: SDUPTHER

## 2024-07-14 RX ORDER — TROSPIUM CHLORIDE 20 MG/1
20 TABLET, FILM COATED ORAL NIGHTLY
Status: DISCONTINUED | OUTPATIENT
Start: 2024-07-14 | End: 2024-07-21 | Stop reason: HOSPADM

## 2024-07-14 RX ORDER — BUDESONIDE AND FORMOTEROL FUMARATE DIHYDRATE 80; 4.5 UG/1; UG/1
2 AEROSOL RESPIRATORY (INHALATION)
Status: DISCONTINUED | OUTPATIENT
Start: 2024-07-14 | End: 2024-07-21 | Stop reason: HOSPADM

## 2024-07-14 RX ORDER — HYDROXYZINE HYDROCHLORIDE 25 MG/1
25 TABLET, FILM COATED ORAL 3 TIMES DAILY PRN
Status: DISCONTINUED | OUTPATIENT
Start: 2024-07-14 | End: 2024-07-21 | Stop reason: HOSPADM

## 2024-07-14 RX ORDER — ATORVASTATIN CALCIUM 40 MG/1
40 TABLET, FILM COATED ORAL NIGHTLY
Status: DISCONTINUED | OUTPATIENT
Start: 2024-07-14 | End: 2024-07-21 | Stop reason: HOSPADM

## 2024-07-14 RX ORDER — MIDODRINE HYDROCHLORIDE 2.5 MG/1
2.5 TABLET ORAL
Status: DISCONTINUED | OUTPATIENT
Start: 2024-07-14 | End: 2024-07-19

## 2024-07-14 RX ADMIN — TRANEXAMIC ACID 500 MG: 100 INJECTION, SOLUTION INTRAVENOUS at 10:34

## 2024-07-14 RX ADMIN — Medication 10 ML: at 21:01

## 2024-07-14 RX ADMIN — TRANEXAMIC ACID 500 MG: 100 INJECTION, SOLUTION INTRAVENOUS at 15:21

## 2024-07-14 RX ADMIN — ASCORBIC ACID, VITAMIN A PALMITATE, CHOLECALCIFEROL, THIAMINE HYDROCHLORIDE, RIBOFLAVIN-5 PHOSPHATE SODIUM, PYRIDOXINE HYDROCHLORIDE, NIACINAMIDE, DEXPANTHENOL, ALPHA-TOCOPHEROL ACETATE, VITAMIN K1, FOLIC ACID, BIOTIN, CYANOCOBALAMIN: 200; 3300; 200; 6; 3.6; 6; 40; 15; 10; 150; 600; 60; 5 INJECTION, SOLUTION INTRAVENOUS at 19:04

## 2024-07-14 RX ADMIN — BUDESONIDE AND FORMOTEROL FUMARATE DIHYDRATE 2 PUFF: 80; 4.5 AEROSOL RESPIRATORY (INHALATION) at 07:26

## 2024-07-14 RX ADMIN — BUDESONIDE AND FORMOTEROL FUMARATE DIHYDRATE 2 PUFF: 80; 4.5 AEROSOL RESPIRATORY (INHALATION) at 19:36

## 2024-07-14 RX ADMIN — DILTIAZEM HYDROCHLORIDE 120 MG: 120 CAPSULE, COATED, EXTENDED RELEASE ORAL at 08:43

## 2024-07-14 RX ADMIN — TIOTROPIUM BROMIDE INHALATION SPRAY 2 PUFF: 3.12 SPRAY, METERED RESPIRATORY (INHALATION) at 07:26

## 2024-07-14 RX ADMIN — ONDANSETRON 4 MG: 2 INJECTION INTRAMUSCULAR; INTRAVENOUS at 10:49

## 2024-07-14 NOTE — PROGRESS NOTES
Hospitalist Progress Note      PCP: Shashi Gan MD    Date of Admission: 7/13/2024    Chief Complaint:    Chief Complaint   Patient presents with    Hemoptysis     Pt from nh. Pt was drained from plurex 350 ml at noon. Cc pt is sob and coughing blood.       Subjective:  Patient denies fevers, chills, sweats, CP, SOB, diarrhea or burning micturition.  12 point ROS negative other than mentioned above       Medications:  Reviewed    Infusion Medications    PN-Adult Premix 4.25/10 - Peripheral Line      sodium chloride       Scheduled Medications    atorvastatin  40 mg Oral Nightly    budesonide-formoterol  2 puff Inhalation BID RT    And    tiotropium  2 puff Inhalation Daily RT    midodrine  2.5 mg Oral TID WC    trospium  20 mg Oral Nightly    pantoprazole  40 mg Oral QAM AC    tranexamic acid  500 mg Nebulization TID    dilTIAZem  120 mg Oral Daily    sodium chloride flush  10 mL IntraVENous 2 times per day     PRN Meds: hydrOXYzine HCl, sodium phosphate 15 mmol in sodium chloride 0.9 % 250 mL IVPB, sodium chloride flush, sodium chloride, ondansetron **OR** ondansetron, polyethylene glycol, acetaminophen **OR** acetaminophen, potassium chloride **OR** potassium alternative oral replacement **OR** potassium chloride, magnesium sulfate      Intake/Output Summary (Last 24 hours) at 7/14/2024 1851  Last data filed at 7/14/2024 0800  Gross per 24 hour   Intake 1786.82 ml   Output --   Net 1786.82 ml       Exam:    /63   Pulse 95   Temp 97.3 °F (36.3 °C) (Oral)   Resp 20   Ht 1.346 m (4' 5\")   Wt 87.5 kg (193 lb)   SpO2 99%   BMI 48.31 kg/m²     General appearance: No apparent distress, appears stated age and cooperative.  HEENT:  Conjunctivae/corneas clear.  Neck: Trachea midline.  Respiratory:  Normal respiratory effort. Clear to auscultation  Cardiovascular: Regular rate and rhythm  Abdomen: Soft, non-tender, non-distended with normal bowel sounds.  Musculoskeletal: No clubbing, cyanosis or edema  bilaterally  Neuro: Non Focal.     Labs:   Recent Labs     07/11/24 2245 07/13/24  1430 07/14/24  0507 07/14/24  1218   WBC 9.7 9.0 7.5  --    HGB 14.0 11.7* 11.2* 11.6*   HCT 45.4 37.6 35.9* 37.9    321 249  --      Recent Labs     07/11/24 2245 07/13/24  1430 07/14/24  0507    138 136   K 3.9 3.2* 3.8   CL 95 98 97   CO2 27 29 29   BUN 9 8 6*   CREATININE 0.45* 0.30* 0.27*   CALCIUM 8.2* 7.8* 7.7*     Recent Labs     07/11/24 2245 07/13/24  1430   AST 17 12   ALT 12 11   BILITOT 0.8* 0.5   ALKPHOS 153* 128     No results for input(s): \"INR\" in the last 72 hours.  No results for input(s): \"CKTOTAL\", \"TROPONINI\" in the last 72 hours.    Urinalysis:      Lab Results   Component Value Date/Time    NITRU Negative 07/08/2024 11:15 AM    BLOODU Negative 07/08/2024 11:15 AM    GLUCOSEU Negative 07/08/2024 11:15 AM       Radiology:  CTA CHEST W WO CONTRAST   Final Result   1. No evidence of pulmonary embolism or other acute cardiopulmonary process.   2. Improved aeration left hemithorax from prior with persistent soft tissue   involvement of the left hilar and suprahilar region with narrowing of the   left upper lobe bronchus and near total lobar atelectasis of the left upper   lobe with minimal improved aeration from prior and a left pleural drainage   catheter in place with trace left pleural air or trace anterior pneumothorax   from instrumentation likely however improved or decreased left pleural   effusion with only trace left effusion remains.  Continued attention on   follow-up for underlying mass or malignancy and stability.  No clear   progression.   3. No evidence of metastatic disease in the abdomen or pelvis.   4. Pancolonic diverticulosis without acute diverticulitis.         CT ABDOMEN PELVIS W IV CONTRAST Additional Contrast? None   Final Result   1. No evidence of pulmonary embolism or other acute cardiopulmonary process.   2. Improved aeration left hemithorax from prior with persistent soft

## 2024-07-14 NOTE — PROGRESS NOTES
07/14/24 1037   Treatment   Treatment Type HHN   $Treatment Type $Inhaled Therapy/Meds   Medications Other (Comment)  (cyklokapron)

## 2024-07-14 NOTE — CONSULTS
Hematology/Oncology Consult  Encounter Date: 2024 1:47 PM    Ms. Dayami Christensen is a 89 y.o. female  : 1934  MRN: [id number]  Acct Number: [id number]  Requesting Provider: dr morris    Reason for request: lung cancer     CONSULTANT: Bert Coreas MD    HPI: Dayami admitted for hemoptysis. 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 Positive for malignant pleural effusion. This is stage 4.  Has completed 6 treatments of of XRT to lungs, last in 2024. Has pain on swallowing. 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     Patient Active Problem List   Diagnosis    Essential hypertension    New onset atrial fibrillation (HCC)    Type 2 diabetes mellitus without complication, without long-term current use of insulin (HCC)    Class 2 severe obesity due to excess calories with serious comorbidity and body mass index (BMI) of 36.0 to 36.9 in adult (HCC)    Gastroesophageal reflux disease without esophagitis    Primary osteoarthritis involving multiple joints    Abnormal gait    Risk for falls    Chronic midline low back pain without sciatica    Osteopenia    Dyslipidemia    Dizzy    History of cardiac radiofrequency ablation (RFA)    Acute respiratory failure with hypoxia (HCC)    Lung mass    Secondary hypercoagulable state (HCC)    Malignant neoplasm of overlapping sites of left lung (HCC)    Collapse of left lung    Squamous cell carcinoma of left lung (HCC)    Pleural effusion, left    Palliative care encounter    Goals of care, counseling/discussion    Advanced care planning/counseling discussion    Malignant neoplasm of left lung (HCC)    Neoplasm related pain    Shortness of breath at rest    SOB (shortness of breath)    Encounter for hospice care discussion    Constipation    Hx of cancer of lung    Generalized weakness    Neuropathy    Pain in both lower extremities    Hemoptysis     Past Medical History:   Diagnosis Date    Arthritis     Chronic  retroperitoneal adenopathy. Bones/Soft Tissues: No acute osseous or soft tissue findings.     1. No evidence of pulmonary embolism or other acute cardiopulmonary process. 2. Improved aeration left hemithorax from prior with persistent soft tissue involvement of the left hilar and suprahilar region with narrowing of the left upper lobe bronchus and near total lobar atelectasis of the left upper lobe with minimal improved aeration from prior and a left pleural drainage catheter in place with trace left pleural air or trace anterior pneumothorax from instrumentation likely however improved or decreased left pleural effusion with only trace left effusion remains.  Continued attention on follow-up for underlying mass or malignancy and stability.  No clear progression. 3. No evidence of metastatic disease in the abdomen or pelvis. 4. Pancolonic diverticulosis without acute diverticulitis.     XR CHEST PORTABLE    Result Date: 7/11/2024  EXAMINATION: ONE XRAY VIEW OF THE CHEST 7/11/2024 11:02 pm COMPARISON: July 8, 2024. HISTORY: ORDERING SYSTEM PROVIDED HISTORY: sob TECHNOLOGIST PROVIDED HISTORY: Reason for exam:->sob What reading provider will be dictating this exam?->CRC FINDINGS: Mild increase in large left pleural effusion.  No cardiomediastinal shift. Left pleural tube remains in position.  Osseous and body wall soft tissues unremarkable.     Mild increase in large left pleural effusion.     CT HEAD WO CONTRAST    Result Date: 7/8/2024  EXAMINATION: CT OF THE HEAD WITHOUT CONTRAST  7/8/2024 12:37 pm TECHNIQUE: CT of the head was performed without the administration of intravenous contrast. Automated exposure control, iterative reconstruction, and/or weight based adjustment of the mA/kV was utilized to reduce the radiation dose to as low as reasonably achievable. COMPARISON: Correlation is made with MRI of the brain performed 06/11/2024. HISTORY: ORDERING SYSTEM PROVIDED HISTORY: weakness ca with mets,

## 2024-07-14 NOTE — CONSULTS
Inpatient consult to GI  Consult performed by: Star Canchola MD  Consult ordered by: Roque Kyle MD      Patient Name: Dayami Christensen  Admit Date: 2024  1:44 PM  MR #: [id number]  : 1934    Attending Physician: Roque Kyle MD    History of Presenting Illness:      Dayami Christensen is a 89 y.o. female on hospital day 1, admitted with short of breath and hemoptysis. Patient with  has a past medical history of Arthritis, Chronic kidney disease, Diabetes mellitus (HCC), Gastroesophageal reflux disease without esophagitis, History of blood transfusion, Hyperlipidemia, Hypertension, Neuropathy, Osteoporosis, and Urinary frequency.    Reason for GI consult: odynophagia vs GERD    History Obtained From:  patient, electronic medical record, staff nurse    Patient presented from nursing home with complaint of shortness of breath and coughing up blood.  Patient reports she has retrosternal pain and unable to swallow water just prior to coming in.  Patient reports not able to manage oral secretions and spitting into emesis bag today.  Patient with diagnosis of squamous cell carcinoma of left lung currently with worsening of the underlying cancer.  She reports unable to swallow liquids for the past 3 days. Patient reports she has not eaten solid food in 5 days.  Patient resides at [facility].  Patient denies any abdominal pain.  Patient reports last bowel movement yesterday.  Unable to quantify if any weight loss  Patient is a poor historian    Previous endoscopic evaluation:  Patient denies any previous endoscopic procedures    History:      Past Medical History:   Diagnosis Date    Arthritis     Chronic kidney disease     Diabetes mellitus (HCC)     borderline    Gastroesophageal reflux disease without esophagitis 2017    History of blood transfusion     hemorrhage after an     Hyperlipidemia     Hypertension     Neuropathy 2024    Osteoporosis     Urinary frequency      Past Surgical History:

## 2024-07-14 NOTE — PROGRESS NOTES
Pt reporting feeling nausea/dry heaves after receiving two does of Transexamic Acid.  Pt expresses not wanting to receive this medication anymore.  Secure message sent to Pulmonologist, awaiting response.

## 2024-07-14 NOTE — CARE COORDINATION
Readmission Assessment  Number of Days since last admission?: 8-30 days  Previous Disposition: SNF  Who is being Interviewed: Patient  What was the patient's/caregiver's perception as to why they think they needed to return back to the hospital?: Other (Comment) (acute illness: hemoptysis.)  Did you visit your Primary Care Physician after you left the hospital, before you returned this time?:  ([facility] provider.)  Who advised the patient to return to the hospital?: Skilled Unit  Does the patient report anything that got in the way of taking their medications?:  (n/a snf)  Case Management Assessment  Initial Evaluation    Date/Time of Evaluation: 7/13/2024 9:00 PM  Assessment Completed by: Aggie Chauhan RN    If patient is discharged prior to next notation, then this note serves as note for discharge by case management.    Patient Name: Dayami Christensen                   YOB: 1934  Diagnosis: Hypokalemia [E87.6]  Hypomagnesemia [E83.42]  Hemoptysis [R04.2]                   Date / Time: 7/13/2024  1:44 PM    Patient Admission Status: Inpatient   Readmission Risk (Low < 19, Mod (19-27), High > 27): Readmission Risk Score: 26.1    Current PCP: Shashi Gan MD  PCP verified by CM? (P)  ([facility] provider.)    Chart Reviewed: Yes      History Provided by: (P) Patient, Child/Family  Patient Orientation: Alert and Oriented, Person, Place, Situation, Self    Patient Cognition: (P) Alert    Hospitalization in the last 30 days (Readmission):  Yes    If yes, Readmission Assessment in CM Navigator will be completed.    Advance Directives:      Code Status: Prior   Patient's Primary Decision Maker is: (P) Named in Scanned ACP Document    Primary Decision Maker: Fanny Ivy - Child - [phone]    Secondary Decision Maker: Inna Shelton - Child - [phone]    Supplemental (Other) Decision Maker: Jose Alejandro Shelton - Child - [phone]    Discharge Planning:    Patient lives with:   Type of Home: (P) Skilled Nursing

## 2024-07-14 NOTE — PROGRESS NOTES
Pt assessed this AM.  Pt remains NPO, awaiting GI to round.  Pt did report nausea, PRN IV Zofran given.  No hemoptysis so far this AM.  Per nightshift report, pt had thick tan/pink tinged sputum x1.  Will continue to monitor.  IVFs infusing per order.  Bed alarm on, safety maintained.

## 2024-07-14 NOTE — ACP (ADVANCE CARE PLANNING)
Advance Care Planning     Advance Care Planning Activator (Inpatient)  Conversation Note      Date of ACP Conversation: 7/13/2024     Conversation Conducted with: Patient with Decision Making Capacity    ACP Activator: Aggie Chauhan, RN        Health Care Decision Maker:     Current Designated Health Care Decision Maker:     Primary Decision Maker: Fanny Ivy - Child - [phone]    Secondary Decision Maker: Inna Shelton - Child - [phone]    Supplemental (Other) Decision Maker: Jose Alejandro Shelton - Child - [phone]

## 2024-07-14 NOTE — PROGRESS NOTES
Secure message received from Pulmonologist who said okay to stop Tranexamic Acid.  Call to resp therapist to update.

## 2024-07-14 NOTE — CONSULTS
Continuous Infusions:   sodium chloride      lactated ringers IV soln 75 mL/hr at 07/14/24 0800       Scheduled Meds:   atorvastatin  40 mg Oral Nightly    budesonide-formoterol  2 puff Inhalation BID RT    And    tiotropium  2 puff Inhalation Daily RT    midodrine  2.5 mg Oral TID WC    trospium  20 mg Oral Nightly    pantoprazole  40 mg Oral QAM AC    tranexamic acid  500 mg Nebulization TID    dilTIAZem  120 mg Oral Daily    sodium chloride flush  10 mL IntraVENous 2 times per day       PRN Meds:hydrOXYzine HCl, sodium chloride flush, sodium chloride, ondansetron **OR** ondansetron, polyethylene glycol, acetaminophen **OR** acetaminophen, potassium chloride **OR** potassium alternative oral replacement **OR** potassium chloride, magnesium sulfate        REVIEW OF SYSTEMS:  ROS: 10 organs review of system is done including general, psychological, ENT, hematological, endocrine, respiratory, cardiovascular, gastrointestinal, musculoskeletal, neurological,  allergy and Immunology is done and is otherwise negative.    PHYSICAL EXAM:    Vitals:  BP (!) 104/59   Pulse 96   Temp 97.3 °F (36.3 °C) (Oral)   Resp 20   Ht 1.346 m (4' 5\")   Wt 87.5 kg (193 lb)   SpO2 100%   BMI 48.31 kg/m²     General: alert, cooperative, no distress  Head: normocephalic, atraumatic  Eyes:No gross abnormalities.  ENT:  MMM no lesions  Neck:  supple and no masses  Chest : Diminished air movement, no wheezing, minimal rales on the left, nontender, tympanic, Pleurx catheter on the left  Heart:: Heart sounds are normal.  Regular rate and rhythm without murmur, gallop or rub.  ABD:  symmetric, soft, non-tender  Musculoskeletal : no cyanosis, no clubbing, and no edema  Neuro:  Grossly normal  Skin: No rashes or nodules noted.  Lymph node:  no cervical nodes  Urology: No [place]   Psychiatric: Calm        Data Review  Recent Labs     07/11/24  2245 07/13/24  1430 07/14/24  0507   WBC 9.7 9.0 7.5   HGB 14.0 11.7* 11.2*   HCT 45.4 37.6 35.9*     321 249      Recent Labs     07/11/24  2245 07/13/24  1430 07/14/24  0507    138 136   K 3.9 3.2* 3.8   CL 95 98 97   CO2 27 29 29   BUN 9 8 6*   CREATININE 0.45* 0.30* 0.27*   GLUCOSE 261* 138* 125*       MV Settings:          ABGs: No results for input(s): \"PHART\", \"VVL2ZLE\", \"PO2ART\", \"JZX1XKH\", \"BEART\", \"H2FPNTFF\", \"XYF6JFF\" in the last 72 hours.  O2 Device: Nasal cannula  O2 Flow Rate (L/min): 3 L/min  Lab Results   Component Value Date/Time    LACTA 1.3 07/13/2024 02:30 PM    LACTA 1.7 06/21/2024 01:00 PM    LACTA 3.0 05/10/2024 09:45 PM       Radiology  I personally reviewed imaging studies films and CT chestShows left upper lobe opacification and collapse no significant pleural effusion        Assessment, plan:   Patient is at risk due to    Hemoptysis secondary to underlying squamous cell carcinoma  Stage IV T2b, N2, M1 squamous cell carcinoma of the lung  Malignant pleural effusion status post pleural  Dysphagia  Diabetes  Hypertension    Recommendation  Monitor clinically, bronchoscopy if worsening hemoptysis  Continue tranexamic acid nebs  Gentle hydration  Watch blood sugar target 1 40-1 80  Consider NG tube and tube feed  Appreciate GI  Monitor and replace electrolyte as needed      Discussed with patient daughter    Thank you for consultation    Electronically signed by Melva Agrawal MD, [facility],  on 7/14/2024 at 10:11 AM

## 2024-07-15 ENCOUNTER — PREP FOR PROCEDURE (OUTPATIENT)
Dept: GASTROENTEROLOGY | Age: 89
End: 2024-07-15

## 2024-07-15 ENCOUNTER — ANESTHESIA EVENT (OUTPATIENT)
Dept: ENDOSCOPY | Age: 89
End: 2024-07-15
Payer: MEDICARE

## 2024-07-15 ENCOUNTER — ANESTHESIA (OUTPATIENT)
Dept: ENDOSCOPY | Age: 89
End: 2024-07-15
Payer: MEDICARE

## 2024-07-15 ENCOUNTER — APPOINTMENT (OUTPATIENT)
Dept: GENERAL RADIOLOGY | Age: 89
DRG: 181 | End: 2024-07-15
Payer: MEDICARE

## 2024-07-15 PROBLEM — E87.6 HYPOKALEMIA: Status: ACTIVE | Noted: 2024-07-15

## 2024-07-15 PROBLEM — K20.90 ESOPHAGITIS: Status: ACTIVE | Noted: 2024-07-15

## 2024-07-15 LAB
ANION GAP SERPL CALCULATED.3IONS-SCNC: 8 MEQ/L (ref 9–15)
BASOPHILS # BLD: 0 K/UL (ref 0–0.2)
BASOPHILS NFR BLD: 0.3 %
BUN SERPL-MCNC: 10 MG/DL (ref 8–23)
CALCIUM SERPL-MCNC: 7.5 MG/DL (ref 8.5–9.9)
CHLORIDE SERPL-SCNC: 100 MEQ/L (ref 95–107)
CO2 SERPL-SCNC: 33 MEQ/L (ref 20–31)
CREAT SERPL-MCNC: 0.34 MG/DL (ref 0.5–0.9)
EOSINOPHIL # BLD: 0.1 K/UL (ref 0–0.7)
EOSINOPHIL NFR BLD: 1.2 %
ERYTHROCYTE [DISTWIDTH] IN BLOOD BY AUTOMATED COUNT: 16.9 % (ref 11.5–14.5)
GLUCOSE BLD-MCNC: 170 MG/DL (ref 70–99)
GLUCOSE SERPL-MCNC: 183 MG/DL (ref 70–99)
HCT VFR BLD AUTO: 35.1 % (ref 37–47)
HCT VFR BLD AUTO: 36 % (ref 37–47)
HCT VFR BLD AUTO: 36.5 % (ref 37–47)
HCT VFR BLD AUTO: 38.3 % (ref 37–47)
HGB BLD-MCNC: 10.9 G/DL (ref 12–16)
HGB BLD-MCNC: 11.1 G/DL (ref 12–16)
HGB BLD-MCNC: 11.4 G/DL (ref 12–16)
HGB BLD-MCNC: 11.9 G/DL (ref 12–16)
LYMPHOCYTES # BLD: 1.1 K/UL (ref 1–4.8)
LYMPHOCYTES NFR BLD: 15.1 %
MAGNESIUM SERPL-MCNC: 1.5 MG/DL (ref 1.7–2.4)
MAGNESIUM SERPL-MCNC: 1.6 MG/DL (ref 1.7–2.4)
MCH RBC QN AUTO: 26.5 PG (ref 27–31.3)
MCHC RBC AUTO-ENTMCNC: 30.8 % (ref 33–37)
MCV RBC AUTO: 85.9 FL (ref 79.4–94.8)
MONOCYTES # BLD: 0.4 K/UL (ref 0.2–0.8)
MONOCYTES NFR BLD: 5.4 %
NEUTROPHILS # BLD: 5.7 K/UL (ref 1.4–6.5)
NEUTS SEG NFR BLD: 77.5 %
PERFORMED ON: ABNORMAL
PERFORMED ON: ABNORMAL
PHOSPHATE SERPL-MCNC: 1.6 MG/DL (ref 2.3–4.8)
PHOSPHATE SERPL-MCNC: 1.6 MG/DL (ref 2.3–4.8)
PLATELET # BLD AUTO: 247 K/UL (ref 130–400)
POC CREATININE: 0.4 MG/DL (ref 0.6–1.2)
POC SAMPLE TYPE: ABNORMAL
POTASSIUM SERPL-SCNC: 3.1 MEQ/L (ref 3.4–4.9)
POTASSIUM SERPL-SCNC: 3.3 MEQ/L (ref 3.4–4.9)
RBC # BLD AUTO: 4.19 M/UL (ref 4.2–5.4)
SODIUM SERPL-SCNC: 141 MEQ/L (ref 135–144)
WBC # BLD AUTO: 7.4 K/UL (ref 4.8–10.8)

## 2024-07-15 PROCEDURE — 84100 ASSAY OF PHOSPHORUS: CPT

## 2024-07-15 PROCEDURE — 2700000000 HC OXYGEN THERAPY PER DAY

## 2024-07-15 PROCEDURE — 99232 SBSQ HOSP IP/OBS MODERATE 35: CPT | Performed by: INTERNAL MEDICINE

## 2024-07-15 PROCEDURE — 84132 ASSAY OF SERUM POTASSIUM: CPT

## 2024-07-15 PROCEDURE — 3700000000 HC ANESTHESIA ATTENDED CARE: Performed by: INTERNAL MEDICINE

## 2024-07-15 PROCEDURE — 99223 1ST HOSP IP/OBS HIGH 75: CPT

## 2024-07-15 PROCEDURE — 85014 HEMATOCRIT: CPT

## 2024-07-15 PROCEDURE — 2580000003 HC RX 258: Performed by: INTERNAL MEDICINE

## 2024-07-15 PROCEDURE — 6370000000 HC RX 637 (ALT 250 FOR IP)

## 2024-07-15 PROCEDURE — 94761 N-INVAS EAR/PLS OXIMETRY MLT: CPT

## 2024-07-15 PROCEDURE — 83735 ASSAY OF MAGNESIUM: CPT

## 2024-07-15 PROCEDURE — 36415 COLL VENOUS BLD VENIPUNCTURE: CPT

## 2024-07-15 PROCEDURE — 0DJ08ZZ INSPECTION OF UPPER INTESTINAL TRACT, VIA NATURAL OR ARTIFICIAL OPENING ENDOSCOPIC: ICD-10-PCS | Performed by: INTERNAL MEDICINE

## 2024-07-15 PROCEDURE — 2500000003 HC RX 250 WO HCPCS: Performed by: NURSE ANESTHETIST, CERTIFIED REGISTERED

## 2024-07-15 PROCEDURE — 6360000002 HC RX W HCPCS: Performed by: NURSE ANESTHETIST, CERTIFIED REGISTERED

## 2024-07-15 PROCEDURE — 6360000002 HC RX W HCPCS: Performed by: INTERNAL MEDICINE

## 2024-07-15 PROCEDURE — 85018 HEMOGLOBIN: CPT

## 2024-07-15 PROCEDURE — 80048 BASIC METABOLIC PNL TOTAL CA: CPT

## 2024-07-15 PROCEDURE — 3700000001 HC ADD 15 MINUTES (ANESTHESIA): Performed by: INTERNAL MEDICINE

## 2024-07-15 PROCEDURE — 7100000011 HC PHASE II RECOVERY - ADDTL 15 MIN: Performed by: INTERNAL MEDICINE

## 2024-07-15 PROCEDURE — 85025 COMPLETE CBC W/AUTO DIFF WBC: CPT

## 2024-07-15 PROCEDURE — 6370000000 HC RX 637 (ALT 250 FOR IP): Performed by: INTERNAL MEDICINE

## 2024-07-15 PROCEDURE — 94640 AIRWAY INHALATION TREATMENT: CPT

## 2024-07-15 PROCEDURE — 2709999900 HC NON-CHARGEABLE SUPPLY: Performed by: INTERNAL MEDICINE

## 2024-07-15 PROCEDURE — 7100000010 HC PHASE II RECOVERY - FIRST 15 MIN: Performed by: INTERNAL MEDICINE

## 2024-07-15 PROCEDURE — 3609017100 HC EGD: Performed by: INTERNAL MEDICINE

## 2024-07-15 PROCEDURE — 43235 EGD DIAGNOSTIC BRUSH WASH: CPT | Performed by: INTERNAL MEDICINE

## 2024-07-15 PROCEDURE — 1210000000 HC MED SURG R&B

## 2024-07-15 RX ORDER — MORPHINE SULFATE 2 MG/ML
2 INJECTION, SOLUTION INTRAMUSCULAR; INTRAVENOUS EVERY 4 HOURS PRN
Status: DISCONTINUED | OUTPATIENT
Start: 2024-07-15 | End: 2024-07-21 | Stop reason: HOSPADM

## 2024-07-15 RX ORDER — SODIUM CHLORIDE 9 MG/ML
INJECTION, SOLUTION INTRAVENOUS PRN
Status: DISCONTINUED | OUTPATIENT
Start: 2024-07-15 | End: 2024-07-15 | Stop reason: HOSPADM

## 2024-07-15 RX ORDER — SODIUM CHLORIDE 9 MG/ML
INJECTION, SOLUTION INTRAVENOUS CONTINUOUS
Status: CANCELLED | OUTPATIENT
Start: 2024-07-15

## 2024-07-15 RX ORDER — SODIUM CHLORIDE 0.9 % (FLUSH) 0.9 %
5-40 SYRINGE (ML) INJECTION EVERY 12 HOURS SCHEDULED
Status: DISCONTINUED | OUTPATIENT
Start: 2024-07-15 | End: 2024-07-15 | Stop reason: HOSPADM

## 2024-07-15 RX ORDER — PROPOFOL 10 MG/ML
INJECTION, EMULSION INTRAVENOUS PRN
Status: DISCONTINUED | OUTPATIENT
Start: 2024-07-15 | End: 2024-07-15 | Stop reason: SDUPTHER

## 2024-07-15 RX ORDER — SODIUM CHLORIDE 0.9 % (FLUSH) 0.9 %
5-40 SYRINGE (ML) INJECTION PRN
Status: CANCELLED | OUTPATIENT
Start: 2024-07-15

## 2024-07-15 RX ORDER — SODIUM CHLORIDE 0.9 % (FLUSH) 0.9 %
5-40 SYRINGE (ML) INJECTION PRN
Status: DISCONTINUED | OUTPATIENT
Start: 2024-07-15 | End: 2024-07-15 | Stop reason: HOSPADM

## 2024-07-15 RX ORDER — SODIUM CHLORIDE 9 MG/ML
INJECTION, SOLUTION INTRAVENOUS PRN
Status: CANCELLED | OUTPATIENT
Start: 2024-07-15

## 2024-07-15 RX ORDER — SODIUM CHLORIDE 9 MG/ML
INJECTION, SOLUTION INTRAVENOUS CONTINUOUS
Status: DISCONTINUED | OUTPATIENT
Start: 2024-07-15 | End: 2024-07-15 | Stop reason: HOSPADM

## 2024-07-15 RX ORDER — SODIUM CHLORIDE 9 MG/ML
INJECTION, SOLUTION INTRAVENOUS
Status: COMPLETED
Start: 2024-07-15 | End: 2024-07-15

## 2024-07-15 RX ORDER — LIDOCAINE HYDROCHLORIDE 20 MG/ML
INJECTION, SOLUTION INFILTRATION; PERINEURAL PRN
Status: DISCONTINUED | OUTPATIENT
Start: 2024-07-15 | End: 2024-07-15 | Stop reason: SDUPTHER

## 2024-07-15 RX ORDER — SUCRALFATE 1 G/1
1 TABLET ORAL
Status: DISCONTINUED | OUTPATIENT
Start: 2024-07-15 | End: 2024-07-21 | Stop reason: HOSPADM

## 2024-07-15 RX ORDER — SODIUM CHLORIDE 0.9 % (FLUSH) 0.9 %
5-40 SYRINGE (ML) INJECTION EVERY 12 HOURS SCHEDULED
Status: CANCELLED | OUTPATIENT
Start: 2024-07-15

## 2024-07-15 RX ADMIN — TROSPIUM CHLORIDE 20 MG: 20 TABLET, FILM COATED ORAL at 22:19

## 2024-07-15 RX ADMIN — LIDOCAINE HYDROCHLORIDE 40 MG: 20 INJECTION, SOLUTION INFILTRATION; PERINEURAL at 16:38

## 2024-07-15 RX ADMIN — POTASSIUM CHLORIDE 10 MEQ: 7.46 INJECTION, SOLUTION INTRAVENOUS at 11:06

## 2024-07-15 RX ADMIN — ATORVASTATIN CALCIUM 40 MG: 40 TABLET, FILM COATED ORAL at 22:19

## 2024-07-15 RX ADMIN — Medication 10 ML: at 11:55

## 2024-07-15 RX ADMIN — TIOTROPIUM BROMIDE INHALATION SPRAY 2 PUFF: 3.12 SPRAY, METERED RESPIRATORY (INHALATION) at 06:48

## 2024-07-15 RX ADMIN — Medication 10 ML: at 22:24

## 2024-07-15 RX ADMIN — SODIUM CHLORIDE: 9 INJECTION, SOLUTION INTRAVENOUS at 16:29

## 2024-07-15 RX ADMIN — PROPOFOL 90 MG: 10 INJECTION, EMULSION INTRAVENOUS at 16:38

## 2024-07-15 RX ADMIN — SUCRALFATE 1 G: 1 TABLET ORAL at 22:18

## 2024-07-15 RX ADMIN — BUDESONIDE AND FORMOTEROL FUMARATE DIHYDRATE 2 PUFF: 80; 4.5 AEROSOL RESPIRATORY (INHALATION) at 20:35

## 2024-07-15 RX ADMIN — BUDESONIDE AND FORMOTEROL FUMARATE DIHYDRATE 2 PUFF: 80; 4.5 AEROSOL RESPIRATORY (INHALATION) at 06:48

## 2024-07-15 ASSESSMENT — ENCOUNTER SYMPTOMS
NAUSEA: 0
TROUBLE SWALLOWING: 1
COLOR CHANGE: 0
SHORTNESS OF BREATH: 0
SHORTNESS OF BREATH: 1
VOICE CHANGE: 0
VOMITING: 0
COUGH: 1

## 2024-07-15 ASSESSMENT — PAIN SCALES - GENERAL: PAINLEVEL_OUTOF10: 3

## 2024-07-15 ASSESSMENT — PAIN - FUNCTIONAL ASSESSMENT: PAIN_FUNCTIONAL_ASSESSMENT: 0-10

## 2024-07-15 NOTE — ANESTHESIA POSTPROCEDURE EVALUATION
Department of Anesthesiology  Postprocedure Note    Patient: Dayami Christensen  MRN: [id number]  YOB: 1934  Date of evaluation: 7/15/2024    Procedure Summary       Date: 07/15/24 Room / Location: [facility] OR 03 / [facility]    Anesthesia Start: 1629 Anesthesia Stop: 1650    Procedure: ESOPHAGOGASTRODUODENOSCOPY Diagnosis:       Dysphagia, unspecified type      (Dysphagia, unspecified type [R13.10])    Surgeons: Star Canchola MD Responsible Provider: Melanie Hdz APRN - CRNA    Anesthesia Type: MAC ASA Status: 4            Anesthesia Type: No value filed.    Angela Phase I: Angela Score: 9    Angela Phase II:      Anesthesia Post Evaluation    Patient location during evaluation: PACU  Patient participation: complete - patient participated  Level of consciousness: awake  Pain score: 0  Airway patency: patent  Nausea & Vomiting: no nausea and no vomiting  Cardiovascular status: hemodynamically stable  Respiratory status: acceptable  Hydration status: euvolemic  Pain management: adequate        No notable events documented.

## 2024-07-15 NOTE — PROGRESS NOTES
INPATIENT PROGRESS NOTES    PATIENT NAME: Dayami Christensen  MRN: [id number]  SERVICE DATE:  July 15, 2024   SERVICE TIME:  9:26 AM      PRIMARY SERVICE: Pulmonary Disease    CHIEF COMPLAIN: Hemoptysis      INTERVAL HPI: Patient seen and examined at bedside, Interval Notes, orders reviewed. Nursing notes noted  Discussed with RN patient has no more hemoptysis.  She is going for EGD.  She is comfortable in bed O2 saturation is 100% on 3 L O2 via nasal cannula.  No fever or chills.  No chest pain.  No vomiting or diarrhea.         OBJECTIVE   I/O:24HR INTAKE/OUTPUT:    Intake/Output Summary (Last 24 hours) at 7/15/2024 0926  Last data filed at 7/14/2024 1904  Gross per 24 hour   Intake --   Output 200 ml   Net -200 ml     07/14 0701 - 07/15 0700  In: 786.8 [I.V.:786.8]  Out: 200 [Urine:200]  Body mass index is 48.31 kg/m².    PHYSICAL EXAM:  Vitals:  BP (!) 107/57   Pulse 91   Temp 97.5 °F (36.4 °C) (Oral)   Resp 16   Ht 1.346 m (4' 5\")   Wt 87.5 kg (193 lb)   SpO2 100%   BMI 48.31 kg/m²     General: Alert, awake .comfortable in bed, No distress.  Head: Atraumatic , Normocephalic   Eyes: PERRL. No sclera icterus. No conjunctival injection. No discharge   ENT: No nasal  discharge. Pharynx clear.  Neck:  Trachea midline. No thyromegaly, no JVD, No cervical adenopathy.  Chest : Bilaterally symmetrical ,Normal effort,  No accessory muscle use  Lung : Diminished breath sound left base. No Rales. No wheezing. No rhonchi.   Heart:: Normal rate. Regular rhythm. No mumur ,  Rub or gallop  ABD: Non-tender. Non-distended. No masses. No organmegaly. Normal bowel sounds. No hernia.  Ext : No Pitting both leg , No Cyanosis. No clubbing.  Neuro: no focal weakness    Labs:  Recent Labs     07/13/24  1430 07/14/24  0507 07/14/24  1218 07/14/24  1927 07/15/24  0552   WBC 9.0 7.5  --   --  7.4   HGB 11.7* 11.2* 11.6* 11.4* 10.9*  11.1*    249  --   --  247     Recent Labs     07/13/24  1430 07/13/24  1450 07/14/24  4867  evidence of acute cholecystitis.     IR GUIDED THORACENTESIS PLEURAL    Result Date: 6/24/2024  PROCEDURE: ULTRASOUNDGUIDED LEFT THORACENTESIS 6/24/2024 HISTORY: ORDERING SYSTEM PROVIDED HISTORY: pleural effusion TECHNOLOGIST PROVIDED HISTORY: Reason for exam:->pleural effusion Which side should the procedure be performed?->Left What reading provider will be dictating this exam?->CRC TECHNIQUE: Informed consent was obtained after a detailed explanation of the procedure including risks, benefits, and alternatives.  Universal protocol was performed. The left chest was prepped and draped in sterile fashion and local anesthesia was achieved with lidocaine.  An 8 Greek needle sheath was advanced under ultrasound guidance into pleural effusion and thoracentesis was performed. The patient tolerated the procedure well. FINDINGS: A total of 550 cc was removed.     Successful ultrasound guided thoracentesis.     XR CHEST (SINGLE VIEW FRONTAL)    Result Date: 6/24/2024  EXAMINATION: ONE XRAY VIEW OF THE CHEST 6/24/2024 10:45 am COMPARISON: 06/21/2024 HISTORY: ORDERING SYSTEM PROVIDED HISTORY: post left thoracentesis TECHNOLOGIST PROVIDED HISTORY: Reason for exam:->post left thoracentesis What reading provider will be dictating this exam?->CRC FINDINGS: Nonspecific interstitial prominence, chronic changes versus component of edema or atypical infection in the acute setting.  Trace right pleural effusion or thickening.  ASVD, cardiomegaly.  Decreased left pleural effusion, now moderate.  Interval partial aeration of the left upper and mid lung zone.  Lytic lesion and pathologic fracture of the left 1st rib.     1. Decreased left pleural effusion, now moderate. Interval partial aeration of the left upper and mid lung zone. No pneumothorax. 2. Nonspecific interstitial prominence, chronic changes versus component of edema or atypical infection in the acute setting. 3. Lytic lesion and pathologic fracture of the left 1st rib.

## 2024-07-15 NOTE — PROGRESS NOTES
Physical Therapy Missed Treatment   Facility/Department: [facility] MED SURG W473/W473-01    NAME: Dayami Christensen    : 1934 (89 y.o.)  MRN: [id number]    Account: [id number]  Gender: female      Pt going off floor at 1354 for testing.  Unable to complete PT eval at this time.      Will follow and attempt PT evaluation again at earliest availability.       Lizette Holman, PT, 07/15/24 at 2:12 PM

## 2024-07-15 NOTE — PROGRESS NOTES
Hospitalist Progress Note      PCP: Shashi Gan MD    Date of Admission: 7/13/2024    Chief Complaint:    Chief Complaint   Patient presents with    Hemoptysis     Pt from nh. Pt was drained from plurex 350 ml at noon. Cc pt is sob and coughing blood.       Subjective:  Patient denies fevers, chills, sweats, CP, SOB, diarrhea or burning micturition.  Denies further hemoptysis.  Complaining of back pain.  12 point ROS negative other than mentioned above     Medications:  Reviewed    Infusion Medications    PN-Adult Premix 4.25/10 - Peripheral Line 50 mL/hr at 07/14/24 1904    sodium chloride       Scheduled Medications    atorvastatin  40 mg Oral Nightly    budesonide-formoterol  2 puff Inhalation BID RT    And    tiotropium  2 puff Inhalation Daily RT    midodrine  2.5 mg Oral TID WC    trospium  20 mg Oral Nightly    pantoprazole  40 mg Oral QAM AC    dilTIAZem  120 mg Oral Daily    sodium chloride flush  10 mL IntraVENous 2 times per day     PRN Meds: hydrOXYzine HCl, sodium phosphate 15 mmol in sodium chloride 0.9 % 250 mL IVPB, sodium chloride flush, sodium chloride, ondansetron **OR** ondansetron, polyethylene glycol, acetaminophen **OR** acetaminophen, potassium chloride **OR** potassium alternative oral replacement **OR** potassium chloride, magnesium sulfate      Intake/Output Summary (Last 24 hours) at 7/15/2024 1137  Last data filed at 7/14/2024 1904  Gross per 24 hour   Intake --   Output 200 ml   Net -200 ml         Exam:    BP (!) 107/57   Pulse 91   Temp 97.5 °F (36.4 °C) (Oral)   Resp 16   Ht 1.346 m (4' 5\")   Wt 87.5 kg (193 lb)   SpO2 100%   BMI 48.31 kg/m²     General appearance: No apparent distress, appears stated age and cooperative.  HEENT:  Conjunctivae/corneas clear.  Neck: Trachea midline.  Respiratory:  Normal respiratory effort. Clear to auscultation  Cardiovascular: Regular rate and rhythm  Abdomen: Soft, non-tender, non-distended with normal bowel sounds.  Musculoskeletal: No

## 2024-07-15 NOTE — PLAN OF CARE
Nutrition Problem #1: Increased nutrient needs  Intervention: Food and/or Nutrient Delivery: Continue NPO (Await results of EGD)

## 2024-07-15 NOTE — PROGRESS NOTES
[facility]  Occupational Therapy        NAME:  Dayami Christensen  ROOM: W473/W473-01  :  1934  DATE: 7/15/2024    Attempted to see Dayami Christensen at 1553 on this date for:   [x]  Initial Evaluation   []  Treatment     Pt was going off unit with transport to [facility]. Will continue to follow.      Electronically signed by SAM Santa on 7/15/24 at 2:13 PM EDT

## 2024-07-15 NOTE — ANESTHESIA PRE PROCEDURE
PRRoque Foster MD        Or    potassium bicarb-citric acid (EFFER-K) effervescent tablet 40 mEq  40 mEq Oral PRRoque Foster MD        Or    potassium chloride 10 mEq/100 mL IVPB (Peripheral Line)  10 mEq IntraVENous PRRoque Foster  mL/hr at 07/15/24 1106 10 mEq at 07/15/24 1106    magnesium sulfate 2000 mg in 50 mL IVPB premix  2,000 mg IntraVENous PRRoque Foster MD           Allergies:    Allergies   Allergen Reactions    Asa [Aspirin] Nausea And Vomiting     Patient states she takes aspirin with no problems          Problem List:    Patient Active Problem List   Diagnosis Code    Essential hypertension I10    New onset atrial fibrillation ([facility]) I48.91    Type 2 diabetes mellitus without complication, without long-term current use of insulin (HCC) E11.9    Class 2 severe obesity due to excess calories with serious comorbidity and body mass index (BMI) of 36.0 to 36.9 in adult (HCC) E66.01, Z68.36    Gastroesophageal reflux disease without esophagitis K21.9    Primary osteoarthritis involving multiple joints M15.9    Abnormal gait R26.9    Risk for falls Z91.81    Chronic midline low back pain without sciatica M54.50, G89.29    Osteopenia M85.80    Dyslipidemia E78.5    Dizzy R42    History of cardiac radiofrequency ablation (RFA) Z98.890    Acute respiratory failure with hypoxia ([facility]) J96.01    Lung mass R91.8    Secondary hypercoagulable state (HCC) D68.69    Malignant neoplasm of overlapping sites of left lung (HCC) C34.82    Collapse of left lung J98.11    Squamous cell carcinoma of left lung (HCC) C34.92    Pleural effusion, left J90    Palliative care encounter Z51.5    Goals of care, counseling/discussion Z71.89    Advanced care planning/counseling discussion Z71.89    Malignant neoplasm of left lung (HCC) C34.92    Neoplasm related pain G89.3    Shortness of breath at rest R06.02    SOB (shortness of breath) R06.02    Encounter for hospice care discussion Z71.89    Constipation

## 2024-07-15 NOTE — PROGRESS NOTES
Comprehensive Nutrition Assessment    Type and Reason for Visit:  Initial, Reassess    Nutrition Recommendations/Plan:   Await results of EGD     Malnutrition Assessment:  Malnutrition Status:  Insufficient data (07/15/24 1143)    Context:  Chronic Illness     Findings of the 6 clinical characteristics of malnutrition:  Energy Intake:     Weight Loss:        Body Fat Loss:        Muscle Mass Loss:       Fluid Accumulation:        Strength:       Nutrition Assessment:    Pt at risk for malnutrition related to metastatic disease, weight increased with edema, difficult to obtain reliable diet hx.  Pt is currently NPO due to dysphagia, EGD planned today.  Pt currently has PPN ordered.  Will wait for results of EGD to determine nutrition POC    Nutrition Related Findings:    \"history of squamous cell carcinoma left lung (stage 4), CKD, T2DM, GERD, HTN, HLD, currenly on raditaion therapy, poor prognosis per notes, admitted with short of breath and hemoptysis, recently admitted (7/8-11).  Noted pt reported last admission she has not been eating and has had weight loss. GI consulted for odynophagia vs GERD. plan for upper endoscopy plus minus biopsy +/- dilation today (7/15). Current labs noted (hypokalemia, hypomag, hypophos, gluc < 183), Meds reviewed.  PPN started 7/14. trace-2+ generalized edema noted. Wound Type: Multiple, Stage II, Pressure Injury       Current Nutrition Intake & Therapies:    Diet NPO Exceptions are: Sips of Water with Meds  PN-Adult Premix 4.25/10 - Peripheral Line    Anthropometric Measures:  Height: 134.6 cm (4' 5\")  Ideal Body Weight (IBW): 65 lbs (30 kg)    Admission Body Weight: 87.5 kg (193 lb) (? source)  Current Body Weight: 87.5 kg (193 lb) (adm wt),   IBW. Weight Source: Not Specified  Current BMI (kg/m2): 48.3  Usual Body Weight: 74.4 kg (164 lb) (( 5/2024), 163# (12/2022))  % Weight Change (Calculated): 17.7                    BMI Categories: Obese Class 3 (BMI 40.0 or

## 2024-07-15 NOTE — CONSULTS
Palliative Care Consult Note  Patient: Dayami Christensen  Gender: female  YOB: 1934  Unit/Bed: W473/W473-01  CodeStatus: Full Code  Inpatient Treatment Team: Treatment Team: Attending Provider: Roque Kyle MD; Consulting Physician: Star Canchola MD; Consulting Physician: Melva Agrawal MD; Consulting Physician: Bert Coreas MD; : Yolis Baker RN; Surgeon: Star Canchola MD; : Leanna Michael RN; Registered Nurse: Zayda Clarke RN; : Amisha Vazquez LSW; Utilization Reviewer: Josefina Alvarez RN  Admit Date:  7/13/2024    Chief Complaint: Hemoptysis    History of Presenting Illness:      Dayami Christensen is a 89 y.o. female on hospital day 2 with a history of squamous cell carcinoma left lung, CKD, T2DM, GERD, HTN, HLD, osteoporosis.    Patient was brought to the emergency room with shortness of breath and hemoptysis. Patient was admitted in the setting of dysphagia, stage IV squamous cell carcinoma of left lung.    Palliative care was consulted by Dr. Kyle for goals of care.     Upon entering the room I find the patient alert and oriented x3, she is answering questions appropriately.  She denies any fevers chills, or any pain at this time.  She reports that she is wanting to go forth with the treatment option that Dr. Coreas discussed with her.  I did discuss with her that there is no cure for her stage IV cancer and she has overall poor prognosis. I also communicated this with SELVIN Escobedo. Patient's previous functional status is limited.  She has had overall functional decline and frequent hospitalizations. She resides currently at [facility]. Plan for EGD today.       Most recent notable labs:   Sodium 141 potassium 3.1 BUN 10 creatinine 0.34 calcium 7.5 WBC 7.4 hemoglobin 11.9 hematocrit 38.3      Review of Systems:       Review of Systems   Constitutional:  Positive for activity change, appetite change and fatigue.   HENT:  Positive for trouble  for review. Automated exposure control, iterative reconstruction, and/or weight based adjustment of the mA/kV was utilized to reduce the radiation dose to as low as reasonably achievable. COMPARISON: CTA chest dated 06/21/2024 HISTORY: ORDERING SYSTEM PROVIDED HISTORY: Rule out PE TECHNOLOGIST PROVIDED HISTORY: Reason for exam:->Rule out PE Decision Support Exception - unselect if not a suspected or confirmed emergency medical condition->Emergency Medical Condition (MA) What reading provider will be dictating this exam?->CRC FINDINGS: Chest: Aorta: No evidence of thoracic aortic aneurysm or dissection.  No acute abnormality of the aorta. Mediastinum: Mediastinal soft tissue anterior mediastinal region prevascular involvement as detailed further below.  The heart and pericardium demonstrate no acute abnormality. Lungs/Pleura: Improved aeration left hemithorax from prior with persistent soft tissue involvement left hilar and suprahilar extension with narrowing left upper lobe bronchus and postobstructive changes near total lobar atelectasis of the left upper lobe with minimal improved aeration from prior and a left pleural drainage catheter in place with trace left pleural air or trace anterior pneumothorax from instrumentation likely however improved or decreased left pleural effusion with improved aeration left lower lung only trace left effusion remains.  Right hemithorax remains grossly clear without nodule or mass. Soft Tissues/Bones: No acute bone or soft tissue abnormality. Abdomen/Pelvis: Organs: Liver without focal lesion.  Gallbladder unremarkable. Pancreas and spleen unremarkable.  Adrenals without nodule.  Kidneys without suspicious renal lesion and no hydronephrosis. GI/Bowel: No focal thickening or disproportion dilatation of bowel.  No inflammatory findings.  Mild colonic stool burden.  Pancolonic diverticulosis without acute diverticulitis.  Appendix normal. Pelvis: No suspicious pelvic lesion or bulky

## 2024-07-15 NOTE — PROGRESS NOTES
Shift assessments complete, see flowsheets. Pt alert and oriented x 4. Pt refused medication , VSS. Pt able to make needs and wants known. No further needs verbalized at this time call light left within reach.

## 2024-07-16 LAB
ANION GAP SERPL CALCULATED.3IONS-SCNC: 8 MEQ/L (ref 9–15)
BASOPHILS # BLD: 0 K/UL (ref 0–0.2)
BASOPHILS NFR BLD: 0.3 %
BUN SERPL-MCNC: 9 MG/DL (ref 8–23)
CALCIUM SERPL-MCNC: 7.7 MG/DL (ref 8.5–9.9)
CHLORIDE SERPL-SCNC: 98 MEQ/L (ref 95–107)
CO2 SERPL-SCNC: 32 MEQ/L (ref 20–31)
CREAT SERPL-MCNC: 0.28 MG/DL (ref 0.5–0.9)
EKG ATRIAL RATE: 113 BPM
EKG Q-T INTERVAL: 352 MS
EKG QRS DURATION: 80 MS
EKG QTC CALCULATION (BAZETT): 461 MS
EKG R AXIS: -22 DEGREES
EKG T AXIS: 138 DEGREES
EKG VENTRICULAR RATE: 103 BPM
EOSINOPHIL # BLD: 0.1 K/UL (ref 0–0.7)
EOSINOPHIL NFR BLD: 1.6 %
ERYTHROCYTE [DISTWIDTH] IN BLOOD BY AUTOMATED COUNT: 16.8 % (ref 11.5–14.5)
GLUCOSE SERPL-MCNC: 150 MG/DL (ref 70–99)
HCT VFR BLD AUTO: 35.8 % (ref 37–47)
HCT VFR BLD AUTO: 38 % (ref 37–47)
HGB BLD-MCNC: 11.3 G/DL (ref 12–16)
HGB BLD-MCNC: 11.6 G/DL (ref 12–16)
LYMPHOCYTES # BLD: 1.5 K/UL (ref 1–4.8)
LYMPHOCYTES NFR BLD: 20.4 %
MAGNESIUM SERPL-MCNC: 1.6 MG/DL (ref 1.7–2.4)
MAGNESIUM SERPL-MCNC: 1.6 MG/DL (ref 1.7–2.4)
MCH RBC QN AUTO: 26.1 PG (ref 27–31.3)
MCHC RBC AUTO-ENTMCNC: 30.5 % (ref 33–37)
MCV RBC AUTO: 85.4 FL (ref 79.4–94.8)
MONOCYTES # BLD: 0.4 K/UL (ref 0.2–0.8)
MONOCYTES NFR BLD: 5.4 %
NEUTROPHILS # BLD: 5.3 K/UL (ref 1.4–6.5)
NEUTS SEG NFR BLD: 71.5 %
PHOSPHATE SERPL-MCNC: 1.9 MG/DL (ref 2.3–4.8)
PHOSPHATE SERPL-MCNC: 2.5 MG/DL (ref 2.3–4.8)
PLATELET # BLD AUTO: 289 K/UL (ref 130–400)
POTASSIUM SERPL-SCNC: 3.4 MEQ/L (ref 3.4–4.9)
POTASSIUM SERPL-SCNC: 3.7 MEQ/L (ref 3.4–4.9)
RBC # BLD AUTO: 4.45 M/UL (ref 4.2–5.4)
SODIUM SERPL-SCNC: 138 MEQ/L (ref 135–144)
WBC # BLD AUTO: 7.4 K/UL (ref 4.8–10.8)

## 2024-07-16 PROCEDURE — 36415 COLL VENOUS BLD VENIPUNCTURE: CPT

## 2024-07-16 PROCEDURE — 2580000003 HC RX 258: Performed by: INTERNAL MEDICINE

## 2024-07-16 PROCEDURE — 97166 OT EVAL MOD COMPLEX 45 MIN: CPT

## 2024-07-16 PROCEDURE — 94640 AIRWAY INHALATION TREATMENT: CPT

## 2024-07-16 PROCEDURE — 94761 N-INVAS EAR/PLS OXIMETRY MLT: CPT

## 2024-07-16 PROCEDURE — 6370000000 HC RX 637 (ALT 250 FOR IP)

## 2024-07-16 PROCEDURE — 80048 BASIC METABOLIC PNL TOTAL CA: CPT

## 2024-07-16 PROCEDURE — 6370000000 HC RX 637 (ALT 250 FOR IP): Performed by: INTERNAL MEDICINE

## 2024-07-16 PROCEDURE — 6360000002 HC RX W HCPCS: Performed by: INTERNAL MEDICINE

## 2024-07-16 PROCEDURE — 84100 ASSAY OF PHOSPHORUS: CPT

## 2024-07-16 PROCEDURE — 1210000000 HC MED SURG R&B

## 2024-07-16 PROCEDURE — 93010 ELECTROCARDIOGRAM REPORT: CPT | Performed by: INTERNAL MEDICINE

## 2024-07-16 PROCEDURE — 85014 HEMATOCRIT: CPT

## 2024-07-16 PROCEDURE — 51798 US URINE CAPACITY MEASURE: CPT

## 2024-07-16 PROCEDURE — 85025 COMPLETE CBC W/AUTO DIFF WBC: CPT

## 2024-07-16 PROCEDURE — 84132 ASSAY OF SERUM POTASSIUM: CPT

## 2024-07-16 PROCEDURE — 83735 ASSAY OF MAGNESIUM: CPT

## 2024-07-16 PROCEDURE — 97161 PT EVAL LOW COMPLEX 20 MIN: CPT

## 2024-07-16 PROCEDURE — 99232 SBSQ HOSP IP/OBS MODERATE 35: CPT | Performed by: INTERNAL MEDICINE

## 2024-07-16 PROCEDURE — 99231 SBSQ HOSP IP/OBS SF/LOW 25: CPT | Performed by: INTERNAL MEDICINE

## 2024-07-16 PROCEDURE — 85018 HEMOGLOBIN: CPT

## 2024-07-16 RX ORDER — PANTOPRAZOLE SODIUM 40 MG/1
40 TABLET, DELAYED RELEASE ORAL
Status: DISCONTINUED | OUTPATIENT
Start: 2024-07-16 | End: 2024-07-21 | Stop reason: HOSPADM

## 2024-07-16 RX ADMIN — MIDODRINE HYDROCHLORIDE 2.5 MG: 2.5 TABLET ORAL at 08:47

## 2024-07-16 RX ADMIN — MORPHINE SULFATE 2 MG: 2 INJECTION, SOLUTION INTRAMUSCULAR; INTRAVENOUS at 22:07

## 2024-07-16 RX ADMIN — MORPHINE SULFATE 2 MG: 2 INJECTION, SOLUTION INTRAMUSCULAR; INTRAVENOUS at 08:47

## 2024-07-16 RX ADMIN — TIOTROPIUM BROMIDE INHALATION SPRAY 2 PUFF: 3.12 SPRAY, METERED RESPIRATORY (INHALATION) at 07:50

## 2024-07-16 RX ADMIN — MIDODRINE HYDROCHLORIDE 2.5 MG: 2.5 TABLET ORAL at 16:42

## 2024-07-16 RX ADMIN — SUCRALFATE 1 G: 1 TABLET ORAL at 11:40

## 2024-07-16 RX ADMIN — SUCRALFATE 1 G: 1 TABLET ORAL at 21:59

## 2024-07-16 RX ADMIN — SUCRALFATE 1 G: 1 TABLET ORAL at 16:42

## 2024-07-16 RX ADMIN — PANTOPRAZOLE SODIUM 40 MG: 40 TABLET, DELAYED RELEASE ORAL at 16:42

## 2024-07-16 RX ADMIN — PANTOPRAZOLE SODIUM 40 MG: 40 TABLET, DELAYED RELEASE ORAL at 05:43

## 2024-07-16 RX ADMIN — DILTIAZEM HYDROCHLORIDE 120 MG: 120 CAPSULE, COATED, EXTENDED RELEASE ORAL at 08:47

## 2024-07-16 RX ADMIN — Medication 10 ML: at 08:49

## 2024-07-16 RX ADMIN — MIDODRINE HYDROCHLORIDE 2.5 MG: 2.5 TABLET ORAL at 11:40

## 2024-07-16 RX ADMIN — ATORVASTATIN CALCIUM 40 MG: 40 TABLET, FILM COATED ORAL at 21:59

## 2024-07-16 RX ADMIN — Medication 10 ML: at 22:11

## 2024-07-16 RX ADMIN — BUDESONIDE AND FORMOTEROL FUMARATE DIHYDRATE 2 PUFF: 80; 4.5 AEROSOL RESPIRATORY (INHALATION) at 19:39

## 2024-07-16 RX ADMIN — BUDESONIDE AND FORMOTEROL FUMARATE DIHYDRATE 2 PUFF: 80; 4.5 AEROSOL RESPIRATORY (INHALATION) at 07:50

## 2024-07-16 RX ADMIN — SUCRALFATE 1 G: 1 TABLET ORAL at 05:43

## 2024-07-16 RX ADMIN — TROSPIUM CHLORIDE 20 MG: 20 TABLET, FILM COATED ORAL at 21:59

## 2024-07-16 ASSESSMENT — PAIN DESCRIPTION - LOCATION
LOCATION: BACK;SHOULDER
LOCATION: LEG

## 2024-07-16 ASSESSMENT — PAIN DESCRIPTION - DESCRIPTORS
DESCRIPTORS: DISCOMFORT
DESCRIPTORS: ACHING

## 2024-07-16 ASSESSMENT — PAIN SCALES - GENERAL
PAINLEVEL_OUTOF10: 9
PAINLEVEL_OUTOF10: 7

## 2024-07-16 ASSESSMENT — PAIN - FUNCTIONAL ASSESSMENT: PAIN_FUNCTIONAL_ASSESSMENT: PREVENTS OR INTERFERES SOME ACTIVE ACTIVITIES AND ADLS

## 2024-07-16 ASSESSMENT — PAIN DESCRIPTION - ORIENTATION
ORIENTATION: RIGHT;LEFT
ORIENTATION: LEFT

## 2024-07-16 NOTE — PROGRESS NOTES
Gastroenterology Progress Note    Dayami Christensen is a 89 y.o. female patient.  Hospitalization Day:3    Chief C/O: odynophagia vs GERD    SUBJECTIVE: Patient is able to swallow oral secretions today.  Patient denies any nausea, vomiting nor abdominal pain.  Patient reports eating small amounts of meals.   Patient is s/p EGD 7/15/2024 noting significant ulcerative LA Grade D esophagitis.      Previous endoscopic evaluation:  EGD 7/15/2024, Melonie Montes  Significant ulcerative LA Grade D esophagitis with no bleeding. Normal stomach. Normal examined duodenum. No specimens collected.    ROS:  Gastrointestinal ROS: no abdominal pain, change in bowel habits, or black or bloody stools    Physical    VITALS:  /61   Pulse 98   Temp 97.3 °F (36.3 °C) (Oral)   Resp 17   Ht 1.346 m (4' 5\")   Wt 87.5 kg (193 lb)   SpO2 100%   BMI 48.31 kg/m²   TEMPERATURE:  Current - Temp: 97.3 °F (36.3 °C); Max - Temp  Av.1 °F (36.7 °C)  Min: 97.3 °F (36.3 °C)  Max: 98.9 °F (37.2 °C)    General - No acute distress  Eyes - no Icterus, no pallor  Cardiovascular - RRR, no murmur  Lungs - Clear to auscultation bilaterally  Abdomen - non-distended,  non-tender, no organomegaly, Bowel sounds normal  Extremities - no edema  Skin - Warm and Dry  Neuro: no asterixis     Data    Data Review:    Recent Labs     24  0507 24  1218 07/15/24  0552 07/15/24  1125 07/15/24  1919 24  0449   WBC 7.5  --  7.4  --   --  7.4   HGB 11.2*   < > 10.9*  11.1* 11.9* 11.4* 11.6*   HCT 35.9*   < > 35.1*  36.0* 38.3 36.5* 38.0   MCV 85.5  --  85.9  --   --  85.4     --  247  --   --  289    < > = values in this interval not displayed.     Recent Labs     24  0507 24  1927 07/15/24  0552 07/15/24  0918 07/15/24  2045 07/16/24  0449 24  0536     --  141  --   --  138  --    K 3.8   < > 3.1*  --  3.3* 3.4  --    CL 97  --  100  --   --  98  --    CO2 29  --  33*  --   --  32*  --    PHOS  --    < >  --  1.6*

## 2024-07-16 NOTE — PROGRESS NOTES
[facility] OCCUPATIONAL THERAPY EVALUATION - ACUTE     NAME: Dayami Christensen  : 1934 (89 y.o.)  MRN: [id number]  CODE STATUS: Full Code  [facility]: [facility]    Date of Service: 2024    Patient Diagnosis(es): Hypokalemia [E87.6]  Hypomagnesemia [E83.42]  Hemoptysis [R04.2]   Patient Active Problem List    Diagnosis Date Noted    Esophagitis 07/15/2024    Hypokalemia 07/15/2024    Esophageal dysphagia 2024    Odynophagia 2024    History of lung cancer 2024    Hemoptysis 2024    Generalized weakness 2024    Neuropathy 2024    Pain in both lower extremities 2024    Hx of cancer of lung 2024    Constipation 2024    SOB (shortness of breath) 2024    Encounter for hospice care discussion 2024    Palliative care encounter 2024    Goals of care, counseling/discussion 2024    Advanced care planning/counseling discussion 2024    Malignant neoplasm of left lung (HCC) 2024    Neoplasm related pain 2024    Shortness of breath at rest 2024    Squamous cell carcinoma of left lung (HCC) 2024    Pleural effusion, left 2024    Collapse of left lung 2024    Malignant neoplasm of overlapping sites of left lung (HCC) 2024    Secondary hypercoagulable state (HCC) 2024    Acute respiratory failure with hypoxia (HCC) 2024    Lung mass 05/10/2024    Dizzy 2018    History of cardiac radiofrequency ablation (RFA) 2018    Dyslipidemia 2017    Gastroesophageal reflux disease without esophagitis 2017    Primary osteoarthritis involving multiple joints 2017    Abnormal gait 2017    Risk for falls 2017    Chronic midline low back pain without sciatica 2017    Osteopenia 2017    Essential hypertension 2017    New onset atrial fibrillation (HCC) 2017    Type 2 diabetes mellitus without complication, without long-term current use of insulin  pending progress  Decision Making: Medium Complexity     History: med  Exam: 10 performance deficits  Assistance / Modification: max A    AMPAC (Six Click) Self care Score   How much help is needed for putting on and taking off regular lower body clothing?: Total  How much help is needed for bathing (which includes washing, rinsing, drying)?: A Lot  How much help is needed for toileting (which includes using toilet, bedpan, or urinal)?: Total  How much help is needed for putting on and taking off regular upper body clothing?: A Lot  How much help is needed for taking care of personal grooming?: A Lot  How much help for eating meals?: A Little  AM-[facility] Inpatient Daily Activity Raw Score: 11  AM-PAC Inpatient ADL T-Scale Score : 29.04  ADL Inpatient CMS 0-100% Score: 70.42    Therapy key for assistance levels -   Independent/Mod I = Pt. is able to perform task with no assistance but may require a device   Stand by assistance = Pt. does not perform task at an independent level but does not need physical assistance, requires verbal cues  Minimal, Moderate, Maximal Assistance = Pt. requires physical assistance (25%, 50%, 75% assist from helper) for task but is able to actively participate in task   Dependent = Pt. requires total assistance with task and is not able to actively participate with task completion     Plan:  Occupational Therapy Plan  Times Per Week: 1-4x/wk  Therapy Duration:  (length of acute stay)  Current Treatment Recommendations: Strengthening, Functional mobility training, Endurance training, Patient/Caregiver education & training, Self-Care / ADL, Safety education & training, Equipment evaluation, education, & procurement, Home management training, Positioning, Coordination training, Cognitive reorientation    Goals:   Patient will:    - Improve functional endurance to tolerate/complete 30 mins of ADL's  - Be mod in UB ADLs   - Be mod in ADL transfers without LOB  - Improve B UE strength and endurance

## 2024-07-16 NOTE — PROGRESS NOTES
Chest: Aorta: No evidence of thoracic aortic aneurysm or dissection.  No acute abnormality of the aorta. Mediastinum: Mediastinal soft tissue anterior mediastinal region prevascular involvement as detailed further below.  The heart and pericardium demonstrate no acute abnormality. Lungs/Pleura: Improved aeration left hemithorax from prior with persistent soft tissue involvement left hilar and suprahilar extension with narrowing left upper lobe bronchus and postobstructive changes near total lobar atelectasis of the left upper lobe with minimal improved aeration from prior and a left pleural drainage catheter in place with trace left pleural air or trace anterior pneumothorax from instrumentation likely however improved or decreased left pleural effusion with improved aeration left lower lung only trace left effusion remains.  Right hemithorax remains grossly clear without nodule or mass. Soft Tissues/Bones: No acute bone or soft tissue abnormality. Abdomen/Pelvis: Organs: Liver without focal lesion.  Gallbladder unremarkable. Pancreas and spleen unremarkable.  Adrenals without nodule.  Kidneys without suspicious renal lesion and no hydronephrosis. GI/Bowel: No focal thickening or disproportion dilatation of bowel.  No inflammatory findings.  Mild colonic stool burden.  Pancolonic diverticulosis without acute diverticulitis.  Appendix normal. Pelvis: No suspicious pelvic lesion or bulky pelvic adenopathy/free fluid. Peritoneum/Retroperitoneum: Patent atherosclerotic nonaneurysmal abdominal aorta.  No bulky retroperitoneal adenopathy. Bones/Soft Tissues: No acute osseous or soft tissue findings.     1. No evidence of pulmonary embolism or other acute cardiopulmonary process. 2. Improved aeration left hemithorax from prior with persistent soft tissue involvement of the left hilar and suprahilar region with narrowing of the left upper lobe bronchus and near total lobar atelectasis of the left upper lobe with minimal  An 8 Thai needle sheath was advanced under ultrasound guidance into pleural effusion and thoracentesis was performed. The patient tolerated the procedure well. FINDINGS: A total of 550 cc was removed.     Successful ultrasound guided thoracentesis.     XR CHEST (SINGLE VIEW FRONTAL)    Result Date: 6/24/2024  EXAMINATION: ONE XRAY VIEW OF THE CHEST 6/24/2024 10:45 am COMPARISON: 06/21/2024 HISTORY: ORDERING SYSTEM PROVIDED HISTORY: post left thoracentesis TECHNOLOGIST PROVIDED HISTORY: Reason for exam:->post left thoracentesis What reading provider will be dictating this exam?->CRC FINDINGS: Nonspecific interstitial prominence, chronic changes versus component of edema or atypical infection in the acute setting.  Trace right pleural effusion or thickening.  ASVD, cardiomegaly.  Decreased left pleural effusion, now moderate.  Interval partial aeration of the left upper and mid lung zone.  Lytic lesion and pathologic fracture of the left 1st rib.     1. Decreased left pleural effusion, now moderate. Interval partial aeration of the left upper and mid lung zone. No pneumothorax. 2. Nonspecific interstitial prominence, chronic changes versus component of edema or atypical infection in the acute setting. 3. Lytic lesion and pathologic fracture of the left 1st rib.     CTA CHEST W WO CONTRAST    Result Date: 6/21/2024  EXAMINATION: CTA OF THE CHEST WITH AND WITHOUT CONTRAST 6/21/2024 3:13 pm TECHNIQUE: CTA of the chest was performed before and after the administration of intravenous contrast.  Multiplanar reformatted images are provided for review.  MIP images are provided for review. Automated exposure control, iterative reconstruction, and/or weight based adjustment of the mA/kV was utilized to reduce the radiation dose to as low as reasonably achievable. COMPARISON: Reviewed PET CT scan dated of June 5, and CT chest of June 1st. HISTORY: ORDERING SYSTEM PROVIDED HISTORY: lung opacified TECHNOLOGIST PROVIDED HISTORY:

## 2024-07-16 NOTE — PROGRESS NOTES
Hospitalist Progress Note      PCP: Shashi Gan MD    Date of Admission: 7/13/2024    Chief Complaint:    Chief Complaint   Patient presents with    Hemoptysis     Pt from nh. Pt was drained from plurex 350 ml at noon. Cc pt is sob and coughing blood.       Subjective:  Patient denies fevers, chills, sweats, CP, SOB, diarrhea or burning micturition.  Feels too weak; is concerned about eating.  12 point ROS negative other than mentioned above     Medications:  Reviewed    Infusion Medications    sodium chloride       Scheduled Medications    pantoprazole  40 mg Oral BID AC    sucralfate  1 g Oral 4x Daily AC & HS    atorvastatin  40 mg Oral Nightly    budesonide-formoterol  2 puff Inhalation BID RT    And    tiotropium  2 puff Inhalation Daily RT    midodrine  2.5 mg Oral TID WC    trospium  20 mg Oral Nightly    dilTIAZem  120 mg Oral Daily    sodium chloride flush  10 mL IntraVENous 2 times per day     PRN Meds: morphine, hydrOXYzine HCl, sodium phosphate 15 mmol in sodium chloride 0.9 % 250 mL IVPB, sodium chloride flush, sodium chloride, ondansetron **OR** ondansetron, polyethylene glycol, acetaminophen **OR** acetaminophen, potassium chloride **OR** potassium alternative oral replacement **OR** potassium chloride, magnesium sulfate      Intake/Output Summary (Last 24 hours) at 7/16/2024 1246  Last data filed at 7/16/2024 1027  Gross per 24 hour   Intake 360 ml   Output 80 ml   Net 280 ml     Exam:    /61   Pulse 98   Temp 97.3 °F (36.3 °C) (Oral)   Resp 17   Ht 1.346 m (4' 5\")   Wt 87.5 kg (193 lb)   SpO2 100%   BMI 48.31 kg/m²     General appearance: No apparent distress, appears stated age and cooperative.  HEENT:  Conjunctivae/corneas clear.  Neck: Trachea midline.  Respiratory:  Normal respiratory effort. Clear to auscultation  Cardiovascular: Regular rate and rhythm  Abdomen: Soft, non-tender, non-distended with normal bowel sounds.  Musculoskeletal: No clubbing, cyanosis or edema  [R04.2] 07/13/2024        Additional work up or/and treatment plan may be added today or then after based on clinical progression. I am managing a portion of pt care. Some medical issues are handled by other specialists. Additional work up and treatment should be done in out pt setting by pt PCP and other out pt providers.     In addition to examining and evaluating pt, I spent additional time explaining care, normal and abnormal findings, and treatment plan. All of pt questions were answered. Counseling, diet and education were  provided. Case will be discussed with nursing staff when appropriate. Family will be updated if and when appropriate.      Diet: ADULT DIET; Dysphagia - Minced and Moist  ADULT ORAL NUTRITION SUPPLEMENT; Breakfast, Lunch, Dinner; Standard High Calorie/High Protein Oral Supplement    Code Status: Full Code    PT/OT Eval     Electronically signed by Roque Kyle MD on 7/16/2024 at 12:46 PM

## 2024-07-16 NOTE — CARE COORDINATION
This LSW visited patient at bedside this am.  Patients referral accepted at  [facility]. Patient will need to complete PT and OT evaluations prior to insurance authorization being initiated.  RAKELW/CM to follow.  Electronically signed by CHANNING Recinos on 7/16/24 at 11:04 AM EDT

## 2024-07-16 NOTE — PROGRESS NOTES
Physical Therapy Med Surg Initial Assessment  Facility/Department: [address] MED SURG UNIT  Room: [place] [address]       NAME: Dayami Christensen  : 1934 (89 y.o.)  MRN: [id number]  CODE STATUS: Full Code    Date of Service: 2024    Patient Diagnosis(es): Hypokalemia [E87.6]  Hypomagnesemia [E83.42]  Hemoptysis [R04.2]   Chief Complaint   Patient presents with    Hemoptysis     Pt from nh. Pt was drained from plurex 350 ml at noon. Cc pt is sob and coughing blood.     Patient Active Problem List    Diagnosis Date Noted    Esophagitis 07/15/2024    Hypokalemia 07/15/2024    Esophageal dysphagia 2024    Odynophagia 2024    History of lung cancer 2024    Hemoptysis 2024    Generalized weakness 2024    Neuropathy 2024    Pain in both lower extremities 2024    Hx of cancer of lung 2024    Constipation 2024    SOB (shortness of breath) 2024    Encounter for hospice care discussion 2024    Palliative care encounter 2024    Goals of care, counseling/discussion 2024    Advanced care planning/counseling discussion 2024    Malignant neoplasm of left lung (HCC) 2024    Neoplasm related pain 2024    Shortness of breath at rest 2024    Squamous cell carcinoma of left lung (HCC) 2024    Pleural effusion, left 2024    Collapse of left lung 2024    Malignant neoplasm of overlapping sites of left lung (HCC) 2024    Secondary hypercoagulable state (HCC) 2024    Acute respiratory failure with hypoxia (HCC) 2024    Lung mass 05/10/2024    Dizzy 2018    History of cardiac radiofrequency ablation (RFA) 2018    Dyslipidemia 2017    Gastroesophageal reflux disease without esophagitis 2017    Primary osteoarthritis involving multiple joints 2017    Abnormal gait 2017    Risk for falls 2017    Chronic midline low back pain without sciatica 2017    Osteopenia  activity  Maximal assistance = pt performs 25% of the activity; assistance is required to complete the activity  Dependent = pt requires total physical assistance to accomplish the task

## 2024-07-16 NOTE — PLAN OF CARE
Problem: Safety - Adult  Goal: Free from fall injury  7/16/2024 1026 by Zayda Clarke RN  Outcome: Progressing  7/16/2024 0214 by Terrence Ramirez RN  Outcome: Progressing     Problem: Discharge Planning  Goal: Discharge to home or other facility with appropriate resources  7/16/2024 1026 by Zayda Clarke RN  Outcome: Progressing  7/16/2024 0214 by Terrence Ramirez RN  Outcome: Progressing     Problem: Skin/Tissue Integrity  Goal: Absence of new skin breakdown  Description: 1.  Monitor for areas of redness and/or skin breakdown  2.  Assess vascular access sites hourly  3.  Every 4-6 hours minimum:  Change oxygen saturation probe site  4.  Every 4-6 hours:  If on nasal continuous positive airway pressure, respiratory therapy assess nares and determine need for appliance change or resting period.  7/16/2024 1026 by Zayda Clarke RN  Outcome: Progressing  7/16/2024 0214 by Terrence Ramirez RN  Outcome: Progressing     Problem: ABCDS Injury Assessment  Goal: Absence of physical injury  7/16/2024 1026 by Zayda Clarke RN  Outcome: Progressing  7/16/2024 0214 by Terrence Ramirez RN  Outcome: Progressing     Problem: Chronic Conditions and Co-morbidities  Goal: Patient's chronic conditions and co-morbidity symptoms are monitored and maintained or improved  7/16/2024 1026 by Zayda Clarke RN  Outcome: Progressing  7/16/2024 0214 by Terrence Ramirez RN  Outcome: Progressing     Problem: Nutrition Deficit:  Goal: Optimize nutritional status  7/16/2024 1026 by Zayda Clarke RN  Outcome: Progressing  7/16/2024 0214 by Terrence Ramirez RN  Outcome: Progressing     Problem: Pain  Goal: Verbalizes/displays adequate comfort level or baseline comfort level  7/16/2024 1026 by Zayda Clarke RN  Outcome: Progressing  7/16/2024 0214 by Terrence Ramirez RN  Outcome: Progressing

## 2024-07-17 ENCOUNTER — APPOINTMENT (OUTPATIENT)
Dept: GENERAL RADIOLOGY | Age: 89
DRG: 181 | End: 2024-07-17
Payer: MEDICARE

## 2024-07-17 LAB
ANION GAP SERPL CALCULATED.3IONS-SCNC: 11 MEQ/L (ref 9–15)
BASOPHILS # BLD: 0 K/UL (ref 0–0.2)
BASOPHILS NFR BLD: 0.3 %
BUN SERPL-MCNC: 10 MG/DL (ref 8–23)
CALCIUM SERPL-MCNC: 8.1 MG/DL (ref 8.5–9.9)
CHLORIDE SERPL-SCNC: 97 MEQ/L (ref 95–107)
CO2 SERPL-SCNC: 32 MEQ/L (ref 20–31)
CREAT SERPL-MCNC: 0.34 MG/DL (ref 0.5–0.9)
EOSINOPHIL # BLD: 0.1 K/UL (ref 0–0.7)
EOSINOPHIL NFR BLD: 1.7 %
ERYTHROCYTE [DISTWIDTH] IN BLOOD BY AUTOMATED COUNT: 17.2 % (ref 11.5–14.5)
GLUCOSE SERPL-MCNC: 150 MG/DL (ref 70–99)
HCT VFR BLD AUTO: 35.3 % (ref 37–47)
HGB BLD-MCNC: 11 G/DL (ref 12–16)
LYMPHOCYTES # BLD: 1.7 K/UL (ref 1–4.8)
LYMPHOCYTES NFR BLD: 22.9 %
MAGNESIUM SERPL-MCNC: 1.8 MG/DL (ref 1.7–2.4)
MCH RBC QN AUTO: 26.6 PG (ref 27–31.3)
MCHC RBC AUTO-ENTMCNC: 31.2 % (ref 33–37)
MCV RBC AUTO: 85.3 FL (ref 79.4–94.8)
MONOCYTES # BLD: 0.5 K/UL (ref 0.2–0.8)
MONOCYTES NFR BLD: 6.2 %
NEUTROPHILS # BLD: 4.9 K/UL (ref 1.4–6.5)
NEUTS SEG NFR BLD: 67.8 %
PHOSPHATE SERPL-MCNC: 2.5 MG/DL (ref 2.3–4.8)
PLATELET # BLD AUTO: 271 K/UL (ref 130–400)
POTASSIUM SERPL-SCNC: 3.9 MEQ/L (ref 3.4–4.9)
RBC # BLD AUTO: 4.14 M/UL (ref 4.2–5.4)
SODIUM SERPL-SCNC: 140 MEQ/L (ref 135–144)
WBC # BLD AUTO: 7.3 K/UL (ref 4.8–10.8)

## 2024-07-17 PROCEDURE — 2700000000 HC OXYGEN THERAPY PER DAY

## 2024-07-17 PROCEDURE — 6370000000 HC RX 637 (ALT 250 FOR IP): Performed by: INTERNAL MEDICINE

## 2024-07-17 PROCEDURE — 74230 X-RAY XM SWLNG FUNCJ C+: CPT

## 2024-07-17 PROCEDURE — 92610 EVALUATE SWALLOWING FUNCTION: CPT

## 2024-07-17 PROCEDURE — 85025 COMPLETE CBC W/AUTO DIFF WBC: CPT

## 2024-07-17 PROCEDURE — 92611 MOTION FLUOROSCOPY/SWALLOW: CPT

## 2024-07-17 PROCEDURE — 94640 AIRWAY INHALATION TREATMENT: CPT

## 2024-07-17 PROCEDURE — 1210000000 HC MED SURG R&B

## 2024-07-17 PROCEDURE — 2580000003 HC RX 258: Performed by: INTERNAL MEDICINE

## 2024-07-17 PROCEDURE — 6370000000 HC RX 637 (ALT 250 FOR IP)

## 2024-07-17 PROCEDURE — 2500000003 HC RX 250 WO HCPCS: Performed by: INTERNAL MEDICINE

## 2024-07-17 PROCEDURE — 36415 COLL VENOUS BLD VENIPUNCTURE: CPT

## 2024-07-17 PROCEDURE — 83735 ASSAY OF MAGNESIUM: CPT

## 2024-07-17 PROCEDURE — 80048 BASIC METABOLIC PNL TOTAL CA: CPT

## 2024-07-17 PROCEDURE — 99232 SBSQ HOSP IP/OBS MODERATE 35: CPT

## 2024-07-17 PROCEDURE — 99232 SBSQ HOSP IP/OBS MODERATE 35: CPT | Performed by: INTERNAL MEDICINE

## 2024-07-17 PROCEDURE — 97535 SELF CARE MNGMENT TRAINING: CPT

## 2024-07-17 PROCEDURE — 6360000002 HC RX W HCPCS: Performed by: INTERNAL MEDICINE

## 2024-07-17 PROCEDURE — 84100 ASSAY OF PHOSPHORUS: CPT

## 2024-07-17 PROCEDURE — 94761 N-INVAS EAR/PLS OXIMETRY MLT: CPT

## 2024-07-17 RX ORDER — DRONABINOL 2.5 MG/1
2.5 CAPSULE ORAL 2 TIMES DAILY
Status: DISCONTINUED | OUTPATIENT
Start: 2024-07-17 | End: 2024-07-21 | Stop reason: HOSPADM

## 2024-07-17 RX ADMIN — MORPHINE SULFATE 2 MG: 2 INJECTION, SOLUTION INTRAMUSCULAR; INTRAVENOUS at 22:33

## 2024-07-17 RX ADMIN — ATORVASTATIN CALCIUM 40 MG: 40 TABLET, FILM COATED ORAL at 22:32

## 2024-07-17 RX ADMIN — Medication 10 ML: at 08:21

## 2024-07-17 RX ADMIN — BUDESONIDE AND FORMOTEROL FUMARATE DIHYDRATE 2 PUFF: 80; 4.5 AEROSOL RESPIRATORY (INHALATION) at 07:18

## 2024-07-17 RX ADMIN — SUCRALFATE 1 G: 1 TABLET ORAL at 17:10

## 2024-07-17 RX ADMIN — PANTOPRAZOLE SODIUM 40 MG: 40 TABLET, DELAYED RELEASE ORAL at 06:32

## 2024-07-17 RX ADMIN — BUDESONIDE AND FORMOTEROL FUMARATE DIHYDRATE 2 PUFF: 80; 4.5 AEROSOL RESPIRATORY (INHALATION) at 19:20

## 2024-07-17 RX ADMIN — SUCRALFATE 1 G: 1 TABLET ORAL at 22:32

## 2024-07-17 RX ADMIN — PANTOPRAZOLE SODIUM 40 MG: 40 TABLET, DELAYED RELEASE ORAL at 17:10

## 2024-07-17 RX ADMIN — BARIUM SULFATE 50 ML: 0.81 POWDER, FOR SUSPENSION ORAL at 15:18

## 2024-07-17 RX ADMIN — SUCRALFATE 1 G: 1 TABLET ORAL at 06:32

## 2024-07-17 RX ADMIN — MIDODRINE HYDROCHLORIDE 2.5 MG: 2.5 TABLET ORAL at 11:35

## 2024-07-17 RX ADMIN — MIDODRINE HYDROCHLORIDE 2.5 MG: 2.5 TABLET ORAL at 17:10

## 2024-07-17 RX ADMIN — DILTIAZEM HYDROCHLORIDE 120 MG: 120 CAPSULE, COATED, EXTENDED RELEASE ORAL at 08:21

## 2024-07-17 RX ADMIN — TROSPIUM CHLORIDE 20 MG: 20 TABLET, FILM COATED ORAL at 22:32

## 2024-07-17 RX ADMIN — TIOTROPIUM BROMIDE INHALATION SPRAY 2 PUFF: 3.12 SPRAY, METERED RESPIRATORY (INHALATION) at 07:18

## 2024-07-17 RX ADMIN — DRONABINOL 2.5 MG: 2.5 CAPSULE ORAL at 15:33

## 2024-07-17 RX ADMIN — MIDODRINE HYDROCHLORIDE 2.5 MG: 2.5 TABLET ORAL at 08:20

## 2024-07-17 RX ADMIN — DRONABINOL 2.5 MG: 2.5 CAPSULE ORAL at 22:32

## 2024-07-17 RX ADMIN — SUCRALFATE 1 G: 1 TABLET ORAL at 11:35

## 2024-07-17 RX ADMIN — Medication 10 ML: at 22:35

## 2024-07-17 ASSESSMENT — ENCOUNTER SYMPTOMS
TROUBLE SWALLOWING: 1
COLOR CHANGE: 0
NAUSEA: 0
COUGH: 1
SHORTNESS OF BREATH: 0
VOMITING: 0
VOICE CHANGE: 0

## 2024-07-17 ASSESSMENT — PAIN SCALES - GENERAL
PAINLEVEL_OUTOF10: 9
PAINLEVEL_OUTOF10: 2

## 2024-07-17 ASSESSMENT — PAIN DESCRIPTION - LOCATION: LOCATION: BACK

## 2024-07-17 NOTE — PROGRESS NOTES
Hematology/Oncology  Attending Progress Note        CHIEF COMPLAINT/HPI:  Dayami admitted for hemoptysis. 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 Positive for malignant pleural effusion. This is stage 4. Has completed 6 treatments of of XRT to lungs, last in 6/20/2024. Has pain on swallowing. 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     C/o feeling tired and weak when seen today. Denies hemoptysis or other bleeding.    REVIEW OF SYSTEMS:    Unremarkable except for symptoms mentioned in HPI.    Current Inpatient Medications:    Current Facility-Administered Medications: pantoprazole (PROTONIX) tablet 40 mg, 40 mg, Oral, BID AC  morphine (PF) injection 2 mg, 2 mg, IntraVENous, Q4H PRN  sucralfate (CARAFATE) tablet 1 g, 1 g, Oral, 4x Daily AC & HS  atorvastatin (LIPITOR) tablet 40 mg, 40 mg, Oral, Nightly  budesonide-formoterol (SYMBICORT) 80-4.5 MCG/ACT inhaler 2 puff, 2 puff, Inhalation, BID RT **AND** tiotropium (SPIRIVA RESPIMAT) 2.5 MCG/ACT inhaler 2 puff, 2 puff, Inhalation, Daily RT  hydrOXYzine HCl (ATARAX) tablet 25 mg, 25 mg, Oral, TID PRN  midodrine (PROAMATINE) tablet 2.5 mg, 2.5 mg, Oral, TID WC  trospium (SANCTURA) tablet 20 mg, 20 mg, Oral, Nightly  sodium phosphate 15 mmol in sodium chloride 0.9 % 250 mL IVPB, 15 mmol, IntraVENous, PRN  dilTIAZem (CARDIZEM CD) extended release capsule 120 mg, 120 mg, Oral, Daily  sodium chloride flush 0.9 % injection 10 mL, 10 mL, IntraVENous, 2 times per day  sodium chloride flush 0.9 % injection 10 mL, 10 mL, IntraVENous, PRN  0.9 % sodium chloride infusion, , IntraVENous, PRN  ondansetron (ZOFRAN-ODT) disintegrating tablet 4 mg, 4 mg, Oral, Q8H PRN **OR** ondansetron (ZOFRAN) injection 4 mg, 4 mg, IntraVENous, Q6H PRN  polyethylene glycol (GLYCOLAX) packet 17 g, 17 g, Oral, Daily PRN  acetaminophen (TYLENOL) tablet 650 mg, 650 mg, Oral, Q6H PRN **OR** acetaminophen (TYLENOL) suppository 650 mg, 650 mg, Rectal,  palliative radiation developed hemoptysis. + Kras mutation on liquid biopsy    Hemoptysis has resolved.     Plan: follow up as outpt  Cont supportive care    Electronically signed by Nidia Olson MD on 7/16/24 at 11:09 PM EDT

## 2024-07-17 NOTE — PROGRESS NOTES
Pt refusing to eat meals , will attempt nutritional drinks throughout the day. Pt went to xr for barium swallow test this afternoon. Pt did sit in chair after PT worked with pt , pt tolerated well with much encouragement. Pt stayed oob and in chair for approx 3 hrs this afternoon. Pt had plurex drained for 600ml this evening, tolerated well.

## 2024-07-17 NOTE — PROGRESS NOTES
Physical Therapy Med Surg Daily Treatment Note  Facility/Department: [address] MED SURG UNIT  Room: [place] [address]       NAME: Dayami Christensen  : 1934 (89 y.o.)  MRN: [id number]  CODE STATUS: Full Code    Date of Service: 2024    Patient Diagnosis(es): Hypokalemia [E87.6]  Hypomagnesemia [E83.42]  Hemoptysis [R04.2]   Chief Complaint   Patient presents with    Hemoptysis     Pt from nh. Pt was drained from plurex 350 ml at noon. Cc pt is sob and coughing blood.     Patient Active Problem List    Diagnosis Date Noted    Esophagitis 07/15/2024    Hypokalemia 07/15/2024    Esophageal dysphagia 2024    Odynophagia 2024    History of lung cancer 2024    Hemoptysis 2024    Generalized weakness 2024    Neuropathy 2024    Pain in both lower extremities 2024    Hx of cancer of lung 2024    Constipation 2024    SOB (shortness of breath) 2024    Encounter for hospice care discussion 2024    Palliative care encounter 2024    Goals of care, counseling/discussion 2024    Advanced care planning/counseling discussion 2024    Malignant neoplasm of left lung (HCC) 2024    Neoplasm related pain 2024    Shortness of breath at rest 2024    Squamous cell carcinoma of left lung (HCC) 2024    Pleural effusion, left 2024    Collapse of left lung 2024    Malignant neoplasm of overlapping sites of left lung (HCC) 2024    Secondary hypercoagulable state (HCC) 2024    Acute respiratory failure with hypoxia (HCC) 2024    Lung mass 05/10/2024    Dizzy 2018    History of cardiac radiofrequency ablation (RFA) 2018    Dyslipidemia 2017    Gastroesophageal reflux disease without esophagitis 2017    Primary osteoarthritis involving multiple joints 2017    Abnormal gait 2017    Risk for falls 2017    Chronic midline low back pain without sciatica 2017    Osteopenia  complete the activity  Maximal assistance = pt performs 25% of the activity; assistance is required to complete the activity  Dependent = pt requires total physical assistance to accomplish the task

## 2024-07-17 NOTE — PROGRESS NOTES
Hospitalist Progress Note      PCP: Shashi Gan MD    Date of Admission: 7/13/2024    Chief Complaint:    Chief Complaint   Patient presents with    Hemoptysis     Pt from nh. Pt was drained from plurex 350 ml at noon. Cc pt is sob and coughing blood.     Subjective:  Patient denies fevers, chills, sweats, CP, SOB, diarrhea or burning micturition.  Requesting a PEG tube because she is not eating or drinking.  12 point ROS negative other than mentioned above     Medications:  Reviewed    Infusion Medications    sodium chloride       Scheduled Medications    droNABinol  2.5 mg Oral BID    pantoprazole  40 mg Oral BID AC    sucralfate  1 g Oral 4x Daily AC & HS    atorvastatin  40 mg Oral Nightly    budesonide-formoterol  2 puff Inhalation BID RT    And    tiotropium  2 puff Inhalation Daily RT    midodrine  2.5 mg Oral TID WC    trospium  20 mg Oral Nightly    dilTIAZem  120 mg Oral Daily    sodium chloride flush  10 mL IntraVENous 2 times per day     PRN Meds: morphine, hydrOXYzine HCl, sodium phosphate 15 mmol in sodium chloride 0.9 % 250 mL IVPB, sodium chloride flush, sodium chloride, ondansetron **OR** ondansetron, polyethylene glycol, acetaminophen **OR** acetaminophen, potassium chloride **OR** potassium alternative oral replacement **OR** potassium chloride, magnesium sulfate      Intake/Output Summary (Last 24 hours) at 7/17/2024 1323  Last data filed at 7/17/2024 0623  Gross per 24 hour   Intake 916.99 ml   Output 0 ml   Net 916.99 ml     Exam:    /63   Pulse 98   Temp 97.9 °F (36.6 °C) (Oral)   Resp 20   Ht 1.346 m (4' 5\")   Wt 74.5 kg (164 lb 3.9 oz)   SpO2 98%   BMI 41.11 kg/m²     General appearance: No apparent distress, appears stated age and cooperative.  HEENT:  Conjunctivae/corneas clear.  Neck: Trachea midline.  Respiratory:  Normal respiratory effort. Clear to auscultation  Cardiovascular: Regular rate and rhythm  Abdomen: Soft, non-tender, non-distended with normal bowel    follow-up for underlying mass or malignancy and stability.  No clear   progression.   3. No evidence of metastatic disease in the abdomen or pelvis.   4. Pancolonic diverticulosis without acute diverticulitis.         CT ABDOMEN PELVIS W IV CONTRAST Additional Contrast? None   Final Result   1. No evidence of pulmonary embolism or other acute cardiopulmonary process.   2. Improved aeration left hemithorax from prior with persistent soft tissue   involvement of the left hilar and suprahilar region with narrowing of the   left upper lobe bronchus and near total lobar atelectasis of the left upper   lobe with minimal improved aeration from prior and a left pleural drainage   catheter in place with trace left pleural air or trace anterior pneumothorax   from instrumentation likely however improved or decreased left pleural   effusion with only trace left effusion remains.  Continued attention on   follow-up for underlying mass or malignancy and stability.  No clear   progression.   3. No evidence of metastatic disease in the abdomen or pelvis.   4. Pancolonic diverticulosis without acute diverticulitis.         Fluoroscopy modified barium swallow with video    (Results Pending)     Assessment/Plan:    Squamous cell lung cancer with hemoptysis:  hold anticoagulation and antiplatets; no longer having hemoptysis  A Fib:  Rate control; hold eliquis  HTN/HLD:  hold meds for now; PRN breakthough ordered  DMII:  SSI  Dysphagia secondary to LA Grade D esophagitis; BID PPI; sucralfate suspension; repeat in 2 months .  Still no swallowing but appears more apprehension; will get modified barium swallow to see.  She is requesting a PEG tube which might be reasonable if she is unable to or isn't eating  Functional Status: Fall precautions. Up with assistance. PT OT  Diet: general   DVT ppx: SCDs  Disposition: Dependent on hospital course. Will discharge once medically stable. SW on board for discharge planning.     Active Hospital

## 2024-07-17 NOTE — PROCEDURES
[facility]   Facility/Department: [address] MED SURG UNIT  Speech Language Pathology  Modified Barium Swallow (MBS)    NAME:Dayami Christensen  : 1934 (89 y.o.)   [x]   confirmed    MRN: [id number]  ROOM: [place] [address]  ADMISSION DATE: 2024  PATIENT DIAGNOSIS(ES): Hypokalemia [E87.6]  Hypomagnesemia [E83.42]  Hemoptysis [R04.2]  Chief Complaint   Patient presents with    Hemoptysis     Pt from nh. Pt was drained from plurex 350 ml at noon. Cc pt is sob and coughing blood.     Patient Active Problem List    Diagnosis Date Noted    Esophagitis 07/15/2024    Hypokalemia 07/15/2024    Esophageal dysphagia 2024    Odynophagia 2024    History of lung cancer 2024    Hemoptysis 2024    Generalized weakness 2024    Neuropathy 2024    Pain in both lower extremities 2024    Hx of cancer of lung 2024    Constipation 2024    SOB (shortness of breath) 2024    Encounter for hospice care discussion 2024    Palliative care encounter 2024    Goals of care, counseling/discussion 2024    Advanced care planning/counseling discussion 2024    Malignant neoplasm of left lung (HCC) 2024    Neoplasm related pain 2024    Shortness of breath at rest 2024    Squamous cell carcinoma of left lung (HCC) 2024    Pleural effusion, left 2024    Collapse of left lung 2024    Malignant neoplasm of overlapping sites of left lung (HCC) 2024    Secondary hypercoagulable state (HCC) 2024    Acute respiratory failure with hypoxia (HCC) 2024    Lung mass 05/10/2024    Dizzy 2018    History of cardiac radiofrequency ablation (RFA) 2018    Dyslipidemia 2017    Gastroesophageal reflux disease without esophagitis 2017    Primary osteoarthritis involving multiple joints 2017    Abnormal gait 2017    Risk for falls 2017    Chronic midline low back pain without sciatica 2017

## 2024-07-17 NOTE — PROGRESS NOTES
INPATIENT PROGRESS NOTES    PATIENT NAME: Dayami Christensen  MRN: [id number]  SERVICE DATE:  July 17, 2024   SERVICE TIME:  4:53 PM      PRIMARY SERVICE: Pulmonary Disease    CHIEF COMPLAIN:'Hemoptysis    INTERVAL HPI: Patient seen and examined at bedside, Interval Notes, orders reviewed. Nursing notes noted  She is on 3 L O2 via nasal cannula and O2 saturation 100%.  She underwent for EGD and as per Dr. Canchola she has esophagitis. No hemoptysis.  No fever or chills.  No chest pain.         OBJECTIVE   I/O:24HR INTAKE/OUTPUT:    Intake/Output Summary (Last 24 hours) at 7/17/2024 1653  Last data filed at 7/17/2024 0623  Gross per 24 hour   Intake 916.99 ml   Output 0 ml   Net 916.99 ml     07/16 0701 - 07/17 0700  In: 1277 [P.O.:470; I.V.:10]  Out: 80 [Urine:80]  Body mass index is 41.11 kg/m².    PHYSICAL EXAM:  Vitals:  /60   Pulse 88   Temp 97.7 °F (36.5 °C) (Oral)   Resp 18   Ht 1.346 m (4' 5\")   Wt 74.5 kg (164 lb 3.9 oz)   SpO2 100%   BMI 41.11 kg/m²     General: Alert, awake .comfortable in bed, No distress.  Head: Atraumatic , Normocephalic   Eyes: PERRL. No sclera icterus. No conjunctival injection. No discharge   ENT: No nasal  discharge. Pharynx clear.  Neck:  Trachea midline. No thyromegaly, no JVD, No cervical adenopathy.  Chest : Bilaterally symmetrical ,Normal effort,  No accessory muscle use  Lung : Diminished breath sound bilaterally more on left side.  No Rales. No wheezing. No rhonchi.   Heart:: Normal rate. Regular rhythm. No mumur ,  Rub or gallop  ABD: Non-tender. Non-distended. No masses. No organmegaly. Normal bowel sounds. No hernia.  Ext : No Pitting both leg , No Cyanosis No clubbing  Neuro: no focal weakness    Labs:  Recent Labs     07/15/24  0552 07/15/24  1125 07/15/24  1919 07/16/24  0449 07/16/24  1102 07/17/24  0529   WBC 7.4  --   --  7.4  --  7.3   HGB 10.9*  11.1*   < > 11.4* 11.6* 11.3* 11.0*     --   --  289  --  271    < > = values in this interval not displayed.  a mild pericardial effusion towards the bases. There is no evidence for mediastinal mass or adenopathy in the left side of mediastinum anterior to the left main artery which abuts against the mediastinal margin Visualized upper abdominal structures not fully covered this study.  There is a nodular appearance of both adrenal glands with larger nodule in the left adrenal gland.  This can represent metastatic disease. Moderate spondylosis with diffuse osteophytes are seen in the thoracic spine. There is an aggressive lytic lesion in the posterior aspect of the left 1st rib as described previously on the PET CT scan examination.     1.  Findings of advanced malignancy in the left chest/left side of the mediastinum as described on previous studies. 2.  Development of a large size left-sided pleural effusions since the PET CT scan study of June 5 causing severe compression near in vibha left 3.  Severe endobronchial obstructive process of the bronchial system. Patient's potential to benefit from evaluation after drainage of left-sided effusion.  Please evaluate clinically. 4.  No indication for acute pulmonary emboli.     XR CHEST (2 VW)    Result Date: 6/21/2024  EXAMINATION: TWO XRAY VIEWS OF THE CHEST 6/21/2024 12:08 pm COMPARISON: PET-CT 06/05/2024. HISTORY: ORDERING SYSTEM PROVIDED HISTORY: SOB (shortness of breath) TECHNOLOGIST PROVIDED HISTORY: What reading provider will be dictating this exam?->CRC FINDINGS: Near complete opacification of the left hemithorax, appears overall worsened compared to the prior PET-CT, given difference in technique.  Findings are nonspecific, but most likely secondary to worsening known lung neoplasm with associated collapse of the majority of the left lung with possible pleural effusion.  Consider correlation with follow-up CT of the chest to better assess.  Right lung grossly clear.  No right pleural effusion. Cardiomediastinal silhouette mostly obscured by the left lung findings.

## 2024-07-17 NOTE — PLAN OF CARE
Problem: Safety - Adult  Goal: Free from fall injury  7/16/2024 2234 by Digna Rizo RN  Outcome: Progressing  7/16/2024 1026 by Zayda Clarke RN  Outcome: Progressing     Problem: Discharge Planning  Goal: Discharge to home or other facility with appropriate resources  7/16/2024 2234 by Digna Rizo RN  Outcome: Progressing  7/16/2024 1026 by Zayda Clarke RN  Outcome: Progressing     Problem: Skin/Tissue Integrity  Goal: Absence of new skin breakdown  Description: 1.  Monitor for areas of redness and/or skin breakdown  2.  Assess vascular access sites hourly  3.  Every 4-6 hours minimum:  Change oxygen saturation probe site  4.  Every 4-6 hours:  If on nasal continuous positive airway pressure, respiratory therapy assess nares and determine need for appliance change or resting period.  7/16/2024 2234 by Digna Rizo RN  Outcome: Progressing  7/16/2024 1026 by Zayda Clarke RN  Outcome: Progressing     Problem: ABCDS Injury Assessment  Goal: Absence of physical injury  7/16/2024 2234 by Digna Rizo RN  Outcome: Progressing  7/16/2024 1026 by Zayda Clarke RN  Outcome: Progressing     Problem: Chronic Conditions and Co-morbidities  Goal: Patient's chronic conditions and co-morbidity symptoms are monitored and maintained or improved  7/16/2024 2234 by Digna Rizo RN  Outcome: Progressing  7/16/2024 1026 by Zayda Clarke RN  Outcome: Progressing     Problem: Nutrition Deficit:  Goal: Optimize nutritional status  7/16/2024 2234 by Digna Rizo RN  Outcome: Progressing  7/16/2024 1026 by Zayda Clarke RN  Outcome: Progressing     Problem: Pain  Goal: Verbalizes/displays adequate comfort level or baseline comfort level  7/16/2024 2234 by Digna Rizo RN  Outcome: Progressing  7/16/2024 1026 by Zayda Clarke RN  Outcome: Progressing

## 2024-07-18 ENCOUNTER — PREP FOR PROCEDURE (OUTPATIENT)
Dept: SURGERY | Age: 89
End: 2024-07-18

## 2024-07-18 ENCOUNTER — ANESTHESIA (OUTPATIENT)
Dept: OPERATING ROOM | Age: 89
End: 2024-07-18
Payer: MEDICARE

## 2024-07-18 ENCOUNTER — ANESTHESIA EVENT (OUTPATIENT)
Dept: OPERATING ROOM | Age: 89
End: 2024-07-18
Payer: MEDICARE

## 2024-07-18 DIAGNOSIS — R63.8 UNABLE TO EAT: ICD-10-CM

## 2024-07-18 LAB
ANION GAP SERPL CALCULATED.3IONS-SCNC: 10 MEQ/L (ref 9–15)
BASOPHILS # BLD: 0 K/UL (ref 0–0.2)
BASOPHILS NFR BLD: 0.4 %
BUN SERPL-MCNC: 9 MG/DL (ref 8–23)
CALCIUM SERPL-MCNC: 7.4 MG/DL (ref 8.5–9.9)
CHLORIDE SERPL-SCNC: 98 MEQ/L (ref 95–107)
CO2 SERPL-SCNC: 28 MEQ/L (ref 20–31)
CREAT SERPL-MCNC: 0.31 MG/DL (ref 0.5–0.9)
EOSINOPHIL # BLD: 0.1 K/UL (ref 0–0.7)
EOSINOPHIL NFR BLD: 1.3 %
ERYTHROCYTE [DISTWIDTH] IN BLOOD BY AUTOMATED COUNT: 17.2 % (ref 11.5–14.5)
GLUCOSE SERPL-MCNC: 135 MG/DL (ref 70–99)
HCT VFR BLD AUTO: 35.6 % (ref 37–47)
HGB BLD-MCNC: 10.9 G/DL (ref 12–16)
LYMPHOCYTES # BLD: 1.5 K/UL (ref 1–4.8)
LYMPHOCYTES NFR BLD: 21.3 %
MCH RBC QN AUTO: 26.1 PG (ref 27–31.3)
MCHC RBC AUTO-ENTMCNC: 30.6 % (ref 33–37)
MCV RBC AUTO: 85.4 FL (ref 79.4–94.8)
MONOCYTES # BLD: 0.4 K/UL (ref 0.2–0.8)
MONOCYTES NFR BLD: 5.1 %
NEUTROPHILS # BLD: 5 K/UL (ref 1.4–6.5)
NEUTS SEG NFR BLD: 70.9 %
PLATELET # BLD AUTO: 274 K/UL (ref 130–400)
POTASSIUM SERPL-SCNC: 3.8 MEQ/L (ref 3.4–4.9)
RBC # BLD AUTO: 4.17 M/UL (ref 4.2–5.4)
SODIUM SERPL-SCNC: 136 MEQ/L (ref 135–144)
WBC # BLD AUTO: 7.1 K/UL (ref 4.8–10.8)

## 2024-07-18 PROCEDURE — 3700000000 HC ANESTHESIA ATTENDED CARE: Performed by: COLON & RECTAL SURGERY

## 2024-07-18 PROCEDURE — 0DH63UZ INSERTION OF FEEDING DEVICE INTO STOMACH, PERCUTANEOUS APPROACH: ICD-10-PCS | Performed by: COLON & RECTAL SURGERY

## 2024-07-18 PROCEDURE — 6370000000 HC RX 637 (ALT 250 FOR IP): Performed by: COLON & RECTAL SURGERY

## 2024-07-18 PROCEDURE — 2709999900 HC NON-CHARGEABLE SUPPLY: Performed by: COLON & RECTAL SURGERY

## 2024-07-18 PROCEDURE — 94761 N-INVAS EAR/PLS OXIMETRY MLT: CPT

## 2024-07-18 PROCEDURE — 94640 AIRWAY INHALATION TREATMENT: CPT

## 2024-07-18 PROCEDURE — 6360000002 HC RX W HCPCS: Performed by: NURSE ANESTHETIST, CERTIFIED REGISTERED

## 2024-07-18 PROCEDURE — 7100000001 HC PACU RECOVERY - ADDTL 15 MIN: Performed by: COLON & RECTAL SURGERY

## 2024-07-18 PROCEDURE — 7100000000 HC PACU RECOVERY - FIRST 15 MIN: Performed by: COLON & RECTAL SURGERY

## 2024-07-18 PROCEDURE — 6370000000 HC RX 637 (ALT 250 FOR IP): Performed by: INTERNAL MEDICINE

## 2024-07-18 PROCEDURE — 2580000003 HC RX 258: Performed by: INTERNAL MEDICINE

## 2024-07-18 PROCEDURE — 6360000002 HC RX W HCPCS: Performed by: ANESTHESIOLOGY

## 2024-07-18 PROCEDURE — 97535 SELF CARE MNGMENT TRAINING: CPT

## 2024-07-18 PROCEDURE — 2580000003 HC RX 258: Performed by: COLON & RECTAL SURGERY

## 2024-07-18 PROCEDURE — 2700000000 HC OXYGEN THERAPY PER DAY

## 2024-07-18 PROCEDURE — 3700000001 HC ADD 15 MINUTES (ANESTHESIA): Performed by: COLON & RECTAL SURGERY

## 2024-07-18 PROCEDURE — 36415 COLL VENOUS BLD VENIPUNCTURE: CPT

## 2024-07-18 PROCEDURE — 6370000000 HC RX 637 (ALT 250 FOR IP)

## 2024-07-18 PROCEDURE — 43246 EGD PLACE GASTROSTOMY TUBE: CPT | Performed by: COLON & RECTAL SURGERY

## 2024-07-18 PROCEDURE — 2500000003 HC RX 250 WO HCPCS: Performed by: COLON & RECTAL SURGERY

## 2024-07-18 PROCEDURE — 3609013300 HC EGD TUBE PLACEMENT: Performed by: COLON & RECTAL SURGERY

## 2024-07-18 PROCEDURE — 1210000000 HC MED SURG R&B

## 2024-07-18 PROCEDURE — 80048 BASIC METABOLIC PNL TOTAL CA: CPT

## 2024-07-18 PROCEDURE — 94760 N-INVAS EAR/PLS OXIMETRY 1: CPT

## 2024-07-18 PROCEDURE — 85025 COMPLETE CBC W/AUTO DIFF WBC: CPT

## 2024-07-18 RX ORDER — METOCLOPRAMIDE HYDROCHLORIDE 5 MG/ML
10 INJECTION INTRAMUSCULAR; INTRAVENOUS
Status: DISCONTINUED | OUTPATIENT
Start: 2024-07-18 | End: 2024-07-18 | Stop reason: HOSPADM

## 2024-07-18 RX ORDER — NALOXONE HYDROCHLORIDE 0.4 MG/ML
INJECTION, SOLUTION INTRAMUSCULAR; INTRAVENOUS; SUBCUTANEOUS PRN
Status: DISCONTINUED | OUTPATIENT
Start: 2024-07-18 | End: 2024-07-18 | Stop reason: HOSPADM

## 2024-07-18 RX ORDER — LIDOCAINE HYDROCHLORIDE 20 MG/ML
INJECTION, SOLUTION INTRAVENOUS PRN
Status: DISCONTINUED | OUTPATIENT
Start: 2024-07-18 | End: 2024-07-18 | Stop reason: SDUPTHER

## 2024-07-18 RX ORDER — OXYCODONE HYDROCHLORIDE 5 MG/1
5 TABLET ORAL
Status: DISCONTINUED | OUTPATIENT
Start: 2024-07-18 | End: 2024-07-18 | Stop reason: HOSPADM

## 2024-07-18 RX ORDER — SODIUM CHLORIDE 9 MG/ML
INJECTION, SOLUTION INTRAVENOUS PRN
Status: CANCELLED | OUTPATIENT
Start: 2024-07-18

## 2024-07-18 RX ORDER — SODIUM CHLORIDE 0.9 % (FLUSH) 0.9 %
5-40 SYRINGE (ML) INJECTION PRN
Status: CANCELLED | OUTPATIENT
Start: 2024-07-18

## 2024-07-18 RX ORDER — PROPOFOL 10 MG/ML
INJECTION, EMULSION INTRAVENOUS PRN
Status: DISCONTINUED | OUTPATIENT
Start: 2024-07-18 | End: 2024-07-18 | Stop reason: SDUPTHER

## 2024-07-18 RX ORDER — FENTANYL CITRATE 0.05 MG/ML
50 INJECTION, SOLUTION INTRAMUSCULAR; INTRAVENOUS EVERY 10 MIN PRN
Status: DISCONTINUED | OUTPATIENT
Start: 2024-07-18 | End: 2024-07-18 | Stop reason: HOSPADM

## 2024-07-18 RX ORDER — SODIUM CHLORIDE 0.9 % (FLUSH) 0.9 %
5-40 SYRINGE (ML) INJECTION EVERY 12 HOURS SCHEDULED
Status: DISCONTINUED | OUTPATIENT
Start: 2024-07-18 | End: 2024-07-18 | Stop reason: HOSPADM

## 2024-07-18 RX ORDER — SODIUM CHLORIDE 0.9 % (FLUSH) 0.9 %
5-40 SYRINGE (ML) INJECTION PRN
Status: DISCONTINUED | OUTPATIENT
Start: 2024-07-18 | End: 2024-07-18 | Stop reason: HOSPADM

## 2024-07-18 RX ORDER — LIDOCAINE HYDROCHLORIDE AND EPINEPHRINE 10; 10 MG/ML; UG/ML
INJECTION, SOLUTION INFILTRATION; PERINEURAL PRN
Status: DISCONTINUED | OUTPATIENT
Start: 2024-07-18 | End: 2024-07-18 | Stop reason: ALTCHOICE

## 2024-07-18 RX ORDER — ONDANSETRON 2 MG/ML
4 INJECTION INTRAMUSCULAR; INTRAVENOUS
Status: DISCONTINUED | OUTPATIENT
Start: 2024-07-18 | End: 2024-07-18 | Stop reason: HOSPADM

## 2024-07-18 RX ORDER — SODIUM CHLORIDE 9 MG/ML
INJECTION, SOLUTION INTRAVENOUS PRN
Status: DISCONTINUED | OUTPATIENT
Start: 2024-07-18 | End: 2024-07-18 | Stop reason: HOSPADM

## 2024-07-18 RX ORDER — SODIUM CHLORIDE 0.9 % (FLUSH) 0.9 %
5-40 SYRINGE (ML) INJECTION EVERY 12 HOURS SCHEDULED
Status: CANCELLED | OUTPATIENT
Start: 2024-07-18

## 2024-07-18 RX ORDER — MEPERIDINE HYDROCHLORIDE 25 MG/ML
12.5 INJECTION INTRAMUSCULAR; INTRAVENOUS; SUBCUTANEOUS
Status: DISCONTINUED | OUTPATIENT
Start: 2024-07-18 | End: 2024-07-18 | Stop reason: HOSPADM

## 2024-07-18 RX ORDER — DIPHENHYDRAMINE HYDROCHLORIDE 50 MG/ML
12.5 INJECTION INTRAMUSCULAR; INTRAVENOUS
Status: DISCONTINUED | OUTPATIENT
Start: 2024-07-18 | End: 2024-07-18 | Stop reason: HOSPADM

## 2024-07-18 RX ORDER — HYDROMORPHONE HYDROCHLORIDE 1 MG/ML
1 INJECTION, SOLUTION INTRAMUSCULAR; INTRAVENOUS; SUBCUTANEOUS
Status: DISCONTINUED | OUTPATIENT
Start: 2024-07-18 | End: 2024-07-21 | Stop reason: HOSPADM

## 2024-07-18 RX ADMIN — BUDESONIDE AND FORMOTEROL FUMARATE DIHYDRATE 2 PUFF: 80; 4.5 AEROSOL RESPIRATORY (INHALATION) at 07:11

## 2024-07-18 RX ADMIN — LIDOCAINE HYDROCHLORIDE 30 MG: 20 INJECTION, SOLUTION INTRAVENOUS at 15:02

## 2024-07-18 RX ADMIN — SUCRALFATE 1 G: 1 TABLET ORAL at 10:06

## 2024-07-18 RX ADMIN — TIOTROPIUM BROMIDE INHALATION SPRAY 2 PUFF: 3.12 SPRAY, METERED RESPIRATORY (INHALATION) at 07:11

## 2024-07-18 RX ADMIN — PROPOFOL 70 MCG/KG/MIN: 10 INJECTION, EMULSION INTRAVENOUS at 15:04

## 2024-07-18 RX ADMIN — TROSPIUM CHLORIDE 20 MG: 20 TABLET, FILM COATED ORAL at 21:49

## 2024-07-18 RX ADMIN — Medication 10 ML: at 22:13

## 2024-07-18 RX ADMIN — DILTIAZEM HYDROCHLORIDE 120 MG: 120 CAPSULE, COATED, EXTENDED RELEASE ORAL at 10:06

## 2024-07-18 RX ADMIN — FENTANYL CITRATE 50 MCG: 0.05 INJECTION, SOLUTION INTRAMUSCULAR; INTRAVENOUS at 16:02

## 2024-07-18 RX ADMIN — Medication 10 ML: at 10:07

## 2024-07-18 RX ADMIN — PANTOPRAZOLE SODIUM 40 MG: 40 TABLET, DELAYED RELEASE ORAL at 06:05

## 2024-07-18 RX ADMIN — ATORVASTATIN CALCIUM 40 MG: 40 TABLET, FILM COATED ORAL at 21:49

## 2024-07-18 RX ADMIN — SUCRALFATE 1 G: 1 TABLET ORAL at 06:05

## 2024-07-18 RX ADMIN — BUDESONIDE AND FORMOTEROL FUMARATE DIHYDRATE 2 PUFF: 80; 4.5 AEROSOL RESPIRATORY (INHALATION) at 19:33

## 2024-07-18 RX ADMIN — HYDROXYZINE HYDROCHLORIDE 25 MG: 25 TABLET ORAL at 21:49

## 2024-07-18 RX ADMIN — MIDODRINE HYDROCHLORIDE 2.5 MG: 2.5 TABLET ORAL at 10:06

## 2024-07-18 RX ADMIN — SUCRALFATE 1 G: 1 TABLET ORAL at 17:13

## 2024-07-18 RX ADMIN — PROPOFOL 50 MG: 10 INJECTION, EMULSION INTRAVENOUS at 15:02

## 2024-07-18 RX ADMIN — DRONABINOL 2.5 MG: 2.5 CAPSULE ORAL at 10:06

## 2024-07-18 RX ADMIN — MIDODRINE HYDROCHLORIDE 2.5 MG: 2.5 TABLET ORAL at 12:26

## 2024-07-18 RX ADMIN — MIDODRINE HYDROCHLORIDE 2.5 MG: 2.5 TABLET ORAL at 17:13

## 2024-07-18 ASSESSMENT — PAIN DESCRIPTION - DESCRIPTORS
DESCRIPTORS: SORE
DESCRIPTORS: SORE

## 2024-07-18 ASSESSMENT — PAIN DESCRIPTION - ORIENTATION
ORIENTATION: MID
ORIENTATION: MID

## 2024-07-18 ASSESSMENT — PAIN SCALES - GENERAL
PAINLEVEL_OUTOF10: 6
PAINLEVEL_OUTOF10: 0
PAINLEVEL_OUTOF10: 5
PAINLEVEL_OUTOF10: 0

## 2024-07-18 ASSESSMENT — PAIN DESCRIPTION - LOCATION
LOCATION: ABDOMEN
LOCATION: ABDOMEN

## 2024-07-18 ASSESSMENT — PAIN - FUNCTIONAL ASSESSMENT: PAIN_FUNCTIONAL_ASSESSMENT: PREVENTS OR INTERFERES SOME ACTIVE ACTIVITIES AND ADLS

## 2024-07-18 ASSESSMENT — ENCOUNTER SYMPTOMS: SHORTNESS OF BREATH: 1

## 2024-07-18 NOTE — PROGRESS NOTES
Physical Therapy Med Surg Daily Treatment Note  Facility/Department: [address] MED SURG UNIT  Room: [place] [address]       NAME: Dayami Christensen  : 1934 (89 y.o.)  MRN: [id number]  CODE STATUS: Full Code    Date of Service: 2024    Patient Diagnosis(es): Hypokalemia [E87.6]  Hypomagnesemia [E83.42]  Hemoptysis [R04.2]   Chief Complaint   Patient presents with    Hemoptysis     Pt from nh. Pt was drained from plurex 350 ml at noon. Cc pt is sob and coughing blood.     Patient Active Problem List    Diagnosis Date Noted    Esophagitis 07/15/2024    Hypokalemia 07/15/2024    Esophageal dysphagia 2024    Odynophagia 2024    History of lung cancer 2024    Hemoptysis 2024    Generalized weakness 2024    Neuropathy 2024    Pain in both lower extremities 2024    Hx of cancer of lung 2024    Constipation 2024    SOB (shortness of breath) 2024    Encounter for hospice care discussion 2024    Palliative care encounter 2024    Goals of care, counseling/discussion 2024    Advanced care planning/counseling discussion 2024    Malignant neoplasm of left lung (HCC) 2024    Neoplasm related pain 2024    Shortness of breath at rest 2024    Squamous cell carcinoma of left lung (HCC) 2024    Pleural effusion, left 2024    Collapse of left lung 2024    Malignant neoplasm of overlapping sites of left lung (HCC) 2024    Secondary hypercoagulable state (HCC) 2024    Acute respiratory failure with hypoxia (HCC) 2024    Lung mass 05/10/2024    Dizzy 2018    History of cardiac radiofrequency ablation (RFA) 2018    Dyslipidemia 2017    Gastroesophageal reflux disease without esophagitis 2017    Primary osteoarthritis involving multiple joints 2017    Abnormal gait 2017    Risk for falls 2017    Chronic midline low back pain without sciatica 2017    Osteopenia

## 2024-07-18 NOTE — DISCHARGE INSTR - COC
Continuity of Care Form    Patient Name: Dayami Christensen   :  1934  MRN:  [id number]    Admit date:  2024  Discharge date:  24    Code Status Order: Full Code   Advance Directives:   Advance Care Flowsheet Documentation       Date/Time Healthcare Directive Type of Healthcare Directive Copy in Chart Healthcare Agent Appointed Healthcare Agent's Name Healthcare Agent's Phone Number    07/15/24 1402 Yes, patient has an advance directive for healthcare treatment Durable power of  for health care -- -- -- --            Admitting Physician:  Roque Kyle MD  PCP: Shashi Gan MD    Discharging Nurse: ***  Discharging Hospital Unit/Room#: W473/W473-01  Discharging Unit Phone Number: [phone]    Emergency Contact:   Extended Emergency Contact Information  Primary Emergency Contact: Fanny Ivy   [facility]  Home Phone: [phone]  Relation: Child  Secondary Emergency Contact: Inna Shelton  Mobile Phone: [phone]  Relation: Child  Preferred language: English   needed? No    Past Surgical History:  Past Surgical History:   Procedure Laterality Date    BRONCHOSCOPY N/A 05/15/2024    NAVIGATIONAL  BRONCHOSCOPY WITH ENDOBRONCHIAL ULTRASOUND  TBNA TRANSBRONCHIAL BIOPIES performed by Benedict Cuenca MD at [facility]    CATARACT REMOVAL      COLONOSCOPY      EYE SURGERY      PLEURAL CATH INSERTION Left 2024    INSERTION LEFT PLEURX CATHETER performed by Kolby Torre MD at [facility]    THORACENTESIS Left 2024    550 ml removed by     TUBAL LIGATION      UPPER GASTROINTESTINAL ENDOSCOPY N/A 7/15/2024    ESOPHAGOGASTRODUODENOSCOPY performed by Star Canchola MD at [facility]       Immunization History:   Immunization History   Administered Date(s) Administered    COVID-19, MODERNA Bivalent, (age 12y+), IM, 50 mcg/0.5 mL 10/04/2022    Influenza, High Dose (Fluzone 65 yrs and older) 2017, 2018    Pneumococcal, PCV-13, PREVNAR 13, (age 6w+),

## 2024-07-18 NOTE — PROGRESS NOTES
[facility]   Facility/Department: [facility]  4[facility] MED SURG UNIT  Speech Language Pathology    Dayami Christensen  11/5/1934  W473/W473-01    Date: 7/18/2024      Speech Therapy attempted to see Dayami Christensen on this date for a/an:    Treatment    Pt was unable to be seen due to:   Other: Pt declining all PO this date. Pt reports having water this AM and \"throwing it back up\".  Pt did not want any trials following encouragement. Continue to follow.         Electronically signed by MEHRDAD Lopez on 7/18/24 at 11:16 AM EDT

## 2024-07-18 NOTE — ANESTHESIA POSTPROCEDURE EVALUATION
Department of Anesthesiology  Postprocedure Note    Patient: Dyaami Christensen  MRN: [id number]  YOB: 1934  Date of evaluation: 7/18/2024    Procedure Summary       Date: 07/18/24 Room / Location: [facility]    Anesthesia Start: 1457 Anesthesia Stop: 1521    Procedure: Esophagogastroduodenoscopy with percutaneous endoscopic gastrostomy tube Diagnosis:       Unable to eat      (Unable to eat [R63.8])    Surgeons: Chan Redding MD Responsible Provider: Domingo Newsome MD    Anesthesia Type: MAC ASA Status: 3            Anesthesia Type: MAC    Angela Phase I: Angela Score: 9    Angela Phase II: Angela Score: 9    Anesthesia Post Evaluation    Patient location during evaluation: PACU  Patient participation: waiting for patient participation  Level of consciousness: lethargic  Pain score: 0  Airway patency: patent  Nausea & Vomiting: no nausea and no vomiting  Cardiovascular status: hemodynamically stable  Respiratory status: acceptable, nonlabored ventilation, spontaneous ventilation and nasal cannula  Hydration status: stable  Pain management: adequate        No notable events documented.

## 2024-07-18 NOTE — CARE COORDINATION
This LSW visited patient at bedside this am. Pending insurance authorization, patient will transfer to [facility] upon discharge.  Insurance authorization was initiated on 7/17/24.  RAKELW/FRANNY to follow.  Electronically signed by CHANNING Recinos on 7/18/24 at 10:43 AM EDT

## 2024-07-18 NOTE — PLAN OF CARE
Problem: Safety - Adult  Goal: Free from fall injury  7/18/2024 1104 by Cordelia Kelley RN  Outcome: Progressing  7/17/2024 2208 by Digna Rizo RN  Outcome: Progressing     Problem: Discharge Planning  Goal: Discharge to home or other facility with appropriate resources  7/18/2024 1104 by Cordelia Kelley RN  Outcome: Progressing  Flowsheets (Taken 7/18/2024 1007)  Discharge to home or other facility with appropriate resources:   Identify barriers to discharge with patient and caregiver   Arrange for needed discharge resources and transportation as appropriate   Identify discharge learning needs (meds, wound care, etc)  7/17/2024 2208 by Digna Rizo RN  Outcome: Progressing     Problem: Skin/Tissue Integrity  Goal: Absence of new skin breakdown  Description: 1.  Monitor for areas of redness and/or skin breakdown  2.  Assess vascular access sites hourly  3.  Every 4-6 hours minimum:  Change oxygen saturation probe site  4.  Every 4-6 hours:  If on nasal continuous positive airway pressure, respiratory therapy assess nares and determine need for appliance change or resting period.  7/18/2024 1104 by Cordelia Kelley RN  Outcome: Progressing  7/17/2024 2208 by Digna Rizo RN  Outcome: Progressing     Problem: ABCDS Injury Assessment  Goal: Absence of physical injury  7/18/2024 1104 by Cordelia Kelley RN  Outcome: Progressing  7/17/2024 2208 by Digna Rizo RN  Outcome: Progressing     Problem: Chronic Conditions and Co-morbidities  Goal: Patient's chronic conditions and co-morbidity symptoms are monitored and maintained or improved  Outcome: Progressing  Flowsheets (Taken 7/18/2024 1007)  Care Plan - Patient's Chronic Conditions and Co-Morbidity Symptoms are Monitored and Maintained or Improved:   Monitor and assess patient's chronic conditions and comorbid symptoms for stability, deterioration, or improvement   Collaborate with multidisciplinary team to address chronic and comorbid

## 2024-07-18 NOTE — PROGRESS NOTES
Hospitalist Progress Note      PCP: Shashi Gan MD    Date of Admission: 7/13/2024    Chief Complaint:    Chief Complaint   Patient presents with    Hemoptysis     Pt from nh. Pt was drained from plurex 350 ml at noon. Cc pt is sob and coughing blood.     Subjective:  Patient is still not eating; states she threw up her mashed potatoes and is requesting a PEG.  12 point ROS negative other than mentioned above     Medications:  Reviewed    Infusion Medications    sodium chloride       Scheduled Medications    droNABinol  2.5 mg Oral BID    pantoprazole  40 mg Oral BID AC    sucralfate  1 g Oral 4x Daily AC & HS    atorvastatin  40 mg Oral Nightly    budesonide-formoterol  2 puff Inhalation BID RT    And    tiotropium  2 puff Inhalation Daily RT    midodrine  2.5 mg Oral TID WC    trospium  20 mg Oral Nightly    dilTIAZem  120 mg Oral Daily    sodium chloride flush  10 mL IntraVENous 2 times per day     PRN Meds: morphine, hydrOXYzine HCl, sodium phosphate 15 mmol in sodium chloride 0.9 % 250 mL IVPB, sodium chloride flush, sodium chloride, ondansetron **OR** ondansetron, polyethylene glycol, acetaminophen **OR** acetaminophen, potassium chloride **OR** potassium alternative oral replacement **OR** potassium chloride, magnesium sulfate      Intake/Output Summary (Last 24 hours) at 7/18/2024 1211  Last data filed at 7/18/2024 0900  Gross per 24 hour   Intake 460 ml   Output --   Net 460 ml     Exam:    BP (!) 107/50   Pulse 91   Temp 97.3 °F (36.3 °C) (Oral)   Resp 18   Ht 1.346 m (4' 5\")   Wt 74.4 kg (164 lb)   SpO2 100%   BMI 41.05 kg/m²     General appearance: No apparent distress, appears stated age and cooperative.  HEENT:  Conjunctivae/corneas clear.  Neck: Trachea midline.  Respiratory:  Normal respiratory effort. Clear to auscultation  Cardiovascular: Regular rate and rhythm  Abdomen: Soft, non-tender, non-distended with normal bowel sounds.  Musculoskeletal: No clubbing, cyanosis or edema

## 2024-07-18 NOTE — ANESTHESIA PRE PROCEDURE
Department of Anesthesiology  Preprocedure Note       Name:  Dayami Christensen   Age:  89 y.o.  :  1934                                          MRN:  [id number]         Date:  2024      Surgeon: Surgeon(s):  Chan Redding MD    Procedure: Procedure(s):  Esophagogastroduodenoscopy with percutaneous endoscopic gastrostomy tube    Medications prior to admission:   Prior to Admission medications    Medication Sig Start Date End Date Taking? Authorizing Provider   tuberculin 5 UNIT/0.1ML injection Inject 0.1 mLs into the skin nightly  24 Yes Chon Starks MD   ondansetron (ZOFRAN) 4 MG tablet Take 1 tablet by mouth every 8 hours as needed for Nausea or Vomiting Before meals and at bedtime for nausea   Yes Chon Starks MD   hydrOXYzine HCl (ATARAX) 25 MG tablet Take 1 tablet by mouth 3 times daily as needed for Anxiety For 21 days 24  Yes Chon Starks MD   guaiFENesin-dextromethorphan (ROBITUSSIN DM) 100-10 MG/5ML syrup Take 10 mLs by mouth every 4 hours as needed for Cough   Yes Chon Starks MD   fluticasone-umeclidin-vilant (TRELEGY ELLIPTA) 100-62.5-25 MCG/ACT AEPB inhaler Inhale 1 puff into the lungs daily Indications: Lung Cancer that Begins in the Bronchus    ProviderChon MD   midodrine (PROAMATINE) 2.5 MG tablet Take 1 tablet by mouth 3 times daily (with meals)  Patient taking differently: Take 1 tablet by mouth 3 times daily (with meals) Indications: Disorder of Low Blood Pressure Hold for sbp >=120, HR>90 24   José Antonio Baker MD   albuterol sulfate HFA (VENTOLIN HFA) 108 (90 Base) MCG/ACT inhaler Inhale 2 puffs into the lungs every 6 hours as needed for Wheezing 24   Deon Bain MD   albuterol (ACCUNEB) 0.63 MG/3ML nebulizer solution Take 3 mLs by nebulization every 6 hours as needed for Wheezing 24   Deon Bain MD   apixaban (ELIQUIS) 5 MG TABS tablet Take 1 tablet by mouth 2 times daily  Patient taking differently: Take 1

## 2024-07-18 NOTE — PROGRESS NOTES
Limits    Cognition Status:  Cognition  Overall Cognitive Status: WFL    Functional Mobility:  Device: no device  Activity: To/From chair with arms to bed  Assistance Level: max     Transfers:  Transfers  Stand Pivot Transfers: Maximum assistance    Bed Mobility  Bed mobility  Rolling to Left: Maximum assistance  Rolling to Right: Moderate assistance  Sit to Supine: Moderate assistance    Seated and Standing Balance:  Balance  Sitting: High guard  Standing: Impaired  Standing - Static: Constant support    Functional Endurance:  Activity Tolerance  Activity Tolerance: Patient limited by fatigue    Treatment consisted of:    ADL training    Assessment/Discharge Disposition:  Assessment  Discharge Recommendations: Continue to assess pending progress  Assessment: Pt demonstrated difficulty participating in ADL tasks due to decreased strength and fatigue.    [employer]  How much help is needed for putting on and taking off regular lower body clothing?: Total  How much help is needed for bathing (which includes washing, rinsing, drying)?: Total  How much help is needed for toileting (which includes using toilet, bedpan, or urinal)?: Total  How much help is needed for putting on and taking off regular upper body clothing?: Total  How much help is needed for taking care of personal grooming?: Total  How much help for eating meals?: Total  AM-PAC Inpatient Daily Activity Raw Score: 6  AM-PAC Inpatient ADL T-Scale Score : 17.07  ADL Inpatient CMS 0-100% Score: 100  ADL Inpatient CMS G-Code Modifier : CN    Plan:    Continue OT per POC    Patient Education:  Patient Education  Education Given To: Patient  Education Provided: Role of Therapy;Plan of Care  Education Method: Verbal  Barriers to Learning: None  Education Outcome: Continued education needed    Goals/Plan of care addressed during this session:        Patient Goal: Patient goals : \"get better\"    Improve [place] with ADLs    Therapy Time:   Individual Group  Co-Treat   Time In 1320       Time Out 1407         Minutes 47         ADL/IADL trainin minutes    Electronically signed by:    SOUTH Brown    2024, 2:18 PM

## 2024-07-18 NOTE — PROGRESS NOTES
[facility] Star Canchola MD   1 g at 07/17/24 2232    atorvastatin (LIPITOR) tablet 40 mg  40 mg Oral Nightly Debbie Aceves, APRN - CNP   40 mg at 07/17/24 2232    budesonide-formoterol (SYMBICORT) 80-4.5 MCG/ACT inhaler 2 puff  2 puff Inhalation BID RT Repdonnell, Debbie, APRN - CNP   2 puff at 07/17/24 1920    And    tiotropium (SPIRIVA RESPIMAT) 2.5 MCG/ACT inhaler 2 puff  2 puff Inhalation Daily RT Faustino Acevesrielle, APRN - CNP   2 puff at 07/17/24 0718    hydrOXYzine HCl (ATARAX) tablet 25 mg  25 mg Oral TID PRN Debbie Aceves APRN - CNP        midodrine (PROAMATINE) tablet 2.5 mg  2.5 mg Oral TID WC Debbie Aceves, APRN - CNP   2.5 mg at 07/17/24 1710    trospium (SANCTURA) tablet 20 mg  20 mg Oral Nightly Debbie Aceves, APRN - CNP   20 mg at 07/17/24 2232    sodium phosphate 15 mmol in sodium chloride 0.9 % 250 mL IVPB  15 mmol IntraVENous PRN Roque Kyle MD        dilTIAZem (CARDIZEM CD) extended release capsule 120 mg  120 mg Oral Daily Roque Kyle MD   120 mg at 07/17/24 0821    sodium chloride flush 0.9 % injection 10 mL  10 mL IntraVENous 2 times per day Roque Kyle MD   10 mL at 07/17/24 2235    sodium chloride flush 0.9 % injection 10 mL  10 mL IntraVENous PRN Roque Kyle MD        0.9 % sodium chloride infusion   IntraVENous PRN Roque Kyle MD        ondansetron (ZOFRAN-ODT) disintegrating tablet 4 mg  4 mg Oral Q8H PRN Roque Kyle MD        Or    ondansetron (ZOFRAN) injection 4 mg  4 mg IntraVENous Q6H PRN Roque Kyle MD   4 mg at 07/14/24 1049    polyethylene glycol (GLYCOLAX) packet 17 g  17 g Oral Daily PRN Roque Kyle MD        acetaminophen (TYLENOL) tablet 650 mg  650 mg Oral Q6H PRN Roque Kyle MD        Or    acetaminophen (TYLENOL) suppository 650 mg  650 mg Rectal Q6H PRN Roque Kyle MD        potassium chloride (KLOR-CON M) extended release tablet 40 mEq  40 mEq Oral PRN Roque Kyle MD        Or    potassium bicarb-citric acid (EFFER-K)  suspicious renal lesion and no hydronephrosis. GI/Bowel: No focal thickening or disproportion dilatation of bowel.  No inflammatory findings.  Mild colonic stool burden.  Pancolonic diverticulosis without acute diverticulitis.  Appendix normal. Pelvis: No suspicious pelvic lesion or bulky pelvic adenopathy/free fluid. Peritoneum/Retroperitoneum: Patent atherosclerotic nonaneurysmal abdominal aorta.  No bulky retroperitoneal adenopathy. Bones/Soft Tissues: No acute osseous or soft tissue findings.     1. No evidence of pulmonary embolism or other acute cardiopulmonary process. 2. Improved aeration left hemithorax from prior with persistent soft tissue involvement of the left hilar and suprahilar region with narrowing of the left upper lobe bronchus and near total lobar atelectasis of the left upper lobe with minimal improved aeration from prior and a left pleural drainage catheter in place with trace left pleural air or trace anterior pneumothorax from instrumentation likely however improved or decreased left pleural effusion with only trace left effusion remains.  Continued attention on follow-up for underlying mass or malignancy and stability.  No clear progression. 3. No evidence of metastatic disease in the abdomen or pelvis. 4. Pancolonic diverticulosis without acute diverticulitis.          Assessment and plan:  Palliative care encounter:   Explained the role of palliative care in treating patient. Discussed symptom management related to chronic disease/condition. Provided emotional support and active listening.   Spoke to patient and patient's daughter, Fanny. They report they would like to move forward with treatment option that Oncology offered. Discussed that patient's overall prognosis is poor, that there is no cure for this stage 4 cancer. 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   Daughter is agreeable to PEG tube.   Patient has Oncology appt on Friday.

## 2024-07-18 NOTE — PLAN OF CARE
Problem: Safety - Adult  Goal: Free from fall injury  Outcome: Progressing     Problem: Discharge Planning  Goal: Discharge to home or other facility with appropriate resources  Outcome: Progressing     Problem: Skin/Tissue Integrity  Goal: Absence of new skin breakdown  Description: 1.  Monitor for areas of redness and/or skin breakdown  2.  Assess vascular access sites hourly  3.  Every 4-6 hours minimum:  Change oxygen saturation probe site  4.  Every 4-6 hours:  If on nasal continuous positive airway pressure, respiratory therapy assess nares and determine need for appliance change or resting period.  Outcome: Progressing     Problem: ABCDS Injury Assessment  Goal: Absence of physical injury  Outcome: Progressing     Problem: SLP Adult - Impaired Swallowing  Goal: By Discharge: Advance to least restrictive diet without signs or symptoms of aspiration for planned discharge setting.  See evaluation for individualized goals.  7/17/2024 1618 by Jeana Perry, SLP  Outcome: Progressing

## 2024-07-19 LAB
ANION GAP SERPL CALCULATED.3IONS-SCNC: 12 MEQ/L (ref 9–15)
ANION GAP SERPL CALCULATED.3IONS-SCNC: 13 MEQ/L (ref 9–15)
BASOPHILS # BLD: 0 K/UL (ref 0–0.2)
BASOPHILS NFR BLD: 0.2 %
BUN SERPL-MCNC: 10 MG/DL (ref 8–23)
BUN SERPL-MCNC: 8 MG/DL (ref 8–23)
CALCIUM SERPL-MCNC: 7.6 MG/DL (ref 8.5–9.9)
CALCIUM SERPL-MCNC: 7.9 MG/DL (ref 8.5–9.9)
CHLORIDE SERPL-SCNC: 97 MEQ/L (ref 95–107)
CHLORIDE SERPL-SCNC: 98 MEQ/L (ref 95–107)
CO2 SERPL-SCNC: 27 MEQ/L (ref 20–31)
CO2 SERPL-SCNC: 28 MEQ/L (ref 20–31)
CREAT SERPL-MCNC: 0.37 MG/DL (ref 0.5–0.9)
CREAT SERPL-MCNC: 0.39 MG/DL (ref 0.5–0.9)
EOSINOPHIL # BLD: 0 K/UL (ref 0–0.7)
EOSINOPHIL NFR BLD: 0.3 %
ERYTHROCYTE [DISTWIDTH] IN BLOOD BY AUTOMATED COUNT: 17.2 % (ref 11.5–14.5)
GLUCOSE SERPL-MCNC: 123 MG/DL (ref 70–99)
GLUCOSE SERPL-MCNC: 148 MG/DL (ref 70–99)
HCT VFR BLD AUTO: 36.4 % (ref 37–47)
HGB BLD-MCNC: 11.2 G/DL (ref 12–16)
LYMPHOCYTES # BLD: 1.5 K/UL (ref 1–4.8)
LYMPHOCYTES NFR BLD: 13.1 %
MAGNESIUM SERPL-MCNC: 1.7 MG/DL (ref 1.7–2.4)
MCH RBC QN AUTO: 26.7 PG (ref 27–31.3)
MCHC RBC AUTO-ENTMCNC: 30.8 % (ref 33–37)
MCV RBC AUTO: 86.7 FL (ref 79.4–94.8)
MONOCYTES # BLD: 0.5 K/UL (ref 0.2–0.8)
MONOCYTES NFR BLD: 4.5 %
NEUTROPHILS # BLD: 9.5 K/UL (ref 1.4–6.5)
NEUTS SEG NFR BLD: 81.1 %
PHOSPHATE SERPL-MCNC: 2.9 MG/DL (ref 2.3–4.8)
PLATELET # BLD AUTO: 293 K/UL (ref 130–400)
POTASSIUM SERPL-SCNC: 3.6 MEQ/L (ref 3.4–4.9)
POTASSIUM SERPL-SCNC: 3.8 MEQ/L (ref 3.4–4.9)
RBC # BLD AUTO: 4.2 M/UL (ref 4.2–5.4)
SODIUM SERPL-SCNC: 137 MEQ/L (ref 135–144)
SODIUM SERPL-SCNC: 138 MEQ/L (ref 135–144)
WBC # BLD AUTO: 11.7 K/UL (ref 4.8–10.8)

## 2024-07-19 PROCEDURE — 97535 SELF CARE MNGMENT TRAINING: CPT

## 2024-07-19 PROCEDURE — 36415 COLL VENOUS BLD VENIPUNCTURE: CPT

## 2024-07-19 PROCEDURE — 2580000003 HC RX 258: Performed by: COLON & RECTAL SURGERY

## 2024-07-19 PROCEDURE — 84100 ASSAY OF PHOSPHORUS: CPT

## 2024-07-19 PROCEDURE — 6370000000 HC RX 637 (ALT 250 FOR IP): Performed by: COLON & RECTAL SURGERY

## 2024-07-19 PROCEDURE — 92526 ORAL FUNCTION THERAPY: CPT

## 2024-07-19 PROCEDURE — 80048 BASIC METABOLIC PNL TOTAL CA: CPT

## 2024-07-19 PROCEDURE — 94640 AIRWAY INHALATION TREATMENT: CPT

## 2024-07-19 PROCEDURE — 6360000002 HC RX W HCPCS: Performed by: COLON & RECTAL SURGERY

## 2024-07-19 PROCEDURE — 83735 ASSAY OF MAGNESIUM: CPT

## 2024-07-19 PROCEDURE — 94761 N-INVAS EAR/PLS OXIMETRY MLT: CPT

## 2024-07-19 PROCEDURE — 1210000000 HC MED SURG R&B

## 2024-07-19 PROCEDURE — 99232 SBSQ HOSP IP/OBS MODERATE 35: CPT | Performed by: INTERNAL MEDICINE

## 2024-07-19 PROCEDURE — 6370000000 HC RX 637 (ALT 250 FOR IP): Performed by: INTERNAL MEDICINE

## 2024-07-19 PROCEDURE — 85025 COMPLETE CBC W/AUTO DIFF WBC: CPT

## 2024-07-19 PROCEDURE — 2700000000 HC OXYGEN THERAPY PER DAY

## 2024-07-19 RX ORDER — MAGNESIUM SULFATE IN WATER 40 MG/ML
2000 INJECTION, SOLUTION INTRAVENOUS PRN
Status: DISCONTINUED | OUTPATIENT
Start: 2024-07-19 | End: 2024-07-21 | Stop reason: HOSPADM

## 2024-07-19 RX ORDER — POTASSIUM CHLORIDE 7.45 MG/ML
10 INJECTION INTRAVENOUS PRN
Status: DISCONTINUED | OUTPATIENT
Start: 2024-07-19 | End: 2024-07-21 | Stop reason: HOSPADM

## 2024-07-19 RX ORDER — POTASSIUM CHLORIDE 20 MEQ/1
40 TABLET, EXTENDED RELEASE ORAL PRN
Status: DISCONTINUED | OUTPATIENT
Start: 2024-07-19 | End: 2024-07-21 | Stop reason: HOSPADM

## 2024-07-19 RX ADMIN — PANTOPRAZOLE SODIUM 40 MG: 40 TABLET, DELAYED RELEASE ORAL at 09:10

## 2024-07-19 RX ADMIN — PANTOPRAZOLE SODIUM 40 MG: 40 TABLET, DELAYED RELEASE ORAL at 16:53

## 2024-07-19 RX ADMIN — TIOTROPIUM BROMIDE INHALATION SPRAY 2 PUFF: 3.12 SPRAY, METERED RESPIRATORY (INHALATION) at 07:13

## 2024-07-19 RX ADMIN — ATORVASTATIN CALCIUM 40 MG: 40 TABLET, FILM COATED ORAL at 20:51

## 2024-07-19 RX ADMIN — Medication 10 ML: at 20:56

## 2024-07-19 RX ADMIN — SUCRALFATE 1 G: 1 TABLET ORAL at 09:09

## 2024-07-19 RX ADMIN — BUDESONIDE AND FORMOTEROL FUMARATE DIHYDRATE 2 PUFF: 80; 4.5 AEROSOL RESPIRATORY (INHALATION) at 07:13

## 2024-07-19 RX ADMIN — DILTIAZEM HYDROCHLORIDE 120 MG: 120 CAPSULE, COATED, EXTENDED RELEASE ORAL at 09:09

## 2024-07-19 RX ADMIN — SUCRALFATE 1 G: 1 TABLET ORAL at 12:25

## 2024-07-19 RX ADMIN — SUCRALFATE 1 G: 1 TABLET ORAL at 22:00

## 2024-07-19 RX ADMIN — DRONABINOL 2.5 MG: 2.5 CAPSULE ORAL at 22:01

## 2024-07-19 RX ADMIN — SUCRALFATE 1 G: 1 TABLET ORAL at 16:53

## 2024-07-19 RX ADMIN — Medication 10 ML: at 09:10

## 2024-07-19 RX ADMIN — MIDODRINE HYDROCHLORIDE 2.5 MG: 2.5 TABLET ORAL at 12:25

## 2024-07-19 RX ADMIN — MIDODRINE HYDROCHLORIDE 2.5 MG: 2.5 TABLET ORAL at 09:10

## 2024-07-19 RX ADMIN — DRONABINOL 2.5 MG: 2.5 CAPSULE ORAL at 12:25

## 2024-07-19 RX ADMIN — HYDROMORPHONE HYDROCHLORIDE 1 MG: 1 INJECTION, SOLUTION INTRAMUSCULAR; INTRAVENOUS; SUBCUTANEOUS at 08:29

## 2024-07-19 RX ADMIN — APIXABAN 5 MG: 5 TABLET, FILM COATED ORAL at 20:51

## 2024-07-19 RX ADMIN — HYDROMORPHONE HYDROCHLORIDE 0.5 MG: 1 INJECTION, SOLUTION INTRAMUSCULAR; INTRAVENOUS; SUBCUTANEOUS at 22:01

## 2024-07-19 RX ADMIN — TROSPIUM CHLORIDE 20 MG: 20 TABLET, FILM COATED ORAL at 20:50

## 2024-07-19 RX ADMIN — BUDESONIDE AND FORMOTEROL FUMARATE DIHYDRATE 2 PUFF: 80; 4.5 AEROSOL RESPIRATORY (INHALATION) at 19:22

## 2024-07-19 ASSESSMENT — PAIN SCALES - GENERAL
PAINLEVEL_OUTOF10: 3
PAINLEVEL_OUTOF10: 1
PAINLEVEL_OUTOF10: 10
PAINLEVEL_OUTOF10: 5

## 2024-07-19 ASSESSMENT — PAIN DESCRIPTION - LOCATION
LOCATION: ABDOMEN

## 2024-07-19 ASSESSMENT — PAIN DESCRIPTION - ORIENTATION: ORIENTATION: MID

## 2024-07-19 ASSESSMENT — PAIN DESCRIPTION - DESCRIPTORS: DESCRIPTORS: ACHING

## 2024-07-19 NOTE — PROGRESS NOTES
Physical Therapy Med Surg Daily Treatment Note  Facility/Department: [address] MED SURG UNIT  Room: [place] [address]       NAME: Dayami Christensen  : 1934 (89 y.o.)  MRN: [id number]  CODE STATUS: Full Code    Date of Service: 2024    Patient Diagnosis(es): Hypokalemia [E87.6]  Hypomagnesemia [E83.42]  Hemoptysis [R04.2]   Chief Complaint   Patient presents with    Hemoptysis     Pt from nh. Pt was drained from plurex 350 ml at noon. Cc pt is sob and coughing blood.     Patient Active Problem List    Diagnosis Date Noted    Unable to eat 2024    Esophagitis 07/15/2024    Hypokalemia 07/15/2024    Esophageal dysphagia 2024    Odynophagia 2024    History of lung cancer 2024    Hemoptysis 2024    Generalized weakness 2024    Neuropathy 2024    Pain in both lower extremities 2024    Hx of cancer of lung 2024    Constipation 2024    SOB (shortness of breath) 2024    Encounter for hospice care discussion 2024    Palliative care encounter 2024    Goals of care, counseling/discussion 2024    Advanced care planning/counseling discussion 2024    Malignant neoplasm of left lung (HCC) 2024    Neoplasm related pain 2024    Shortness of breath at rest 2024    Squamous cell carcinoma of left lung (HCC) 2024    Pleural effusion, left 2024    Collapse of left lung 2024    Malignant neoplasm of overlapping sites of left lung (HCC) 2024    Secondary hypercoagulable state (HCC) 2024    Acute respiratory failure with hypoxia (HCC) 2024    Lung mass 05/10/2024    Dizzy 2018    History of cardiac radiofrequency ablation (RFA) 2018    Dyslipidemia 2017    Gastroesophageal reflux disease without esophagitis 2017    Primary osteoarthritis involving multiple joints 2017    Abnormal gait 2017    Risk for falls 2017    Chronic midline low back pain without  assistance= pt performs 75% or more of the activity; assistance is required to complete the activity  Moderate assistance= pt performs 50% of the activity; assistance is required to complete the activity  Maximal assistance = pt performs 25% of the activity; assistance is required to complete the activity  Dependent = pt requires total physical assistance to accomplish the task

## 2024-07-19 NOTE — PLAN OF CARE
Problem: Safety - Adult  Goal: Free from fall injury  7/18/2024 2318 by Digna Rizo RN  Outcome: Progressing  7/18/2024 1104 by Cordelia Kelley RN  Outcome: Progressing     Problem: Discharge Planning  Goal: Discharge to home or other facility with appropriate resources  7/18/2024 2318 by Digna Rizo RN  Outcome: Progressing  7/18/2024 1104 by Cordelia Kelley RN  Outcome: Progressing  Flowsheets (Taken 7/18/2024 1007)  Discharge to home or other facility with appropriate resources:   Identify barriers to discharge with patient and caregiver   Arrange for needed discharge resources and transportation as appropriate   Identify discharge learning needs (meds, wound care, etc)     Problem: Skin/Tissue Integrity  Goal: Absence of new skin breakdown  Description: 1.  Monitor for areas of redness and/or skin breakdown  2.  Assess vascular access sites hourly  3.  Every 4-6 hours minimum:  Change oxygen saturation probe site  4.  Every 4-6 hours:  If on nasal continuous positive airway pressure, respiratory therapy assess nares and determine need for appliance change or resting period.  7/18/2024 2318 by Digna Rizo RN  Outcome: Progressing  7/18/2024 1104 by Cordelia Kelley RN  Outcome: Progressing     Problem: ABCDS Injury Assessment  Goal: Absence of physical injury  7/18/2024 2318 by Digna Rizo RN  Outcome: Progressing  7/18/2024 1104 by Cordelia Kelley RN  Outcome: Progressing     Problem: Chronic Conditions and Co-morbidities  Goal: Patient's chronic conditions and co-morbidity symptoms are monitored and maintained or improved  7/18/2024 2318 by Digna Rizo RN  Outcome: Progressing  7/18/2024 1104 by Cordelia Kelley RN  Outcome: Progressing  Flowsheets (Taken 7/18/2024 1007)  Care Plan - Patient's Chronic Conditions and Co-Morbidity Symptoms are Monitored and Maintained or Improved:   Monitor and assess patient's chronic conditions and comorbid symptoms for stability,  deterioration, or improvement   Collaborate with multidisciplinary team to address chronic and comorbid conditions and prevent exacerbation or deterioration   Update acute care plan with appropriate goals if chronic or comorbid symptoms are exacerbated and prevent overall improvement and discharge     Problem: Nutrition Deficit:  Goal: Optimize nutritional status  7/18/2024 2318 by Digna Rizo, RN  Outcome: Progressing  7/18/2024 1104 by Cordelia Kelley, RN  Outcome: Progressing     Problem: Pain  Goal: Verbalizes/displays adequate comfort level or baseline comfort level  7/18/2024 2318 by Digna Rizo, RN  Outcome: Progressing  7/18/2024 1104 by Cordelia Kelley, RN  Outcome: Progressing

## 2024-07-19 NOTE — PROGRESS NOTES
Comprehensive Nutrition Assessment    Type and Reason for Visit:  Initial, Reassess    Nutrition Recommendations/Plan:   Monitor and replace electrolytes prior to starting TF (K/Phos/Mg)  Begin trophic TF with a diabetic formula (Glucerna 1.5) @ 15 ml/hr x 16 hrs (hold TF 1-2 hrs before and after carafate)   50 ml water flush every 4 hrs   Continue to monitor significant labs daily for refeeding until stable  Once TF tolerated at trophic rate, increase goal rate to 35 ml/hr x 16 hrs with 150 ml water flushq 4      Malnutrition Assessment:  Malnutrition Status:  Insufficient data (07/15/24 1143)    Context:  Chronic Illness     Findings of the 6 clinical characteristics of malnutrition:  Energy Intake:     Weight Loss:        Body Fat Loss:        Muscle Mass Loss:       Fluid Accumulation:        Strength:       Nutrition Assessment:    Pt remains at high risk for malnutrition, continues to refuse po.  PEG tube placed per pt request, noted emesis x 3 po trials with SLP on 7/17.  Pt ordered carafate QID.  TF must be held 1-2 hours before and after administration of carafate, therefore pt only able to receive ~8-16 hr of continuous TF infusion.  Will begin slow infusion of TF due to risk of refeeding and hx emesis, once tolerated at goal rate will transition to bolus feedings.  Monitor significant labs daily (K/Mg/Phos) for signs of refeeding.  Pt may benefit from JTube if unable to tolerate PEG tube feedings    Nutrition Related Findings:    \"history of squamous cell carcinoma left lung (stage 4), CKD, T2DM ('prediabetes\", GERD, HTN, HLD, currenly on raditaion therapy, poor prognosis per notes, admitted with short of breath and hemoptysis, recently admitted (7/8-11).  Noted pt reported last admission she has not been eating and has had weight loss. Pt continues to refuse meals this admission.  GI consulted for odynophagia vs GERD. s/p EGD 7/15/2024 noting significant ulcerative LA Grade D esophagitis with no

## 2024-07-19 NOTE — PROGRESS NOTES
Patient screaming states she is having pain at PEG tube insertion site. Medication given. Site is CDI, no redness, or swelling noted.     Message sent to oncology at the request of Dr Kyle for anticoagulation recommendations.      Pleurx drained for 450 ml. Patient complained of pain left lung near the end of draining. SPO2 99% on 2 L NC. Denied pain at completion of draining.     Spoke with Dr. Wesley wallis for patient to resume eliquis 5 mg BID. Dr Kyle notified.

## 2024-07-19 NOTE — PROGRESS NOTES
Hospitalist Progress Note      PCP: Shashi Gan MD    Date of Admission: 7/13/2024    Chief Complaint:    Chief Complaint   Patient presents with    Hemoptysis     Pt from nh. Pt was drained from plurex 350 ml at noon. Cc pt is sob and coughing blood.     Subjective:  Patient is still not eating.   12 point ROS negative other than mentioned above     Medications:  Reviewed    Infusion Medications    sodium chloride       Scheduled Medications    droNABinol  2.5 mg Oral BID    pantoprazole  40 mg Oral BID AC    sucralfate  1 g Oral 4x Daily AC & HS    atorvastatin  40 mg Oral Nightly    budesonide-formoterol  2 puff Inhalation BID RT    And    tiotropium  2 puff Inhalation Daily RT    midodrine  2.5 mg Oral TID WC    trospium  20 mg Oral Nightly    dilTIAZem  120 mg Oral Daily    sodium chloride flush  10 mL IntraVENous 2 times per day     PRN Meds: HYDROmorphone **OR** HYDROmorphone, morphine, hydrOXYzine HCl, sodium phosphate 15 mmol in sodium chloride 0.9 % 250 mL IVPB, sodium chloride flush, sodium chloride, ondansetron **OR** ondansetron, polyethylene glycol, acetaminophen **OR** acetaminophen, potassium chloride **OR** potassium alternative oral replacement **OR** potassium chloride, magnesium sulfate      Intake/Output Summary (Last 24 hours) at 7/19/2024 1302  Last data filed at 7/19/2024 0142  Gross per 24 hour   Intake 0 ml   Output 150 ml   Net -150 ml     Exam:    /62   Pulse 93   Temp 98.2 °F (36.8 °C) (Oral)   Resp 16   Ht 1.346 m (4' 5\")   Wt 72 kg (158 lb 11.7 oz)   SpO2 99%   BMI 39.73 kg/m²     General appearance: No apparent distress, appears stated age and cooperative.  HEENT:  Conjunctivae/corneas clear.  Neck: Trachea midline.  Respiratory:  Normal respiratory effort. Clear to auscultation  Cardiovascular: Regular rate and rhythm  Abdomen: Soft, non-tender, non-distended with normal bowel sounds.  Musculoskeletal: No clubbing, cyanosis or edema bilaterally  Neuro: Non Focal.      Labs:   Recent Labs     07/17/24  0529 07/18/24  0520 07/19/24  0525   WBC 7.3 7.1 11.7*   HGB 11.0* 10.9* 11.2*   HCT 35.3* 35.6* 36.4*    274 293       Recent Labs     07/16/24 2027 07/17/24  0529 07/17/24  0807 07/18/24  0520 07/19/24  0525   NA  --  140  --  136 138   K 3.7 3.9  --  3.8 3.6   CL  --  97  --  98 98   CO2  --  32*  --  28 28   BUN  --  10  --  9 8   CREATININE  --  0.34*  --  0.31* 0.39*   CALCIUM  --  8.1*  --  7.4* 7.6*   PHOS 2.5  --  2.5  --   --        No results for input(s): \"AST\", \"ALT\", \"BILIDIR\", \"BILITOT\", \"ALKPHOS\" in the last 72 hours.    No results for input(s): \"INR\" in the last 72 hours.  No results for input(s): \"CKTOTAL\", \"TROPONINI\" in the last 72 hours.    Urinalysis:      Lab Results   Component Value Date/Time    NITRU Negative 07/08/2024 11:15 AM    BLOODU Negative 07/08/2024 11:15 AM    GLUCOSEU Negative 07/08/2024 11:15 AM       Radiology:  Fluoroscopy modified barium swallow with video   Final Result   Dysphagia is observed      Please see separate speech pathology report for full discussion of findings   and recommendations.         CTA CHEST W WO CONTRAST   Final Result   1. No evidence of pulmonary embolism or other acute cardiopulmonary process.   2. Improved aeration left hemithorax from prior with persistent soft tissue   involvement of the left hilar and suprahilar region with narrowing of the   left upper lobe bronchus and near total lobar atelectasis of the left upper   lobe with minimal improved aeration from prior and a left pleural drainage   catheter in place with trace left pleural air or trace anterior pneumothorax   from instrumentation likely however improved or decreased left pleural   effusion with only trace left effusion remains.  Continued attention on   follow-up for underlying mass or malignancy and stability.  No clear   progression.   3. No evidence of metastatic disease in the abdomen or pelvis.   4. Pancolonic diverticulosis without

## 2024-07-19 NOTE — PROGRESS NOTES
[facility]  [place] [facility] [place]  [facility] MED SURG UNIT  Speech Language Pathology   Treatment Note      Dayami Christensen  1934  [id number]  [x]   confirmed      Date: 2024    Hypokalemia [E87.6]  Hypomagnesemia [E83.42]  Hemoptysis [R04.2]    Restrictions/Precautions: Fall Risk    ADULT DIET; Dysphagia - Minced and Moist  ADULT TUBE FEEDING; PEG; Standard with Fiber; Continuous; 25; No; 30; Q 4 hours; Protein; 1 Dose; BID     Respiratory Status:O2 Flow Rate (L/min): 2 L/min (24 0713)   No active isolations      Subjective:  Alert and Self Limiting      Pt asleep upon arrival but was easily woken up. She was cooperative during trials.    Interventions used this date:  Dysphagia Treatment    Objective/Assessment:  Patient progressing towards goals:  Short Term Goals  Time Frame for Short Term Goals: 1-2x week  Goal 1: Pt will tolerate therapeutic trials of minced/moist  with adequate mastication and oral clearance of bolus with no overt s/s of aspiration on 100% trials.  Pt refused trials of minced/moist.  Goal 2: Pt will demonstrate reflux precaution for safe and efficient swallow of recommended diet in all given opportunities  Pt educated on reflux precautions, sitting upright while eating and remaining up right for 30 minutes after eating. Pt verbalized understanding. Education needs reinforcement.   Goal 3: Pt will tolerate the recommended diet level with no s/s of aspiration.  Pt trialed thin liquids and demonstrated weak cough 3/5 trials. Pt demonstrated oral holding and delayed initiation. Hyolaryngeal elevation present via palpation.   Pt trialed puree texture and demonstrated weak cough 1/3 trials and no oral residue observed. Pt declined additional PO trials.     Rec: Continue pureed and thin diet with continued monitoring.  TAMICA Storey notified.     Treatment/Activity Tolerance:  Patient tolerated treatment well    Plan:  Continue per POC    Pain Assessment:  Patient does not c/o

## 2024-07-19 NOTE — PROGRESS NOTES
Pulmonary Progress Note    2024 9:08 AM    Subjective:   Admit Date: 2024  PCP: Shashi Gan MD    Chief Complaint   Patient presents with    Hemoptysis     Pt from nh. Pt was drained from plurex 350 ml at noon. Cc pt is sob and coughing blood.     Interval History: denies hemoptysis. Respiratory status has been about the same.     14 points review of systems has been obtained and negative except to was mentioned in HPI.     Medications:   Scheduled Meds:   droNABinol  2.5 mg Oral BID    pantoprazole  40 mg Oral BID AC    sucralfate  1 g Oral 4x Daily AC & HS    atorvastatin  40 mg Oral Nightly    budesonide-formoterol  2 puff Inhalation BID RT    And    tiotropium  2 puff Inhalation Daily RT    midodrine  2.5 mg Oral TID WC    trospium  20 mg Oral Nightly    dilTIAZem  120 mg Oral Daily    sodium chloride flush  10 mL IntraVENous 2 times per day     Continuous Infusions:   sodium chloride           Objective:   Vitals:   Temp (24hrs), Av.6 °F (36.4 °C), Min:97 °F (36.1 °C), Max:98.2 °F (36.8 °C)    /62   Pulse 93   Temp 98.2 °F (36.8 °C) (Oral)   Resp 16   Ht 1.346 m (4' 5\")   Wt 72 kg (158 lb 11.7 oz)   SpO2 99%   BMI 39.73 kg/m²   I/O:24HR INTAKE/OUTPUT:    Intake/Output Summary (Last 24 hours) at 2024 0908  Last data filed at 2024 0142  Gross per 24 hour   Intake 0 ml   Output 150 ml   Net -150 ml      0701 -  0700  In: 240 [P.O.:240]  Out: 150 [Urine:150]  CVP:           Physical Exam:  General appearance - alert, ill appearing, and in no distress  Mental status - alert, oriented to person, place, and time  Eyes - pupils equal and reactive, extraocular eye movements intact  Nose - normal and patent, no erythema, discharge or polyps  Neck - supple, no significant adenopathy  Chest - clear to auscultation, no wheezes, rales or rhonchi, symmetric air entry  Heart - normal rate, regular rhythm, normal S1, S2, no murmurs, rubs, clicks or gallops  Abdomen - soft,  nontender, nondistended, no masses or organomegaly  Rectal - deferred, not clinically indicated  Neurological - alert, oriented, normal speech, no focal findings or movement disorder noted, motor and sensory grossly normal bilaterally  Musculoskeletal - no joint tenderness, deformity or swelling  Extremities - peripheral pulses normal, no pedal edema, no clubbing or cyanosis  Skin - normal coloration and turgor, no rashes, no suspicious skin lesions noted               Recent Labs     07/16/24 2027 07/16/24 2027 07/17/24 0529 07/17/24  0807 07/18/24  0520 07/19/24  0525   NA  --    < > 140  --  136 138   K 3.7  --  3.9  --  3.8 3.6   CL  --    < > 97  --  98 98   CO2  --   --  32*  --  28 28   BUN  --    < > 10  --  9 8   CREATININE  --    < > 0.34*  --  0.31* 0.39*   GLUCOSE  --    < > 150*  --  135* 123*   PHOS 2.5  --   --  2.5  --   --    MG 1.6*  --   --  1.8  --   --     < > = values in this interval not displayed.    .     Recent Labs     07/18/24 0520 07/19/24  0525   WBC 7.1 11.7*   HGB 10.9* 11.2*    293             Assessment and Plan:       Impression:    - Hemoptysis, this has resolved.  - NSCLC   - Dysphagia   - COPD. Does not look to be in exacerbation    Recommendations:    - continue to watch for hemoptysis but seems to have stopped.   - wean off of O2  - continue bronchodilators  - speech following.           Electronically signed by Benedict Cuenca MD on 7/19/2024 at 9:08 AM

## 2024-07-19 NOTE — PLAN OF CARE
Nutrition Problem #1: Increased nutrient needs  Intervention: Food and/or Nutrient Delivery: Start Tube Feeding (Begin trophic TF with a diabetic formula (Glucerna 1.5) @ 15 ml/hr x 16 hrs (hold TF 1-2 hrs before and after carafate)  50 ml water flush every 4 hrs  Once TF tolerated at trophic rate, increase goal rate to 35 ml/hr x 16 hrs with 150 ml water flushq 4)

## 2024-07-19 NOTE — CARE COORDINATION
This LSW received notification that insurance has given authorization for SNF transfer.  Patients insurance authorization is approved through midnight of  7/22/24.  [place] [id number] is needed prior to discharge.  CHANNING/FRANNY to follow.  Electronically signed by CHANNING Recinos on 7/19/24 at 10:35 AM EDT

## 2024-07-20 VITALS
HEIGHT: 59 IN | BODY MASS INDEX: 32.44 KG/M2 | SYSTOLIC BLOOD PRESSURE: 107 MMHG | WEIGHT: 160.94 LBS | DIASTOLIC BLOOD PRESSURE: 76 MMHG | RESPIRATION RATE: 16 BRPM | OXYGEN SATURATION: 100 % | TEMPERATURE: 97.7 F | HEART RATE: 95 BPM

## 2024-07-20 LAB
ANION GAP SERPL CALCULATED.3IONS-SCNC: 12 MEQ/L (ref 9–15)
ANION GAP SERPL CALCULATED.3IONS-SCNC: 9 MEQ/L (ref 9–15)
BUN SERPL-MCNC: 14 MG/DL (ref 8–23)
BUN SERPL-MCNC: 15 MG/DL (ref 8–23)
CALCIUM SERPL-MCNC: 8 MG/DL (ref 8.5–9.9)
CALCIUM SERPL-MCNC: 8.2 MG/DL (ref 8.5–9.9)
CHLORIDE SERPL-SCNC: 100 MEQ/L (ref 95–107)
CHLORIDE SERPL-SCNC: 96 MEQ/L (ref 95–107)
CO2 SERPL-SCNC: 28 MEQ/L (ref 20–31)
CO2 SERPL-SCNC: 29 MEQ/L (ref 20–31)
CREAT SERPL-MCNC: 0.52 MG/DL (ref 0.5–0.9)
CREAT SERPL-MCNC: 0.66 MG/DL (ref 0.5–0.9)
GLUCOSE SERPL-MCNC: 168 MG/DL (ref 70–99)
GLUCOSE SERPL-MCNC: 188 MG/DL (ref 70–99)
MAGNESIUM SERPL-MCNC: 1.7 MG/DL (ref 1.7–2.4)
MAGNESIUM SERPL-MCNC: 1.8 MG/DL (ref 1.7–2.4)
PHOSPHATE SERPL-MCNC: 2.3 MG/DL (ref 2.3–4.8)
PHOSPHATE SERPL-MCNC: 3 MG/DL (ref 2.3–4.8)
POTASSIUM SERPL-SCNC: 3.7 MEQ/L (ref 3.4–4.9)
POTASSIUM SERPL-SCNC: 4 MEQ/L (ref 3.4–4.9)
SODIUM SERPL-SCNC: 134 MEQ/L (ref 135–144)
SODIUM SERPL-SCNC: 140 MEQ/L (ref 135–144)

## 2024-07-20 PROCEDURE — 94640 AIRWAY INHALATION TREATMENT: CPT

## 2024-07-20 PROCEDURE — 94761 N-INVAS EAR/PLS OXIMETRY MLT: CPT

## 2024-07-20 PROCEDURE — 2580000003 HC RX 258: Performed by: COLON & RECTAL SURGERY

## 2024-07-20 PROCEDURE — 84100 ASSAY OF PHOSPHORUS: CPT

## 2024-07-20 PROCEDURE — 6370000000 HC RX 637 (ALT 250 FOR IP): Performed by: COLON & RECTAL SURGERY

## 2024-07-20 PROCEDURE — 83735 ASSAY OF MAGNESIUM: CPT

## 2024-07-20 PROCEDURE — 99232 SBSQ HOSP IP/OBS MODERATE 35: CPT | Performed by: INTERNAL MEDICINE

## 2024-07-20 PROCEDURE — 6360000002 HC RX W HCPCS: Performed by: COLON & RECTAL SURGERY

## 2024-07-20 PROCEDURE — 80048 BASIC METABOLIC PNL TOTAL CA: CPT

## 2024-07-20 PROCEDURE — 36415 COLL VENOUS BLD VENIPUNCTURE: CPT

## 2024-07-20 PROCEDURE — 2700000000 HC OXYGEN THERAPY PER DAY

## 2024-07-20 PROCEDURE — 6370000000 HC RX 637 (ALT 250 FOR IP): Performed by: INTERNAL MEDICINE

## 2024-07-20 RX ORDER — PANTOPRAZOLE SODIUM 40 MG/1
40 TABLET, DELAYED RELEASE ORAL
Qty: 30 TABLET | Refills: 3 | DISCHARGE
Start: 2024-07-20

## 2024-07-20 RX ORDER — SUCRALFATE 1 G/1
1 TABLET ORAL
Qty: 120 TABLET | Refills: 3 | DISCHARGE
Start: 2024-07-20

## 2024-07-20 RX ADMIN — DILTIAZEM HYDROCHLORIDE 120 MG: 120 CAPSULE, COATED, EXTENDED RELEASE ORAL at 09:14

## 2024-07-20 RX ADMIN — ATORVASTATIN CALCIUM 40 MG: 40 TABLET, FILM COATED ORAL at 21:26

## 2024-07-20 RX ADMIN — BUDESONIDE AND FORMOTEROL FUMARATE DIHYDRATE 2 PUFF: 80; 4.5 AEROSOL RESPIRATORY (INHALATION) at 19:37

## 2024-07-20 RX ADMIN — PANTOPRAZOLE SODIUM 40 MG: 40 TABLET, DELAYED RELEASE ORAL at 06:03

## 2024-07-20 RX ADMIN — PANTOPRAZOLE SODIUM 40 MG: 40 TABLET, DELAYED RELEASE ORAL at 16:32

## 2024-07-20 RX ADMIN — TIOTROPIUM BROMIDE INHALATION SPRAY 2 PUFF: 3.12 SPRAY, METERED RESPIRATORY (INHALATION) at 07:53

## 2024-07-20 RX ADMIN — APIXABAN 5 MG: 5 TABLET, FILM COATED ORAL at 09:14

## 2024-07-20 RX ADMIN — APIXABAN 5 MG: 5 TABLET, FILM COATED ORAL at 21:26

## 2024-07-20 RX ADMIN — TROSPIUM CHLORIDE 20 MG: 20 TABLET, FILM COATED ORAL at 21:26

## 2024-07-20 RX ADMIN — HYDROMORPHONE HYDROCHLORIDE 0.5 MG: 1 INJECTION, SOLUTION INTRAMUSCULAR; INTRAVENOUS; SUBCUTANEOUS at 18:18

## 2024-07-20 RX ADMIN — Medication 10 ML: at 21:27

## 2024-07-20 RX ADMIN — SUCRALFATE 1 G: 1 TABLET ORAL at 18:10

## 2024-07-20 RX ADMIN — DRONABINOL 2.5 MG: 2.5 CAPSULE ORAL at 09:56

## 2024-07-20 RX ADMIN — Medication 10 ML: at 09:21

## 2024-07-20 RX ADMIN — SUCRALFATE 1 G: 1 TABLET ORAL at 09:14

## 2024-07-20 RX ADMIN — SUCRALFATE 1 G: 1 TABLET ORAL at 21:26

## 2024-07-20 RX ADMIN — BUDESONIDE AND FORMOTEROL FUMARATE DIHYDRATE 2 PUFF: 80; 4.5 AEROSOL RESPIRATORY (INHALATION) at 07:53

## 2024-07-20 RX ADMIN — SUCRALFATE 1 G: 1 TABLET ORAL at 11:38

## 2024-07-20 ASSESSMENT — PAIN SCALES - GENERAL: PAINLEVEL_OUTOF10: 10

## 2024-07-20 ASSESSMENT — PAIN DESCRIPTION - DESCRIPTORS: DESCRIPTORS: DISCOMFORT

## 2024-07-20 ASSESSMENT — PULMONARY FUNCTION TESTS: PEFR_L/MIN: 16

## 2024-07-20 ASSESSMENT — PAIN DESCRIPTION - LOCATION: LOCATION: ABDOMEN

## 2024-07-20 ASSESSMENT — PAIN - FUNCTIONAL ASSESSMENT: PAIN_FUNCTIONAL_ASSESSMENT: PREVENTS OR INTERFERES SOME ACTIVE ACTIVITIES AND ADLS

## 2024-07-20 ASSESSMENT — PAIN DESCRIPTION - ORIENTATION: ORIENTATION: MID

## 2024-07-20 NOTE — CARE COORDINATION
Rounds done with bedside nurse and I messaged Digna from [facility] to check on auth for [facility].

## 2024-07-20 NOTE — DISCHARGE SUMMARY
Hospital Medicine Discharge Summary    Dayami Christensen  :  1934  MRN:  [id number]    Admit date:  2024  Discharge date:  2024    Admitting Physician:  Roque Kyle MD  Primary Care Physician:  Shashi Gan MD    Discharge Diagnoses:    Hemoptysis    Chief Complaint   Patient presents with    Hemoptysis     Pt from nh. Pt was drained from plurex 350 ml at noon. Cc pt is sob and coughing blood.     Hospital Course:   Patient with known advanced lung cancer presneted with hemoptysis.  Resolved with holding her eliquis.  Discussed with oncology given the likelihood of reoccurrence but they recommended resuming on discharge.  The patient has a very poor prognosis but still wants aggressive care.  She has not been eating due to fear; EGD did show esophagitis but she will not attempt to eat and insisted she wanted a PEG which was done per patient request as she is very motivated to try to get stronger to try to do chemo.      Exam on discharge:   /63   Pulse 98   Temp 97.3 °F (36.3 °C) (Oral)   Resp 16   Ht 1.499 m (4' 11\")   Wt 73 kg (160 lb 15 oz)   SpO2 99%   BMI 32.51 kg/m²   eneral appearance: No apparent distress, appears stated age and cooperative.  HEENT:  Conjunctivae/corneas clear.  Neck: Trachea midline.  Respiratory:  Normal respiratory effort. Clear to auscultation  Cardiovascular: Regular rate and rhythm  Abdomen: Soft, non-tender, non-distended with normal bowel sounds.  Musculoskeletal: No clubbing, cyanosis or edema bilaterally  Neuro: Non Focal    Consults:  IP CONSULT TO GI  IP CONSULT TO PULMONOLOGY  IP CONSULT TO ONCOLOGY  IP CONSULT TO PALLIATIVE CARE  IP CONSULT TO GENERAL SURGERY  IP CONSULT TO DIETITIAN    Significant Diagnostic Studies:    Refer to chart     Please refer to chart if no studies are shown here    CTA CHEST W WO CONTRAST    Result Date: 2024  EXAMINATION: CTA OF THE CHEST WITH AND WITHOUT CONTRAST; CT OF THE ABDOMEN AND PELVIS WITH CONTRAST  that were indicated and/or required as been addressed and set up by Social Work.     Condition at discharge: stable but poor      Activity: activity as tolerated    Total time taken for discharging this patient: 40 minutes. Greater than 70% of time was spent focused exclusively on this patient. Time was taken to review chart, discuss plans with consultants, reconciling medications, discussing plan answering questions with patient.     Signed:  Roque Kyle MD  7/20/2024, 11:40 AM  ----------------------------------------------------------------------------------------------------------------------    Dayami Christensen

## 2024-07-20 NOTE — PROGRESS NOTES
INPATIENT PROGRESS NOTES    PATIENT NAME: Dayami Christensen  MRN: [id number]  SERVICE DATE:  July 20, 2024   SERVICE TIME:  1:16 PM      PRIMARY SERVICE: Pulmonary Disease    CHIEF COMPLAIN:'Hemoptysis    INTERVAL HPI: Patient seen and examined at bedside, Interval Notes, orders reviewed. Nursing notes noted  She had a PEG tube in place currently on tube feeding.  She is on 3 L O2 via nasal cannula and O2 saturation 99%.   No hemoptysis.  No fever or chills.  No chest pain.         OBJECTIVE   I/O:24HR INTAKE/OUTPUT:    Intake/Output Summary (Last 24 hours) at 7/20/2024 1316  Last data filed at 7/20/2024 0045  Gross per 24 hour   Intake 23 ml   Output 150 ml   Net -127 ml     07/19 0701 - 07/20 0700  In: 383 [P.O.:360]  Out: 150 [Urine:150]  Body mass index is 32.51 kg/m².    PHYSICAL EXAM:  Vitals:  /63   Pulse 98   Temp 97.3 °F (36.3 °C) (Oral)   Resp 16   Ht 1.499 m (4' 11\")   Wt 73 kg (160 lb 15 oz)   SpO2 99%   BMI 32.51 kg/m²     General: Alert, awake .comfortable in bed, No distress.  Head: Atraumatic , Normocephalic   Eyes: PERRL. No sclera icterus. No conjunctival injection. No discharge   ENT: No nasal  discharge. Pharynx clear.  Neck:  Trachea midline. No thyromegaly, no JVD, No cervical adenopathy.  Chest : Bilaterally symmetrical ,Normal effort,  No accessory muscle use  Lung : Diminished breath sound bilaterally more on left side.  No Rales. No wheezing. No rhonchi.   Heart:: Normal rate. Regular rhythm. No mumur ,  Rub or gallop  ABD: Non-tender. Non-distended. No masses. No organmegaly. Normal bowel sounds. No hernia.  Ext : No Pitting both leg , No Cyanosis No clubbing  Neuro: no focal weakness    Labs:  Recent Labs     07/18/24  0520 07/19/24  0525   WBC 7.1 11.7*   HGB 10.9* 11.2*    293     Recent Labs     07/18/24  0520 07/19/24  0525 07/19/24  1831 07/20/24  0630    138 137 140   K 3.8 3.6 3.8 4.0   CL 98 98 97 100   CO2 28 28 27 28   BUN 9 8 10 14   CREATININE 0.31* 0.39*  the prior PET-CT. RECOMMENDATION: Consider follow-up CT of the chest to better assess.  Patient was sent to the ER for further evaluation.           IMPRESSION AND SUGGESTION:  Hemoptysis secondary to underlying squamous cell carcinoma  Stage IV T2b, N2, M1 squamous cell carcinoma of the lung  Malignant pleural effusion status post pleural  Dysphagia  Diabetes  Hypertension    Continue O2 to keep saturation 90% above.no more hemoptysis.  PEG tube in place on tube feeding.  DVT prophylaxis.  Continue present treatment plan.    NOTE: This report was transcribed using voice recognition software. Every effort was made to ensure accuracy; however, inadvertent computerized transcription errors may be present.      Electronically signed by Ayush Levy MD, FCCP on 7/20/2024 at 1:16 PM

## 2024-07-20 NOTE — CARE COORDINATION
Received message from [employer] they have approval for patient to come today. I called and let Qing know that pt is approved and can go today if medically cleared. I messaged Dr. Kyle to let him know.

## 2024-07-20 NOTE — PLAN OF CARE
Problem: Safety - Adult  Goal: Free from fall injury  Outcome: Progressing     Problem: Discharge Planning  Goal: Discharge to home or other facility with appropriate resources  Outcome: Progressing     Problem: Skin/Tissue Integrity  Goal: Absence of new skin breakdown  Description: 1.  Monitor for areas of redness and/or skin breakdown  2.  Assess vascular access sites hourly  3.  Every 4-6 hours minimum:  Change oxygen saturation probe site  4.  Every 4-6 hours:  If on nasal continuous positive airway pressure, respiratory therapy assess nares and determine need for appliance change or resting period.  Outcome: Progressing     Problem: ABCDS Injury Assessment  Goal: Absence of physical injury  Outcome: Progressing     Problem: Chronic Conditions and Co-morbidities  Goal: Patient's chronic conditions and co-morbidity symptoms are monitored and maintained or improved  Outcome: Progressing     Problem: Nutrition Deficit:  Goal: Optimize nutritional status  7/19/2024 6799 by Zulma Flores RN  Outcome: Progressing  7/19/2024 1513 by Jennifer Morrison, RD, LD  Flowsheets (Taken 7/15/2024 1150)  Nutrient intake appropriate for improving, restoring, or maintaining nutritional needs:   Assess nutritional status and recommend course of action   Monitor oral intake, labs, and treatment plans   Recommend appropriate diets, oral nutritional supplements, and vitamin/mineral supplements   Recommend, monitor, and adjust tube feedings and TPN/PPN based on assessed needs     Problem: Pain  Goal: Verbalizes/displays adequate comfort level or baseline comfort level  Outcome: Progressing     Problem: Neurosensory - Adult  Goal: Achieves maximal functionality and self care  Outcome: Progressing     Problem: Respiratory - Adult  Goal: Achieves optimal ventilation and oxygenation  Outcome: Progressing     Problem: Skin/Tissue Integrity - Adult  Goal: Skin integrity remains intact  Outcome: Progressing  Goal: Incisions, wounds, or drain  sites healing without S/S of infection  Outcome: Progressing  Goal: Oral mucous membranes remain intact  Outcome: Progressing     Problem: Musculoskeletal - Adult  Goal: Return mobility to safest level of function  Outcome: Progressing  Goal: Return ADL status to a safe level of function  Outcome: Progressing     Problem: Gastrointestinal - Adult  Goal: Minimal or absence of nausea and vomiting  Outcome: Progressing  Goal: Maintains or returns to baseline bowel function  Outcome: Progressing  Goal: Maintains adequate nutritional intake  Outcome: Progressing     Problem: Genitourinary - Adult  Goal: Absence of urinary retention  Outcome: Progressing      Royce Glaser

## 2024-07-21 ENCOUNTER — HOSPITAL ENCOUNTER (EMERGENCY)
Age: 89
Discharge: HOME | End: 2024-07-21
Attending: FAMILY MEDICINE
Payer: MEDICARE

## 2024-07-21 ENCOUNTER — APPOINTMENT (OUTPATIENT)
Dept: GENERAL RADIOLOGY | Age: 89
End: 2024-07-21
Payer: MEDICARE

## 2024-07-21 VITALS
RESPIRATION RATE: 28 BRPM | OXYGEN SATURATION: 98 % | WEIGHT: 190 LBS | SYSTOLIC BLOOD PRESSURE: 114 MMHG | BODY MASS INDEX: 32.44 KG/M2 | HEIGHT: 64 IN | DIASTOLIC BLOOD PRESSURE: 57 MMHG | TEMPERATURE: 96.9 F | HEART RATE: 99 BPM

## 2024-07-21 DIAGNOSIS — J42 CHRONIC BRONCHITIS, UNSPECIFIED CHRONIC BRONCHITIS TYPE (HCC): Primary | ICD-10-CM

## 2024-07-21 DIAGNOSIS — Z93.1 S/P PERCUTANEOUS ENDOSCOPIC GASTROSTOMY (PEG) TUBE PLACEMENT (HCC): ICD-10-CM

## 2024-07-21 LAB
ALBUMIN SERPL-MCNC: 2.4 G/DL (ref 3.5–4.6)
ALP SERPL-CCNC: 135 U/L (ref 40–130)
ALT SERPL-CCNC: 10 U/L (ref 0–33)
ANION GAP SERPL CALCULATED.3IONS-SCNC: 12 MEQ/L (ref 9–15)
AST SERPL-CCNC: 17 U/L (ref 0–35)
B PARAP IS1001 DNA NPH QL NAA+NON-PROBE: NOT DETECTED
B PERT.PT PRMT NPH QL NAA+NON-PROBE: NOT DETECTED
BASOPHILS # BLD: 0 K/UL (ref 0–0.2)
BASOPHILS NFR BLD: 0.1 %
BILIRUB SERPL-MCNC: 1 MG/DL (ref 0.2–0.7)
BUN SERPL-MCNC: 15 MG/DL (ref 8–23)
C PNEUM DNA NPH QL NAA+NON-PROBE: NOT DETECTED
CALCIUM SERPL-MCNC: 8.2 MG/DL (ref 8.5–9.9)
CHLORIDE SERPL-SCNC: 95 MEQ/L (ref 95–107)
CO2 SERPL-SCNC: 29 MEQ/L (ref 20–31)
CREAT SERPL-MCNC: 0.42 MG/DL (ref 0.5–0.9)
EOSINOPHIL # BLD: 0 K/UL (ref 0–0.7)
EOSINOPHIL NFR BLD: 0.3 %
ERYTHROCYTE [DISTWIDTH] IN BLOOD BY AUTOMATED COUNT: 17.3 % (ref 11.5–14.5)
FLUAV RNA NPH QL NAA+NON-PROBE: NOT DETECTED
FLUBV RNA NPH QL NAA+NON-PROBE: NOT DETECTED
GLOBULIN SER CALC-MCNC: 3.2 G/DL (ref 2.3–3.5)
GLUCOSE SERPL-MCNC: 235 MG/DL (ref 70–99)
HADV DNA NPH QL NAA+NON-PROBE: NOT DETECTED
HCOV 229E RNA NPH QL NAA+NON-PROBE: NOT DETECTED
HCOV HKU1 RNA NPH QL NAA+NON-PROBE: NOT DETECTED
HCOV NL63 RNA NPH QL NAA+NON-PROBE: NOT DETECTED
HCOV OC43 RNA NPH QL NAA+NON-PROBE: NOT DETECTED
HCT VFR BLD AUTO: 37.5 % (ref 37–47)
HGB BLD-MCNC: 11.6 G/DL (ref 12–16)
HMPV RNA NPH QL NAA+NON-PROBE: NOT DETECTED
HPIV1 RNA NPH QL NAA+NON-PROBE: NOT DETECTED
HPIV2 RNA NPH QL NAA+NON-PROBE: NOT DETECTED
HPIV3 RNA NPH QL NAA+NON-PROBE: NOT DETECTED
HPIV4 RNA NPH QL NAA+NON-PROBE: NOT DETECTED
INR PPP: 2.1
LYMPHOCYTES # BLD: 2.3 K/UL (ref 1–4.8)
LYMPHOCYTES NFR BLD: 15.3 %
M PNEUMO DNA NPH QL NAA+NON-PROBE: NOT DETECTED
MAGNESIUM SERPL-MCNC: 1.7 MG/DL (ref 1.7–2.4)
MCH RBC QN AUTO: 26.7 PG (ref 27–31.3)
MCHC RBC AUTO-ENTMCNC: 30.9 % (ref 33–37)
MCV RBC AUTO: 86.4 FL (ref 79.4–94.8)
MONOCYTES # BLD: 0.4 K/UL (ref 0.2–0.8)
MONOCYTES NFR BLD: 2.6 %
NEUTROPHILS # BLD: 12 K/UL (ref 1.4–6.5)
NEUTS SEG NFR BLD: 81.2 %
PLATELET # BLD AUTO: 342 K/UL (ref 130–400)
POTASSIUM SERPL-SCNC: 4.1 MEQ/L (ref 3.4–4.9)
PROCALCITONIN SERPL IA-MCNC: 0.17 NG/ML (ref 0–0.15)
PROT SERPL-MCNC: 5.6 G/DL (ref 6.3–8)
PROTHROMBIN TIME: 23.8 SEC (ref 12.3–14.9)
RBC # BLD AUTO: 4.34 M/UL (ref 4.2–5.4)
RSV RNA NPH QL NAA+NON-PROBE: NOT DETECTED
RV+EV RNA NPH QL NAA+NON-PROBE: NOT DETECTED
SARS-COV-2 RNA NPH QL NAA+NON-PROBE: NOT DETECTED
SODIUM SERPL-SCNC: 136 MEQ/L (ref 135–144)
TROPONIN, HIGH SENSITIVITY: 19 NG/L (ref 0–19)
WBC # BLD AUTO: 14.7 K/UL (ref 4.8–10.8)

## 2024-07-21 PROCEDURE — 85025 COMPLETE CBC W/AUTO DIFF WBC: CPT

## 2024-07-21 PROCEDURE — 0202U NFCT DS 22 TRGT SARS-COV-2: CPT

## 2024-07-21 PROCEDURE — 84484 ASSAY OF TROPONIN QUANT: CPT

## 2024-07-21 PROCEDURE — 85610 PROTHROMBIN TIME: CPT

## 2024-07-21 PROCEDURE — 80053 COMPREHEN METABOLIC PANEL: CPT

## 2024-07-21 PROCEDURE — 71045 X-RAY EXAM CHEST 1 VIEW: CPT

## 2024-07-21 PROCEDURE — 93005 ELECTROCARDIOGRAM TRACING: CPT | Performed by: FAMILY MEDICINE

## 2024-07-21 PROCEDURE — 83735 ASSAY OF MAGNESIUM: CPT

## 2024-07-21 PROCEDURE — 84145 PROCALCITONIN (PCT): CPT

## 2024-07-21 PROCEDURE — 99285 EMERGENCY DEPT VISIT HI MDM: CPT

## 2024-07-21 NOTE — ED TRIAGE NOTES
Patient arrives to ER via EMS from [facility].  Shortness of breath since 6m 7/21, hx of lung cancer, plurex drain, g-tube noted.  Ems states pt 92% on 2L upon arrival. 2L o2 baseline.  Extremity edema and moist cough present.  Pt A&Ox2 baseline.  Recently on hospice up unitl 1 week ago.  97% on 3L, skin warm, dry and intact.

## 2024-07-21 NOTE — ED PROVIDER NOTES
Hyperlipidemia     Hypertension     Neuropathy 7/9/2024    Osteoporosis     Urinary frequency          SURGICALHISTORY       Past Surgical History:   Procedure Laterality Date    BRONCHOSCOPY N/A 05/15/2024    NAVIGATIONAL  BRONCHOSCOPY WITH ENDOBRONCHIAL ULTRASOUND  TBNA TRANSBRONCHIAL BIOPIES [place] [facility] [place]    CATARACT REMOVAL      COLONOSCOPY      EYE SURGERY      PLEURAL CATH INSERTION Left 6/25/2024    INSERTION LEFT PLEURX CATHETER performed [place] Kolby Torre [place] [facility] [place]    THORACENTESIS Left 06/24/2024    550 ml removed by     TUBAL LIGATION      UPPER GASTROINTESTINAL ENDOSCOPY N/A 7/15/2024    ESOPHAGOGASTRODUODENOSCOPY performed by Star Canchola [place] [facility]    UPPER GASTROINTESTINAL ENDOSCOPY N/A 7/18/2024    Esophagogastroduodenoscopy with percutaneous endoscopic gastrostomy tube performed [place] Chan Redding [place] [facility]         CURRENT MEDICATIONS       Previous Medications    ACETAMINOPHEN (TYLENOL) 325 MG TABLET    Take 1 tablet by mouth every 6 hours as needed for Pain Takes 2 tabs every 6 hrs as needed    ALBUTEROL (ACCUNEB) 0.63 MG/3ML NEBULIZER SOLUTION    Take 3 mLs by nebulization every 6 hours as needed for Wheezing    ALBUTEROL SULFATE HFA (VENTOLIN HFA) 108 (90 BASE) MCG/ACT INHALER    Inhale 2 puffs into the lungs every 6 hours as needed for Wheezing    APIXABAN (ELIQUIS) 5 MG TABS TABLET    Take 1 tablet by mouth 2 times daily    ATORVASTATIN (LIPITOR) 40 MG TABLET    Take 1 tablet by mouth daily    DILTIAZEM (CARDIZEM CD) 120 MG EXTENDED RELEASE CAPSULE    Take 1 capsule by mouth daily    FLUTICASONE-UMECLIDIN-VILANT (TRELEGY ELLIPTA) 100-62.5-25 MCG/ACT AEPB INHALER    Inhale 1 puff into the lungs daily Indications: Lung Cancer that Begins in the Bronchus    GUAIFENESIN-DEXTROMETHORPHAN (ROBITUSSIN DM) 100-10 MG/5ML SYRUP    Take 10 mLs by mouth every 4 hours as needed for Cough    HYDROXYZINE HCL (ATARAX) 25 MG TABLET    Take 1

## 2024-07-21 NOTE — PROGRESS NOTES
Patient discharged to [facility]. Left via [facility] at 2200. IV was removed without issue, and peg tube was flushed and capped. Patient tolerated both well. All belongings gathered and placed with patient.

## 2024-07-21 NOTE — CARE COORDINATION
I called [facility] and spoke to nurse caring for pt Cj CECY. She states they rcvd pt back around 1030 last night. This am she began \"screaming she couldn't breath and couldn't see and was in pain.\"     Cj states she rcvd bolus tube feeding last night and this am before another feeding given she c/o N/V and was going to have diarrhea. She also drained pleurax catheter of 400 cc.     Baseline A&O x 3. And 2 person assist with 02--2l continuous and nebs.    She currently is a full code, [facility] signed off with this last admission.    I called dtr Fanny (whom pt asked for  To call, she is currently A&O x 3). Fanny answered on 2nd attempt who lives local and she will come over in about 10 minutes.     Dtr slightly disgruntled \"nobody told me she was going back and told me she was discharged.\" Dr. Roger informed and will speak with dtr on arrival.    Electronically signed by Lara Kent, RN, BSN on 7/21/2024 at 12:20 PM

## 2024-07-21 NOTE — CARE COORDINATION
Dr Roger met with dtr Fanny and pt at the bedside. Per Dr. Roger pt wants to remain a full code, pt not SOB and her normal per dtr.  Both in agreement to be discharged back to [facility].  Tynan making arrangements for transport.     Electronically signed by Lara Kent, RN, BSN on 7/21/2024 at 1:23 PM

## 2024-07-22 LAB
EKG ATRIAL RATE: 117 BPM
EKG Q-T INTERVAL: 312 MS
EKG QRS DURATION: 80 MS
EKG QTC CALCULATION (BAZETT): 427 MS
EKG R AXIS: -13 DEGREES
EKG T AXIS: 187 DEGREES
EKG VENTRICULAR RATE: 113 BPM

## 2024-07-24 ENCOUNTER — OFFICE VISIT (OUTPATIENT)
Dept: GERIATRIC MEDICINE | Age: 89
End: 2024-07-24

## 2024-07-26 ENCOUNTER — OFFICE VISIT (OUTPATIENT)
Dept: GERIATRIC MEDICINE | Age: 89
End: 2024-07-26

## 2024-07-26 DIAGNOSIS — R19.5 PASSAGE OF LOOSE STOOLS: ICD-10-CM

## 2024-07-26 DIAGNOSIS — K94.20 COMPLICATION OF GASTROSTOMY TUBE (HCC): Primary | ICD-10-CM

## 2024-07-26 DIAGNOSIS — Z91.89 AT RISK FOR MALNUTRITION: ICD-10-CM

## 2024-07-26 DIAGNOSIS — L03.311 ABDOMINAL WALL CELLULITIS: ICD-10-CM

## 2024-07-26 LAB
BASOPHILS # BLD: 0 K/UL (ref 0–0.2)
BASOPHILS NFR BLD: 0.2 %
EOSINOPHIL # BLD: 0.1 K/UL (ref 0–0.7)
EOSINOPHIL NFR BLD: 0.5 %
ERYTHROCYTE [DISTWIDTH] IN BLOOD BY AUTOMATED COUNT: 17.3 % (ref 11.5–14.5)
HCT VFR BLD AUTO: 38.2 % (ref 37–47)
HGB BLD-MCNC: 11.5 G/DL (ref 12–16)
LYMPHOCYTES # BLD: 2.4 K/UL (ref 1–4.8)
LYMPHOCYTES NFR BLD: 16.7 %
MCH RBC QN AUTO: 26 PG (ref 27–31.3)
MCHC RBC AUTO-ENTMCNC: 30.1 % (ref 33–37)
MCV RBC AUTO: 86.4 FL (ref 79.4–94.8)
MONOCYTES # BLD: 0.5 K/UL (ref 0.2–0.8)
MONOCYTES NFR BLD: 3.5 %
NEUTROPHILS # BLD: 11.3 K/UL (ref 1.4–6.5)
NEUTS SEG NFR BLD: 77.6 %
PLATELET # BLD AUTO: 495 K/UL (ref 130–400)
RBC # BLD AUTO: 4.42 M/UL (ref 4.2–5.4)
WBC # BLD AUTO: 14.6 K/UL (ref 4.8–10.8)

## 2024-07-26 NOTE — PROGRESS NOTES
nursing/therapy/ / nutritional staff. Current longstanding medical problems and acute medical issues addressed with staff. Available data and data elements in on site paper chart reviewed and analyzed.  Current external consultant notes reviewed in on site chart. Ordered laboratory testing and imaging will be reviewed when available.     Side effects, adverse effects of the medication prescribed today, as well as treatment plan and result expectations have been discussed withthe patient who expresses understanding and desires to proceed.    I spent a total of 35 minutes on the date of service which included preparing to see the patient, face-to-face patient care, performing a medically appropriate examination, completing clinical documentation, and on counseling/ eductaing the patient and the family.      Please note Nuance Dragon PowerMic III software used for dictation of note,  which may contain minor errors due to ambient noise and indiscriminate speech pickup.

## 2024-07-27 LAB
FINAL REPORT: NORMAL
PRELIMINARY: NORMAL

## 2024-07-29 ENCOUNTER — OFFICE VISIT (OUTPATIENT)
Dept: GERIATRIC MEDICINE | Age: 89
End: 2024-07-29

## 2024-07-29 DIAGNOSIS — R19.7 INTERMITTENT DIARRHEA: ICD-10-CM

## 2024-07-29 DIAGNOSIS — J90 PLEURAL EFFUSION: Primary | ICD-10-CM

## 2024-07-29 DIAGNOSIS — Z93.1 PRESENCE OF EXTERNALLY REMOVABLE PERCUTANEOUS ENDOSCOPIC GASTROSTOMY (PEG) TUBE (HCC): ICD-10-CM

## 2024-07-29 DIAGNOSIS — Z91.89 AT RISK FOR WEIGHT LOSS: ICD-10-CM

## 2024-07-29 DIAGNOSIS — Z91.89 AT RISK FOR RETENTION OF URINE: ICD-10-CM

## 2024-07-30 ENCOUNTER — OFFICE VISIT (OUTPATIENT)
Dept: GERIATRIC MEDICINE | Age: 89
End: 2024-07-30

## 2024-07-30 DIAGNOSIS — I10 ESSENTIAL HYPERTENSION: Primary | ICD-10-CM

## 2024-07-30 DIAGNOSIS — C34.82 MALIGNANT NEOPLASM OF OVERLAPPING SITES OF LEFT LUNG (HCC): ICD-10-CM

## 2024-07-30 DIAGNOSIS — E11.9 TYPE 2 DIABETES MELLITUS WITHOUT COMPLICATION, WITHOUT LONG-TERM CURRENT USE OF INSULIN (HCC): ICD-10-CM

## 2024-07-30 DIAGNOSIS — K59.00 CONSTIPATION, UNSPECIFIED CONSTIPATION TYPE: ICD-10-CM

## 2024-07-30 DIAGNOSIS — G62.9 NEUROPATHY: ICD-10-CM

## 2024-07-31 ENCOUNTER — OFFICE VISIT (OUTPATIENT)
Dept: GERIATRIC MEDICINE | Age: 89
End: 2024-07-31

## 2024-07-31 DIAGNOSIS — E11.9 TYPE 2 DIABETES MELLITUS WITHOUT COMPLICATION, WITHOUT LONG-TERM CURRENT USE OF INSULIN (HCC): ICD-10-CM

## 2024-07-31 DIAGNOSIS — Z91.89 AT RISK FOR MALNUTRITION: ICD-10-CM

## 2024-07-31 DIAGNOSIS — Z93.1 PRESENCE OF EXTERNALLY REMOVABLE PERCUTANEOUS ENDOSCOPIC GASTROSTOMY (PEG) TUBE (HCC): ICD-10-CM

## 2024-07-31 DIAGNOSIS — K59.1 FUNCTIONAL DIARRHEA: Primary | ICD-10-CM

## 2024-08-01 ENCOUNTER — OFFICE VISIT (OUTPATIENT)
Dept: GERIATRIC MEDICINE | Age: 89
End: 2024-08-01

## 2024-08-01 DIAGNOSIS — E11.9 TYPE 2 DIABETES MELLITUS WITHOUT COMPLICATION, WITHOUT LONG-TERM CURRENT USE OF INSULIN (HCC): ICD-10-CM

## 2024-08-01 DIAGNOSIS — G62.9 NEUROPATHY: ICD-10-CM

## 2024-08-01 DIAGNOSIS — I10 ESSENTIAL HYPERTENSION: Primary | ICD-10-CM

## 2024-08-01 ASSESSMENT — ENCOUNTER SYMPTOMS
VOICE CHANGE: 0
TROUBLE SWALLOWING: 1
COLOR CHANGE: 0
COUGH: 1
SHORTNESS OF BREATH: 0
VOMITING: 0
NAUSEA: 0

## 2024-08-01 NOTE — PROGRESS NOTES
Conversation      Date of Conversation: 08/02/24  The patient and/or authorized decision maker consented to a voluntary Advance Care Planning conversation.   Individuals present for the conversation:   Patient with decision making capacity    Legal Healthcare Agent(s):    Primary Decision Maker: Fanny Ivy - Child - [phone]    Secondary Decision Maker: Inna Shelton - Child - [phone]    Supplemental (Other) Decision Maker: Jose Alejandro Shelton - Child - [phone]    ACP documents available in EMR prior to discussion:  -Power of  for Healthcare    Primary Palliative Diagnosis(es):  Decreased appetite  Insomnia  Advance care planning discussion  Goals of care discussion  Palliative care encounter    Conversation Summary:  Reviewed patient's current condition and she wishes to remain a FULL CODE at this time and continue with aggressive treatment options.  Patient states that she has previously elected her daughter Fanny as HCPOA and copy is currently on file.    Resuscitation Status:    Code Status: Prior/FULL CODE     Outcomes / Completed Documentation:  An explanation of advance directives and their importance was provided and the following forms completed:    -No new documents completed.    If new document completed, original was provided to patient and/or family member.    Copy was placed for scanning into the [facility] EMR.      I spent 11 minutes providing separately identifiable ACP services with the patient and/or surrogate decision maker in a voluntary, in-person conversation discussing the patient's wishes and goals as detailed in the above note.       Tiburcio Valdez, APRN - CNP        4. Goals of care, counseling/discussion  -Disease process and goals of treatment were discussed in basic terms. Dayami's goal is to optimize available comfort care measures and continue with current plan of care. We discussed the palliative care philosophy in light of those goals. We discussed all care options

## 2024-08-02 ENCOUNTER — OFFICE VISIT (OUTPATIENT)
Dept: PALLATIVE CARE | Age: 89
End: 2024-08-02

## 2024-08-02 VITALS
SYSTOLIC BLOOD PRESSURE: 118 MMHG | DIASTOLIC BLOOD PRESSURE: 62 MMHG | HEART RATE: 91 BPM | OXYGEN SATURATION: 99 % | RESPIRATION RATE: 18 BRPM | TEMPERATURE: 97.6 F

## 2024-08-02 DIAGNOSIS — Z51.5 PALLIATIVE CARE ENCOUNTER: ICD-10-CM

## 2024-08-02 DIAGNOSIS — Z71.89 GOALS OF CARE, COUNSELING/DISCUSSION: ICD-10-CM

## 2024-08-02 DIAGNOSIS — R63.0 DECREASED APPETITE: Primary | ICD-10-CM

## 2024-08-02 DIAGNOSIS — Z71.89 ADVANCED CARE PLANNING/COUNSELING DISCUSSION: ICD-10-CM

## 2024-08-02 DIAGNOSIS — G47.01 INSOMNIA DUE TO MEDICAL CONDITION: ICD-10-CM

## 2024-08-02 RX ORDER — CALCIUM CARBONATE 500 MG/1
2 TABLET, CHEWABLE ORAL EVERY MORNING
COMMUNITY

## 2024-08-02 RX ORDER — ACETAMINOPHEN 325 MG/1
650 TABLET ORAL EVERY 4 HOURS PRN
COMMUNITY

## 2024-08-02 RX ORDER — POLYETHYLENE GLYCOL 3350 17 G/17G
17 POWDER, FOR SOLUTION ORAL EVERY MORNING
COMMUNITY

## 2024-08-02 RX ORDER — LANOLIN ALCOHOL/MO/W.PET/CERES
400 CREAM (GRAM) TOPICAL DAILY
COMMUNITY

## 2024-08-02 RX ORDER — PSYLLIUM HUSK 100 %
3 POWDER (GRAM) MISCELLANEOUS
COMMUNITY

## 2024-08-02 RX ORDER — MIRTAZAPINE 7.5 MG/1
7.5 TABLET, FILM COATED ORAL NIGHTLY
Qty: 30 TABLET | Refills: 0 | Status: SHIPPED | OUTPATIENT
Start: 2024-08-02

## 2024-08-02 RX ORDER — LOPERAMIDE HYDROCHLORIDE 2 MG/1
2 CAPSULE ORAL 2 TIMES DAILY PRN
COMMUNITY

## 2024-08-03 ENCOUNTER — APPOINTMENT (OUTPATIENT)
Dept: GENERAL RADIOLOGY | Age: 89
End: 2024-08-03
Payer: MEDICARE

## 2024-08-03 ENCOUNTER — HOSPITAL ENCOUNTER (EMERGENCY)
Age: 89
Discharge: HOME | End: 2024-08-03
Attending: STUDENT IN AN ORGANIZED HEALTH CARE EDUCATION/TRAINING PROGRAM
Payer: MEDICARE

## 2024-08-03 VITALS
BODY MASS INDEX: 32.61 KG/M2 | DIASTOLIC BLOOD PRESSURE: 64 MMHG | SYSTOLIC BLOOD PRESSURE: 112 MMHG | HEIGHT: 64 IN | TEMPERATURE: 98 F | HEART RATE: 68 BPM | RESPIRATION RATE: 15 BRPM | OXYGEN SATURATION: 98 %

## 2024-08-03 DIAGNOSIS — K94.23 PEG TUBE MALFUNCTION (HCC): Primary | ICD-10-CM

## 2024-08-03 PROCEDURE — 49465 FLUORO EXAM OF G/COLON TUBE: CPT

## 2024-08-03 PROCEDURE — 74018 RADEX ABDOMEN 1 VIEW: CPT

## 2024-08-03 PROCEDURE — 43762 RPLC GTUBE NO REVJ TRC: CPT

## 2024-08-03 PROCEDURE — 99284 EMERGENCY DEPT VISIT MOD MDM: CPT

## 2024-08-03 PROCEDURE — 6360000004 HC RX CONTRAST MEDICATION: Performed by: STUDENT IN AN ORGANIZED HEALTH CARE EDUCATION/TRAINING PROGRAM

## 2024-08-03 RX ORDER — WATER 10 ML/10ML
INJECTION INTRAMUSCULAR; INTRAVENOUS; SUBCUTANEOUS
Status: DISCONTINUED
Start: 2024-08-03 | End: 2024-08-03 | Stop reason: HOSPADM

## 2024-08-03 RX ADMIN — DIATRIZOATE MEGLUMINE AND DIATRIZOATE SODIUM 30 ML: 660; 100 LIQUID ORAL; RECTAL at 14:11

## 2024-08-03 ASSESSMENT — PAIN - FUNCTIONAL ASSESSMENT
PAIN_FUNCTIONAL_ASSESSMENT: NONE - DENIES PAIN
PAIN_FUNCTIONAL_ASSESSMENT: NONE - DENIES PAIN

## 2024-08-03 NOTE — ED PROVIDER NOTES
[facility] ED  EMERGENCY DEPARTMENT ENCOUNTER      Pt Name: Dayami Christensen  MRN: [id number]  Birthdate 11/5/1934  Date of evaluation: 8/3/2024  Provider: Ray Velasco MD  11:42 AM    CHIEF COMPLAINT     No chief complaint on file.        HISTORY OF PRESENT ILLNESS    Dayami Christensen is a 89 y.o. female who presents to the emergency department ***     HPI  Patient is an 89-year-old female presenting to ED due to concern for PEG tube issue.  Patient is a past medical history of essential hypertension, A-fib, type 2 diabetes, GERD, malignant neoplasm of lung, esophagitis.  Patient did have a percutaneous endoscopic gastrostomy tube placed in the OR on 7/18/2024.  Per chart review, PEG tube was placed due to decreased appetite that was severe with dysphagia.  Patient was sent over from nursing home due to concern for leakage around the PEG tube site with some discomfort.  I spoke with the agency nurse that sent the patient to the ED.  She endorsed that patient was about to get her Isosource 1.5 bolus and the nurse had given her a flush before hand.  Nurse noticed that she had drainage around the tube and felt that the tube might be dislodged.  She also had some firmness to palpation with tenderness to the right side of the PEG tube next to the ostomy.  Given that the PEG tube was recently placed, there were concern for potential displacement and thus sent her to the ED for further evaluation.  Patient denies remembering pulling on the tube.  She denies any fevers or chills or any abdominal discomfort until the site is touched.    To review she also states worse, suppression of the left lung.  During her recent hospitalization in July 2024 she had an EGD that showed esophagitis as well.  At that time, she had the esophagogastroduodenoscopy with percutaneous endoscopic extremity placement  Nursing Notes were reviewed.    REVIEW OF SYSTEMS       Review of Systems    Except as noted above the remainder of the review of

## 2024-08-03 NOTE — ED TRIAGE NOTES
[facility]  Pt denies pain   Pt is afebrile   Pt is A&Ox4  Pt's skin is normal for ethnicity   Pt's breathing and respirations are unlabored and equal

## 2024-08-03 NOTE — DISCHARGE INSTRUCTIONS
You are seen in the ER today due to concern for PEG tube malfunction.  We are concerned given that your PEG tube was recently placed 2 weeks ago and thus is relatively fresh.  We did evaluate the PEG tube and noticed that it was at 2 cm although it was placed at 3 cm initially.  There was concern potentially that the tube has been pulled somehow into the tissue space in the abdomen.  Due to this, we did contact neurosurgery and they did assist us in placing a new PEG tube with an inflatable balloon and 2 ports.  X-ray imaging did show appropriate placement with Gastrografin study.  He may safely discharge back to her facility.

## 2024-08-03 NOTE — CONSULTS
100-62.5-25 MCG/ACT AEPB inhaler Inhale 1 puff into the lungs daily Indications: Difficulty Breathing, Lung Cancer that Begins in the Bronchus    ProviderChon MD   albuterol sulfate HFA (VENTOLIN HFA) 108 (90 Base) MCG/ACT inhaler Inhale 2 puffs into the lungs every 6 hours as needed for Wheezing  Patient taking differently: Inhale 2 puffs into the lungs every 6 hours as needed for Wheezing or Shortness of Breath 7/2/24   Deon Bain MD   albuterol (ACCUNEB) 0.63 MG/3ML nebulizer solution Take 3 mLs by nebulization every 6 hours as needed for Wheezing  Patient taking differently: Take 3 mLs by nebulization every 6 hours as needed for Wheezing or Shortness of Breath 7/2/24   Deon Bain MD   apixaban (ELIQUIS) 5 MG TABS tablet Take 1 tablet by mouth 2 times daily  Patient taking differently: Take 1 tablet by mouth 2 times daily Indications: Atrial Fibrillation 7/2/24   Deon Bain MD   mirabegron (MYRBETRIQ) 50 MG TB24 Take 50 mg by mouth daily  Patient taking differently: Take 50 mg by mouth daily Indications: Bladder Spasm 7/2/24   Deon Bain MD   vitamin D (ERGOCALCIFEROL) 1.25 MG (88525 UT) CAPS capsule Take 1 capsule by mouth once a week  Patient taking differently: Take 1 capsule by mouth once a week Indications: Vitamin D Deficiency 7/2/24   Deon Bain MD   atorvastatin (LIPITOR) 40 MG tablet Take 1 tablet by mouth daily  Patient taking differently: Take 1 tablet by mouth nightly Indications: High Amount of Fats in the Blood 7/2/24   Deon Bain MD   dilTIAZem (CARDIZEM CD) 120 MG extended release capsule Take 1 capsule by mouth daily  Patient taking differently: Take 1 capsule by mouth daily Indications: High Blood Pressure Disorder 7/2/24   Deon Bain MD   metFORMIN, MOD, (GLUMETZA) 1000 MG extended release tablet Take 1 tablet by mouth 2 times daily (with meals)  Patient not taking: Reported on 8/2/2024 6/19/18   Chan Decker, DO     Allergies   Allergen

## 2024-08-05 ENCOUNTER — OFFICE VISIT (OUTPATIENT)
Dept: GERIATRIC MEDICINE | Age: 89
End: 2024-08-05

## 2024-08-05 DIAGNOSIS — R19.5 LOOSE STOOLS: ICD-10-CM

## 2024-08-05 DIAGNOSIS — K94.20 COMPLICATION OF FEEDING TUBE (HCC): Primary | ICD-10-CM

## 2024-08-05 ASSESSMENT — ENCOUNTER SYMPTOMS
ABDOMINAL PAIN: 1
EYE DISCHARGE: 0
COLOR CHANGE: 0
SINUS PAIN: 0
CHEST TIGHTNESS: 0
VOMITING: 0
ABDOMINAL DISTENTION: 0
EYE REDNESS: 0
WHEEZING: 0
EYE ITCHING: 0
SINUS PRESSURE: 0
EYE PAIN: 0
PHOTOPHOBIA: 0
NAUSEA: 0
RHINORRHEA: 0
COUGH: 0
BACK PAIN: 0
CONSTIPATION: 0

## 2024-08-06 ENCOUNTER — OFFICE VISIT (OUTPATIENT)
Dept: GERIATRIC MEDICINE | Age: 89
End: 2024-08-06

## 2024-08-06 DIAGNOSIS — I10 ESSENTIAL HYPERTENSION: Primary | ICD-10-CM

## 2024-08-06 DIAGNOSIS — C34.92 SQUAMOUS CELL CARCINOMA OF LEFT LUNG (HCC): ICD-10-CM

## 2024-08-06 DIAGNOSIS — E11.9 TYPE 2 DIABETES MELLITUS WITHOUT COMPLICATION, WITHOUT LONG-TERM CURRENT USE OF INSULIN (HCC): ICD-10-CM

## 2024-08-06 DIAGNOSIS — M15.9 PRIMARY OSTEOARTHRITIS INVOLVING MULTIPLE JOINTS: ICD-10-CM

## 2024-08-07 ENCOUNTER — OFFICE VISIT (OUTPATIENT)
Dept: GERIATRIC MEDICINE | Age: 89
End: 2024-08-07

## 2024-08-07 DIAGNOSIS — F51.01 PRIMARY INSOMNIA: Primary | ICD-10-CM

## 2024-08-07 ASSESSMENT — ENCOUNTER SYMPTOMS
ABDOMINAL PAIN: 0
CONSTIPATION: 0
SHORTNESS OF BREATH: 0
NAUSEA: 0
COUGH: 0
CHEST TIGHTNESS: 0
WHEEZING: 0
VOMITING: 0
DIARRHEA: 1
ABDOMINAL DISTENTION: 1

## 2024-08-07 NOTE — PROGRESS NOTES
on file   Food Insecurity: No Food Insecurity (7/8/2024)    Hunger Vital Sign     Worried About Running Out of Food in the Last Year: Never true     Ran Out of Food in the Last Year: Never true   Transportation Needs: No Transportation Needs (7/8/2024)    PRAPARE - Transportation     Lack of Transportation (Medical): No     Lack of Transportation (Non-Medical): No   Physical Activity: Not on file   Stress: Not on file   Social Connections: Moderately Integrated (8/7/2024)    Social Connections ([facility] HRSN)     If for any reason you need help with day-to-day activities such as bathing, preparing meals, shopping, managing finances, etc., do you get the help you need?: Not on file   Intimate Partner Violence: Not on file   Housing Stability: Low Risk  (7/8/2024)    Housing Stability Vital Sign     Unable to Pay for Housing in the Last Year: No     Number of Places Lived in the Last Year: 1     Unstable Housing in the Last Year: No     Family History   Problem Relation Age of Onset    Colon Cancer Neg Hx      Allergies   Allergen Reactions    Asa [Aspirin] Nausea And Vomiting     Patient states she takes aspirin with no problems              Review of Systems   Constitutional:  Positive for fatigue. Negative for activity change, chills, fever and unexpected weight change.   Respiratory:  Negative for cough, chest tightness, shortness of breath and wheezing.    Cardiovascular:  Positive for leg swelling (minor). Negative for chest pain.   Gastrointestinal:  Positive for abdominal distention and diarrhea. Negative for abdominal pain, constipation, nausea and vomiting.   Genitourinary:  Positive for difficulty urinating.   Musculoskeletal:  Positive for arthralgias and gait problem.   Neurological:  Positive for weakness.   Psychiatric/Behavioral:  Negative for agitation, behavioral problems and confusion. The patient is not nervous/anxious.    All other systems reviewed and are negative.      Objective:   VS: See PCC.

## 2024-08-12 ENCOUNTER — OFFICE VISIT (OUTPATIENT)
Dept: GERIATRIC MEDICINE | Age: 89
End: 2024-08-12

## 2024-08-12 DIAGNOSIS — C34.92 MALIGNANT NEOPLASM OF LEFT LUNG, UNSPECIFIED PART OF LUNG (HCC): ICD-10-CM

## 2024-08-12 DIAGNOSIS — G62.9 NEUROPATHY: ICD-10-CM

## 2024-08-12 DIAGNOSIS — I10 ESSENTIAL HYPERTENSION: Primary | ICD-10-CM

## 2024-08-12 ASSESSMENT — ENCOUNTER SYMPTOMS
COLOR CHANGE: 0
TROUBLE SWALLOWING: 1
NAUSEA: 0
VOMITING: 0
SHORTNESS OF BREATH: 0
COUGH: 1
VOICE CHANGE: 0

## 2024-08-12 NOTE — PROGRESS NOTES
Subjective:      Patient Id: Seen Dayami at [facility], for follow up palliative Care visit.  She was accompanied to the appointment by: self.    Chief Complaint   Patient presents with    Follow-up    Cancer      HPI       Dayami Christensen is a 89 y.o. female was seen and evaluated by palliative care for symptom management in the setting of lung cancer with pain. Dayami has complex medical history that includes squamous cell carcinoma left lung, CKD, T2DM, GERD, HTN, HLD, osteoporosis.  Nursing facility visit is necessary in lieu of office due to significant frailty and high symptom burden from comorbid illnesses.     Patient complains of left chest pain and dizziness.  Patient follows with PCP.    General: Upon entering the room I find the patient X4, sitting in chair, working with therapy, no family at bedside, calm, cooperative and in NAD.  Patient has been experiencing dizziness and staff currently deny any orthostatic hypotension reflected in vital signs.  Current dizziness has been hindering patient's therapy performance.    SCC: Patient was previously seen and diagnosed with stage IV squamous cell carcinoma of lung. Patient wishes to continue and receive all recommended cancer treatment.     Pain: Patient states pain that she has pain in her left chest area. Rates the pain at a Pain Score:   9 on a scale of 0 to 10. Worse with movement/therapy. Current pain medications include Percocet, Lidocaine patches & Tylenol. No sedation, constipation, or adverse effects on current regimen.     Appetite: Patient states she has has slight improvement but not greatly improved. Nursing staff continue to express concern for decreased oral appetite and they frequently have to utilize PEG tube.    Mood: Patient continues to experience some depression related to her condition and location. Patient denies any unmanaged mood disturbance.    Sleep: Patient continues to not sleep well per conversation.    Skin: Patient has a

## 2024-08-13 ENCOUNTER — OFFICE VISIT (OUTPATIENT)
Dept: PALLATIVE CARE | Age: 89
End: 2024-08-13
Payer: MEDICARE

## 2024-08-13 VITALS — SYSTOLIC BLOOD PRESSURE: 111 MMHG | TEMPERATURE: 98.3 F | DIASTOLIC BLOOD PRESSURE: 73 MMHG | OXYGEN SATURATION: 100 %

## 2024-08-13 DIAGNOSIS — Z51.5 PALLIATIVE CARE ENCOUNTER: ICD-10-CM

## 2024-08-13 DIAGNOSIS — R63.0 DECREASED APPETITE: ICD-10-CM

## 2024-08-13 DIAGNOSIS — G89.3 NEOPLASM RELATED PAIN: Primary | ICD-10-CM

## 2024-08-13 DIAGNOSIS — R42 DIZZINESS: ICD-10-CM

## 2024-08-13 PROCEDURE — G9692 HOSP RECD BY PT DUR MSMT PER: HCPCS

## 2024-08-13 PROCEDURE — 99310 SBSQ NF CARE HIGH MDM 45: CPT

## 2024-08-13 RX ORDER — MECLIZINE HCL 12.5 MG/1
12.5 TABLET ORAL 3 TIMES DAILY PRN
Qty: 30 TABLET | Refills: 0 | Status: SHIPPED | OUTPATIENT
Start: 2024-08-13 | End: 2024-09-12

## 2024-08-13 RX ORDER — LIDOCAINE 4 G/G
3 PATCH TOPICAL DAILY
Qty: 90 PATCH | Refills: 0 | Status: SHIPPED | OUTPATIENT
Start: 2024-08-13 | End: 2024-09-12

## 2024-08-13 RX ORDER — OXYCODONE HYDROCHLORIDE AND ACETAMINOPHEN 5; 325 MG/1; MG/1
.5-1 TABLET ORAL EVERY 8 HOURS PRN
Qty: 28 TABLET | Refills: 0 | Status: SHIPPED | OUTPATIENT
Start: 2024-08-13 | End: 2024-09-12

## 2024-08-13 RX ORDER — POTASSIUM CHLORIDE 20MEQ/15ML
7.5 LIQUID (ML) ORAL DAILY
COMMUNITY

## 2024-08-14 ASSESSMENT — ENCOUNTER SYMPTOMS
SHORTNESS OF BREATH: 0
ABDOMINAL DISTENTION: 0
COLOR CHANGE: 0
DIARRHEA: 0
NAUSEA: 0
CONSTIPATION: 0
ABDOMINAL PAIN: 1
VOMITING: 0
CHEST TIGHTNESS: 0
WHEEZING: 0

## 2024-08-14 NOTE — PROGRESS NOTES
mass    Secondary hypercoagulable state (HCC)    Malignant neoplasm of overlapping sites of left lung (HCC)    Collapse of left lung    Squamous cell carcinoma of left lung (HCC)    Pleural effusion, left    Palliative care encounter    Goals of care, counseling/discussion    Advanced care planning/counseling discussion    Malignant neoplasm of left lung (HCC)    Neoplasm related pain    Shortness of breath at rest    SOB (shortness of breath)    Encounter for hospice care discussion    Constipation    Hx of cancer of lung    Generalized weakness    Neuropathy    Pain in both lower extremities    Hemoptysis    Esophageal dysphagia    Odynophagia    History of lung cancer    Esophagitis    Hypokalemia    Unable to eat     Past Medical History:   Diagnosis Date    Arthritis     Chronic kidney disease     Diabetes mellitus (HCC)     borderline    Gastroesophageal reflux disease without esophagitis 2017    History of blood transfusion     hemorrhage after an     Hyperlipidemia     Hypertension     Neuropathy 2024    Osteoporosis     Urinary frequency      Past Surgical History:   Procedure Laterality Date    BRONCHOSCOPY N/A 05/15/2024    NAVIGATIONAL  BRONCHOSCOPY WITH ENDOBRONCHIAL ULTRASOUND  TBNA TRANSBRONCHIAL BIOPIES performed by Benedict Cuenca MD at [facility] OR    CATARACT REMOVAL      COLONOSCOPY      EYE SURGERY      PLEURAL CATH INSERTION Left 2024    INSERTION LEFT PLEURX CATHETER performed by Kolby Torre MD at [facility] OR    THORACENTESIS Left 2024    550 ml removed by     TUBAL LIGATION      UPPER GASTROINTESTINAL ENDOSCOPY N/A 7/15/2024    ESOPHAGOGASTRODUODENOSCOPY performed by Star Canchola MD at [facility]    UPPER GASTROINTESTINAL ENDOSCOPY N/A 2024    Esophagogastroduodenoscopy with percutaneous endoscopic gastrostomy tube performed by Chan Redding MD at [facility] OR     Social History     Socioeconomic History    Marital status:      Spouse name:

## 2024-08-15 ASSESSMENT — ENCOUNTER SYMPTOMS
ABDOMINAL DISTENTION: 0
VOMITING: 0
DIARRHEA: 1
ABDOMINAL PAIN: 0
CONSTIPATION: 0
CHEST TIGHTNESS: 0
COLOR CHANGE: 0
SHORTNESS OF BREATH: 0
WHEEZING: 0

## 2024-08-15 NOTE — PROGRESS NOTES
Subjective:      Patient ID: Dayami Christensen is a pleasant 89 y.o. female who presents today for:  Chief Complaint   Patient presents with    Other     Pleural effusion  (primary encounter diagnosis)  Presence of externally removable percutaneous endoscopic gastrostomy (peg) tube (hcc)  At risk for retention of urine  At risk for weight loss  Intermittent diarrhea         [facility]    Following up with patient prior PEG tube infection as well as to check on fluid retention/pleural effusion.  Patient's Pleurx drain functioning well.  They are getting fairly consistent amounts of fluid off of her lung (see progress note in PCC).  She states that she is breathing fairly well without any significant dyspnea noted at this time.  Utilizing O2 as needed.  Lung sounds show some mild diffuse crackling in the bilateral bases; no significant change from admission.  Patient does have history of urine retention, per pharmacy formulary her mirabegron on much more expensive, agreed to change to oxybutynin IR at equivalent dosing.  No evident urine retention or abdominal distention outside of normal.      Patient Active Problem List   Diagnosis    Essential hypertension    New onset atrial fibrillation (HCC)    Type 2 diabetes mellitus without complication, without long-term current use of insulin ([facility])    Class 2 severe obesity due to excess calories with serious comorbidity and body mass index (BMI) of 36.0 to 36.9 in adult (HCC)    Gastroesophageal reflux disease without esophagitis    Primary osteoarthritis involving multiple joints    Abnormal gait    Risk for falls    Chronic midline low back pain without sciatica    Osteopenia    Dyslipidemia    Dizzy    History of cardiac radiofrequency ablation (RFA)    Acute respiratory failure with hypoxia (HCC)    Lung mass    Secondary hypercoagulable state (HCC)    Malignant neoplasm of overlapping sites of left lung (HCC)    Collapse of left lung    Squamous cell carcinoma of

## 2024-08-16 ENCOUNTER — OFFICE VISIT (OUTPATIENT)
Dept: GERIATRIC MEDICINE | Age: 89
End: 2024-08-16

## 2024-08-16 DIAGNOSIS — Z91.89 AT RISK FOR DEHYDRATION: ICD-10-CM

## 2024-08-16 DIAGNOSIS — R19.7 DIARRHEA, UNSPECIFIED TYPE: Primary | ICD-10-CM

## 2024-08-17 ASSESSMENT — ENCOUNTER SYMPTOMS
CONSTIPATION: 0
CHEST TIGHTNESS: 0
COLOR CHANGE: 0
NAUSEA: 0
WHEEZING: 0
ABDOMINAL PAIN: 0
SHORTNESS OF BREATH: 0
DIARRHEA: 1
VOMITING: 0
ABDOMINAL DISTENTION: 0

## 2024-08-17 NOTE — PROGRESS NOTES
Subjective:      Patient ID: Dayami Christensen is a pleasant 89 y.o. female who presents today for:  Chief Complaint   Patient presents with    Other     Functional diarrhea  (primary encounter diagnosis)  Presence of externally removable percutaneous endoscopic gastrostomy (peg) tube ([facility])  At risk for malnutrition  Type 2 diabetes mellitus without complication, without long-term current use of insulin (hcc)         [facility]    Patient seen today due to concern of poor eating/avoiding PEG tube feedings.  She states that it is giving her diarrhea and indigestion.  Did explain some of the complications associated with PEG tube feedings and that we can attempt to mitigate utilizing loperamide and psyllium husk powder.  Patient in agreement.  Additionally discussed concerns send had about metformin causing diarrhea.  Will switch out for Jardiance and monitor progress.  Otherwise patient weight is still stable, vital signs are stable.  Denies any new abdominal pain will continue APAP and tramadol.  Maintain close monitoring.    Patient Active Problem List   Diagnosis    Essential hypertension    New onset atrial fibrillation (HCC)    Type 2 diabetes mellitus without complication, without long-term current use of insulin ([facility])    Class 2 severe obesity due to excess calories with serious comorbidity and body mass index (BMI) of 36.0 to 36.9 in adult (HCC)    Gastroesophageal reflux disease without esophagitis    Primary osteoarthritis involving multiple joints    Abnormal gait    Risk for falls    Chronic midline low back pain without sciatica    Osteopenia    Dyslipidemia    Dizzy    History of cardiac radiofrequency ablation (RFA)    Acute respiratory failure with hypoxia (HCC)    Lung mass    Secondary hypercoagulable state (HCC)    Malignant neoplasm of overlapping sites of left lung (HCC)    Collapse of left lung    Squamous cell carcinoma of left lung (HCC)    Pleural effusion, left    Palliative care encounter

## 2024-08-20 LAB
AFB STAIN: NORMAL
FINAL REPORT: NORMAL
PRELIMINARY: NORMAL

## 2024-08-21 ENCOUNTER — OFFICE VISIT (OUTPATIENT)
Dept: PULMONOLOGY | Age: 89
End: 2024-08-21
Payer: MEDICARE

## 2024-08-21 VITALS
RESPIRATION RATE: 18 BRPM | DIASTOLIC BLOOD PRESSURE: 60 MMHG | HEART RATE: 98 BPM | BODY MASS INDEX: 38.38 KG/M2 | HEIGHT: 59 IN | TEMPERATURE: 97.4 F | SYSTOLIC BLOOD PRESSURE: 100 MMHG | OXYGEN SATURATION: 99 %

## 2024-08-21 DIAGNOSIS — R06.02 SOB (SHORTNESS OF BREATH): Primary | ICD-10-CM

## 2024-08-21 DIAGNOSIS — R60.0 LOWER EXTREMITY EDEMA: ICD-10-CM

## 2024-08-21 DIAGNOSIS — R07.82 INTERCOSTAL PAIN: ICD-10-CM

## 2024-08-21 DIAGNOSIS — C34.92 SQUAMOUS CELL CARCINOMA OF LEFT LUNG (HCC): ICD-10-CM

## 2024-08-21 PROCEDURE — 99214 OFFICE O/P EST MOD 30 MIN: CPT | Performed by: INTERNAL MEDICINE

## 2024-08-21 PROCEDURE — G9692 HOSP RECD BY PT DUR MSMT PER: HCPCS | Performed by: INTERNAL MEDICINE

## 2024-08-21 NOTE — PROGRESS NOTES
Subjective:     Dayami Christensen is a 89 y.o. female who complains today of:     Chief Complaint   Patient presents with    Follow-up     2 Month F/U for Shortness of Breath and Lung Mass       HPI  Patient presents for shortness of breath    2024  Patient presents for follow-up, she is currently resides at a nursing home, she reports chest pain in her back with tenderness, worse with coughing, she has shortness of breath on exertion, she currently follows up with radiation oncology and oncology, patient is poor historian, she was unable to provide me with any meaningful history I spoke with her daughter Fanny over the phone.  No fever, lower extremity edema is at baseline         Allergies:  Asa [aspirin]  Past Medical History:   Diagnosis Date    Arthritis     Chronic kidney disease     Diabetes mellitus (HCC)     borderline    Dysphagia     Gastroesophageal reflux disease without esophagitis 2017    History of blood transfusion     hemorrhage after an     Hyperlipidemia     Hypertension     Neuropathy 2024    Osteoporosis     Squamous carcinoma of lung, left (HCC)     Urinary frequency      Past Surgical History:   Procedure Laterality Date    BRONCHOSCOPY N/A 05/15/2024    NAVIGATIONAL  BRONCHOSCOPY WITH ENDOBRONCHIAL ULTRASOUND  TBNA TRANSBRONCHIAL BIOPIES performed by Benedict Cuenca MD at [facility] [place]    CATARACT REMOVAL      COLONOSCOPY      EYE SURGERY      PLEURAL CATH INSERTION Left 2024    INSERTION LEFT PLEURX CATHETER performed by Kolby Torre MD at [facility] OR    THORACENTESIS Left 2024    550 ml removed by     TUBAL LIGATION      UPPER GASTROINTESTINAL ENDOSCOPY N/A 7/15/2024    ESOPHAGOGASTRODUODENOSCOPY performed by Star Canchola MD at [facility]    UPPER GASTROINTESTINAL ENDOSCOPY N/A 2024    Esophagogastroduodenoscopy with percutaneous endoscopic gastrostomy tube performed by Chan Redding MD at [facility] OR     Family History   Problem Relation

## 2024-08-22 ENCOUNTER — OFFICE VISIT (OUTPATIENT)
Dept: GERIATRIC MEDICINE | Age: 89
End: 2024-08-22
Payer: MEDICARE

## 2024-08-22 DIAGNOSIS — E11.9 TYPE 2 DIABETES MELLITUS WITHOUT COMPLICATION, WITHOUT LONG-TERM CURRENT USE OF INSULIN (HCC): ICD-10-CM

## 2024-08-22 DIAGNOSIS — I10 ESSENTIAL HYPERTENSION: Primary | ICD-10-CM

## 2024-08-22 DIAGNOSIS — G62.9 NEUROPATHY: ICD-10-CM

## 2024-08-22 PROBLEM — Z85.118 PERSONAL HISTORY OF OTHER MALIGNANT NEOPLASM OF BRONCHUS AND LUNG: Status: RESOLVED | Noted: 2024-07-08 | Resolved: 2024-08-22

## 2024-08-22 PROCEDURE — G9692 HOSP RECD BY PT DUR MSMT PER: HCPCS | Performed by: INTERNAL MEDICINE

## 2024-08-22 PROCEDURE — G9687 HOSPICE ANYTIME MSMT PER: HCPCS | Performed by: INTERNAL MEDICINE

## 2024-08-22 PROCEDURE — 99309 SBSQ NF CARE MODERATE MDM 30: CPT | Performed by: INTERNAL MEDICINE

## 2024-08-22 ASSESSMENT — ENCOUNTER SYMPTOMS
NAUSEA: 0
VOMITING: 0
WHEEZING: 0
CONSTIPATION: 0
DIARRHEA: 1
CHEST TIGHTNESS: 0
COLOR CHANGE: 0
ABDOMINAL DISTENTION: 0
ABDOMINAL PAIN: 0
SHORTNESS OF BREATH: 0

## 2024-08-22 NOTE — PROGRESS NOTES
reviewed and are negative.      Objective:   VS: See PCC. Reviewed.    Physical Exam  Vitals (See PCC) reviewed.   Constitutional:       General: She is not in acute distress.     Appearance: She is not ill-appearing.   HENT:      Head: Normocephalic and atraumatic.      Mouth/Throat:      Mouth: Mucous membranes are moist.      Pharynx: Oropharynx is clear.   Eyes:      Conjunctiva/sclera: Conjunctivae normal.   Cardiovascular:      Rate and Rhythm: Normal rate. Rhythm irregular.   Pulmonary:      Effort: Pulmonary effort is normal. No respiratory distress.      Breath sounds: Wheezing (chronic) present. No rhonchi or rales.   Abdominal:      General: Bowel sounds are normal.      Palpations: Abdomen is soft. There is no mass.      Tenderness: There is no abdominal tenderness.      Comments: PEG tube showing no evidence of further infection/cellulitis around site. Functioning WNL.   Skin:     General: Skin is warm and dry.   Neurological:      Mental Status: She is alert and oriented to person, place, and time. Mental status is at baseline.      Motor: Weakness present.      Gait: Gait abnormal.   Psychiatric:         Mood and Affect: Mood normal.         Behavior: Behavior normal.         Thought Content: Thought content normal.         Assessment:       Diagnosis Orders   1. Complication of feeding tube (HCC)        2. Loose stools              Plan:      No orders of the defined types were placed in this encounter.    No orders of the defined types were placed in this encounter.      0.  52 g capsules-2 tabs p.o. after meals and at bedtime in addition to psyllium husk powder each afternoon.  Monitor for further loose stools.    No follow-ups on file.      TYLER Horton    Electronically signed by: TYLER Horton on 8/22/2024    Please note orders entered on site at facility after discussion with appropriate facility nursing/therapy/ / nutritional staff. Current longstanding medical problems and

## 2024-08-28 ENCOUNTER — OFFICE VISIT (OUTPATIENT)
Dept: PALLATIVE CARE | Age: 89
End: 2024-08-28
Payer: MEDICARE

## 2024-08-28 ENCOUNTER — OFFICE VISIT (OUTPATIENT)
Dept: GERIATRIC MEDICINE | Age: 89
End: 2024-08-28

## 2024-08-28 VITALS
TEMPERATURE: 98.9 F | SYSTOLIC BLOOD PRESSURE: 108 MMHG | DIASTOLIC BLOOD PRESSURE: 70 MMHG | HEART RATE: 80 BPM | RESPIRATION RATE: 16 BRPM

## 2024-08-28 DIAGNOSIS — J90 PLEURAL EFFUSION, LEFT: Primary | ICD-10-CM

## 2024-08-28 DIAGNOSIS — Z51.5 PALLIATIVE CARE ENCOUNTER: ICD-10-CM

## 2024-08-28 DIAGNOSIS — G89.3 NEOPLASM RELATED PAIN: Primary | ICD-10-CM

## 2024-08-28 DIAGNOSIS — R62.7 FTT (FAILURE TO THRIVE) IN ADULT: ICD-10-CM

## 2024-08-28 DIAGNOSIS — R63.0 DECREASED APPETITE: ICD-10-CM

## 2024-08-28 DIAGNOSIS — C34.92 MALIGNANT NEOPLASM OF LEFT LUNG, UNSPECIFIED PART OF LUNG (HCC): ICD-10-CM

## 2024-08-28 DIAGNOSIS — F41.9 ANXIETY: ICD-10-CM

## 2024-08-28 DIAGNOSIS — R63.4 UNINTENDED WEIGHT LOSS: ICD-10-CM

## 2024-08-28 DIAGNOSIS — R42 DIZZINESS: ICD-10-CM

## 2024-08-28 PROCEDURE — G9692 HOSP RECD BY PT DUR MSMT PER: HCPCS

## 2024-08-28 PROCEDURE — 99310 SBSQ NF CARE HIGH MDM 45: CPT

## 2024-08-28 RX ORDER — MIRTAZAPINE 7.5 MG/1
7.5 TABLET, FILM COATED ORAL NIGHTLY
Qty: 30 TABLET | Refills: 0 | Status: SHIPPED | OUTPATIENT
Start: 2024-08-28

## 2024-08-28 RX ORDER — MECLIZINE HCL 12.5 MG 12.5 MG/1
12.5 TABLET ORAL 3 TIMES DAILY
Qty: 90 TABLET | Refills: 0 | Status: SHIPPED | OUTPATIENT
Start: 2024-08-28 | End: 2024-09-27

## 2024-08-28 ASSESSMENT — ENCOUNTER SYMPTOMS
TROUBLE SWALLOWING: 1
SHORTNESS OF BREATH: 1
VOICE CHANGE: 0
CONSTIPATION: 0
COUGH: 1

## 2024-08-28 NOTE — PROGRESS NOTES
or Shortness of Breath) 18 g 3    albuterol (ACCUNEB) 0.63 MG/3ML nebulizer solution Take 3 mLs by nebulization every 6 hours as needed for Wheezing (Patient taking differently: Take 3 mLs by nebulization every 6 hours as needed for Wheezing or Shortness of Breath) 270 mL 3    apixaban (ELIQUIS) 5 MG TABS tablet Take 1 tablet by mouth 2 times daily (Patient taking differently: Take 1 tablet by mouth 2 times daily Indications: Atrial Fibrillation) 60 tablet 1    mirabegron (MYRBETRIQ) 50 MG TB24 Take 50 mg by mouth daily (Patient taking differently: Take 50 mg by mouth daily Indications: Bladder Spasm) 30 tablet 0    vitamin D (ERGOCALCIFEROL) 1.25 MG (38528 UT) CAPS capsule Take 1 capsule by mouth once a week (Patient taking differently: Take 1 capsule by mouth once a week Indications: Vitamin D Deficiency) 5 capsule 0    atorvastatin (LIPITOR) 40 MG tablet Take 1 tablet by mouth daily (Patient taking differently: Take 1 tablet by mouth nightly Indications: High Amount of Fats in the Blood) 30 tablet 0    dilTIAZem (CARDIZEM CD) 120 MG extended release capsule Take 1 capsule by mouth daily (Patient taking differently: Take 1 capsule by mouth daily Indications: High Blood Pressure Disorder) 30 capsule 3    lidocaine 4 % external patch Place 3 patches onto the skin daily Apply to left chest: 12 hours on and 12 hours off, up to 3 patches daily. Remove before bed. (Patient not taking: Reported on 8/28/2024) 90 patch 0    ondansetron (ZOFRAN) 4 MG tablet Take 1 tablet by mouth every 8 hours as needed for Nausea or Vomiting Before meals and at bedtime for nausea (Patient not taking: Reported on 8/28/2024)      hydrOXYzine HCl (ATARAX) 25 MG tablet Take 1 tablet by mouth 3 times daily as needed for Anxiety For 21 days       No current facility-administered medications on file prior to visit.       Review of Systems   Constitutional:  Positive for activity change and appetite change.   HENT:  Positive for trouble  swallowing. Negative for voice change.    Respiratory:  Positive for cough and shortness of breath.    Gastrointestinal:  Negative for constipation.   Genitourinary:  Negative for difficulty urinating.   Skin:  Positive for wound.   Neurological:  Positive for dizziness and weakness.   Psychiatric/Behavioral:  Positive for sleep disturbance.        Objective:   /70 (Site: Left Upper Arm, Position: Sitting)   Pulse 80   Temp 98.9 °F (37.2 °C) (Temporal)   Resp 16    Wt Readings from Last 3 Encounters:   07/21/24 86.2 kg (190 lb)   07/20/24 73 kg (160 lb 15 oz)   07/11/24 87.9 kg (193 lb 12.6 oz)       Physical Exam  Constitutional:       General: She is awake. She is not in acute distress.     Appearance: She is well-developed.      Interventions: Nasal cannula in place.   HENT:      Head: Normocephalic and atraumatic.      Mouth/Throat:      Mouth: Mucous membranes are moist.      Pharynx: Oropharynx is clear.   Cardiovascular:      Rate and Rhythm: Normal rate and regular rhythm.      Heart sounds: Normal heart sounds.   Pulmonary:      Effort: Pulmonary effort is normal. No respiratory distress.      Breath sounds: Decreased breath sounds present. No wheezing.      Comments: Pleurx drain  Abdominal:      General: Abdomen is protuberant. Bowel sounds are normal.      Palpations: Abdomen is soft.   Musculoskeletal:         General: Normal range of motion.      Right lower leg: No edema.      Left lower leg: No edema.   Skin:     General: Skin is warm and dry.      Findings: Wound (RLE with DSD) present. No rash.   Neurological:      Mental Status: She is alert, oriented to person, place, and time and easily aroused. Mental status is at baseline.      Motor: Weakness present.      Gait: Gait abnormal.   Psychiatric:         Behavior: Behavior is cooperative.         Assessment and Plan:     1. Neoplasm related pain  -Managed  -Plan: Continue Percocet 5/325 mg 0.5 tablet for moderate pain and whole tablet for

## 2024-08-29 ENCOUNTER — HOSPITAL ENCOUNTER (OUTPATIENT)
Dept: PULMONOLOGY | Age: 89
Discharge: HOME | End: 2024-08-29
Payer: MEDICARE

## 2024-08-29 DIAGNOSIS — R06.02 SOB (SHORTNESS OF BREATH): ICD-10-CM

## 2024-08-29 PROCEDURE — 94729 DIFFUSING CAPACITY: CPT

## 2024-08-29 PROCEDURE — 6360000002 HC RX W HCPCS

## 2024-08-29 PROCEDURE — 94060 EVALUATION OF WHEEZING: CPT

## 2024-08-29 RX ORDER — ALBUTEROL SULFATE 0.83 MG/ML
SOLUTION RESPIRATORY (INHALATION)
Status: COMPLETED
Start: 2024-08-29 | End: 2024-08-29

## 2024-08-29 RX ADMIN — ALBUTEROL SULFATE 2.5 MG: 2.5 SOLUTION RESPIRATORY (INHALATION) at 08:20

## 2024-08-29 NOTE — PROGRESS NOTES
Patient Name: Dayami Christensen  Date: 2024  YOB: 1934  Medical Record Number: [id number]              History of Present Illness:      Review of Systems    Review of Systems: All 14 review of systems negative other than as stated above    Social History     Tobacco Use    Smoking status: Former     Current packs/day: 0.00     Types: Cigarettes     Quit date: 1987     Years since quittin.0    Smokeless tobacco: Never   Vaping Use    Vaping status: Never Used   Substance Use Topics    Alcohol use: No    Drug use: No         Past Medical History:   Diagnosis Date    Arthritis     Chronic kidney disease     Diabetes mellitus (HCC)     borderline    Dysphagia     Gastroesophageal reflux disease without esophagitis 2017    History of blood transfusion     hemorrhage after an     Hyperlipidemia     Hypertension     Neuropathy 2024    Osteoporosis     Squamous carcinoma of lung, left (HCC)     Urinary frequency            Past Surgical History:   Procedure Laterality Date    BRONCHOSCOPY N/A 05/15/2024    NAVIGATIONAL  BRONCHOSCOPY WITH ENDOBRONCHIAL ULTRASOUND  TBNA TRANSBRONCHIAL BIOPIES performed by Benedict Cuenca [facility]    CATARACT REMOVAL      COLONOSCOPY      EYE SURGERY      PLEURAL CATH INSERTION Left 2024    INSERTION LEFT PLEURX CATHETER performed by Kolby Torre MD at [facility]    THORACENTESIS Left 2024    550 ml removed by     TUBAL LIGATION      UPPER GASTROINTESTINAL ENDOSCOPY N/A 7/15/2024    ESOPHAGOGASTRODUODENOSCOPY performed by Star Canchola MD at [facility]    UPPER GASTROINTESTINAL ENDOSCOPY N/A 2024    Esophagogastroduodenoscopy with percutaneous endoscopic gastrostomy tube performed by Chan Redding MD at [facility]         Current Outpatient Medications on File Prior to Visit   Medication Sig Dispense Refill    sucralfate (CARAFATE) 1 GM tablet Take 1 tablet by mouth 4 times daily (before meals and nightly)  (Patient taking differently: Take 1 tablet by mouth 4 times daily (before meals and nightly) Indications: Excess Stomach Acid) 120 tablet 3    pantoprazole (PROTONIX) 40 MG tablet Take 1 tablet by mouth 2 times daily (before meals) 30 tablet 3    ondansetron (ZOFRAN) 4 MG tablet Take 1 tablet by mouth every 8 hours as needed for Nausea or Vomiting Before meals and at bedtime for nausea (Patient not taking: Reported on 8/28/2024)      hydrOXYzine HCl (ATARAX) 25 MG tablet Take 1 tablet by mouth 3 times daily as needed for Anxiety For 21 days      guaiFENesin-dextromethorphan (ROBITUSSIN DM) 100-10 MG/5ML syrup Take 10 mLs by mouth every 4 hours as needed for Cough      fluticasone-umeclidin-vilant (TRELEGY ELLIPTA) 100-62.5-25 MCG/ACT AEPB inhaler Inhale 1 puff into the lungs daily Indications: Difficulty Breathing, Lung Cancer that Begins in the Bronchus      albuterol sulfate HFA (VENTOLIN HFA) 108 (90 Base) MCG/ACT inhaler Inhale 2 puffs into the lungs every 6 hours as needed for Wheezing (Patient taking differently: Inhale 2 puffs into the lungs every 6 hours as needed for Wheezing or Shortness of Breath) 18 g 3    albuterol (ACCUNEB) 0.63 MG/3ML nebulizer solution Take 3 mLs by nebulization every 6 hours as needed for Wheezing (Patient taking differently: Take 3 mLs by nebulization every 6 hours as needed for Wheezing or Shortness of Breath) 270 mL 3    apixaban (ELIQUIS) 5 MG TABS tablet Take 1 tablet by mouth 2 times daily (Patient taking differently: Take 1 tablet by mouth 2 times daily Indications: Atrial Fibrillation) 60 tablet 1    mirabegron (MYRBETRIQ) 50 MG TB24 Take 50 mg by mouth daily (Patient taking differently: Take 50 mg by mouth daily Indications: Bladder Spasm) 30 tablet 0    vitamin D (ERGOCALCIFEROL) 1.25 MG (11830 UT) CAPS capsule Take 1 capsule by mouth once a week (Patient taking differently: Take 1 capsule by mouth once a week Indications: Vitamin D Deficiency) 5 capsule 0    atorvastatin

## 2024-08-30 ENCOUNTER — APPOINTMENT (OUTPATIENT)
Dept: PRIMARY CARE | Facility: CLINIC | Age: 89
End: 2024-08-30
Payer: MEDICARE

## 2024-08-31 NOTE — PROGRESS NOTES
tablet 3    ondansetron (ZOFRAN) 4 MG tablet Take 1 tablet by mouth every 8 hours as needed for Nausea or Vomiting Before meals and at bedtime for nausea (Patient not taking: Reported on 8/28/2024)      hydrOXYzine HCl (ATARAX) 25 MG tablet Take 1 tablet by mouth 3 times daily as needed for Anxiety For 21 days      guaiFENesin-dextromethorphan (ROBITUSSIN DM) 100-10 MG/5ML syrup Take 10 mLs by mouth every 4 hours as needed for Cough      fluticasone-umeclidin-vilant (TRELEGY ELLIPTA) 100-62.5-25 MCG/ACT AEPB inhaler Inhale 1 puff into the lungs daily Indications: Difficulty Breathing, Lung Cancer that Begins in the Bronchus      albuterol sulfate HFA (VENTOLIN HFA) 108 (90 Base) MCG/ACT inhaler Inhale 2 puffs into the lungs every 6 hours as needed for Wheezing (Patient taking differently: Inhale 2 puffs into the lungs every 6 hours as needed for Wheezing or Shortness of Breath) 18 g 3    albuterol (ACCUNEB) 0.63 MG/3ML nebulizer solution Take 3 mLs by nebulization every 6 hours as needed for Wheezing (Patient taking differently: Take 3 mLs by nebulization every 6 hours as needed for Wheezing or Shortness of Breath) 270 mL 3    apixaban (ELIQUIS) 5 MG TABS tablet Take 1 tablet by mouth 2 times daily (Patient taking differently: Take 1 tablet by mouth 2 times daily Indications: Atrial Fibrillation) 60 tablet 1    mirabegron (MYRBETRIQ) 50 MG TB24 Take 50 mg by mouth daily (Patient taking differently: Take 50 mg by mouth daily Indications: Bladder Spasm) 30 tablet 0    vitamin D (ERGOCALCIFEROL) 1.25 MG (69149 UT) CAPS capsule Take 1 capsule by mouth once a week (Patient taking differently: Take 1 capsule by mouth once a week Indications: Vitamin D Deficiency) 5 capsule 0    atorvastatin (LIPITOR) 40 MG tablet Take 1 tablet by mouth daily (Patient taking differently: Take 1 tablet by mouth nightly Indications: High Amount of Fats in the Blood) 30 tablet 0    dilTIAZem (CARDIZEM CD) 120 MG extended release capsule Take  1 capsule by mouth daily (Patient taking differently: Take 1 capsule by mouth daily Indications: High Blood Pressure Disorder) 30 capsule 3     No current facility-administered medications on file prior to visit.         Family History   Problem Relation Age of Onset    Colon Cancer Neg Hx        Social History     Socioeconomic History    Marital status:      Spouse name: Not on file    Number of children: Not on file    Years of education: Not on file    Highest education level: Not on file   Occupational History    Not on file   Tobacco Use    Smoking status: Former     Current packs/day: 0.00     Types: Cigarettes     Quit date: 1987     Years since quittin.0    Smokeless tobacco: Never   Vaping Use    Vaping status: Never Used   Substance and Sexual Activity    Alcohol use: No    Drug use: No    Sexual activity: Not on file   Other Topics Concern    Not on file   Social History Narrative    Not on file     Social Determinants of Health     Financial Resource Strain: Not on file   Food Insecurity: No Food Insecurity (2024)    Hunger Vital Sign     Worried About Running Out of Food in the Last Year: Never true     Ran Out of Food in the Last Year: Never true   Transportation Needs: No Transportation Needs (2024)    PRAPARE - Transportation     Lack of Transportation (Medical): No     Lack of Transportation (Non-Medical): No   Physical Activity: Not on file   Stress: Not on file   Social Connections: Not on file   Intimate Partner Violence: Not on file   Housing Stability: Low Risk  (2024)    Housing Stability Vital Sign     Unable to Pay for Housing in the Last Year: No     Number of Places Lived in the Last Year: 1     Unstable Housing in the Last Year: No         Lab Results   Component Value Date    LABA1C 8.8 (H) 2018     No results found for: \"EAG\"    Lab Results   Component Value Date/Time     2024 04:00 PM    K 4.9 2024 04:00 PM    K 3.6 2024 05:25

## 2024-09-03 ASSESSMENT — ENCOUNTER SYMPTOMS
BACK PAIN: 1
SHORTNESS OF BREATH: 1
COUGH: 1

## 2024-09-04 ENCOUNTER — OFFICE VISIT (OUTPATIENT)
Dept: GERIATRIC MEDICINE | Age: 89
End: 2024-09-04

## 2024-09-04 DIAGNOSIS — J90 PLEURAL EFFUSION, LEFT: ICD-10-CM

## 2024-09-04 DIAGNOSIS — R42 DIZZINESS: Primary | ICD-10-CM

## 2024-09-04 DIAGNOSIS — Z91.199 COMPLIANCE POOR: ICD-10-CM

## 2024-09-04 DIAGNOSIS — I48.91 NEW ONSET ATRIAL FIBRILLATION (HCC): ICD-10-CM

## 2024-09-04 DIAGNOSIS — C34.82 MALIGNANT NEOPLASM OF OVERLAPPING SITES OF LEFT LUNG (HCC): ICD-10-CM

## 2024-09-04 NOTE — PROGRESS NOTES
SUBJECTIVE:  89-year-old woman seen in follow-up visit for hypertension squamous cell C of the lung diabetes degenerative disease patient at her baseline acute lightheadedness functional weak in her      ROS: Intermittently constipated  The rest of the 14 point ROS negative    PHYSICAL EXAM: VSS per facility record  Alert orient x 2 pupils reactive oral mucosa moist chest notes.  Sounds cardiovascular showed a regular and abdomen soft with diminished bowel sounds extremity trace    ASSESSMENT & PLAN:   Diagnosis Orders   1. Essential hypertension        2. Squamous cell carcinoma of left lung (HCC)        3. Type 2 diabetes mellitus without complication, without long-term current use of insulin (HCC)        4. Primary osteoarthritis involving multiple joints            Continue respiratory dysfunction stable this time.  Patient intermittently weak intermittent noncompliant with therapeutic interventions.  Encouraged compliance with physical therapy.  Remains anticoagulated bleeding ounces continue anti-inflammatory agents.  Follow-up A1c BMP pending          Past Medical History:   Diagnosis Date    Arthritis     Chronic kidney disease     Diabetes mellitus (HCC)     borderline    Dysphagia     Gastroesophageal reflux disease without esophagitis 2017    History of blood transfusion     hemorrhage after an     Hyperlipidemia     Hypertension     Neuropathy 2024    Osteoporosis     Squamous carcinoma of lung, left (HCC)     Urinary frequency          Past Surgical History:   Procedure Laterality Date    BRONCHOSCOPY N/A 05/15/2024    NAVIGATIONAL  BRONCHOSCOPY WITH ENDOBRONCHIAL ULTRASOUND  TBNA TRANSBRONCHIAL BIOPIES performed by Benedict Cuenca MD at [facility] OR    CATARACT REMOVAL      COLONOSCOPY      EYE SURGERY      PLEURAL CATH INSERTION Left 2024    INSERTION LEFT PLEURX CATHETER performed by Kolby Torre MD at [facility] OR    THORACENTESIS Left 2024    550 ml removed by     TUBAL

## 2024-09-05 ENCOUNTER — OFFICE VISIT (OUTPATIENT)
Dept: GERIATRIC MEDICINE | Age: 89
End: 2024-09-05
Payer: MEDICARE

## 2024-09-05 DIAGNOSIS — E11.9 TYPE 2 DIABETES MELLITUS WITHOUT COMPLICATION, WITHOUT LONG-TERM CURRENT USE OF INSULIN (HCC): ICD-10-CM

## 2024-09-05 DIAGNOSIS — C34.92 MALIGNANT NEOPLASM OF LEFT LUNG, UNSPECIFIED PART OF LUNG (HCC): ICD-10-CM

## 2024-09-05 DIAGNOSIS — I10 ESSENTIAL HYPERTENSION: Primary | ICD-10-CM

## 2024-09-05 PROCEDURE — G9687 HOSPICE ANYTIME MSMT PER: HCPCS | Performed by: INTERNAL MEDICINE

## 2024-09-05 PROCEDURE — G9692 HOSP RECD BY PT DUR MSMT PER: HCPCS | Performed by: INTERNAL MEDICINE

## 2024-09-05 PROCEDURE — 99309 SBSQ NF CARE MODERATE MDM 30: CPT | Performed by: INTERNAL MEDICINE

## 2024-09-05 ASSESSMENT — ENCOUNTER SYMPTOMS
ABDOMINAL DISTENTION: 1
COUGH: 0
WHEEZING: 0
NAUSEA: 0
SHORTNESS OF BREATH: 0
CHEST TIGHTNESS: 0
ABDOMINAL PAIN: 0
VOMITING: 0
CONSTIPATION: 0
DIARRHEA: 1

## 2024-09-05 NOTE — PROGRESS NOTES
Subjective:      Patient ID: Dayami Christensen is a pleasant 89 y.o. female who presents today for:  Chief Complaint   Patient presents with    Other     Diarrhea, unspecified type  (primary encounter diagnosis)  At risk for dehydration           [facility]    Patient seen today due to recurrent loose stool.  Currently on PEG tube feeding as well as p.o. intake.  P.o. intake is gradually improving, however no significant strides at this time.  Patient states that she is having intermittent diarrhea/loose stools more frequently in the past.  She is on routine MiraLAX as well as as needed stool softeners.  Will DC MiraLAX and monitor.  No abdominal pain noted or further symptom complaints. Maintains fair fluids PO intake and consistent fluid flushes.       Patient Active Problem List   Diagnosis    Essential hypertension    New onset atrial fibrillation (HCC)    Type 2 diabetes mellitus without complication, without long-term current use of insulin (HCC)    Class 2 severe obesity due to excess calories with serious comorbidity and body mass index (BMI) of 36.0 to 36.9 in adult (HCC)    Gastroesophageal reflux disease without esophagitis    Primary osteoarthritis involving multiple joints    Abnormal gait    Risk for falls    Chronic midline low back pain without sciatica    Osteopenia    Dyslipidemia    Dizzy    History of cardiac radiofrequency ablation (RFA)    Acute respiratory failure with hypoxia (HCC)    Lung mass    Secondary hypercoagulable state (HCC)    Malignant neoplasm of overlapping sites of left lung (HCC)    Collapse of left lung    Squamous cell carcinoma of left lung (HCC)    Pleural effusion, left    Palliative care encounter    Goals of care, counseling/discussion    Advanced care planning/counseling discussion    Malignant neoplasm of left lung (HCC)    Neoplasm related pain    Shortness of breath at rest    SOB (shortness of breath)    Encounter for hospice care discussion    Constipation    Hx

## 2024-09-06 ENCOUNTER — TRANSCRIBE ORDERS (OUTPATIENT)
Dept: ADMINISTRATIVE | Age: 89
End: 2024-09-06

## 2024-09-06 DIAGNOSIS — C34.12 SQUAMOUS CELL CARCINOMA OF BRONCHUS IN LEFT UPPER LOBE (HCC): ICD-10-CM

## 2024-09-06 DIAGNOSIS — R91.8 LUNG MASS: Primary | ICD-10-CM

## 2024-09-09 ENCOUNTER — OFFICE VISIT (OUTPATIENT)
Dept: GERIATRIC MEDICINE | Age: 89
End: 2024-09-09

## 2024-09-09 DIAGNOSIS — R42 DIZZINESS: ICD-10-CM

## 2024-09-09 DIAGNOSIS — R33.9 URINE RETENTION: Primary | ICD-10-CM

## 2024-09-12 ENCOUNTER — OFFICE VISIT (OUTPATIENT)
Dept: GERIATRIC MEDICINE | Age: 89
End: 2024-09-12

## 2024-09-12 DIAGNOSIS — I10 ESSENTIAL HYPERTENSION: Primary | ICD-10-CM

## 2024-09-16 ENCOUNTER — HOSPITAL ENCOUNTER (OUTPATIENT)
Dept: CT IMAGING | Age: 89
Discharge: HOME | End: 2024-09-18
Attending: INTERNAL MEDICINE
Payer: MEDICARE

## 2024-09-16 DIAGNOSIS — C34.12 SQUAMOUS CELL CARCINOMA OF BRONCHUS IN LEFT UPPER LOBE (HCC): ICD-10-CM

## 2024-09-16 DIAGNOSIS — R91.8 LUNG MASS: ICD-10-CM

## 2024-09-16 LAB
PERFORMED ON: ABNORMAL
POC CREATININE: 0.5 MG/DL (ref 0.6–1.2)
POC SAMPLE TYPE: ABNORMAL

## 2024-09-16 PROCEDURE — 36415 COLL VENOUS BLD VENIPUNCTURE: CPT

## 2024-09-16 PROCEDURE — 6360000004 HC RX CONTRAST MEDICATION: Performed by: INTERNAL MEDICINE

## 2024-09-16 PROCEDURE — 71260 CT THORAX DX C+: CPT

## 2024-09-16 PROCEDURE — 70470 CT HEAD/BRAIN W/O & W/DYE: CPT

## 2024-09-16 RX ORDER — IOPAMIDOL 755 MG/ML
75 INJECTION, SOLUTION INTRAVASCULAR
Status: COMPLETED | OUTPATIENT
Start: 2024-09-16 | End: 2024-09-16

## 2024-09-16 RX ADMIN — IOPAMIDOL 75 ML: 755 INJECTION, SOLUTION INTRAVENOUS at 15:46

## 2024-09-18 ENCOUNTER — OFFICE VISIT (OUTPATIENT)
Dept: PALLATIVE CARE | Age: 89
End: 2024-09-18

## 2024-09-18 VITALS
DIASTOLIC BLOOD PRESSURE: 68 MMHG | RESPIRATION RATE: 16 BRPM | SYSTOLIC BLOOD PRESSURE: 106 MMHG | TEMPERATURE: 97.5 F | HEART RATE: 63 BPM

## 2024-09-18 DIAGNOSIS — Z51.5 PALLIATIVE CARE ENCOUNTER: ICD-10-CM

## 2024-09-18 DIAGNOSIS — G89.3 NEOPLASM RELATED PAIN: Primary | ICD-10-CM

## 2024-09-18 DIAGNOSIS — F41.9 ANXIETY: ICD-10-CM

## 2024-09-18 DIAGNOSIS — R63.0 DECREASED APPETITE: ICD-10-CM

## 2024-09-18 RX ORDER — OXYCODONE AND ACETAMINOPHEN 5; 325 MG/1; MG/1
1 TABLET ORAL EVERY 6 HOURS PRN
Qty: 28 TABLET | Refills: 0 | Status: SHIPPED | OUTPATIENT
Start: 2024-09-18 | End: 2024-10-18

## 2024-09-18 RX ORDER — OXYCODONE AND ACETAMINOPHEN 5; 325 MG/1; MG/1
0.5 TABLET ORAL EVERY 6 HOURS PRN
Qty: 28 TABLET | Refills: 0 | Status: SHIPPED | OUTPATIENT
Start: 2024-09-18 | End: 2024-10-18

## 2024-09-18 ASSESSMENT — ENCOUNTER SYMPTOMS
CONSTIPATION: 0
TROUBLE SWALLOWING: 1
COUGH: 1
SHORTNESS OF BREATH: 1
VOICE CHANGE: 0

## 2024-09-19 DIAGNOSIS — Z98.890 STATUS POST LUNG SURGERY: Primary | ICD-10-CM

## 2024-09-20 ENCOUNTER — APPOINTMENT (OUTPATIENT)
Dept: GENERAL RADIOLOGY | Age: 89
End: 2024-09-20
Payer: MEDICARE

## 2024-09-20 ENCOUNTER — HOSPITAL ENCOUNTER (EMERGENCY)
Age: 89
Discharge: HOME | End: 2024-09-21
Payer: MEDICARE

## 2024-09-20 DIAGNOSIS — K94.23 PEG TUBE MALFUNCTION (HCC): Primary | ICD-10-CM

## 2024-09-20 PROCEDURE — 49465 FLUORO EXAM OF G/COLON TUBE: CPT

## 2024-09-20 PROCEDURE — 99284 EMERGENCY DEPT VISIT MOD MDM: CPT

## 2024-09-20 PROCEDURE — 43762 RPLC GTUBE NO REVJ TRC: CPT

## 2024-09-20 PROCEDURE — 6360000002 HC RX W HCPCS: Performed by: PHYSICIAN ASSISTANT

## 2024-09-20 PROCEDURE — 6360000004 HC RX CONTRAST MEDICATION: Performed by: PHYSICIAN ASSISTANT

## 2024-09-20 PROCEDURE — 96372 THER/PROPH/DIAG INJ SC/IM: CPT

## 2024-09-20 PROCEDURE — 6370000000 HC RX 637 (ALT 250 FOR IP): Performed by: PHYSICIAN ASSISTANT

## 2024-09-20 RX ORDER — DIATRIZOATE MEGLUMINE AND DIATRIZOATE SODIUM 660; 100 MG/ML; MG/ML
60 SOLUTION ORAL; RECTAL ONCE
Status: COMPLETED | OUTPATIENT
Start: 2024-09-20 | End: 2024-09-20

## 2024-09-20 RX ORDER — MORPHINE SULFATE 4 MG/ML
4 INJECTION, SOLUTION INTRAMUSCULAR; INTRAVENOUS ONCE
Status: COMPLETED | OUTPATIENT
Start: 2024-09-20 | End: 2024-09-20

## 2024-09-20 RX ORDER — ONDANSETRON 4 MG/1
4 TABLET, ORALLY DISINTEGRATING ORAL ONCE
Status: COMPLETED | OUTPATIENT
Start: 2024-09-20 | End: 2024-09-20

## 2024-09-20 RX ADMIN — DIATRIZOATE MEGLUMINE AND DIATRIZOATE SODIUM 60 ML: 660; 100 LIQUID ORAL; RECTAL at 20:30

## 2024-09-20 RX ADMIN — ONDANSETRON 4 MG: 4 TABLET, ORALLY DISINTEGRATING ORAL at 19:22

## 2024-09-20 RX ADMIN — MORPHINE SULFATE 4 MG: 4 INJECTION, SOLUTION INTRAMUSCULAR; INTRAVENOUS at 19:22

## 2024-09-20 ASSESSMENT — ENCOUNTER SYMPTOMS
ABDOMINAL PAIN: 0
NAUSEA: 0
VOMITING: 0
ABDOMINAL PAIN: 0
WHEEZING: 0
NAUSEA: 1
COUGH: 0
CONSTIPATION: 0
SHORTNESS OF BREATH: 0
CHEST TIGHTNESS: 0
ABDOMINAL DISTENTION: 1
DIARRHEA: 1

## 2024-09-20 ASSESSMENT — PAIN - FUNCTIONAL ASSESSMENT: PAIN_FUNCTIONAL_ASSESSMENT: NONE - DENIES PAIN

## 2024-09-21 VITALS
WEIGHT: 190 LBS | RESPIRATION RATE: 19 BRPM | DIASTOLIC BLOOD PRESSURE: 64 MMHG | HEART RATE: 98 BPM | OXYGEN SATURATION: 98 % | HEIGHT: 59 IN | BODY MASS INDEX: 38.3 KG/M2 | TEMPERATURE: 97.9 F | SYSTOLIC BLOOD PRESSURE: 115 MMHG

## 2024-09-21 ASSESSMENT — PAIN - FUNCTIONAL ASSESSMENT: PAIN_FUNCTIONAL_ASSESSMENT: NONE - DENIES PAIN

## 2024-09-27 ASSESSMENT — ENCOUNTER SYMPTOMS
SHORTNESS OF BREATH: 0
CHEST TIGHTNESS: 0
CONSTIPATION: 0
NAUSEA: 0
ABDOMINAL DISTENTION: 1
COUGH: 0
VOMITING: 0
WHEEZING: 0
DIARRHEA: 0
ABDOMINAL PAIN: 0

## 2024-10-02 ASSESSMENT — ENCOUNTER SYMPTOMS
SHORTNESS OF BREATH: 0
DIARRHEA: 0
CONSTIPATION: 0
CHEST TIGHTNESS: 0
ABDOMINAL PAIN: 0
VOMITING: 0
WHEEZING: 0
COUGH: 0
NAUSEA: 0
ABDOMINAL DISTENTION: 1

## 2024-10-02 NOTE — PROGRESS NOTES
Subjective:      Patient ID: Dayami Christensen is a pleasant 89 y.o. female who presents today for:  Chief Complaint   Patient presents with    Other     Urine retention  (primary encounter diagnosis)  Dizziness         [facility]    Following up after recent reduction the patient oxybutynin and impact on dizziness.  PT/OT team states that she is more participatory, albeit only a small amount.  However we will continue with dose reduction as patient does not seem to be having any worsening symptoms of urinary retention.      Patient Active Problem List   Diagnosis    Essential hypertension    New onset atrial fibrillation (HCC)    Type 2 diabetes mellitus without complication, without long-term current use of insulin (HCC)    Class 2 severe obesity due to excess calories with serious comorbidity and body mass index (BMI) of 36.0 to 36.9 in adult    Gastroesophageal reflux disease without esophagitis    Primary osteoarthritis involving multiple joints    Abnormal gait    Risk for falls    Chronic midline low back pain without sciatica    Osteopenia    Dyslipidemia    Dizzy    History of cardiac radiofrequency ablation (RFA)    Acute respiratory failure with hypoxia    Lung mass    Secondary hypercoagulable state (HCC)    Malignant neoplasm of overlapping sites of left lung (HCC)    Collapse of left lung    Squamous cell carcinoma of left lung (HCC)    Pleural effusion, left    Palliative care encounter    Goals of care, counseling/discussion    Advanced care planning/counseling discussion    Malignant neoplasm of left lung (HCC)    Neoplasm related pain    Shortness of breath at rest    SOB (shortness of breath)    Encounter for hospice care discussion    Constipation    Hx of cancer of lung    Generalized weakness    Neuropathy    Pain in both lower extremities    Hemoptysis    Esophageal dysphagia    Odynophagia    History of lung cancer    Esophagitis    Hypokalemia    Unable to eat     Past Medical History:

## 2024-10-09 ENCOUNTER — OFFICE VISIT (OUTPATIENT)
Dept: CARDIOTHORACIC SURGERY | Age: 89
End: 2024-10-09
Payer: MEDICARE

## 2024-10-09 VITALS — HEIGHT: 59 IN | HEART RATE: 68 BPM | BODY MASS INDEX: 38.3 KG/M2 | WEIGHT: 190 LBS | TEMPERATURE: 97.8 F

## 2024-10-09 DIAGNOSIS — J90 PLEURAL EFFUSION, LEFT: Primary | ICD-10-CM

## 2024-10-09 PROCEDURE — 99212 OFFICE O/P EST SF 10 MIN: CPT | Performed by: THORACIC SURGERY (CARDIOTHORACIC VASCULAR SURGERY)

## 2024-10-09 PROCEDURE — 32552 REMOVE LUNG CATHETER: CPT | Performed by: THORACIC SURGERY (CARDIOTHORACIC VASCULAR SURGERY)

## 2024-10-09 PROCEDURE — G9692 HOSP RECD BY PT DUR MSMT PER: HCPCS | Performed by: THORACIC SURGERY (CARDIOTHORACIC VASCULAR SURGERY)

## 2024-10-09 NOTE — PROGRESS NOTES
Patient is sent to the office from her nursing home.  Apparently the Pleurx catheter is not draining anything patient is sent to the office from her nursing home.  Apparently the Pleurx catheter is not draining anything and needs to be removed.    The insertion site was prepped with alcohol.  Approximately 8 cc 1% lidocaine was used to infiltrate the site.  Using careful blunt dissection the fibrous cuff was dissected free.  The catheter was easily removed and a fresh dressing was applied.    No follow-up is needed.  May remove surgical dressing tomorrow and clean the area.  Recover with a simple Band-Aid daily until healed.

## 2024-10-09 NOTE — PATIENT INSTRUCTIONS
May remove dressing tomorrow and shower.  Recover incision with fresh Band-Aid daily until healed.

## 2024-10-11 NOTE — PROGRESS NOTES
Take 50 mg by mouth daily (Patient taking differently: Take 50 mg by mouth daily Indications: Bladder Spasm) 30 tablet 0    vitamin D (ERGOCALCIFEROL) 1.25 MG (65433 UT) CAPS capsule Take 1 capsule by mouth once a week (Patient taking differently: Take 1 capsule by mouth once a week Indications: Vitamin D Deficiency) 5 capsule 0    atorvastatin (LIPITOR) 40 MG tablet Take 1 tablet by mouth daily (Patient taking differently: Take 1 tablet by mouth nightly Indications: High Amount of Fats in the Blood) 30 tablet 0    dilTIAZem (CARDIZEM CD) 120 MG extended release capsule Take 1 capsule by mouth daily (Patient taking differently: Take 1 capsule by mouth daily Indications: High Blood Pressure) 30 capsule 3     No current facility-administered medications on file prior to visit.         Family History   Problem Relation Age of Onset    Colon Cancer Neg Hx        Social History     Socioeconomic History    Marital status:      Spouse name: Not on file    Number of children: Not on file    Years of education: Not on file    Highest education level: Not on file   Occupational History    Not on file   Tobacco Use    Smoking status: Former     Current packs/day: 0.00     Types: Cigarettes     Quit date: 1987     Years since quittin.2    Smokeless tobacco: Never   Vaping Use    Vaping status: Never Used   Substance and Sexual Activity    Alcohol use: No    Drug use: No    Sexual activity: Not on file   Other Topics Concern    Not on file   Social History Narrative    Not on file     Social Determinants of Health     Financial Resource Strain: Not on file   Food Insecurity: No Food Insecurity (2024)    Hunger Vital Sign     Worried About Running Out of Food in the Last Year: Never true     Ran Out of Food in the Last Year: Never true   Transportation Needs: No Transportation Needs (2024)    PRAPARE - Transportation     Lack of Transportation (Medical): No     Lack of Transportation (Non-Medical): No

## 2024-12-16 NOTE — PROGRESS NOTES
eat     Past Medical History:   Diagnosis Date    Arthritis     Chronic kidney disease     Diabetes mellitus (HCC)     borderline    Dysphagia     Gastroesophageal reflux disease without esophagitis 2017    History of blood transfusion     hemorrhage after an     Hyperlipidemia     Hypertension     Neuropathy 2024    Osteoporosis     Squamous carcinoma of lung, left (HCC)     Urinary frequency      Past Surgical History:   Procedure Laterality Date    BRONCHOSCOPY N/A 05/15/2024    NAVIGATIONAL  BRONCHOSCOPY WITH ENDOBRONCHIAL ULTRASOUND  TBNA TRANSBRONCHIAL BIOPIES performed by eBnedict Cuenca MD at [facility]    CATARACT REMOVAL      COLONOSCOPY      EYE SURGERY      PLEURAL CATH INSERTION Left 2024    INSERTION LEFT PLEURX CATHETER performed by Kolby Torre MD at [facility] OR    THORACENTESIS Left 2024    550 ml removed by     TUBAL LIGATION      UPPER GASTROINTESTINAL ENDOSCOPY N/A 7/15/2024    ESOPHAGOGASTRODUODENOSCOPY performed by Star Canchola MD at [facility]    UPPER GASTROINTESTINAL ENDOSCOPY N/A 2024    Esophagogastroduodenoscopy with percutaneous endoscopic gastrostomy tube performed by Chan Redding MD at [facility]     Social History     Socioeconomic History    Marital status:      Spouse name: Not on file    Number of children: Not on file    Years of education: Not on file    Highest education level: Not on file   Occupational History    Not on file   Tobacco Use    Smoking status: Former     Current packs/day: 0.00     Types: Cigarettes     Quit date: 1987     Years since quittin.3    Smokeless tobacco: Never   Vaping Use    Vaping status: Never Used   Substance and Sexual Activity    Alcohol use: No    Drug use: No    Sexual activity: Not on file   Other Topics Concern    Not on file   Social History Narrative    Not on file     Social Determinants of Health     Financial Resource Strain: Not on file   Food Insecurity: No Food

## 2025-05-16 DIAGNOSIS — I10 ESSENTIAL HYPERTENSION: ICD-10-CM

## 2025-05-16 DIAGNOSIS — E11.42 TYPE 2 DIABETES MELLITUS WITH DIABETIC POLYNEUROPATHY, WITHOUT LONG-TERM CURRENT USE OF INSULIN: ICD-10-CM

## 2025-05-16 DIAGNOSIS — E78.2 MIXED HYPERLIPIDEMIA: ICD-10-CM

## 2025-05-30 RX ORDER — LISINOPRIL 2.5 MG/1
2.5 TABLET ORAL DAILY
Qty: 90 TABLET | Refills: 0 | Status: CANCELLED | OUTPATIENT
Start: 2025-05-30

## 2025-05-30 RX ORDER — EMPAGLIFLOZIN 25 MG/1
25 TABLET, FILM COATED ORAL DAILY
Qty: 90 TABLET | Refills: 0 | Status: CANCELLED | OUTPATIENT
Start: 2025-05-30

## 2025-05-30 RX ORDER — ATORVASTATIN CALCIUM 40 MG/1
40 TABLET, FILM COATED ORAL DAILY
Qty: 90 TABLET | Refills: 0 | Status: CANCELLED | OUTPATIENT
Start: 2025-05-30

## (undated) PROCEDURE — 0BD88ZX EXTRACTION OF LEFT UPPER LOBE BRONCHUS, VIA NATURAL OR ARTIFICIAL OPENING ENDOSCOPIC, DIAGNOSTIC: ICD-10-PCS

## (undated) DEVICE — PEG KIT STD 20 FR PUSH W/ ENFIT ENDOVIVE

## (undated) DEVICE — TOWEL,OR,DSP,ST,BLUE,STD,4/PK,20PK/CS: Brand: MEDLINE

## (undated) DEVICE — ENDO CARRY-ON PROCEDURE KIT INCLUDES LUBRICANT, DEFENDO OLYMPUS AIR, WATER, SUCTION, BIOPSY VALVE KIT, ENZYMATIC SPONGE, AND BASIN.: Brand: ENDO CARRY-ON PROCEDURE KIT

## (undated) DEVICE — COVER,TABLE,44X90,STERILE: Brand: MEDLINE

## (undated) DEVICE — TUBE SET 96 MM 64 MM H2O PERISTALTIC STD AUX CHANNEL

## (undated) DEVICE — TUBING, SUCTION, 1/4" X 10', STRAIGHT: Brand: MEDLINE

## (undated) DEVICE — MAJ-1414 SINGLE USE ADPATER BIOPSY VALV: Brand: SINGLE USE ADAPTOR BIOPSY VALVE

## (undated) DEVICE — STERILE POLYISOPRENE POWDER-FREE SURGICAL GLOVES: Brand: PROTEXIS

## (undated) DEVICE — GLOVE ORANGE PI 8 1/2   MSG9085

## (undated) DEVICE — COUNTER NDL 40 COUNT HLD 70 FOAM BLK ADH W/ MAG

## (undated) DEVICE — COVER,MAYO STAND,STERILE: Brand: MEDLINE

## (undated) DEVICE — DRESSING,GAUZE,PETROLATUM,CURAD,1"X8",ST: Brand: CURAD

## (undated) DEVICE — SYRINGE, LUER SLIP, STERILE, 5ML: Brand: MEDLINE INDUSTRIES, INC.

## (undated) DEVICE — SYRINGE MED 3ML NDL 18GA L1.5IN BLNT FILL LUERLOCK TIP DISP

## (undated) DEVICE — SINGLE PORT MANIFOLD: Brand: NEPTUNE 2

## (undated) DEVICE — ADAPTER TBNG DIA15MM SWVL FBROPT BRONCHSCP TERM 2 AXIS PEEP

## (undated) DEVICE — SHEET,DRAPE,53X77,STERILE: Brand: MEDLINE

## (undated) DEVICE — SYRINGE MED 10ML SLIP TIP BLNT FILL AND LUERLOCK DISP

## (undated) DEVICE — HYPODERMIC SAFETY NEEDLE: Brand: MAGELLAN

## (undated) DEVICE — PACK,BASIC: Brand: MEDLINE

## (undated) DEVICE — BRUSH ENDO CLN L90.5IN SHTH DIA1.7MM BRIST DIA5-7MM 2-6MM

## (undated) DEVICE — SYRINGE MED 30ML SLIP TIP BLNT FILL AND LUERLOCK DISP

## (undated) DEVICE — LABEL MED MINI W/ MARKER

## (undated) DEVICE — ENDOVIVE SFT PEG KIT PULL WENFIT 20F BX2

## (undated) DEVICE — BITE BLOCK ENDOSCP AD 60 FR W/ ADJ STRP PLAS GRN BLOX

## (undated) DEVICE — UNIT DRNGE SGL COLL W/ INLINE CONN EXPR

## (undated) DEVICE — SUTURE VICRYL + SZ 3-0 L27IN ABSRB UD L26MM SH 1/2 CIR VCP416H

## (undated) DEVICE — APPLICATOR MEDICATED 10.5 CC SOLUTION HI LT ORNG CHLORAPREP

## (undated) DEVICE — ENDOSCOPIC TRAY TRNSPRT 20.5X16.5X4.1 IN RECYCL SUGAR PULP

## (undated) DEVICE — GAUZE,SPONGE,4"X4",16PLY,XRAY,STRL,LF: Brand: MEDLINE

## (undated) DEVICE — GOWN,AURORA,NONREINFORCED,LARGE: Brand: MEDLINE

## (undated) DEVICE — Device: Brand: BALLOON

## (undated) DEVICE — CONTAINER,SPECIMEN,OR STERILE,4OZ: Brand: MEDLINE

## (undated) DEVICE — CONMED SCOPE SAVER BITE BLOCK, 20X27 MM: Brand: SCOPE SAVER

## (undated) DEVICE — SINGLE USE SUCTION VALVE MAJ-209: Brand: SINGLE USE SUCTION VALVE (STERILE)

## (undated) DEVICE — ADAPTER FLSH PMP FLD MGMT GI IRRIG OFP 2 DISPOSABLE

## (undated) DEVICE — STOPCOCK 3-WAY 45 PSI LOW OFF ROT COLAR

## (undated) DEVICE — GLOVE ORANGE PI 8   MSG9080

## (undated) DEVICE — SPONGE GZ W4XL4IN RAYON POLY CVR W/NONWOVEN FAB STRL 2/PK

## (undated) DEVICE — GOWN,SIRUS,NONRNF,SETINSLV,2XL,18/CS: Brand: MEDLINE

## (undated) DEVICE — TUBING, SUCTION, 1/4" X 20', STRAIGHT: Brand: MEDLINE INDUSTRIES, INC.

## (undated) DEVICE — SUTURE VICRYL SZ 4-0 L18IN ABSRB UD L19MM PS-2 3/8 CIR PRIM J496H

## (undated) DEVICE — ENDO CARRY-ON PROCEDURE KIT: Brand: ENDO CARRY-ON PROCEDURE KIT

## (undated) DEVICE — TUBING IRRIGATION 140/160/180/190 SER GI ENDOSCP SMARTCAP

## (undated) DEVICE — PAD N ADH W3XL4IN POLY COT SFT PERF FLM EASILY CUT ABSRB

## (undated) DEVICE — TUBING, SUCTION, 9/32" X 12', STRAIGHT: Brand: MEDLINE INDUSTRIES, INC.

## (undated) DEVICE — COVER LT HNDL BLU PLAS

## (undated) DEVICE — KIT CATH PLEUR CHLORAPREP FEN DRP FLTR STRW FOAM CATH PD

## (undated) DEVICE — RESTRAINT EXTREMITY LIMB HOLDER SFT FOAM SINGLE STRP DISP